# Patient Record
Sex: MALE | Race: OTHER | Employment: UNEMPLOYED | URBAN - METROPOLITAN AREA
[De-identification: names, ages, dates, MRNs, and addresses within clinical notes are randomized per-mention and may not be internally consistent; named-entity substitution may affect disease eponyms.]

---

## 2022-10-05 ENCOUNTER — HOSPITAL ENCOUNTER (INPATIENT)
Age: 57
LOS: 20 days | Discharge: HOME HEALTH | DRG: 854 | End: 2022-10-25
Attending: EMERGENCY MEDICINE | Admitting: FAMILY MEDICINE

## 2022-10-05 ENCOUNTER — APPOINTMENT (OUTPATIENT)
Dept: CT IMAGING | Age: 57
DRG: 854 | End: 2022-10-05

## 2022-10-05 ENCOUNTER — HOSPITAL ENCOUNTER (EMERGENCY)
Dept: GENERAL RADIOLOGY | Age: 57
Discharge: HOME | DRG: 854 | End: 2022-10-08

## 2022-10-05 DIAGNOSIS — L02.219 CELLULITIS AND ABSCESS OF TRUNK: Primary | ICD-10-CM

## 2022-10-05 DIAGNOSIS — R73.9 HYPERGLYCEMIA: ICD-10-CM

## 2022-10-05 DIAGNOSIS — L03.319 CELLULITIS AND ABSCESS OF TRUNK: Primary | ICD-10-CM

## 2022-10-05 DIAGNOSIS — L02.215 ABSCESS, PERINEUM: ICD-10-CM

## 2022-10-05 DIAGNOSIS — N48.21 PENILE ABSCESS: ICD-10-CM

## 2022-10-05 PROBLEM — L02.91 ABSCESS: Status: ACTIVE | Noted: 2022-10-05

## 2022-10-05 LAB
ALBUMIN SERPL-MCNC: 1.8 G/DL (ref 3.5–5)
ALBUMIN/GLOB SERPL: 0.2 {RATIO} (ref 1.2–3.5)
ALP SERPL-CCNC: 164 U/L (ref 50–136)
ALT SERPL-CCNC: 66 U/L (ref 12–65)
ANION GAP SERPL CALC-SCNC: 7 MMOL/L (ref 4–13)
AST SERPL-CCNC: 36 U/L (ref 15–37)
BASOPHILS # BLD: 0.1 K/UL (ref 0–0.2)
BASOPHILS NFR BLD: 0 % (ref 0–2)
BILIRUB SERPL-MCNC: 0.4 MG/DL (ref 0.2–1.1)
BUN SERPL-MCNC: 31 MG/DL (ref 6–23)
CALCIUM SERPL-MCNC: 9.3 MG/DL (ref 8.3–10.4)
CHLORIDE SERPL-SCNC: 99 MMOL/L (ref 101–110)
CO2 SERPL-SCNC: 27 MMOL/L (ref 21–32)
CREAT SERPL-MCNC: 1.3 MG/DL (ref 0.8–1.5)
CRP SERPL-MCNC: 26.8 MG/DL (ref 0–0.9)
DIFFERENTIAL METHOD BLD: ABNORMAL
EOSINOPHIL # BLD: 0.1 K/UL (ref 0–0.8)
EOSINOPHIL NFR BLD: 0 % (ref 0.5–7.8)
ERYTHROCYTE [DISTWIDTH] IN BLOOD BY AUTOMATED COUNT: 14.1 % (ref 11.9–14.6)
GLOBULIN SER CALC-MCNC: 7.3 G/DL (ref 2.3–3.5)
GLUCOSE BLD STRIP.AUTO-MCNC: 327 MG/DL (ref 65–100)
GLUCOSE BLD STRIP.AUTO-MCNC: 363 MG/DL (ref 65–100)
GLUCOSE SERPL-MCNC: 287 MG/DL (ref 65–100)
HCT VFR BLD AUTO: 37.9 % (ref 41.1–50.3)
HGB BLD-MCNC: 12 G/DL (ref 13.6–17.2)
IMM GRANULOCYTES # BLD AUTO: 0.2 K/UL (ref 0–0.5)
IMM GRANULOCYTES NFR BLD AUTO: 1 % (ref 0–5)
LACTATE SERPL-SCNC: 1.8 MMOL/L (ref 0.4–2)
LACTATE SERPL-SCNC: 2 MMOL/L (ref 0.4–2)
LYMPHOCYTES # BLD: 3.1 K/UL (ref 0.5–4.6)
LYMPHOCYTES NFR BLD: 15 % (ref 13–44)
MCH RBC QN AUTO: 28.8 PG (ref 26.1–32.9)
MCHC RBC AUTO-ENTMCNC: 31.7 G/DL (ref 31.4–35)
MCV RBC AUTO: 91.1 FL (ref 79.6–97.8)
MONOCYTES # BLD: 1.3 K/UL (ref 0.1–1.3)
MONOCYTES NFR BLD: 6 % (ref 4–12)
NEUTS SEG # BLD: 16.6 K/UL (ref 1.7–8.2)
NEUTS SEG NFR BLD: 78 % (ref 43–78)
NRBC # BLD: 0 K/UL (ref 0–0.2)
PLATELET # BLD AUTO: 321 K/UL (ref 150–450)
PMV BLD AUTO: 12 FL (ref 9.4–12.3)
POTASSIUM SERPL-SCNC: 4.4 MMOL/L (ref 3.5–5.1)
PROCALCITONIN SERPL-MCNC: 0.95 NG/ML (ref 0–0.49)
PROT SERPL-MCNC: 9.1 G/DL (ref 6.3–8.2)
RBC # BLD AUTO: 4.16 M/UL (ref 4.23–5.6)
SERVICE CMNT-IMP: ABNORMAL
SERVICE CMNT-IMP: ABNORMAL
SODIUM SERPL-SCNC: 133 MMOL/L (ref 138–145)
WBC # BLD AUTO: 21.2 K/UL (ref 4.3–11.1)

## 2022-10-05 PROCEDURE — 87040 BLOOD CULTURE FOR BACTERIA: CPT

## 2022-10-05 PROCEDURE — 6370000000 HC RX 637 (ALT 250 FOR IP): Performed by: EMERGENCY MEDICINE

## 2022-10-05 PROCEDURE — 82962 GLUCOSE BLOOD TEST: CPT

## 2022-10-05 PROCEDURE — 96365 THER/PROPH/DIAG IV INF INIT: CPT

## 2022-10-05 PROCEDURE — 96375 TX/PRO/DX INJ NEW DRUG ADDON: CPT

## 2022-10-05 PROCEDURE — 93005 ELECTROCARDIOGRAM TRACING: CPT | Performed by: EMERGENCY MEDICINE

## 2022-10-05 PROCEDURE — 87205 SMEAR GRAM STAIN: CPT

## 2022-10-05 PROCEDURE — 84145 PROCALCITONIN (PCT): CPT

## 2022-10-05 PROCEDURE — 85025 COMPLETE CBC W/AUTO DIFF WBC: CPT

## 2022-10-05 PROCEDURE — 1100000000 HC RM PRIVATE

## 2022-10-05 PROCEDURE — 6360000002 HC RX W HCPCS: Performed by: EMERGENCY MEDICINE

## 2022-10-05 PROCEDURE — 2580000003 HC RX 258: Performed by: EMERGENCY MEDICINE

## 2022-10-05 PROCEDURE — 87186 SC STD MICRODIL/AGAR DIL: CPT

## 2022-10-05 PROCEDURE — 80053 COMPREHEN METABOLIC PANEL: CPT

## 2022-10-05 PROCEDURE — 87077 CULTURE AEROBIC IDENTIFY: CPT

## 2022-10-05 PROCEDURE — 6360000004 HC RX CONTRAST MEDICATION: Performed by: EMERGENCY MEDICINE

## 2022-10-05 PROCEDURE — 71045 X-RAY EXAM CHEST 1 VIEW: CPT

## 2022-10-05 PROCEDURE — 74177 CT ABD & PELVIS W/CONTRAST: CPT

## 2022-10-05 PROCEDURE — 86140 C-REACTIVE PROTEIN: CPT

## 2022-10-05 PROCEDURE — 99285 EMERGENCY DEPT VISIT HI MDM: CPT

## 2022-10-05 PROCEDURE — 83605 ASSAY OF LACTIC ACID: CPT

## 2022-10-05 RX ORDER — HYDROMORPHONE HYDROCHLORIDE 1 MG/ML
1 INJECTION, SOLUTION INTRAMUSCULAR; INTRAVENOUS; SUBCUTANEOUS
Status: COMPLETED | OUTPATIENT
Start: 2022-10-05 | End: 2022-10-05

## 2022-10-05 RX ORDER — 0.9 % SODIUM CHLORIDE 0.9 %
100 INTRAVENOUS SOLUTION INTRAVENOUS
Status: COMPLETED | OUTPATIENT
Start: 2022-10-05 | End: 2022-10-06

## 2022-10-05 RX ORDER — SODIUM CHLORIDE, SODIUM LACTATE, POTASSIUM CHLORIDE, AND CALCIUM CHLORIDE .6; .31; .03; .02 G/100ML; G/100ML; G/100ML; G/100ML
1000 INJECTION, SOLUTION INTRAVENOUS ONCE
Status: COMPLETED | OUTPATIENT
Start: 2022-10-05 | End: 2022-10-06

## 2022-10-05 RX ORDER — GLIPIZIDE 10 MG/1
TABLET, FILM COATED, EXTENDED RELEASE ORAL
Status: ON HOLD | COMMUNITY
Start: 2022-09-14 | End: 2022-10-25 | Stop reason: HOSPADM

## 2022-10-05 RX ORDER — ONDANSETRON 2 MG/ML
4 INJECTION INTRAMUSCULAR; INTRAVENOUS
Status: COMPLETED | OUTPATIENT
Start: 2022-10-05 | End: 2022-10-05

## 2022-10-05 RX ORDER — LISINOPRIL 5 MG/1
TABLET ORAL
Status: ON HOLD | COMMUNITY
End: 2022-10-25 | Stop reason: HOSPADM

## 2022-10-05 RX ORDER — SODIUM CHLORIDE 0.9 % (FLUSH) 0.9 %
10 SYRINGE (ML) INJECTION
Status: COMPLETED | OUTPATIENT
Start: 2022-10-05 | End: 2022-10-05

## 2022-10-05 RX ADMIN — HYDROMORPHONE HYDROCHLORIDE 1 MG: 1 INJECTION, SOLUTION INTRAMUSCULAR; INTRAVENOUS; SUBCUTANEOUS at 20:45

## 2022-10-05 RX ADMIN — SODIUM CHLORIDE 100 ML: 9 INJECTION, SOLUTION INTRAVENOUS at 21:03

## 2022-10-05 RX ADMIN — PIPERACILLIN AND TAZOBACTAM 4500 MG: 4; .5 INJECTION, POWDER, FOR SOLUTION INTRAVENOUS at 20:46

## 2022-10-05 RX ADMIN — SODIUM CHLORIDE, POTASSIUM CHLORIDE, SODIUM LACTATE AND CALCIUM CHLORIDE 1000 ML: 600; 310; 30; 20 INJECTION, SOLUTION INTRAVENOUS at 21:35

## 2022-10-05 RX ADMIN — INSULIN HUMAN 10 UNITS: 100 INJECTION, SOLUTION PARENTERAL at 21:36

## 2022-10-05 RX ADMIN — SODIUM CHLORIDE, PRESERVATIVE FREE 10 ML: 5 INJECTION INTRAVENOUS at 21:03

## 2022-10-05 RX ADMIN — IOPAMIDOL 100 ML: 755 INJECTION, SOLUTION INTRAVENOUS at 21:03

## 2022-10-05 RX ADMIN — ONDANSETRON 4 MG: 2 INJECTION INTRAMUSCULAR; INTRAVENOUS at 20:45

## 2022-10-05 ASSESSMENT — ENCOUNTER SYMPTOMS
SHORTNESS OF BREATH: 0
COUGH: 0
BACK PAIN: 1
ABDOMINAL PAIN: 0
VOMITING: 0

## 2022-10-05 ASSESSMENT — PAIN - FUNCTIONAL ASSESSMENT: PAIN_FUNCTIONAL_ASSESSMENT: 0-10

## 2022-10-05 ASSESSMENT — PAIN DESCRIPTION - LOCATION: LOCATION: BACK

## 2022-10-05 ASSESSMENT — PAIN SCALES - GENERAL: PAINLEVEL_OUTOF10: 8

## 2022-10-05 NOTE — ED TRIAGE NOTES
Per patient hyperglycemia x4 months states recently 404. Abscess noted to mid thoracic area red and darkened area noted. Patient states of n/v/d x7 days. Denies gu complications. Denies fever/chills.

## 2022-10-06 ENCOUNTER — APPOINTMENT (OUTPATIENT)
Dept: ULTRASOUND IMAGING | Age: 57
DRG: 854 | End: 2022-10-06

## 2022-10-06 ENCOUNTER — APPOINTMENT (OUTPATIENT)
Dept: NON INVASIVE DIAGNOSTICS | Age: 57
DRG: 854 | End: 2022-10-06

## 2022-10-06 PROBLEM — E11.9 DM2 (DIABETES MELLITUS, TYPE 2) (HCC): Status: ACTIVE | Noted: 2022-10-06

## 2022-10-06 PROBLEM — I10 PRIMARY HYPERTENSION: Status: ACTIVE | Noted: 2022-10-06

## 2022-10-06 PROBLEM — E11.51 PERIPHERAL ANGIOPATHY DUE TO TYPE 2 DIABETES MELLITUS (HCC): Chronic | Status: ACTIVE | Noted: 2022-10-06

## 2022-10-06 PROBLEM — E11.51 PERIPHERAL ANGIOPATHY DUE TO TYPE 2 DIABETES MELLITUS (HCC): Status: ACTIVE | Noted: 2022-10-06

## 2022-10-06 LAB
ANION GAP SERPL CALC-SCNC: 7 MMOL/L (ref 4–13)
BASOPHILS # BLD: 0.1 K/UL (ref 0–0.2)
BASOPHILS NFR BLD: 0 % (ref 0–2)
BUN SERPL-MCNC: 32 MG/DL (ref 6–23)
CALCIUM SERPL-MCNC: 8.3 MG/DL (ref 8.3–10.4)
CHLORIDE SERPL-SCNC: 102 MMOL/L (ref 101–110)
CO2 SERPL-SCNC: 25 MMOL/L (ref 21–32)
CREAT SERPL-MCNC: 1.4 MG/DL (ref 0.8–1.5)
DIFFERENTIAL METHOD BLD: ABNORMAL
ECHO AO ASC DIAM: 3.4 CM
ECHO AO ASCENDING AORTA INDEX: 1.21 CM/M2
ECHO AO ROOT DIAM: 3.1 CM
ECHO AO ROOT INDEX: 1.1 CM/M2
ECHO AV AREA PEAK VELOCITY: 3.2 CM2
ECHO AV AREA VTI: 3.3 CM2
ECHO AV AREA/BSA PEAK VELOCITY: 1.1 CM2/M2
ECHO AV AREA/BSA VTI: 1.2 CM2/M2
ECHO AV MEAN GRADIENT: 4 MMHG
ECHO AV MEAN VELOCITY: 1 M/S
ECHO AV PEAK GRADIENT: 8 MMHG
ECHO AV PEAK VELOCITY: 1.4 M/S
ECHO AV VELOCITY RATIO: 0.86
ECHO AV VTI: 23.4 CM
ECHO BSA: 2.94 M2
ECHO EST RA PRESSURE: 3 MMHG
ECHO IVC PROX: 1.7 CM
ECHO LA AREA 2C: 22 CM2
ECHO LA AREA 4C: 19.7 CM2
ECHO LA DIAMETER INDEX: 1.39 CM/M2
ECHO LA DIAMETER: 3.9 CM
ECHO LA MAJOR AXIS: 5.3 CM
ECHO LA MINOR AXIS: 5.8 CM
ECHO LA TO AORTIC ROOT RATIO: 1.26
ECHO LA VOL BP: 66 ML (ref 18–58)
ECHO LA VOL/BSA BIPLANE: 23 ML/M2 (ref 16–34)
ECHO LV E' LATERAL VELOCITY: 12 CM/S
ECHO LV E' SEPTAL VELOCITY: 10 CM/S
ECHO LV EDV A2C: 95 ML
ECHO LV EDV A4C: 137 ML
ECHO LV EDV INDEX A4C: 49 ML/M2
ECHO LV EDV NDEX A2C: 34 ML/M2
ECHO LV EJECTION FRACTION A2C: 34 %
ECHO LV EJECTION FRACTION A4C: 47 %
ECHO LV EJECTION FRACTION BIPLANE: 42 % (ref 55–100)
ECHO LV ESV A2C: 63 ML
ECHO LV ESV A4C: 72 ML
ECHO LV ESV INDEX A2C: 22 ML/M2
ECHO LV ESV INDEX A4C: 26 ML/M2
ECHO LV FRACTIONAL SHORTENING: 19 % (ref 28–44)
ECHO LV INTERNAL DIMENSION DIASTOLE INDEX: 1.53 CM/M2
ECHO LV INTERNAL DIMENSION DIASTOLIC: 4.3 CM (ref 4.2–5.9)
ECHO LV INTERNAL DIMENSION SYSTOLIC INDEX: 1.25 CM/M2
ECHO LV INTERNAL DIMENSION SYSTOLIC: 3.5 CM
ECHO LV IVSD: 1.4 CM (ref 0.6–1)
ECHO LV MASS 2D: 219.8 G (ref 88–224)
ECHO LV MASS INDEX 2D: 78.2 G/M2 (ref 49–115)
ECHO LV POSTERIOR WALL DIASTOLIC: 1.3 CM (ref 0.6–1)
ECHO LV RELATIVE WALL THICKNESS RATIO: 0.6
ECHO LVOT AREA: 3.8 CM2
ECHO LVOT AV VTI INDEX: 0.86
ECHO LVOT DIAM: 2.2 CM
ECHO LVOT MEAN GRADIENT: 3 MMHG
ECHO LVOT PEAK GRADIENT: 6 MMHG
ECHO LVOT PEAK VELOCITY: 1.2 M/S
ECHO LVOT STROKE VOLUME INDEX: 27.3 ML/M2
ECHO LVOT SV: 76.7 ML
ECHO LVOT VTI: 20.2 CM
ECHO MV A VELOCITY: 0.62 M/S
ECHO MV E DECELERATION TIME (DT): 224 MS
ECHO MV E VELOCITY: 0.67 M/S
ECHO MV E/A RATIO: 1.08
ECHO MV E/E' LATERAL: 5.58
ECHO MV E/E' RATIO (AVERAGED): 6.14
ECHO MV E/E' SEPTAL: 6.7
ECHO PV ACCELERATION TIME (AT): 116 MS
ECHO PV MAX VELOCITY: 1.3 M/S
ECHO PV PEAK GRADIENT: 7 MMHG
ECHO RV BASAL DIMENSION: 3.8 CM
ECHO RV INTERNAL DIMENSION: 3.2 CM
ECHO RV TAPSE: 2.1 CM (ref 1.7–?)
EKG ATRIAL RATE: 122 BPM
EKG DIAGNOSIS: NORMAL
EKG P AXIS: 44 DEGREES
EKG P-R INTERVAL: 140 MS
EKG Q-T INTERVAL: 308 MS
EKG QRS DURATION: 98 MS
EKG QTC CALCULATION (BAZETT): 438 MS
EKG R AXIS: -56 DEGREES
EKG T AXIS: 77 DEGREES
EKG VENTRICULAR RATE: 122 BPM
EOSINOPHIL # BLD: 0.1 K/UL (ref 0–0.8)
EOSINOPHIL NFR BLD: 1 % (ref 0.5–7.8)
ERYTHROCYTE [DISTWIDTH] IN BLOOD BY AUTOMATED COUNT: 14 % (ref 11.9–14.6)
EST. AVERAGE GLUCOSE BLD GHB EST-MCNC: ABNORMAL MG/DL
GLUCOSE BLD STRIP.AUTO-MCNC: 171 MG/DL (ref 65–100)
GLUCOSE BLD STRIP.AUTO-MCNC: 237 MG/DL (ref 65–100)
GLUCOSE BLD STRIP.AUTO-MCNC: 344 MG/DL (ref 65–100)
GLUCOSE BLD STRIP.AUTO-MCNC: 354 MG/DL (ref 65–100)
GLUCOSE BLD STRIP.AUTO-MCNC: 372 MG/DL (ref 65–100)
GLUCOSE SERPL-MCNC: 348 MG/DL (ref 65–100)
HBA1C MFR BLD: >14 % (ref 4.8–5.6)
HCT VFR BLD AUTO: 31.9 % (ref 41.1–50.3)
HGB BLD-MCNC: 10.2 G/DL (ref 13.6–17.2)
IMM GRANULOCYTES # BLD AUTO: 0.2 K/UL (ref 0–0.5)
IMM GRANULOCYTES NFR BLD AUTO: 1 % (ref 0–5)
LV EF: 63 %
LVEF MODALITY: ABNORMAL
LYMPHOCYTES # BLD: 2.3 K/UL (ref 0.5–4.6)
LYMPHOCYTES NFR BLD: 12 % (ref 13–44)
MCH RBC QN AUTO: 29.1 PG (ref 26.1–32.9)
MCHC RBC AUTO-ENTMCNC: 32 G/DL (ref 31.4–35)
MCV RBC AUTO: 90.9 FL (ref 79.6–97.8)
MONOCYTES # BLD: 1.3 K/UL (ref 0.1–1.3)
MONOCYTES NFR BLD: 7 % (ref 4–12)
NEUTS SEG # BLD: 15.6 K/UL (ref 1.7–8.2)
NEUTS SEG NFR BLD: 79 % (ref 43–78)
NRBC # BLD: 0 K/UL (ref 0–0.2)
PLATELET # BLD AUTO: 246 K/UL (ref 150–450)
PMV BLD AUTO: 12 FL (ref 9.4–12.3)
POTASSIUM SERPL-SCNC: 4.5 MMOL/L (ref 3.5–5.1)
RBC # BLD AUTO: 3.51 M/UL (ref 4.23–5.6)
SERVICE CMNT-IMP: ABNORMAL
SODIUM SERPL-SCNC: 134 MMOL/L (ref 138–145)
WBC # BLD AUTO: 19.7 K/UL (ref 4.3–11.1)

## 2022-10-06 PROCEDURE — 2580000003 HC RX 258: Performed by: FAMILY MEDICINE

## 2022-10-06 PROCEDURE — 6370000000 HC RX 637 (ALT 250 FOR IP): Performed by: FAMILY MEDICINE

## 2022-10-06 PROCEDURE — 86593 SYPHILIS TEST NON-TREP QUANT: CPT

## 2022-10-06 PROCEDURE — 2580000003 HC RX 258: Performed by: STUDENT IN AN ORGANIZED HEALTH CARE EDUCATION/TRAINING PROGRAM

## 2022-10-06 PROCEDURE — 87102 FUNGUS ISOLATION CULTURE: CPT

## 2022-10-06 PROCEDURE — 6370000000 HC RX 637 (ALT 250 FOR IP): Performed by: STUDENT IN AN ORGANIZED HEALTH CARE EDUCATION/TRAINING PROGRAM

## 2022-10-06 PROCEDURE — 6360000002 HC RX W HCPCS: Performed by: FAMILY MEDICINE

## 2022-10-06 PROCEDURE — 86592 SYPHILIS TEST NON-TREP QUAL: CPT

## 2022-10-06 PROCEDURE — 76700 US EXAM ABDOM COMPLETE: CPT

## 2022-10-06 PROCEDURE — 51798 US URINE CAPACITY MEASURE: CPT

## 2022-10-06 PROCEDURE — 1100000000 HC RM PRIVATE

## 2022-10-06 PROCEDURE — 6360000004 HC RX CONTRAST MEDICATION: Performed by: STUDENT IN AN ORGANIZED HEALTH CARE EDUCATION/TRAINING PROGRAM

## 2022-10-06 PROCEDURE — 82962 GLUCOSE BLOOD TEST: CPT

## 2022-10-06 PROCEDURE — 36415 COLL VENOUS BLD VENIPUNCTURE: CPT

## 2022-10-06 PROCEDURE — 85025 COMPLETE CBC W/AUTO DIFF WBC: CPT

## 2022-10-06 PROCEDURE — 86635 COCCIDIOIDES ANTIBODY: CPT

## 2022-10-06 PROCEDURE — 83036 HEMOGLOBIN GLYCOSYLATED A1C: CPT

## 2022-10-06 PROCEDURE — C8929 TTE W OR WO FOL WCON,DOPPLER: HCPCS

## 2022-10-06 PROCEDURE — 93306 TTE W/DOPPLER COMPLETE: CPT | Performed by: INTERNAL MEDICINE

## 2022-10-06 PROCEDURE — 6360000002 HC RX W HCPCS: Performed by: EMERGENCY MEDICINE

## 2022-10-06 PROCEDURE — 80048 BASIC METABOLIC PNL TOTAL CA: CPT

## 2022-10-06 PROCEDURE — 99222 1ST HOSP IP/OBS MODERATE 55: CPT | Performed by: UROLOGY

## 2022-10-06 RX ORDER — ONDANSETRON 2 MG/ML
4 INJECTION INTRAMUSCULAR; INTRAVENOUS EVERY 6 HOURS PRN
Status: DISCONTINUED | OUTPATIENT
Start: 2022-10-06 | End: 2022-10-25 | Stop reason: HOSPADM

## 2022-10-06 RX ORDER — HYDROCODONE BITARTRATE AND ACETAMINOPHEN 5; 325 MG/1; MG/1
1 TABLET ORAL EVERY 4 HOURS PRN
Status: DISCONTINUED | OUTPATIENT
Start: 2022-10-06 | End: 2022-10-08

## 2022-10-06 RX ORDER — MORPHINE SULFATE 2 MG/ML
2 INJECTION, SOLUTION INTRAMUSCULAR; INTRAVENOUS EVERY 4 HOURS PRN
Status: DISCONTINUED | OUTPATIENT
Start: 2022-10-06 | End: 2022-10-08

## 2022-10-06 RX ORDER — ACETAMINOPHEN 650 MG/1
650 SUPPOSITORY RECTAL EVERY 6 HOURS PRN
Status: DISCONTINUED | OUTPATIENT
Start: 2022-10-06 | End: 2022-10-25 | Stop reason: HOSPADM

## 2022-10-06 RX ORDER — ENOXAPARIN SODIUM 100 MG/ML
40 INJECTION SUBCUTANEOUS 2 TIMES DAILY
Status: DISCONTINUED | OUTPATIENT
Start: 2022-10-06 | End: 2022-10-18

## 2022-10-06 RX ORDER — SODIUM CHLORIDE 0.9 % (FLUSH) 0.9 %
5-40 SYRINGE (ML) INJECTION EVERY 12 HOURS SCHEDULED
Status: DISCONTINUED | OUTPATIENT
Start: 2022-10-06 | End: 2022-10-25 | Stop reason: HOSPADM

## 2022-10-06 RX ORDER — ACETAMINOPHEN 325 MG/1
650 TABLET ORAL EVERY 6 HOURS PRN
Status: DISCONTINUED | OUTPATIENT
Start: 2022-10-06 | End: 2022-10-25 | Stop reason: HOSPADM

## 2022-10-06 RX ORDER — INSULIN LISPRO 100 [IU]/ML
0-8 INJECTION, SOLUTION INTRAVENOUS; SUBCUTANEOUS
Status: DISCONTINUED | OUTPATIENT
Start: 2022-10-06 | End: 2022-10-25 | Stop reason: HOSPADM

## 2022-10-06 RX ORDER — INSULIN GLARGINE 100 [IU]/ML
25 INJECTION, SOLUTION SUBCUTANEOUS DAILY
Status: DISCONTINUED | OUTPATIENT
Start: 2022-10-06 | End: 2022-10-08

## 2022-10-06 RX ORDER — INSULIN GLARGINE 100 [IU]/ML
10 INJECTION, SOLUTION SUBCUTANEOUS NIGHTLY
Status: DISCONTINUED | OUTPATIENT
Start: 2022-10-06 | End: 2022-10-06

## 2022-10-06 RX ORDER — INSULIN LISPRO 100 [IU]/ML
3 INJECTION, SOLUTION INTRAVENOUS; SUBCUTANEOUS
Status: DISCONTINUED | OUTPATIENT
Start: 2022-10-06 | End: 2022-10-06

## 2022-10-06 RX ORDER — INSULIN LISPRO 100 [IU]/ML
0-4 INJECTION, SOLUTION INTRAVENOUS; SUBCUTANEOUS NIGHTLY
Status: DISCONTINUED | OUTPATIENT
Start: 2022-10-06 | End: 2022-10-25 | Stop reason: HOSPADM

## 2022-10-06 RX ORDER — SODIUM CHLORIDE 0.9 % (FLUSH) 0.9 %
5-40 SYRINGE (ML) INJECTION PRN
Status: DISCONTINUED | OUTPATIENT
Start: 2022-10-06 | End: 2022-10-25 | Stop reason: HOSPADM

## 2022-10-06 RX ORDER — ONDANSETRON 4 MG/1
4 TABLET, ORALLY DISINTEGRATING ORAL EVERY 8 HOURS PRN
Status: DISCONTINUED | OUTPATIENT
Start: 2022-10-06 | End: 2022-10-25 | Stop reason: HOSPADM

## 2022-10-06 RX ORDER — LISINOPRIL 5 MG/1
5 TABLET ORAL DAILY
Status: DISCONTINUED | OUTPATIENT
Start: 2022-10-06 | End: 2022-10-09

## 2022-10-06 RX ORDER — SODIUM CHLORIDE 9 MG/ML
INJECTION, SOLUTION INTRAVENOUS CONTINUOUS
Status: ACTIVE | OUTPATIENT
Start: 2022-10-06 | End: 2022-10-06

## 2022-10-06 RX ORDER — POLYETHYLENE GLYCOL 3350 17 G/17G
17 POWDER, FOR SOLUTION ORAL DAILY PRN
Status: DISCONTINUED | OUTPATIENT
Start: 2022-10-06 | End: 2022-10-25 | Stop reason: HOSPADM

## 2022-10-06 RX ORDER — SODIUM CHLORIDE 9 MG/ML
INJECTION, SOLUTION INTRAVENOUS PRN
Status: DISCONTINUED | OUTPATIENT
Start: 2022-10-06 | End: 2022-10-25 | Stop reason: HOSPADM

## 2022-10-06 RX ADMIN — Medication 2500 MG: at 00:44

## 2022-10-06 RX ADMIN — MORPHINE SULFATE 2 MG: 2 INJECTION, SOLUTION INTRAMUSCULAR; INTRAVENOUS at 05:33

## 2022-10-06 RX ADMIN — MORPHINE SULFATE 2 MG: 2 INJECTION, SOLUTION INTRAMUSCULAR; INTRAVENOUS at 09:50

## 2022-10-06 RX ADMIN — MORPHINE SULFATE 2 MG: 2 INJECTION, SOLUTION INTRAMUSCULAR; INTRAVENOUS at 01:34

## 2022-10-06 RX ADMIN — PIPERACILLIN AND TAZOBACTAM 3375 MG: 3; .375 INJECTION, POWDER, FOR SOLUTION INTRAVENOUS at 22:08

## 2022-10-06 RX ADMIN — INSULIN LISPRO 2 UNITS: 100 INJECTION, SOLUTION INTRAVENOUS; SUBCUTANEOUS at 16:17

## 2022-10-06 RX ADMIN — HYDROCODONE BITARTRATE AND ACETAMINOPHEN 1 TABLET: 5; 325 TABLET ORAL at 23:39

## 2022-10-06 RX ADMIN — SODIUM CHLORIDE, PRESERVATIVE FREE 8 ML: 5 INJECTION INTRAVENOUS at 08:22

## 2022-10-06 RX ADMIN — INSULIN LISPRO 8 UNITS: 100 INJECTION, SOLUTION INTRAVENOUS; SUBCUTANEOUS at 11:38

## 2022-10-06 RX ADMIN — SODIUM CHLORIDE, PRESERVATIVE FREE 10 ML: 5 INJECTION INTRAVENOUS at 22:08

## 2022-10-06 RX ADMIN — SODIUM CHLORIDE: 9 INJECTION, SOLUTION INTRAVENOUS at 02:33

## 2022-10-06 RX ADMIN — PIPERACILLIN AND TAZOBACTAM 3375 MG: 3; .375 INJECTION, POWDER, FOR SOLUTION INTRAVENOUS at 13:39

## 2022-10-06 RX ADMIN — PERFLUTREN 0.15 ML: 6.52 INJECTION, SUSPENSION INTRAVENOUS at 14:41

## 2022-10-06 RX ADMIN — HYDROCODONE BITARTRATE AND ACETAMINOPHEN 1 TABLET: 5; 325 TABLET ORAL at 03:50

## 2022-10-06 RX ADMIN — ENOXAPARIN SODIUM 40 MG: 100 INJECTION SUBCUTANEOUS at 22:08

## 2022-10-06 RX ADMIN — ACETAMINOPHEN 650 MG: 325 TABLET ORAL at 02:33

## 2022-10-06 RX ADMIN — PIPERACILLIN AND TAZOBACTAM 3375 MG: 3; .375 INJECTION, POWDER, FOR SOLUTION INTRAVENOUS at 06:34

## 2022-10-06 RX ADMIN — INSULIN LISPRO 8 UNITS: 100 INJECTION, SOLUTION INTRAVENOUS; SUBCUTANEOUS at 08:18

## 2022-10-06 RX ADMIN — MORPHINE SULFATE 2 MG: 2 INJECTION, SOLUTION INTRAMUSCULAR; INTRAVENOUS at 22:07

## 2022-10-06 RX ADMIN — INSULIN LISPRO 3 UNITS: 100 INJECTION, SOLUTION INTRAVENOUS; SUBCUTANEOUS at 08:18

## 2022-10-06 RX ADMIN — INSULIN GLARGINE 25 UNITS: 100 INJECTION, SOLUTION SUBCUTANEOUS at 09:51

## 2022-10-06 RX ADMIN — HYDROCODONE BITARTRATE AND ACETAMINOPHEN 1 TABLET: 5; 325 TABLET ORAL at 13:38

## 2022-10-06 RX ADMIN — INSULIN LISPRO 4 UNITS: 100 INJECTION, SOLUTION INTRAVENOUS; SUBCUTANEOUS at 02:48

## 2022-10-06 RX ADMIN — MORPHINE SULFATE 2 MG: 2 INJECTION, SOLUTION INTRAMUSCULAR; INTRAVENOUS at 16:17

## 2022-10-06 RX ADMIN — SODIUM CHLORIDE: 9 INJECTION, SOLUTION INTRAVENOUS at 08:34

## 2022-10-06 RX ADMIN — HYDROCODONE BITARTRATE AND ACETAMINOPHEN 1 TABLET: 5; 325 TABLET ORAL at 08:22

## 2022-10-06 ASSESSMENT — PAIN SCALES - GENERAL
PAINLEVEL_OUTOF10: 10
PAINLEVEL_OUTOF10: 7
PAINLEVEL_OUTOF10: 6
PAINLEVEL_OUTOF10: 10
PAINLEVEL_OUTOF10: 6
PAINLEVEL_OUTOF10: 7
PAINLEVEL_OUTOF10: 10
PAINLEVEL_OUTOF10: 6
PAINLEVEL_OUTOF10: 7
PAINLEVEL_OUTOF10: 6

## 2022-10-06 ASSESSMENT — PAIN DESCRIPTION - PAIN TYPE: TYPE: SURGICAL PAIN

## 2022-10-06 ASSESSMENT — PAIN DESCRIPTION - ORIENTATION
ORIENTATION: MID
ORIENTATION: MID
ORIENTATION: LEFT

## 2022-10-06 ASSESSMENT — PAIN DESCRIPTION - LOCATION
LOCATION: BACK
LOCATION: SCROTUM
LOCATION: BACK
LOCATION: BACK
LOCATION: PENIS
LOCATION: SCROTUM
LOCATION: SCROTUM
LOCATION: PENIS

## 2022-10-06 ASSESSMENT — PAIN DESCRIPTION - DESCRIPTORS
DESCRIPTORS: ACHING;SORE
DESCRIPTORS: ACHING;SORE
DESCRIPTORS: THROBBING
DESCRIPTORS: SORE

## 2022-10-06 NOTE — CONSULTS
CONSULT                      Date: 10/6/2022        Patient Name: Nadia Catherine     YOB: 1965      Age:  60 y.o. History of Present Illness     60 y.o.   male with medical history of DM2, HTN who presented to ED with nausea, vomiting, and concerns about back abscess. Per report, patient has been having intermittent drainage from a painful wound on his back for ~2 months. Additionally, patient with areas of groin cellulitis and induration as well. I&D performed in ER for back abscess. WBC is elevated at 21.2. Blood glucoses are also elevated over 300. Patient reports high blood sugars at home recently as well. Given areas of abscess and cellulitis, CT was performed, which does not show air or gangrenous changes, but does show developing L mons (as well as known back abscess). Patient related that it had been > 24 hours since he voided. Past Medical History     Past Medical History:   Diagnosis Date    Diabetes mellitus ([facility] [address]     Hypertension         Past Surgical History     History reviewed. No pertinent surgical history. Medications Prior to Admission     Prior to Admission medications    Medication Sig Start Date End Date Taking? Authorizing Provider   glipiZIDE (GLUCOTROL XL) 10 MG extended release tablet TAKE 1 TABLET BY MOUTH TWICE DAILY WITH FOOD 9/14/22   Historical Provider, MD   lisinopril (PRINIVIL;ZESTRIL) 5 MG tablet 1 tablet Orally Once a day for 30 day(s)    Historical Provider, MD   metFORMIN (GLUCOPHAGE) 1000 MG tablet 1 tablet with a meal Orally Twice a day for 30 days    Historical Provider, MD        Allergies     Patient has no known allergies.     Social History     Social History       Tobacco History       Smoking Status  Former Smoking Tobacco Type  Cigarettes      Smokeless Tobacco Use  Never              Alcohol History       Alcohol Use Status  Not Asked              Drug Use       Drug Use Status  Not Asked              Sexual Activity Sexually Active  Not Asked                    Family History     History reviewed. No pertinent family history. Review of Systems     Pertinent information in HPI    Physical Exam     /75   Pulse 91   Temp 99 °F (37.2 °C)   Resp 16   Ht 6' 2\" (1.88 m)   Wt (!) 365 lb (165.6 kg)   SpO2 95%   BMI 46.86 kg/m²     Physical Exam:  General:          Well nourished. No overt distress  Head:               Normocephalic, atraumatic  HENT:             Nares appear normal, no drainage. CV:                  RRR.    Lungs:             CTAB. Abdomen: Bowel sounds present. Soft, nontender, nondistended. Extremities:     Warm and dry. No cyanosis or clubbing. Skin:                L mons erythematous, firm, warm;  Penis edematous with small fluctuant area of dorsal midshaft and pinhole draining area; no scrotal swelling or redness  Neuro:             Cranial nerves II-XII grossly intact. Sensation intact  Psych:             Normal mood and affect.   Alert and oriented x3        Labs      Recent Results (from the past 24 hour(s))   EKG 12 Lead    Collection Time: 10/05/22  4:52 PM   Result Value Ref Range    Ventricular Rate 122 BPM    Atrial Rate 122 BPM    P-R Interval 140 ms    QRS Duration 98 ms    Q-T Interval 308 ms    QTc Calculation (Bazett) 438 ms    P Axis 44 degrees    R Axis -56 degrees    T Axis 77 degrees    Diagnosis Sinus tachycardia    POCT Glucose    Collection Time: 10/05/22  4:57 PM   Result Value Ref Range    POC Glucose 363 (H) 65 - 100 mg/dL    Performed by: CarlosAprGino    Lactic Acid    Collection Time: 10/05/22  4:58 PM   Result Value Ref Range    Lactic Acid, Plasma 2.0 0.4 - 2.0 MMOL/L   CBC with Auto Differential    Collection Time: 10/05/22  4:58 PM   Result Value Ref Range    WBC 21.2 (H) 4.3 - 11.1 K/uL    RBC 4.16 (L) 4.23 - 5.6 M/uL    Hemoglobin 12.0 (L) 13.6 - 17.2 g/dL    Hematocrit 37.9 (L) 41.1 - 50.3 %    MCV 91.1 79.6 - 97.8 FL    MCH 28.8 26.1 - 32.9 PG    MCHC 31.7 31.4 - 35.0 g/dL    RDW 14.1 11.9 - 14.6 %    Platelets 640 165 - 644 K/uL    MPV 12.0 9.4 - 12.3 FL    nRBC 0.00 0.0 - 0.2 K/uL    Differential Type AUTOMATED      Seg Neutrophils 78 43 - 78 %    Lymphocytes 15 13 - 44 %    Monocytes 6 4.0 - 12.0 %    Eosinophils % 0 (L) 0.5 - 7.8 %    Basophils 0 0.0 - 2.0 %    Immature Granulocytes 1 0.0 - 5.0 %    Segs Absolute 16.6 (H) 1.7 - 8.2 K/UL    Absolute Lymph # 3.1 0.5 - 4.6 K/UL    Absolute Mono # 1.3 0.1 - 1.3 K/UL    Absolute Eos # 0.1 0.0 - 0.8 K/UL    Basophils Absolute 0.1 0.0 - 0.2 K/UL    Absolute Immature Granulocyte 0.2 0.0 - 0.5 K/UL   CMP    Collection Time: 10/05/22  4:58 PM   Result Value Ref Range    Sodium 133 (L) 138 - 145 mmol/L    Potassium 4.4 3.5 - 5.1 mmol/L    Chloride 99 (L) 101 - 110 mmol/L    CO2 27 21 - 32 mmol/L    Anion Gap 7 4 - 13 mmol/L    Glucose 287 (H) 65 - 100 mg/dL    BUN 31 (H) 6 - 23 MG/DL    Creatinine 1.30 0.8 - 1.5 MG/DL    Est, Glom Filt Rate >60 >60 ml/min/1.73m2    Calcium 9.3 8.3 - 10.4 MG/DL    Total Bilirubin 0.4 0.2 - 1.1 MG/DL    ALT 66 (H) 12 - 65 U/L    AST 36 15 - 37 U/L    Alk Phosphatase 164 (H) 50 - 136 U/L    Total Protein 9.1 (H) 6.3 - 8.2 g/dL    Albumin 1.8 (L) 3.5 - 5.0 g/dL    Globulin 7.3 (H) 2.3 - 3.5 g/dL    Albumin/Globulin Ratio 0.2 (L) 1.2 - 3.5     Procalcitonin    Collection Time: 10/05/22  4:58 PM   Result Value Ref Range    Procalcitonin 0.95 (H) 0.00 - 0.49 ng/mL   C-Reactive Protein    Collection Time: 10/05/22  4:58 PM   Result Value Ref Range    CRP 26.8 (H) 0.0 - 0.9 mg/dL   Lactic Acid    Collection Time: 10/05/22  8:23 PM   Result Value Ref Range    Lactic Acid, Plasma 1.8 0.4 - 2.0 MMOL/L   POCT Glucose    Collection Time: 10/05/22 10:17 PM   Result Value Ref Range    POC Glucose 327 (H) 65 - 100 mg/dL    Performed by: Vicente    POCT Glucose    Collection Time: 10/06/22  2:45 AM   Result Value Ref Range    POC Glucose 344 (H) 65 - 100 mg/dL    Performed by: Tere    POCT Glucose    Collection Time: 10/06/22  7:13 AM   Result Value Ref Range    POC Glucose 372 (H) 65 - 100 mg/dL    Performed by: Clarke         Imaging/Diagnostics Last 24 Hours     CT ABDOMEN PELVIS W IV CONTRAST Additional Contrast? None    Result Date: 10/5/2022  EXAM: CT of the abdomen and pelvis with contrast. Indication: Hyperglycemia. Comparison: None. Multiple axial images were obtained through the abdomen and pelvis after intravenous injection of 100mL of Isovue 370. Radiation dose reduction techniques were used for this study: All CT scans performed at this facility use one or all of the following: Automated exposure control, adjustment of the mA and/or kVp according to patient's size, iterative reconstruction. FINDINGS: LOWER THORAX: Lung bases are clear. No pericardial or pleural effusion. LIVER: Normal size and contour. No focal liver lesions. The portal vein, splenic vein, and superior mesenteric vein are patent. BILIARY SYSTEM: Cholelithiasis without evidence of acute cholecystitis. PANCREAS: No pancreatic mass. No pancreatic duct dilation. SPLEEN: No splenomegaly or aggressive splenic lesion. ADRENALS: No adrenal nodule or adrenal hypertrophy. URINARY SYSTEM: No suspicious renal lesion. No renal or ureteral calculus. No hydronephrosis. The bladder is unremarkable. FLUID:  No intraperitoneal free fluid. REPRODUCTIVE: Prominent left inguinal lymph nodes with fatty soft tissue fat stranding and what appears to be phlegmonous change along the leftward aspect of the pubic region/superior aspect of the left scrotum soft tissues with suspicion for developing pubic/scrotal abscess measuring 22 x 38 mm. BOWEL: Stomach, small bowel, and large bowel are normal in course and caliber. No evidence of obstruction. Appendix is normal. VASCULAR/NODES: No aortic or iliac artery aneurysm. No lymphadenopathy.  BONES/SUBCUTANEOUS TISSUE: Left back/flank irregular peripheral enhancing fluid collection with numerous locules of air adjacent reactive fat stranding most indicative of abscess measuring approximately 12 x 10 x 4.9 cm. This abscess abuts the leftward dorsal paraspinous musculature however does not appear to involve the musculature. No evidence of acute osseous abnormality. 1. Left soft tissue\flank abscess measuring approximately 12 x 10 x 4.9 cm abutting the dorsal left paraspinous musculature without muscular involvement evident. 2. Abundant inflammatory change of the left inguinal region/left pubic region with some phlegmonous change evident and likely an early developing abscess of the left scrotum/left pubic region measuring 22 x 38 mm. Recommend clinical correlation with physical examination and close attention to this area. 3. Cholelithiasis without evidence of acute cholecystitis. XR CHEST PORTABLE    Result Date: 10/5/2022  EXAM: Single view chest radiograph. INDICATION: Hyperglycemia for 4 months. COMPARISON: None. FINDINGS: No focal lung consolidation. No pneumothorax. No pleural effusion. The heart is normal in size. No evidence of acute osseous abnormality. 1. No acute cardiopulmonary abnormality. Assessment      Principal Problem:    Abscess  Active Problems:    Primary hypertension    DM2 (diabetes mellitus, type 2) ([facility])  Resolved Problems:    * No resolved hospital problems. *  Small penile abscess  Urinary retention    Plan       Using sterile technique and feel to locate glans/urethral meatus within edematous foreskin, urethral catheter placed with > 1 L UOP. Urine will be sent for culture. Penile wound does not currently need I &D. Hopefully the area will continue to spontaneously drain and improve with IV antibiotics. Will monitor. NO scrotal sign of infection present. I called Dr. Jaiden Sandoval, General Surgery, in regards to the back abscess and L mons infection and she will see patient later today.           Electronically signed by Danielito Brizuela MD on 10/6/22 at 7:47 AM EDT

## 2022-10-06 NOTE — PROGRESS NOTES
TRANSFER - IN REPORT:    Verbal report received from Diana Tate RN on Jeanne Cevallos being received from ED for routine progression of care. Report consisted of patients Situation, Background, Assessment and Recommendations(SBAR). Information from the following report(s) SBAR, Kardex, ED Summary, Procedure Summary, MAR, and Recent Results was reviewed. Opportunity for questions and clarification was provided. Assessment completed upon patients arrival to unit and care assumed. Patient received to room OF17. Patient connected to monitor and assessment completed. Plan of care reviewed. Patient oriented to room and call light. Patient aware to use call light to communicate any chest pain or needs. Admission skin assessment completed with second RN and reveals the following: Open wound to left mid-back/flank, covered loosely w/packing and ABD pad. Scrotal redness and swelling. Small scattered scabs to BLE. Sacrum/coccyx and bilateral heels dry and intact w/no redness noted.

## 2022-10-06 NOTE — CONSULTS
Infectious Disease Consult      Today's Date: 10/6/2022   Admit Date: 10/5/2022    Impression:   Patient is a 60year old with DM2 with HbA1C of >14, admitted with a multiple abscesses, dominant on the left Paraspinous area    #Left Paraspinous abscess  #Pubic abscess  - S/p bedside I&D by urology - gram stain with GPCs  - Likely bacterial but given prolonged duration of drainage - will also consider mycobacterial organisms, fungal and atypical bacterial etiology. - Appropriately on vancomycin and PTZ  - Patient does need I&D for source control  - Major contributing factor is his diabetes   -  Plan:   Continue vancomycin dosed by pharmacy and pip/tazo 3.375grams q8rs - prolonged infusions  Engage surgery for I&D, send OR sample for AFB, fungal and bacterial cultures  Will check Cocci ab  Agree with HIV ab  Check RPR and GC/chlamydia  Aim for optimal glycemic control       Anti-infectives:   PTZ - 10/6- present  Vanc 10/5 - present    Subjective:   Date of Consultation:  October 6, 2022  Date of Admission: 10/5/2022   Referring Provider: Dr Allean Mcburney    Patient is a 60 y.o. male with PMH of poorly controlled DM, noted be originally from [facility] in ED note but on questioning patient, he has lived in the [address] [place] [address] since he was 1years old. He has lived in [place] and [place]. He moved to the [address] [place] [address] when he was 2 from [facility]. He is visiting [place], history of DM 2 hba1c of >14, reports that he is on metformin, admitted on 10/5/22 , with primary complaint of persistent draining back wound for about 2 months. S/p bedside I&D of groin abscess by urology on 10/06/22. A CT done showed a [id number]  abscess abutting the left paraspinous musculatures, abundant inflammatory changes in left pubic region and a left 3.8cm pubic abscess. Patient denies any trauma the area. He also has drainage from his left groin and shaft of his penis. He denies any respiratory or GI symptoms.        Patient Active Problem List   Diagnosis    Abscess Peripheral angiopathy due to type 2 diabetes mellitus ([facility]    Primary hypertension    DM2 (diabetes mellitus, type 2) ([facility])     Past Medical History:   Diagnosis Date    Diabetes mellitus ([facility]     Hypertension       History reviewed. No pertinent family history. Social History     Tobacco Use    Smoking status: Former     Types: Cigarettes    Smokeless tobacco: Never   Substance Use Topics    Alcohol use: Not on file     History reviewed. No pertinent surgical history. Prior to Admission medications    Medication Sig Start Date End Date Taking? Authorizing Provider   glipiZIDE (GLUCOTROL XL) 10 MG extended release tablet TAKE 1 TABLET BY MOUTH TWICE DAILY WITH FOOD 22   Historical Provider, MD   lisinopril (PRINIVIL;ZESTRIL) 5 MG tablet 1 tablet Orally Once a day for 30 day(s)    Historical Provider, MD   metFORMIN (GLUCOPHAGE) 1000 MG tablet 1 tablet with a meal Orally Twice a day for 30 days    Historical Provider, MD     No Known Allergies     Review of Systems:  All systems reviewed and negative except as otherwise stated in the HPI    Objective:   Blood pressure (!) 154/82, pulse 93, temperature 99.1 °F (37.3 °C), resp. rate 18, height 6' 2\" (1.88 m), weight (!) 365 lb (165.6 kg), SpO2 95 %. Visit Vitals  BP (!) 154/82   Pulse 93   Temp 99.1 °F (37.3 °C)   Resp 18   Ht 6' 2\" (1.88 m)   Wt (!) 365 lb (165.6 kg)   SpO2 95%   BMI 46.86 kg/m²     Temp (24hrs), Av.3 °F (37.4 °C), Min:98.2 °F (36.8 °C), Max:100.8 °F (38.2 °C)       Exam:    General:  Alert, cooperative, well noursished, well developed, appears stated age   Eyes:  Sclera anicteric. Pupils equally round and reactive to light. Mouth/Throat: Mucous membranes normal, oral pharynx clear   Neck: Supple   Lungs:   Clear to auscultation bilaterally, good effort   CV:  Regular rate and rhythm,no murmur, click, rub or gallop   Abdomen:   Soft, non-tender.  bowel sounds normal. non-distended   Extremities: No cyanosis or edema   Skin: Large draining abscess of patients left back, drainage and redness on scrotum, shaft of penis.  Erythema in groin and perineum   Lymph nodes: Cervical and supraclavicular normal   Musculoskeletal: No swelling or deformity   Lines/Devices:  Intact, no erythema, drainage or tenderness   Psych: Alert and oriented, normal mood affect given the setting       CBC:  Recent Labs     10/05/22  1658 10/06/22  0840   WBC 21.2* 19.7*   HGB 12.0* 10.2*   HCT 37.9* 31.9*    246       BMP:  Recent Labs     10/05/22  1658 10/06/22  0840   BUN 31* 32*   * 134*   K 4.4 4.5   CL 99* 102   CO2 27 25       LFTS:  Recent Labs     10/05/22  1658   ALT 66*       Data Review:   Recent Results (from the past 24 hour(s))   EKG 12 Lead    Collection Time: 10/05/22  4:52 PM   Result Value Ref Range    Ventricular Rate 122 BPM    Atrial Rate 122 BPM    P-R Interval 140 ms    QRS Duration 98 ms    Q-T Interval 308 ms    QTc Calculation (Bazett) 438 ms    P Axis 44 degrees    R Axis -56 degrees    T Axis 77 degrees    Diagnosis Sinus tachycardia    POCT Glucose    Collection Time: 10/05/22  4:57 PM   Result Value Ref Range    POC Glucose 363 (H) 65 - 100 mg/dL    Performed by: CarlosAprGino    Lactic Acid    Collection Time: 10/05/22  4:58 PM   Result Value Ref Range    Lactic Acid, Plasma 2.0 0.4 - 2.0 MMOL/L   CBC with Auto Differential    Collection Time: 10/05/22  4:58 PM   Result Value Ref Range    WBC 21.2 (H) 4.3 - 11.1 K/uL    RBC 4.16 (L) 4.23 - 5.6 M/uL    Hemoglobin 12.0 (L) 13.6 - 17.2 g/dL    Hematocrit 37.9 (L) 41.1 - 50.3 %    MCV 91.1 79.6 - 97.8 FL    MCH 28.8 26.1 - 32.9 PG    MCHC 31.7 31.4 - 35.0 g/dL    RDW 14.1 11.9 - 14.6 %    Platelets 549 087 - 878 K/uL    MPV 12.0 9.4 - 12.3 FL    nRBC 0.00 0.0 - 0.2 K/uL    Differential Type AUTOMATED      Seg Neutrophils 78 43 - 78 %    Lymphocytes 15 13 - 44 %    Monocytes 6 4.0 - 12.0 %    Eosinophils % 0 (L) 0.5 - 7.8 %    Basophils 0 0.0 - 2.0 %    Immature Granulocytes 1 0.0 - 5.0 %    Segs Absolute 16.6 (H) 1.7 - 8.2 K/UL    Absolute Lymph # 3.1 0.5 - 4.6 K/UL    Absolute Mono # 1.3 0.1 - 1.3 K/UL    Absolute Eos # 0.1 0.0 - 0.8 K/UL    Basophils Absolute 0.1 0.0 - 0.2 K/UL    Absolute Immature Granulocyte 0.2 0.0 - 0.5 K/UL   CMP    Collection Time: 10/05/22  4:58 PM   Result Value Ref Range    Sodium 133 (L) 138 - 145 mmol/L    Potassium 4.4 3.5 - 5.1 mmol/L    Chloride 99 (L) 101 - 110 mmol/L    CO2 27 21 - 32 mmol/L    Anion Gap 7 4 - 13 mmol/L    Glucose 287 (H) 65 - 100 mg/dL    BUN 31 (H) 6 - 23 MG/DL    Creatinine 1.30 0.8 - 1.5 MG/DL    Est, Glom Filt Rate >60 >60 ml/min/1.73m2    Calcium 9.3 8.3 - 10.4 MG/DL    Total Bilirubin 0.4 0.2 - 1.1 MG/DL    ALT 66 (H) 12 - 65 U/L    AST 36 15 - 37 U/L    Alk Phosphatase 164 (H) 50 - 136 U/L    Total Protein 9.1 (H) 6.3 - 8.2 g/dL    Albumin 1.8 (L) 3.5 - 5.0 g/dL    Globulin 7.3 (H) 2.3 - 3.5 g/dL    Albumin/Globulin Ratio 0.2 (L) 1.2 - 3.5     Procalcitonin    Collection Time: 10/05/22  4:58 PM   Result Value Ref Range    Procalcitonin 0.95 (H) 0.00 - 0.49 ng/mL   C-Reactive Protein    Collection Time: 10/05/22  4:58 PM   Result Value Ref Range    CRP 26.8 (H) 0.0 - 0.9 mg/dL   Lactic Acid    Collection Time: 10/05/22  8:23 PM   Result Value Ref Range    Lactic Acid, Plasma 1.8 0.4 - 2.0 MMOL/L   Culture, Wound Aerobic Only    Collection Time: 10/05/22  8:23 PM    Specimen: Abscess    SACRAL WOUND   Result Value Ref Range    Special Requests NO SPECIAL REQUESTS      Gram stain PENDING     Culture        No growth after short period of incubation. Further results to follow after overnight incubation.    POCT Glucose    Collection Time: 10/05/22 10:17 PM   Result Value Ref Range    POC Glucose 327 (H) 65 - 100 mg/dL    Performed by: Vicente    POCT Glucose    Collection Time: 10/06/22  2:45 AM   Result Value Ref Range    POC Glucose 344 (H) 65 - 100 mg/dL    Performed by: Tere    POCT Glucose    Collection Time: 10/06/22  7:13 AM   Result Value Ref Range    POC Glucose 372 (H) 65 - 100 mg/dL    Performed by: Clarke    Basic Metabolic Panel w/ Reflex to MG    Collection Time: 10/06/22  8:40 AM   Result Value Ref Range    Sodium 134 (L) 138 - 145 mmol/L    Potassium 4.5 3.5 - 5.1 mmol/L    Chloride 102 101 - 110 mmol/L    CO2 25 21 - 32 mmol/L    Anion Gap 7 4 - 13 mmol/L    Glucose 348 (H) 65 - 100 mg/dL    BUN 32 (H) 6 - 23 MG/DL    Creatinine 1.40 0.8 - 1.5 MG/DL    Est, Glom Filt Rate 58 (L) >60 ml/min/1.73m2    Calcium 8.3 8.3 - 10.4 MG/DL   CBC with Auto Differential    Collection Time: 10/06/22  8:40 AM   Result Value Ref Range    WBC 19.7 (H) 4.3 - 11.1 K/uL    RBC 3.51 (L) 4.23 - 5.6 M/uL    Hemoglobin 10.2 (L) 13.6 - 17.2 g/dL    Hematocrit 31.9 (L) 41.1 - 50.3 %    MCV 90.9 79.6 - 97.8 FL    MCH 29.1 26.1 - 32.9 PG    MCHC 32.0 31.4 - 35.0 g/dL    RDW 14.0 11.9 - 14.6 %    Platelets 350 945 - 904 K/uL    MPV 12.0 9.4 - 12.3 FL    nRBC 0.00 0.0 - 0.2 K/uL    Differential Type AUTOMATED      Seg Neutrophils 79 (H) 43 - 78 %    Lymphocytes 12 (L) 13 - 44 %    Monocytes 7 4.0 - 12.0 %    Eosinophils % 1 0.5 - 7.8 %    Basophils 0 0.0 - 2.0 %    Immature Granulocytes 1 0.0 - 5.0 %    Segs Absolute 15.6 (H) 1.7 - 8.2 K/UL    Absolute Lymph # 2.3 0.5 - 4.6 K/UL    Absolute Mono # 1.3 0.1 - 1.3 K/UL    Absolute Eos # 0.1 0.0 - 0.8 K/UL    Basophils Absolute 0.1 0.0 - 0.2 K/UL    Absolute Immature Granulocyte 0.2 0.0 - 0.5 K/UL   POCT Glucose    Collection Time: 10/06/22 11:03 AM   Result Value Ref Range    POC Glucose 354 (H) 65 - 100 mg/dL    Performed by: Clarke         Microbiology:  Reviewed    Studies:  Reviewed    Signed By: Valeriy Alvarado MD     October 6, 2022

## 2022-10-06 NOTE — PROGRESS NOTES
Physician Progress Note      PATIENT:               Yola Boles  CSN #:                  [id number]  :                       1965  ADMIT DATE:       10/5/2022 7:22 PM  [address] [place] DATE:  RESPONDING  PROVIDER #:        Geo Fam MD          QUERY TEXT:    Pt admitted with Abscesses/Cellulitis. Pt noted to meet sepsis criteria. If   possible, please document in the progress notes and discharge summary if you   are evaluating and /or treating any of the following: The medical record reflects the following:  Risk Factors: 60 y.o. male with medical history of DM2, HTN who presented to   ED with nausea, vomiting, and concerns about abscess. Per report, patient has   been having intermittent drainage from a painful wound on his back for   2 months. Additionally, patient with areas of groin cellulitis and induration   as well. Clinical Indicators: WBC 21.2, 19.7, Tachycardia, CRP 26.8, Procal 0.95, LA   1.8, 2.0  Treatment: Serial labs, monitor VS, Zosyn, blood and urine cultures, IVF's    Thank you,  Brynn Escobedo RN, BSN, CDI  [email or web address]  . Options provided:  -- Sepsis, present on admission  -- Cellulitis without Sepsis  -- Other - I will add my own diagnosis  -- Disagree - Not applicable / Not valid  -- Disagree - Clinically unable to determine / Unknown  -- Refer to Clinical Documentation Reviewer    PROVIDER RESPONSE TEXT:    This patient has sepsis which was present on admission. Query created by: Janet Nichole on 10/6/2022 12:11 PM      QUERY TEXT:    Patient admitted with BMI 46.86. If possible, please document in progress   notes and discharge summary if you are evaluating and /or treating any of the   following: The medical record reflects the following:  Risk Factors: 60 y.o. male with medical history of DM2, HTN who presented to   ED with nausea, vomiting, and concerns about abscess.   Per report, patient has   been having intermittent drainage from a painful wound on his back for   2 months. Additionally, patient with areas of groin cellulitis and induration   as well. Clinical Indicators: BMI 46.86  Treatment: Carb control diet      ? Specificity of obesity and morbid obesity should be reported based on   physician documentation, as there are several published classifications and   definitions?  MS-DRG Training Guide. CDC:   https://tate-dior.info/. WHO:   http://edgardo.biz/. NIH:   LargeFood.be    Thank you,  Robert Boo RN, BSN, [email or web address] [email or web address]  . Options provided:  -- Obesity  -- Morbid obesity  -- Severe obesity  -- Overweight  -- BMI not clinically significant  -- Other - I will add my own diagnosis  -- Disagree - Not applicable / Not valid  -- Disagree - Clinically unable to determine / Unknown  -- Refer to Clinical Documentation Reviewer    PROVIDER RESPONSE TEXT:    This patient has severe obesity.     Query created by: Sarah Salazar on 10/6/2022 12:15 PM      Electronically signed by:  Sugey Patricia MD 10/6/2022 1:04 PM

## 2022-10-06 NOTE — H&P
Hospitalist History and Physical   Admit Date:  10/5/2022  7:22 PM   Name:  Domenica Sicard   Age:  60 y.o. Sex:  male  :  1965   MRN:  [id number]     Presenting Complaint: N/V, abscess, high BG  Reason(s) for Admission: Abscess [L02.91]     History of Present Illness:   Domenica Sicard is a 60 y.o. male with medical history of DM2, HTN who presented to ED with nausea, vomiting, and concerns about abscess. Per report, patient has been having intermittent drainage from a painful wound on his back for ~2 months. Additionally, patient with areas of groin cellulitis and induration as well. I&D performed in ER for back abscess. WBC is elevated at 21.2. Blood glucoses are also elevated over 300. Patient reports high blood sugars at home recently as well. Patient not in DKA. Given areas of abscess and cellulitis, CT was performed, which does not show air or gangrenous changes, but does show developing scrotal abscess (as well as known back abscess). Dr. Cavazos of Urology contacted. Hospitalist to admit. Review of Systems:  10 systems reviewed and negative except as noted in HPI. Assessment & Plan:   Abscesses/Cellulitis  - Back abscess has been I&D'd by ER  - Areas of developing groin/scrotal abscess as seen on CT  - Given  involvement, Urology consulted, Dr. Cavazos agrees to see, appreciate their evaluation  - IV vanc and zosyn    DM2  - Elevated Bgs  - Only on oral meds, holding while hospitalized  - Will check hgbA1C  - SSI    HTN  - Continue home lisinopril     Disposition: inpatient    Past medical history reviewed. Past surgical history reviewed. No Known Allergies   Social History     Tobacco Use    Smoking status: Not on file    Smokeless tobacco: Not on file   Substance Use Topics    Alcohol use: Not on file         Family history reviewed and noncontributory to patient's acute condition; no relevant family history unless otherwise noted above.     There is no immunization history on file for this patient. PTA Medications:  No current outpatient medications    Objective:   Patient Vitals for the past 24 hrs:   Temp Pulse Resp BP SpO2   10/05/22 2223 -- -- -- 123/75 96 %   10/05/22 2035 -- -- -- 121/63 --   10/05/22 1923 -- -- -- 133/80 98 %   10/05/22 1907 99 °F (37.2 °C) (!) 120 20 (!) 138/90 98 %   10/05/22 1654 98.2 °F (36.8 °C) (!) 123 20 (!) 146/95 99 %          Estimated body mass index is 46.86 kg/m² as calculated from the following:    Height as of this encounter: 6' 2\" (1.88 m). Weight as of this encounter: 365 lb (165.6 kg). Intake/Output Summary (Last 24 hours) at 10/5/2022 2335  Last data filed at 10/5/2022 2116  Gross per 24 hour   Intake 100 ml   Output --   Net 100 ml         Physical Exam:  General:    Well nourished. No overt distress  Head:  Normocephalic, atraumatic  Eyes:  Sclerae appear normal.  Pupils equally round. HENT:  Nares appear normal, no drainage. Moist mucous membranes  Neck:  No restricted ROM. Trachea midline  CV:   RRR. S1/S2 auscultated  Lungs:   CTAB. No wheezing, rhonchi, or rales. Appears even, unlabored  Abdomen: Bowel sounds present. Soft, nontender, nondistended. Extremities: Warm and dry. No cyanosis or clubbing. Skin:     Back with area of abscess s/p I&D, dressed. Swelling of groin, scrotum, penis with associated erythema. Neuro:  Cranial nerves II-XII grossly intact. Sensation intact  Psych:  Normal mood and affect.   Alert and oriented x3    Data Ordered and Personally Reviewed:    Last 24hr Labs:  Recent Results (from the past 24 hour(s))   EKG 12 Lead    Collection Time: 10/05/22  4:52 PM   Result Value Ref Range    Ventricular Rate 122 BPM    Atrial Rate 122 BPM    P-R Interval 140 ms    QRS Duration 98 ms    Q-T Interval 308 ms    QTc Calculation (Bazett) 438 ms    P Axis 44 degrees    R Axis -56 degrees    T Axis 77 degrees    Diagnosis Sinus tachycardia    POCT Glucose    Collection Time: 10/05/22  4:57 PM   Result Value Ref Range    POC Glucose 363 (H) 65 - 100 mg/dL    Performed by: Varsha    Lactic Acid    Collection Time: 10/05/22  4:58 PM   Result Value Ref Range    Lactic Acid, Plasma 2.0 0.4 - 2.0 MMOL/L   CBC with Auto Differential    Collection Time: 10/05/22  4:58 PM   Result Value Ref Range    WBC 21.2 (H) 4.3 - 11.1 K/uL    RBC 4.16 (L) 4.23 - 5.6 M/uL    Hemoglobin 12.0 (L) 13.6 - 17.2 g/dL    Hematocrit 37.9 (L) 41.1 - 50.3 %    MCV 91.1 79.6 - 97.8 FL    MCH 28.8 26.1 - 32.9 PG    MCHC 31.7 31.4 - 35.0 g/dL    RDW 14.1 11.9 - 14.6 %    Platelets 414 263 - 472 K/uL    MPV 12.0 9.4 - 12.3 FL    nRBC 0.00 0.0 - 0.2 K/uL    Differential Type AUTOMATED      Seg Neutrophils 78 43 - 78 %    Lymphocytes 15 13 - 44 %    Monocytes 6 4.0 - 12.0 %    Eosinophils % 0 (L) 0.5 - 7.8 %    Basophils 0 0.0 - 2.0 %    Immature Granulocytes 1 0.0 - 5.0 %    Segs Absolute 16.6 (H) 1.7 - 8.2 K/UL    Absolute Lymph # 3.1 0.5 - 4.6 K/UL    Absolute Mono # 1.3 0.1 - 1.3 K/UL    Absolute Eos # 0.1 0.0 - 0.8 K/UL    Basophils Absolute 0.1 0.0 - 0.2 K/UL    Absolute Immature Granulocyte 0.2 0.0 - 0.5 K/UL   CMP    Collection Time: 10/05/22  4:58 PM   Result Value Ref Range    Sodium 133 (L) 138 - 145 mmol/L    Potassium 4.4 3.5 - 5.1 mmol/L    Chloride 99 (L) 101 - 110 mmol/L    CO2 27 21 - 32 mmol/L    Anion Gap 7 4 - 13 mmol/L    Glucose 287 (H) 65 - 100 mg/dL    BUN 31 (H) 6 - 23 MG/DL    Creatinine 1.30 0.8 - 1.5 MG/DL    Est, Glom Filt Rate >60 >60 ml/min/1.73m2    Calcium 9.3 8.3 - 10.4 MG/DL    Total Bilirubin 0.4 0.2 - 1.1 MG/DL    ALT 66 (H) 12 - 65 U/L    AST 36 15 - 37 U/L    Alk Phosphatase 164 (H) 50 - 136 U/L    Total Protein 9.1 (H) 6.3 - 8.2 g/dL    Albumin 1.8 (L) 3.5 - 5.0 g/dL    Globulin 7.3 (H) 2.3 - 3.5 g/dL    Albumin/Globulin Ratio 0.2 (L) 1.2 - 3.5     Procalcitonin    Collection Time: 10/05/22  4:58 PM   Result Value Ref Range    Procalcitonin 0.95 (H) 0.00 - 0.49 ng/mL   C-Reactive Protein    Collection Time: 10/05/22  4:58 PM   Result Value Ref Range    CRP 26.8 (H) 0.0 - 0.9 mg/dL   Lactic Acid    Collection Time: 10/05/22  8:23 PM   Result Value Ref Range    Lactic Acid, Plasma 1.8 0.4 - 2.0 MMOL/L   POCT Glucose    Collection Time: 10/05/22 10:17 PM   Result Value Ref Range    POC Glucose 327 (H) 65 - 100 mg/dL    Performed by: Vicente          Signed:  Jesika Dos Santos MD

## 2022-10-06 NOTE — PROGRESS NOTES
Hospitalist Progress Note   Admit Date:  10/5/2022  7:22 PM   Name:  Ryan Hagan   Age:  60 y.o. Sex:  male  :  1965   MRN:  [id number]   Room:  [place] [address]    Presenting Complaint: Abscess     Reason(s) for Admission: Cellulitis and abscess of trunk [L03.319, L02.219]  Abscess [L02.91]  Hyperglycemia [R73.9]  Abscess, perineum [L02.215]     Hospital Course:   Ryan Hagan is a 60 y.o. male with medical history of DM2, HTN who presented to ED with nausea, vomiting, and concerns about abscess. Per report, patient has been having intermittent drainage from a painful wound on his back for ~2 months. Additionally, patient with areas of groin cellulitis and induration as well. I&D performed in ER for back abscess. WBC is elevated at 21.2. Blood glucoses are also elevated over 300. Patient reports high blood sugars at home recently as well. Patient not in DKA. Given areas of abscess and cellulitis, CT was performed, which does not show air or gangrenous changes, but does show developing scrotal abscess (as well as known back abscess). Dr. Loli Leija of Urology contacted. Subjective & 24hr Events (10/06/22): Patient is seen and examined at the bedside. He spiked a fever of 100.8 F yesterday. Patient is complaining of severe back pain. He reported nausea but no vomiting. Family was at the bedside. Patient denies chest pain, shortness of breath, diarrhea, palpitations, dizziness.       Assessment & Plan:   Abscesses/Cellulitis  -Left flank abscess and s/p I&D'd by ED  Febrile 100.8F and  leukocytosis improving  S/p IV fluids  Follow-up with wound cultures and blood cultures  - Continue IV vanc and zosyn  F/u HIV  F/U echo  IR was consulted   ID was consulted    Urinary retention  S/p Robles  Urine output of more than 1 L  Follow-up with urine cultures    Small penile abscess  No scrotal signs of infection  No need of I&D and hopefully will  improve with IV antibiotics  Urology was consulted    Cholelithiasis  Follow-up with ultrasound     Uncontrolled DM2  Blood glucose ranging around 344  Holding oral hypoglycemics  Follow-up with hgbA1C  Started on Lantus 25 units daily  - SSI  Diabetes instructor was consulted     HTN  -Stopped lisinopril as patient was hypotensive    Anemia   Most likely due to infection  Hb is 10 and stable. Disposition: Anticipating hospital stay for 2 to 3 days. Diet:  ADULT DIET; Regular; 4 carb choices (60 gm/meal)  DVT PPx: Lovenox  Code status: Full Code    Hospital Problems:  Principal Problem:    Abscess  Active Problems:    Primary hypertension    DM2 (diabetes mellitus, type 2) ([facility] [address]  Resolved Problems:    * No resolved hospital problems. *      Objective:   Patient Vitals for the past 24 hrs:   Temp Pulse Resp BP SpO2   10/06/22 1102 99.1 °F (37.3 °C) 93 18 (!) 154/82 95 %   10/06/22 0713 99 °F (37.2 °C) 91 16 122/75 95 %   10/06/22 0413 99.7 °F (37.6 °C) 96 16 109/63 95 %   10/06/22 0350 -- -- 18 -- --   10/06/22 0204 -- -- 18 -- --   10/06/22 0122 (!) 100.8 °F (38.2 °C) (!) 108 20 117/77 96 %   10/06/22 0040 -- -- -- 119/69 --   10/06/22 0010 -- -- -- 110/70 --   10/06/22 0000 -- -- -- 125/73 --   10/05/22 2340 -- -- -- 108/69 90 %   10/05/22 2330 -- -- -- 103/70 96 %   10/05/22 2310 -- -- -- 113/82 --   10/05/22 2300 -- -- -- 108/63 100 %   10/05/22 2223 -- -- -- 123/75 96 %   10/05/22 2035 -- -- -- 121/63 --   10/05/22 1923 -- -- -- 133/80 98 %   10/05/22 1907 99 °F (37.2 °C) (!) 120 20 (!) 138/90 98 %   10/05/22 1654 98.2 °F (36.8 °C) (!) 123 20 (!) 146/95 99 %       Oxygen Therapy  SpO2: 95 %  O2 Device: None (Room air)    Estimated body mass index is 46.86 kg/m² as calculated from the following:    Height as of this encounter: 6' 2\" (1.88 m). Weight as of this encounter: 365 lb (165.6 kg).     Intake/Output Summary (Last 24 hours) at 10/6/2022 1450  Last data filed at 10/6/2022 0443  Gross per 24 hour   Intake 545 ml   Output -- Net 545 ml         Physical Exam:     Blood pressure (!) 154/82, pulse 93, temperature 99.1 °F (37.3 °C), resp. rate 18, height 6' 2\" (1.88 m), weight (!) 365 lb (165.6 kg), SpO2 95 %. General:    Well nourished, morbidly obese   Head:  Normocephalic, atraumatic  Eyes:  Sclerae appear normal.  Pupils equally round. ENT:  Nares appear normal, no drainage. Moist oral mucosa  Neck:  No restricted ROM. Trachea midline   CV:   RRR. No m/r/g. No jugular venous distension. Lungs:   CTAB. No wheezing, rhonchi, or rales. Symmetric expansion. Abdomen: Bowel sounds present. Soft, nontender, nondistended. Extremities: No cyanosis or clubbing. No edema  Skin:     No rashes and normal coloration. Warm and dry. Genitourinary Scrotal ulcer is present and small penile abscess is present, Robles is present   Dressing is present on the back  Neuro:  CN II-XII grossly intact. Sensation intact. A&Ox3  Psych:  Normal mood and affect.       I have personally reviewed labs and tests showing:  Recent Labs:  Recent Results (from the past 48 hour(s))   EKG 12 Lead    Collection Time: 10/05/22  4:52 PM   Result Value Ref Range    Ventricular Rate 122 BPM    Atrial Rate 122 BPM    P-R Interval 140 ms    QRS Duration 98 ms    Q-T Interval 308 ms    QTc Calculation (Bazett) 438 ms    P Axis 44 degrees    R Axis -56 degrees    T Axis 77 degrees    Diagnosis Sinus tachycardia    POCT Glucose    Collection Time: 10/05/22  4:57 PM   Result Value Ref Range    POC Glucose 363 (H) 65 - 100 mg/dL    Performed by: Varsha    Culture, Blood 1    Collection Time: 10/05/22  4:58 PM    Specimen: Blood   Result Value Ref Range    Special Requests NO SPECIAL REQUESTS  LEFT  Antecubital        Culture NO GROWTH AFTER 17 HOURS     Lactic Acid    Collection Time: 10/05/22  4:58 PM   Result Value Ref Range    Lactic Acid, Plasma 2.0 0.4 - 2.0 MMOL/L   CBC with Auto Differential    Collection Time: 10/05/22  4:58 PM   Result Value Ref Range WBC 21.2 (H) 4.3 - 11.1 K/uL    RBC 4.16 (L) 4.23 - 5.6 M/uL    Hemoglobin 12.0 (L) 13.6 - 17.2 g/dL    Hematocrit 37.9 (L) 41.1 - 50.3 %    MCV 91.1 79.6 - 97.8 FL    MCH 28.8 26.1 - 32.9 PG    MCHC 31.7 31.4 - 35.0 g/dL    RDW 14.1 11.9 - 14.6 %    Platelets 635 895 - 275 K/uL    MPV 12.0 9.4 - 12.3 FL    nRBC 0.00 0.0 - 0.2 K/uL    Differential Type AUTOMATED      Seg Neutrophils 78 43 - 78 %    Lymphocytes 15 13 - 44 %    Monocytes 6 4.0 - 12.0 %    Eosinophils % 0 (L) 0.5 - 7.8 %    Basophils 0 0.0 - 2.0 %    Immature Granulocytes 1 0.0 - 5.0 %    Segs Absolute 16.6 (H) 1.7 - 8.2 K/UL    Absolute Lymph # 3.1 0.5 - 4.6 K/UL    Absolute Mono # 1.3 0.1 - 1.3 K/UL    Absolute Eos # 0.1 0.0 - 0.8 K/UL    Basophils Absolute 0.1 0.0 - 0.2 K/UL    Absolute Immature Granulocyte 0.2 0.0 - 0.5 K/UL   CMP    Collection Time: 10/05/22  4:58 PM   Result Value Ref Range    Sodium 133 (L) 138 - 145 mmol/L    Potassium 4.4 3.5 - 5.1 mmol/L    Chloride 99 (L) 101 - 110 mmol/L    CO2 27 21 - 32 mmol/L    Anion Gap 7 4 - 13 mmol/L    Glucose 287 (H) 65 - 100 mg/dL    BUN 31 (H) 6 - 23 MG/DL    Creatinine 1.30 0.8 - 1.5 MG/DL    Est, Glom Filt Rate >60 >60 ml/min/1.73m2    Calcium 9.3 8.3 - 10.4 MG/DL    Total Bilirubin 0.4 0.2 - 1.1 MG/DL    ALT 66 (H) 12 - 65 U/L    AST 36 15 - 37 U/L    Alk Phosphatase 164 (H) 50 - 136 U/L    Total Protein 9.1 (H) 6.3 - 8.2 g/dL    Albumin 1.8 (L) 3.5 - 5.0 g/dL    Globulin 7.3 (H) 2.3 - 3.5 g/dL    Albumin/Globulin Ratio 0.2 (L) 1.2 - 3.5     Procalcitonin    Collection Time: 10/05/22  4:58 PM   Result Value Ref Range    Procalcitonin 0.95 (H) 0.00 - 0.49 ng/mL   C-Reactive Protein    Collection Time: 10/05/22  4:58 PM   Result Value Ref Range    CRP 26.8 (H) 0.0 - 0.9 mg/dL   Culture, Blood 1    Collection Time: 10/05/22  8:23 PM    Specimen: Blood   Result Value Ref Range    Special Requests LEFT  HAND        Culture NO GROWTH AFTER 14 HOURS     Lactic Acid    Collection Time: 10/05/22 8:23 PM   Result Value Ref Range    Lactic Acid, Plasma 1.8 0.4 - 2.0 MMOL/L   Culture, Wound Aerobic Only    Collection Time: 10/05/22  8:23 PM    Specimen: Abscess    SACRAL WOUND   Result Value Ref Range    Special Requests NO SPECIAL REQUESTS      Gram stain 10 TO 30 WBCS SEEN PER OIF     Gram stain FEW GRAM POSITIVE COCCI      Culture        No growth after short period of incubation. Further results to follow after overnight incubation.    POCT Glucose    Collection Time: 10/05/22 10:17 PM   Result Value Ref Range    POC Glucose 327 (H) 65 - 100 mg/dL    Performed by: Vicente    POCT Glucose    Collection Time: 10/06/22  2:45 AM   Result Value Ref Range    POC Glucose 344 (H) 65 - 100 mg/dL    Performed by: Tere    POCT Glucose    Collection Time: 10/06/22  7:13 AM   Result Value Ref Range    POC Glucose 372 (H) 65 - 100 mg/dL    Performed by: Clarke    Transthoracic echocardiogram (TTE) complete with contrast, bubble, strain, and 3D PRN    Collection Time: 10/06/22  7:48 AM   Result Value Ref Range    LV EDV A2C 95 mL    LV EDV A4C 137 mL    LV ESV A2C 63 mL    LV ESV A4C 72 mL    IVSd 1.4 (A) 0.6 - 1.0 cm    LVIDd 4.3 4.2 - 5.9 cm    LVIDs 3.5 cm    LVOT Diameter 2.2 cm    LVOT Mean Gradient 3 mmHg    LVOT VTI 20.2 cm    LVOT Peak Velocity 1.2 m/s    LVOT Peak Gradient 6 mmHg    LVPWd 1.3 (A) 0.6 - 1.0 cm    LV E' Lateral Velocity 12 cm/s    LV E' Septal Velocity 10 cm/s    LV Ejection Fraction A2C 34 %    LV Ejection Fraction A4C 47 %    EF BP 42 (A) 55 - 100 %    LVOT Area 3.8 cm2    LVOT SV 77.0 ml    LA Minor Axis 5.8 cm    LA Major Axis 5.3 cm    LA Area 2C 22.0 cm2    LA Area 4C 19.7 cm2    LA Volume BP 66 (A) 18 - 58 mL    LA Diameter 3.9 cm    AV Mean Velocity 1.0 m/s    AV Mean Gradient 4 mmHg    AV VTI 23.4 cm    AV Peak Velocity 1.4 m/s    AV Peak Gradient 8 mmHg    AV Area by VTI 3.3 cm2    AV Area by Peak Velocity 3.2 cm2    Aortic Root 3.1 cm    Ascending Aorta 3.4 cm    IVC Proxmal 1.7 cm    MV E Wave Deceleration Time 224.0 ms    MV A Velocity 0.62 m/s    MV E Velocity 0.67 m/s    PV .0 ms    PV Max Velocity 1.3 m/s    PV Peak Gradient 7 mmHg    RVIDd 3.2 cm    RV Basal Dimension 3.8 cm    TAPSE 2.1 1.7 cm   Basic Metabolic Panel w/ Reflex to MG    Collection Time: 10/06/22  8:40 AM   Result Value Ref Range    Sodium 134 (L) 138 - 145 mmol/L    Potassium 4.5 3.5 - 5.1 mmol/L    Chloride 102 101 - 110 mmol/L    CO2 25 21 - 32 mmol/L    Anion Gap 7 4 - 13 mmol/L    Glucose 348 (H) 65 - 100 mg/dL    BUN 32 (H) 6 - 23 MG/DL    Creatinine 1.40 0.8 - 1.5 MG/DL    Est, Glom Filt Rate 58 (L) >60 ml/min/1.73m2    Calcium 8.3 8.3 - 10.4 MG/DL   CBC with Auto Differential    Collection Time: 10/06/22  8:40 AM   Result Value Ref Range    WBC 19.7 (H) 4.3 - 11.1 K/uL    RBC 3.51 (L) 4.23 - 5.6 M/uL    Hemoglobin 10.2 (L) 13.6 - 17.2 g/dL    Hematocrit 31.9 (L) 41.1 - 50.3 %    MCV 90.9 79.6 - 97.8 FL    MCH 29.1 26.1 - 32.9 PG    MCHC 32.0 31.4 - 35.0 g/dL    RDW 14.0 11.9 - 14.6 %    Platelets 054 113 - 697 K/uL    MPV 12.0 9.4 - 12.3 FL    nRBC 0.00 0.0 - 0.2 K/uL    Differential Type AUTOMATED      Seg Neutrophils 79 (H) 43 - 78 %    Lymphocytes 12 (L) 13 - 44 %    Monocytes 7 4.0 - 12.0 %    Eosinophils % 1 0.5 - 7.8 %    Basophils 0 0.0 - 2.0 %    Immature Granulocytes 1 0.0 - 5.0 %    Segs Absolute 15.6 (H) 1.7 - 8.2 K/UL    Absolute Lymph # 2.3 0.5 - 4.6 K/UL    Absolute Mono # 1.3 0.1 - 1.3 K/UL    Absolute Eos # 0.1 0.0 - 0.8 K/UL    Basophils Absolute 0.1 0.0 - 0.2 K/UL    Absolute Immature Granulocyte 0.2 0.0 - 0.5 K/UL   Hemoglobin A1C    Collection Time: 10/06/22  8:40 AM   Result Value Ref Range    Hemoglobin A1C >14.0 (H) 4.8 - 5.6 %    eAG Cannot be calculated mg/dL   POCT Glucose    Collection Time: 10/06/22 11:03 AM   Result Value Ref Range    POC Glucose 354 (H) 65 - 100 mg/dL    Performed by: Clarke        I have personally reviewed imaging studies showing: Other Studies:  CT ABDOMEN PELVIS W IV CONTRAST Additional Contrast? None   Final Result   1. Left soft tissue\flank abscess measuring approximately 12 x 10 x 4.9 cm   abutting the dorsal left paraspinous musculature without muscular involvement   evident. 2. Abundant inflammatory change of the left inguinal region/left pubic region   with some phlegmonous change evident and likely an early developing abscess of   the left scrotum/left pubic region measuring 22 x 38 mm. Recommend clinical   correlation with physical examination and close attention to this area. 3. Cholelithiasis without evidence of acute cholecystitis. XR CHEST PORTABLE   Final Result   1. No acute cardiopulmonary abnormality. US ABDOMEN LIMITED Specify organ?  GALLBLADDER    (Results Pending)       Current Meds:  Current Facility-Administered Medications   Medication Dose Route Frequency    [Held by provider] lisinopril (PRINIVIL;ZESTRIL) tablet 5 mg  5 mg Oral Daily    insulin lispro (HUMALOG) injection vial 0-8 Units  0-8 Units SubCUTAneous TID WC    insulin lispro (HUMALOG) injection vial 0-4 Units  0-4 Units SubCUTAneous Nightly    sodium chloride flush 0.9 % injection 5-40 mL  5-40 mL IntraVENous 2 times per day    sodium chloride flush 0.9 % injection 5-40 mL  5-40 mL IntraVENous PRN    0.9 % sodium chloride infusion   IntraVENous PRN    enoxaparin (LOVENOX) injection 40 mg  40 mg SubCUTAneous BID    ondansetron (ZOFRAN-ODT) disintegrating tablet 4 mg  4 mg Oral Q8H PRN    Or    ondansetron (ZOFRAN) injection 4 mg  4 mg IntraVENous Q6H PRN    polyethylene glycol (GLYCOLAX) packet 17 g  17 g Oral Daily PRN    acetaminophen (TYLENOL) tablet 650 mg  650 mg Oral Q6H PRN    Or    acetaminophen (TYLENOL) suppository 650 mg  650 mg Rectal Q6H PRN    0.9 % sodium chloride infusion   IntraVENous Continuous    HYDROcodone-acetaminophen (NORCO) 5-325 MG per tablet 1 tablet  1 tablet Oral Q4H PRN    morphine injection 2 mg  2 mg IntraVENous Q4H PRN    piperacillin-tazobactam (ZOSYN) 3,375 mg in sodium chloride 0.9 % 50 mL IVPB (mini-bag)  3,375 mg IntraVENous Q8H    insulin glargine (LANTUS) injection vial 25 Units  25 Units SubCUTAneous Daily       Signed:  Maribel Goss MD    Part of this note may have been written by using a voice dictation software. The note has been proof read but may still contain some grammatical/other typographical errors.

## 2022-10-06 NOTE — ED PROVIDER NOTES
Emergency Department Provider Note                   PCP:                None Provider               Age: 60 y.o. Sex: male     No diagnosis found. DISPOSITION          MDM  Number of Diagnoses or Management Options  Diagnosis management comments: Diabetic with skin abscesses and cellulitis. Concern for sepsis. Also groin cellulitis. Assess for Lake's. CT scan. IV antibiotics and cultures. Amount and/or Complexity of Data Reviewed  Clinical lab tests: ordered and reviewed  Tests in the radiology section of CPT®: ordered and reviewed  Tests in the medicine section of CPT®: ordered and reviewed  Decide to obtain previous medical records or to obtain history from someone other than the patient: yes  Independent visualization of images, tracings, or specimens: yes (My interpretation EKG shows sinus tachycardia at 122. Left axis deviation. Partial right bundle branch block. No ST-T changes. No ectopy.   Normal QT interval.)    Risk of Complications, Morbidity, and/or Mortality  Presenting problems: moderate  Diagnostic procedures: minimal  Management options: low               Orders Placed This Encounter   Procedures    Culture, Blood 1    Culture, Blood 1    Culture, Wound Aerobic Only    XR CHEST PORTABLE    CT ABDOMEN PELVIS W IV CONTRAST Additional Contrast? None    Lactic Acid    Lactic Acid    CBC with Auto Differential    CMP    Procalcitonin    Cardiac Monitor - ED Only    Straight Cath (Select if patient is unable to provide a sample)    POCT Glucose    POCT Glucose    EKG 12 Lead    Saline lock IV        Medications   lactated ringers bolus (has no administration in time range)   insulin regular (HUMULIN R;NOVOLIN R) injection 10 Units (has no administration in time range)   piperacillin-tazobactam (ZOSYN) 4,500 mg in sodium chloride 0.9 % 100 mL IVPB (mini-bag) (has no administration in time range)   HYDROmorphone HCl PF (DILAUDID) injection 1 mg (has no administration in time range)   ondansetron (ZOFRAN) injection 4 mg (has no administration in time range)   sodium chloride flush 0.9 % injection 10 mL (has no administration in time range)   0.9 % sodium chloride bolus (has no administration in time range)   iopamidol (ISOVUE-370) 76 % injection 100 mL (has no administration in time range)       New Prescriptions    No medications on file        Jeanne Cevallos is a 60 y.o. male who presents to the Emergency Department with chief complaint of    Chief Complaint   Patient presents with    Abscess      57-year-old male was visiting from [facility].  Has a history of independent dependent diabetes. Here with significant other. No significant surgery in the past other than appendix. Patient states he has had a draining wound on his back for 2 months. Has not seen anyone about it. It has drained intermittently. It seems to be more swollen as of late. Patient presented today because of increasing pain in his back, elevated blood sugar and more drainage. Has had fever and chills. No vomiting diarrhea. No cough runny nose congestion. No urinary symptoms. The history is provided by the patient. Review of Systems   Constitutional:  Positive for chills, fatigue and fever. Respiratory:  Negative for cough and shortness of breath. Cardiovascular:  Negative for chest pain, palpitations and leg swelling. Gastrointestinal:  Negative for abdominal pain and vomiting. Genitourinary:  Positive for genital sores and scrotal swelling. Negative for difficulty urinating. Musculoskeletal:  Positive for back pain. All other systems reviewed and are negative. No past medical history on file. No past surgical history on file. No family history on file. Social History     Socioeconomic History    Marital status: Single         Patient has no known allergies. Previous Medications    No medications on file        Vitals signs and nursing note reviewed.    Patient Vitals for the past 4 hrs:   Temp Pulse Resp BP SpO2   10/05/22 1907 99 °F (37.2 °C) (!) 120 20 (!) 138/90 98 %   10/05/22 1654 98.2 °F (36.8 °C) (!) 123 20 (!) 146/95 99 %          Physical Exam  Vitals and nursing note reviewed. Constitutional:       Appearance: He is not ill-appearing. HENT:      Head: Normocephalic and atraumatic. Right Ear: External ear normal.      Left Ear: External ear normal.      Mouth/Throat:      Mouth: Mucous membranes are moist.      Pharynx: Oropharynx is clear. Eyes:      General: No scleral icterus. Extraocular Movements: Extraocular movements intact. Pupils: Pupils are equal, round, and reactive to light. Cardiovascular:      Rate and Rhythm: Regular rhythm. Tachycardia present. Pulmonary:      Effort: Pulmonary effort is normal.      Breath sounds: Normal breath sounds. Abdominal:      Palpations: Abdomen is soft. Tenderness: There is no abdominal tenderness. Musculoskeletal:         General: No swelling or tenderness. Cervical back: Normal range of motion and neck supple. Right lower leg: No edema. Left lower leg: No edema. Skin:     General: Skin is warm and dry. Neurological:      General: No focal deficit present. Mental Status: He is alert. Incision/Drainage    Date/Time: 10/5/2022 10:53 PM  Performed by: Sherman Barney MD  Authorized by:  Sherman Barney MD     Consent:     Consent obtained:  Verbal  Universal protocol:     Patient identity confirmed:  Verbally with patient  Location:     Type:  Abscess    Location:  Trunk    Trunk location:  Back  Pre-procedure details:     Skin preparation:  Antiseptic wash  Anesthesia:     Anesthesia method:  Local infiltration    Local anesthetic:  Lidocaine 1% WITH epi  Procedure details:     Incision types:  Single straight    Incision depth:  Subcutaneous    Wound management:  Probed and deloculated    Drainage:  Purulent    Drainage amount:  Copious    Wound treatment: Wound left open    Packing materials:  1/2 in iodoform gauze  Post-procedure details:     Procedure completion:  Tolerated  Comments:      2 abscesses on back drained. Total of about 120 mL of pus evacuated. Both abscesses to the right of midline were packed with iodoform gauze. Results for orders placed or performed during the hospital encounter of 10/05/22   XR CHEST PORTABLE    Narrative    EXAM: Single view chest radiograph. INDICATION: Hyperglycemia for 4 months. COMPARISON: None. FINDINGS:  No focal lung consolidation. No pneumothorax. No pleural effusion. The heart is  normal in size. No evidence of acute osseous abnormality. Impression    1. No acute cardiopulmonary abnormality.      Lactic Acid   Result Value Ref Range    Lactic Acid, Plasma 2.0 0.4 - 2.0 MMOL/L   CBC with Auto Differential   Result Value Ref Range    WBC 21.2 (H) 4.3 - 11.1 K/uL    RBC 4.16 (L) 4.23 - 5.6 M/uL    Hemoglobin 12.0 (L) 13.6 - 17.2 g/dL    Hematocrit 37.9 (L) 41.1 - 50.3 %    MCV 91.1 79.6 - 97.8 FL    MCH 28.8 26.1 - 32.9 PG    MCHC 31.7 31.4 - 35.0 g/dL    RDW 14.1 11.9 - 14.6 %    Platelets 728 975 - 960 K/uL    MPV 12.0 9.4 - 12.3 FL    nRBC 0.00 0.0 - 0.2 K/uL    Differential Type AUTOMATED      Seg Neutrophils 78 43 - 78 %    Lymphocytes 15 13 - 44 %    Monocytes 6 4.0 - 12.0 %    Eosinophils % 0 (L) 0.5 - 7.8 %    Basophils 0 0.0 - 2.0 %    Immature Granulocytes 1 0.0 - 5.0 %    Segs Absolute 16.6 (H) 1.7 - 8.2 K/UL    Absolute Lymph # 3.1 0.5 - 4.6 K/UL    Absolute Mono # 1.3 0.1 - 1.3 K/UL    Absolute Eos # 0.1 0.0 - 0.8 K/UL    Basophils Absolute 0.1 0.0 - 0.2 K/UL    Absolute Immature Granulocyte 0.2 0.0 - 0.5 K/UL   CMP   Result Value Ref Range    Sodium 133 (L) 138 - 145 mmol/L    Potassium 4.4 3.5 - 5.1 mmol/L    Chloride 99 (L) 101 - 110 mmol/L    CO2 27 21 - 32 mmol/L    Anion Gap 7 4 - 13 mmol/L    Glucose 287 (H) 65 - 100 mg/dL    BUN 31 (H) 6 - 23 MG/DL    Creatinine 1.30 0.8 - 1.5 MG/DL Est, Glom Filt Rate >60 >60 ml/min/1.73m2    Calcium 9.3 8.3 - 10.4 MG/DL    Total Bilirubin 0.4 0.2 - 1.1 MG/DL    ALT 66 (H) 12 - 65 U/L    AST 36 15 - 37 U/L    Alk Phosphatase 164 (H) 50 - 136 U/L    Total Protein 9.1 (H) 6.3 - 8.2 g/dL    Albumin 1.8 (L) 3.5 - 5.0 g/dL    Globulin 7.3 (H) 2.3 - 3.5 g/dL    Albumin/Globulin Ratio 0.2 (L) 1.2 - 3.5     Procalcitonin   Result Value Ref Range    Procalcitonin 0.95 (H) 0.00 - 0.49 ng/mL   POCT Glucose   Result Value Ref Range    POC Glucose 363 (H) 65 - 100 mg/dL    Performed by: [employer]    EKG 12 Lead   Result Value Ref Range    Ventricular Rate 122 BPM    Atrial Rate 122 BPM    P-R Interval 140 ms    QRS Duration 98 ms    Q-T Interval 308 ms    QTc Calculation (Bazett) 438 ms    P Axis 44 degrees    R Axis -56 degrees    T Axis 77 degrees    Diagnosis Sinus tachycardia         XR CHEST PORTABLE   Final Result   1. No acute cardiopulmonary abnormality.          CT ABDOMEN PELVIS W IV CONTRAST Additional Contrast? None    (Results Pending)             Results Include:    Recent Results (from the past 24 hour(s))   EKG 12 Lead    Collection Time: 10/05/22  4:52 PM   Result Value Ref Range    Ventricular Rate 122 BPM    Atrial Rate 122 BPM    P-R Interval 140 ms    QRS Duration 98 ms    Q-T Interval 308 ms    QTc Calculation (Bazett) 438 ms    P Axis 44 degrees    R Axis -56 degrees    T Axis 77 degrees    Diagnosis Sinus tachycardia    POCT Glucose    Collection Time: 10/05/22  4:57 PM   Result Value Ref Range    POC Glucose 363 (H) 65 - 100 mg/dL    Performed by: [facility]    Lactic Acid    Collection Time: 10/05/22  4:58 PM   Result Value Ref Range    Lactic Acid, Plasma 2.0 0.4 - 2.0 MMOL/L   CBC with Auto Differential    Collection Time: 10/05/22  4:58 PM   Result Value Ref Range    WBC 21.2 (H) 4.3 - 11.1 K/uL    RBC 4.16 (L) 4.23 - 5.6 M/uL    Hemoglobin 12.0 (L) 13.6 - 17.2 g/dL    Hematocrit 37.9 (L) 41.1 - 50.3 %    MCV 91.1 79.6 - 97.8 FL    MCH 28.8 26.1 - 32.9 PG    MCHC 31.7 31.4 - 35.0 g/dL    RDW 14.1 11.9 - 14.6 %    Platelets 765 894 - 980 K/uL    MPV 12.0 9.4 - 12.3 FL    nRBC 0.00 0.0 - 0.2 K/uL    Differential Type AUTOMATED      Seg Neutrophils 78 43 - 78 %    Lymphocytes 15 13 - 44 %    Monocytes 6 4.0 - 12.0 %    Eosinophils % 0 (L) 0.5 - 7.8 %    Basophils 0 0.0 - 2.0 %    Immature Granulocytes 1 0.0 - 5.0 %    Segs Absolute 16.6 (H) 1.7 - 8.2 K/UL    Absolute Lymph # 3.1 0.5 - 4.6 K/UL    Absolute Mono # 1.3 0.1 - 1.3 K/UL    Absolute Eos # 0.1 0.0 - 0.8 K/UL    Basophils Absolute 0.1 0.0 - 0.2 K/UL    Absolute Immature Granulocyte 0.2 0.0 - 0.5 K/UL   CMP    Collection Time: 10/05/22  4:58 PM   Result Value Ref Range    Sodium 133 (L) 138 - 145 mmol/L    Potassium 4.4 3.5 - 5.1 mmol/L    Chloride 99 (L) 101 - 110 mmol/L    CO2 27 21 - 32 mmol/L    Anion Gap 7 4 - 13 mmol/L    Glucose 287 (H) 65 - 100 mg/dL    BUN 31 (H) 6 - 23 MG/DL    Creatinine 1.30 0.8 - 1.5 MG/DL    Est, Glom Filt Rate >60 >60 ml/min/1.73m2    Calcium 9.3 8.3 - 10.4 MG/DL    Total Bilirubin 0.4 0.2 - 1.1 MG/DL    ALT 66 (H) 12 - 65 U/L    AST 36 15 - 37 U/L    Alk Phosphatase 164 (H) 50 - 136 U/L    Total Protein 9.1 (H) 6.3 - 8.2 g/dL    Albumin 1.8 (L) 3.5 - 5.0 g/dL    Globulin 7.3 (H) 2.3 - 3.5 g/dL    Albumin/Globulin Ratio 0.2 (L) 1.2 - 3.5     Procalcitonin    Collection Time: 10/05/22  4:58 PM   Result Value Ref Range    Procalcitonin 0.95 (H) 0.00 - 0.49 ng/mL   C-Reactive Protein    Collection Time: 10/05/22  4:58 PM   Result Value Ref Range    CRP 26.8 (H) 0.0 - 0.9 mg/dL   Lactic Acid    Collection Time: 10/05/22  8:23 PM   Result Value Ref Range    Lactic Acid, Plasma 1.8 0.4 - 2.0 MMOL/L   POCT Glucose    Collection Time: 10/05/22 10:17 PM   Result Value Ref Range    POC Glucose 327 (H) 65 - 100 mg/dL    Performed by: Vicente      CT ABDOMEN PELVIS W IV CONTRAST Additional Contrast? None    Result Date: 10/5/2022  EXAM: CT of the abdomen and pelvis with contrast. Indication: Hyperglycemia. Comparison: None. Multiple axial images were obtained through the abdomen and pelvis after intravenous injection of 100mL of Isovue 370. Radiation dose reduction techniques were used for this study: All CT scans performed at this facility use one or all of the following: Automated exposure control, adjustment of the mA and/or kVp according to patient's size, iterative reconstruction. FINDINGS: LOWER THORAX: Lung bases are clear. No pericardial or pleural effusion. LIVER: Normal size and contour. No focal liver lesions. The portal vein, splenic vein, and superior mesenteric vein are patent. BILIARY SYSTEM: Cholelithiasis without evidence of acute cholecystitis. PANCREAS: No pancreatic mass. No pancreatic duct dilation. SPLEEN: No splenomegaly or aggressive splenic lesion. ADRENALS: No adrenal nodule or adrenal hypertrophy. URINARY SYSTEM: No suspicious renal lesion. No renal or ureteral calculus. No hydronephrosis. The bladder is unremarkable. FLUID:  No intraperitoneal free fluid. REPRODUCTIVE: Prominent left inguinal lymph nodes with fatty soft tissue fat stranding and what appears to be phlegmonous change along the leftward aspect of the pubic region/superior aspect of the left scrotum soft tissues with suspicion for developing pubic/scrotal abscess measuring 22 x 38 mm. BOWEL: Stomach, small bowel, and large bowel are normal in course and caliber. No evidence of obstruction. Appendix is normal. VASCULAR/NODES: No aortic or iliac artery aneurysm. No lymphadenopathy. BONES/SUBCUTANEOUS TISSUE: Left back/flank irregular peripheral enhancing fluid collection with numerous locules of air adjacent reactive fat stranding most indicative of abscess measuring approximately 12 x 10 x 4.9 cm. This abscess abuts the leftward dorsal paraspinous musculature however does not appear to involve the musculature. No evidence of acute osseous abnormality.      1. Left soft tissue\flank abscess measuring approximately 12 x 10 x 4.9 cm abutting the dorsal left paraspinous musculature without muscular involvement evident. 2. Abundant inflammatory change of the left inguinal region/left pubic region with some phlegmonous change evident and likely an early developing abscess of the left scrotum/left pubic region measuring 22 x 38 mm. Recommend clinical correlation with physical examination and close attention to this area. 3. Cholelithiasis without evidence of acute cholecystitis. XR CHEST PORTABLE    Result Date: 10/5/2022  EXAM: Single view chest radiograph. INDICATION: Hyperglycemia for 4 months. COMPARISON: None. FINDINGS: No focal lung consolidation. No pneumothorax. No pleural effusion. The heart is normal in size. No evidence of acute osseous abnormality. 1. No acute cardiopulmonary abnormality. Patient's been cultured up. Antibiotics given. Insulin for hyperglycemia. Discussed with hospitalist regarding admission. Abscesses were drained. No crepitus in  region to back. CT scan was a result of my exploration of the abscesses. No pain out of proportion. Doubt necrotizing fasciitis. Did speak with urology who will evaluate the patient. Voice dictation software was used during the making of this note. This software is not perfect and grammatical and other typographical errors may be present. This note has not been completely proofread for errors.      Marquis Basilia MD  10/05/22 2041       Marquis Basilia MD  10/05/22 9371       Marquis Basilia MD  10/05/22 4661

## 2022-10-06 NOTE — ED NOTES
TRANSFER - OUT REPORT:    Verbal report given to KEVIN Swain on Antolin Renae  being transferred to [address] [place] [address] for routine progression of patient care       Report consisted of patient's Situation, Background, Assessment and   Recommendations(SBAR). Information from the following report(s) Nurse Handoff Report was reviewed with the receiving nurse. Lines:   Peripheral IV 10/05/22 Left (Active)       Peripheral IV 10/05/22 Right Hand (Active)   Site Assessment Clean, dry & intact 10/05/22 2219   Line Status Blood return noted 10/05/22 2219        Opportunity for questions and clarification was provided.       Patient transported with:  Registered Nurse      Skye Oquendo RN  10/06/22 6135

## 2022-10-06 NOTE — CONSULTS
Department of Interventional Radiology  [phone]        Consult Note     Patient: Anthony Mathews MRN: [id number]  SSN: [id number]    YOB: 1965  Age: 60 y.o. Sex: male      Referring Physician: Dr. Ria Lopez Date: 10/6/2022     Subjective:     Chief Complaint: Back abscess and scrotal cellulits    History of Present Illness: Anthony Mathews is a 60 y.o. male who is seen in consultation for potential need for percutaneous IR placed drains. Pt has a pertinent PMH for: DM2 and HTN who presented to the ER yesterday with nausea, vomiting, and concerns about abscess. Per report, patient has been having intermittent drainage from a painful wound on his back for a little over one month. Additionally, patient with areas of groin cellulitis and induration as well which he states have been present around 2 weeks    I&D performed in ER for back abscess. WBC is elevated at 21.2. Blood glucoses are also elevated over 300. Patient reports high blood sugars at home recently as well. Patient not in DKA. Given areas of abscess and cellulitis, CT was performed, which does not show air or gangrenous changes, but does show groin/perineal phlegmon as well as packing in the known back abscess/cellulitis. Dr. Earnest Rangel has already evaluated the groin/perineal region. Dr. Kristy Ng from surgery is also being consulted. Past Medical History:   Diagnosis Date    Diabetes mellitus ([address]     Hypertension      History reviewed. No pertinent surgical history. History reviewed. No pertinent family history.   Social History     Tobacco Use    Smoking status: Former     Types: Cigarettes    Smokeless tobacco: Never   Substance Use Topics    Alcohol use: Not on file      No Known Allergies  Current Facility-Administered Medications   Medication Dose Route Frequency Provider Last Rate Last Admin    [Held by provider] lisinopril (PRINIVIL;ZESTRIL) tablet 5 mg  5 mg Oral Daily Jose Alberto Schmitz MD        insulin lispro (HUMALOG) injection vial 0-8 Units  0-8 Units SubCUTAneous TID WC Deb Nails MD   2 Units at 10/06/22 1617    insulin lispro (HUMALOG) injection vial 0-4 Units  0-4 Units SubCUTAneous Nightly Deb Nails MD   4 Units at 10/06/22 0248    sodium chloride flush 0.9 % injection 5-40 mL  5-40 mL IntraVENous 2 times per day Deb Nails MD   8 mL at 10/06/22 7086    sodium chloride flush 0.9 % injection 5-40 mL  5-40 mL IntraVENous PRN Deb Nails MD        0.9 % sodium chloride infusion   IntraVENous PRN Deb Nails MD        enoxaparin (LOVENOX) injection 40 mg  40 mg SubCUTAneous BID Deb Nails MD        ondansetron (ZOFRAN-ODT) disintegrating tablet 4 mg  4 mg Oral Q8H PRN Deb Nails MD        Or    [facility]ron [facility]) injection 4 mg  4 mg IntraVENous Q6H PRN Deb Nails MD        polyethylene glycol (GLYCOLAX) packet 17 g  17 g Oral Daily PRN Deb Nails MD        acetaminophen (TYLENOL) tablet 650 mg  650 mg Oral Q6H PRN Deb Nails MD   650 mg at 10/06/22 7477    Or    acetaminophen (TYLENOL) suppository 650 mg  650 mg Rectal Q6H PRN Deb Nails MD        0.9 % sodium chloride infusion   IntraVENous Continuous Cathryn Aguilera  mL/hr at 10/06/22 0834 New Bag at 10/06/22 0834    HYDROcodone-acetaminophen (NORCO) 5-325 MG per tablet 1 tablet  1 tablet Oral Q4H PRN Deb Nails MD   1 tablet at 10/06/22 1338    morphine injection 2 mg  2 mg IntraVENous Q4H PRN Deb Nails MD   2 mg at 10/06/22 1617    piperacillin-tazobactam (ZOSYN) 3,375 mg in sodium chloride 0.9 % 50 mL IVPB (mini-bag)  3,375 mg IntraVENous Norrine MD Benita 12.5 mL/hr at 10/06/22 1339 3,375 mg at 10/06/22 1339    insulin glargine (LANTUS) injection vial 25 Units  25 Units SubCUTAneous Daily Corlis Bars, MD   25 Units at 10/06/22 0974      Medications Prior to Admission: glipiZIDE (GLUCOTROL XL) 10 MG extended release tablet, TAKE 1 TABLET BY MOUTH TWICE DAILY WITH FOOD  lisinopril (PRINIVIL;ZESTRIL) 5 MG tablet, 1 tablet Orally Once a day for 30 day(s)  metFORMIN (GLUCOPHAGE) 1000 MG tablet, 1 tablet with a meal Orally Twice a day for 30 days    No Known Allergies    Review of Systems:  Pertinent items are noted in HPI. Objective:     Physical Exam:  Vitals:    10/06/22 0413 10/06/22 0713 10/06/22 1102 10/06/22 1504   BP: 109/63 122/75 (!) 154/82 129/84   Pulse: 96 91 93 94   Resp: 16 16 18 15   Temp: 99.7 °F (37.6 °C) 99 °F (37.2 °C) 99.1 °F (37.3 °C) 99.1 °F (37.3 °C)   TempSrc: Oral      SpO2: 95% 95% 95% 95%   Weight:       Height:         Pain Assessment   10     HEART: regular rate and rhythm, S1, S2 normal, no murmur, click, rub or gallop  LUNG: clear to auscultation bilaterally  ABDOMEN: soft, non-tender; bowel sounds normal; no masses,  no organomegaly  BACK: Patient has a very large area of cellulitis over his mid to left back with a small I&D present with some purulent drainage on the bandage. : Patient has cellulitis and swelling to the suprapubic region that extends to the shaft of the penis and scrotum. Moderate swelling noted. No draining abscess noted.   No crepitus or obvious Lake's at this time    Lab/Data Review:    CBC:   Lab Results   Component Value Date/Time    WBC 19.7 10/06/2022 08:40 AM    RBC 3.51 10/06/2022 08:40 AM    HGB 10.2 10/06/2022 08:40 AM    HCT 31.9 10/06/2022 08:40 AM    MCV 90.9 10/06/2022 08:40 AM    RDW 14.0 10/06/2022 08:40 AM     10/06/2022 08:40 AM     CMP:    Lab Results   Component Value Date/Time     10/06/2022 08:40 AM    K 4.5 10/06/2022 08:40 AM     10/06/2022 08:40 AM    CO2 25 10/06/2022 08:40 AM    BUN 32 10/06/2022 08:40 AM    PROT 9.1 10/05/2022 04:58 PM     PT/INR:  No results found for: PTINR  PTT:  No results found for: APTT      Assessment/Plan:     61-year-old diabetic male with a large back abscess/cellulitis as well as cellulitis/phlegmon to the mons pubis/groin/scrotum. No discrete, undrained abscess. No role for percutaneous drain. Discussed with, and images reviewed with, Dr. Clyde Sorto. Agree w recommendation for General Surgery consult RE:  back cellulitis/abscess.

## 2022-10-06 NOTE — PROGRESS NOTES
Patient speaks Polish as their preferred language for their healthcare communication. If there are technical problems using the [employer] mobil unit, please contact Language Services for interpretation at:    Grey Bosworth, Senior -Navigator ([phone])  General phone: [phone] ( [phone])  Email: [email or web address]    Please always document the use of interpreting services (name and/or 's ID number) in your clinical notes. Our interpreters are available for team members working with limited  English proficient (LEP) patients remotely, in person (if needed for special cases), as phone or video interpreters on the [employer] units.         Thank you,        Grey Bosworth CHITM  Senior /Navigator

## 2022-10-06 NOTE — PROGRESS NOTES
Patient states he hasn't voided in over 24 hrs. Bladder scanner = > 730ml. Unable to straight cath due to penile and scrotal edema and scrotal abscesses. Notified Dr. Anya Zaman and was instructed to call urology. Notified Dr. Sonam Gómez via phone. Dr. Sonam Gómez to see patient momentarily.

## 2022-10-06 NOTE — PROGRESS NOTES
[facility] Pharmacokinetic Monitoring Service - Vancomycin     Domenico Khan is a 60 y.o. male starting on vancomycin therapy for Wound Infection. Pharmacy consulted by Dr. Slim Carbajal for monitoring and adjustment. Target Concentration: Goal trough of 10-15 mg/L and AUC/MADINA <500 mg*hr/L    Additional Antimicrobials: Zosyn    Pertinent Laboratory Values: Wt Readings from Last 1 Encounters:   10/05/22 (!) 365 lb (165.6 kg)     Temp Readings from Last 1 Encounters:   10/06/22 (!) 100.8 °F (38.2 °C) (Oral)     Recent Labs     10/05/22  1658 10/05/22  2023   BUN 31*  --    CREATININE 1.30  --    WBC 21.2*  --    PROCAL 0.95*  --    LACACIDPL 2.0 1.8     Estimated Creatinine Clearance: 101 mL/min (based on SCr of 1.3 mg/dL). No results found for: Anali Vance    MRSA Nasal Swab: N/A. Non-respiratory infection. .    Plan:  Dosing recommendations based on Bayesian software  Start vancomycin 2500mg IV x 1 in ER followed by Vancomycin 1250mg Iv   Anticipated AUC of 443 and trough concentration of 10.9 at steady state  Renal labs as indicated   Vancomycin concentrations will be ordered as clinically appropriate   Pharmacy will continue to monitor patient and adjust therapy as indicated    Thank you for the consult,  Kd Verde, [facility]

## 2022-10-07 ENCOUNTER — ANESTHESIA (OUTPATIENT)
Dept: SURGERY | Age: 57
DRG: 854 | End: 2022-10-07

## 2022-10-07 ENCOUNTER — ANESTHESIA EVENT (OUTPATIENT)
Dept: SURGERY | Age: 57
DRG: 854 | End: 2022-10-07

## 2022-10-07 PROBLEM — L02.219 CELLULITIS AND ABSCESS OF TRUNK: Status: ACTIVE | Noted: 2022-10-07

## 2022-10-07 PROBLEM — L03.319 CELLULITIS AND ABSCESS OF TRUNK: Status: ACTIVE | Noted: 2022-10-07

## 2022-10-07 LAB
ANION GAP SERPL CALC-SCNC: 6 MMOL/L (ref 4–13)
BASOPHILS # BLD: 0.1 K/UL (ref 0–0.2)
BASOPHILS NFR BLD: 0 % (ref 0–2)
BUN SERPL-MCNC: 23 MG/DL (ref 6–23)
CALCIUM SERPL-MCNC: 8.4 MG/DL (ref 8.3–10.4)
CHLORIDE SERPL-SCNC: 104 MMOL/L (ref 101–110)
CO2 SERPL-SCNC: 24 MMOL/L (ref 21–32)
CREAT SERPL-MCNC: 1.4 MG/DL (ref 0.8–1.5)
DIFFERENTIAL METHOD BLD: ABNORMAL
EOSINOPHIL # BLD: 0.3 K/UL (ref 0–0.8)
EOSINOPHIL NFR BLD: 2 % (ref 0.5–7.8)
ERYTHROCYTE [DISTWIDTH] IN BLOOD BY AUTOMATED COUNT: 14.1 % (ref 11.9–14.6)
GLUCOSE BLD STRIP.AUTO-MCNC: 169 MG/DL (ref 65–100)
GLUCOSE BLD STRIP.AUTO-MCNC: 192 MG/DL (ref 65–100)
GLUCOSE BLD STRIP.AUTO-MCNC: 193 MG/DL (ref 65–100)
GLUCOSE BLD STRIP.AUTO-MCNC: 200 MG/DL (ref 65–100)
GLUCOSE BLD STRIP.AUTO-MCNC: 212 MG/DL (ref 65–100)
GLUCOSE SERPL-MCNC: 170 MG/DL (ref 65–100)
HCT VFR BLD AUTO: 31.9 % (ref 41.1–50.3)
HGB BLD-MCNC: 10 G/DL (ref 13.6–17.2)
IMM GRANULOCYTES # BLD AUTO: 0.2 K/UL (ref 0–0.5)
IMM GRANULOCYTES NFR BLD AUTO: 1 % (ref 0–5)
LYMPHOCYTES # BLD: 2.3 K/UL (ref 0.5–4.6)
LYMPHOCYTES NFR BLD: 12 % (ref 13–44)
MCH RBC QN AUTO: 28.8 PG (ref 26.1–32.9)
MCHC RBC AUTO-ENTMCNC: 31.3 G/DL (ref 31.4–35)
MCV RBC AUTO: 91.9 FL (ref 79.6–97.8)
MONOCYTES # BLD: 1.2 K/UL (ref 0.1–1.3)
MONOCYTES NFR BLD: 6 % (ref 4–12)
NEUTS SEG # BLD: 15.2 K/UL (ref 1.7–8.2)
NEUTS SEG NFR BLD: 79 % (ref 43–78)
NRBC # BLD: 0 K/UL (ref 0–0.2)
PHOSPHATE SERPL-MCNC: 2.9 MG/DL (ref 2.5–4.5)
PLATELET # BLD AUTO: 279 K/UL (ref 150–450)
PMV BLD AUTO: 11.8 FL (ref 9.4–12.3)
POTASSIUM SERPL-SCNC: 4.4 MMOL/L (ref 3.5–5.1)
RBC # BLD AUTO: 3.47 M/UL (ref 4.23–5.6)
RPR SER QL: NONREACTIVE
SERVICE CMNT-IMP: ABNORMAL
SODIUM SERPL-SCNC: 134 MMOL/L (ref 138–145)
WBC # BLD AUTO: 19.3 K/UL (ref 4.3–11.1)

## 2022-10-07 PROCEDURE — 36415 COLL VENOUS BLD VENIPUNCTURE: CPT

## 2022-10-07 PROCEDURE — 2580000003 HC RX 258: Performed by: NURSE ANESTHETIST, CERTIFIED REGISTERED

## 2022-10-07 PROCEDURE — 6360000002 HC RX W HCPCS: Performed by: FAMILY MEDICINE

## 2022-10-07 PROCEDURE — 99232 SBSQ HOSP IP/OBS MODERATE 35: CPT | Performed by: UROLOGY

## 2022-10-07 PROCEDURE — 84100 ASSAY OF PHOSPHORUS: CPT

## 2022-10-07 PROCEDURE — 3700000000 HC ANESTHESIA ATTENDED CARE: Performed by: STUDENT IN AN ORGANIZED HEALTH CARE EDUCATION/TRAINING PROGRAM

## 2022-10-07 PROCEDURE — 6360000002 HC RX W HCPCS: Performed by: ANESTHESIOLOGY

## 2022-10-07 PROCEDURE — 85025 COMPLETE CBC W/AUTO DIFF WBC: CPT

## 2022-10-07 PROCEDURE — 7100000001 HC PACU RECOVERY - ADDTL 15 MIN: Performed by: STUDENT IN AN ORGANIZED HEALTH CARE EDUCATION/TRAINING PROGRAM

## 2022-10-07 PROCEDURE — 87205 SMEAR GRAM STAIN: CPT

## 2022-10-07 PROCEDURE — 6360000002 HC RX W HCPCS: Performed by: NURSE ANESTHETIST, CERTIFIED REGISTERED

## 2022-10-07 PROCEDURE — 87075 CULTR BACTERIA EXCEPT BLOOD: CPT

## 2022-10-07 PROCEDURE — 0VB50ZZ EXCISION OF SCROTUM, OPEN APPROACH: ICD-10-PCS | Performed by: UROLOGY

## 2022-10-07 PROCEDURE — 6370000000 HC RX 637 (ALT 250 FOR IP): Performed by: STUDENT IN AN ORGANIZED HEALTH CARE EDUCATION/TRAINING PROGRAM

## 2022-10-07 PROCEDURE — 7100000000 HC PACU RECOVERY - FIRST 15 MIN: Performed by: STUDENT IN AN ORGANIZED HEALTH CARE EDUCATION/TRAINING PROGRAM

## 2022-10-07 PROCEDURE — 0JBB0ZZ EXCISION OF PERINEUM SUBCUTANEOUS TISSUE AND FASCIA, OPEN APPROACH: ICD-10-PCS | Performed by: STUDENT IN AN ORGANIZED HEALTH CARE EDUCATION/TRAINING PROGRAM

## 2022-10-07 PROCEDURE — 1100000000 HC RM PRIVATE

## 2022-10-07 PROCEDURE — 2500000003 HC RX 250 WO HCPCS: Performed by: NURSE ANESTHETIST, CERTIFIED REGISTERED

## 2022-10-07 PROCEDURE — 80048 BASIC METABOLIC PNL TOTAL CA: CPT

## 2022-10-07 PROCEDURE — 0JBC0ZZ EXCISION OF PELVIC REGION SUBCUTANEOUS TISSUE AND FASCIA, OPEN APPROACH: ICD-10-PCS | Performed by: STUDENT IN AN ORGANIZED HEALTH CARE EDUCATION/TRAINING PROGRAM

## 2022-10-07 PROCEDURE — 10061 I&D ABSCESS COMP/MULTIPLE: CPT | Performed by: STUDENT IN AN ORGANIZED HEALTH CARE EDUCATION/TRAINING PROGRAM

## 2022-10-07 PROCEDURE — 3600000014 HC SURGERY LEVEL 4 ADDTL 15MIN: Performed by: STUDENT IN AN ORGANIZED HEALTH CARE EDUCATION/TRAINING PROGRAM

## 2022-10-07 PROCEDURE — 0VBS0ZZ EXCISION OF PENIS, OPEN APPROACH: ICD-10-PCS | Performed by: UROLOGY

## 2022-10-07 PROCEDURE — 87536 HIV-1 QUANT&REVRSE TRNSCRPJ: CPT

## 2022-10-07 PROCEDURE — 2580000003 HC RX 258: Performed by: FAMILY MEDICINE

## 2022-10-07 PROCEDURE — 87206 SMEAR FLUORESCENT/ACID STAI: CPT

## 2022-10-07 PROCEDURE — 82962 GLUCOSE BLOOD TEST: CPT

## 2022-10-07 PROCEDURE — 3700000001 HC ADD 15 MINUTES (ANESTHESIA): Performed by: STUDENT IN AN ORGANIZED HEALTH CARE EDUCATION/TRAINING PROGRAM

## 2022-10-07 PROCEDURE — 0JB70ZZ EXCISION OF BACK SUBCUTANEOUS TISSUE AND FASCIA, OPEN APPROACH: ICD-10-PCS | Performed by: STUDENT IN AN ORGANIZED HEALTH CARE EDUCATION/TRAINING PROGRAM

## 2022-10-07 PROCEDURE — 87077 CULTURE AEROBIC IDENTIFY: CPT

## 2022-10-07 PROCEDURE — 2709999900 HC NON-CHARGEABLE SUPPLY: Performed by: STUDENT IN AN ORGANIZED HEALTH CARE EDUCATION/TRAINING PROGRAM

## 2022-10-07 PROCEDURE — 6370000000 HC RX 637 (ALT 250 FOR IP): Performed by: FAMILY MEDICINE

## 2022-10-07 PROCEDURE — 87186 SC STD MICRODIL/AGAR DIL: CPT

## 2022-10-07 PROCEDURE — 99254 IP/OBS CNSLTJ NEW/EST MOD 60: CPT | Performed by: STUDENT IN AN ORGANIZED HEALTH CARE EDUCATION/TRAINING PROGRAM

## 2022-10-07 PROCEDURE — 3600000004 HC SURGERY LEVEL 4 BASE: Performed by: STUDENT IN AN ORGANIZED HEALTH CARE EDUCATION/TRAINING PROGRAM

## 2022-10-07 RX ORDER — HYDROMORPHONE HCL 110MG/55ML
0.5 PATIENT CONTROLLED ANALGESIA SYRINGE INTRAVENOUS AS NEEDED
Status: COMPLETED | OUTPATIENT
Start: 2022-10-07 | End: 2022-10-07

## 2022-10-07 RX ORDER — PROPOFOL 10 MG/ML
INJECTION, EMULSION INTRAVENOUS PRN
Status: DISCONTINUED | OUTPATIENT
Start: 2022-10-07 | End: 2022-10-07 | Stop reason: SDUPTHER

## 2022-10-07 RX ORDER — SUCCINYLCHOLINE CHLORIDE 20 MG/ML
INJECTION INTRAMUSCULAR; INTRAVENOUS PRN
Status: DISCONTINUED | OUTPATIENT
Start: 2022-10-07 | End: 2022-10-07 | Stop reason: SDUPTHER

## 2022-10-07 RX ORDER — SODIUM CHLORIDE, SODIUM LACTATE, POTASSIUM CHLORIDE, CALCIUM CHLORIDE 600; 310; 30; 20 MG/100ML; MG/100ML; MG/100ML; MG/100ML
INJECTION, SOLUTION INTRAVENOUS CONTINUOUS PRN
Status: DISCONTINUED | OUTPATIENT
Start: 2022-10-07 | End: 2022-10-07 | Stop reason: SDUPTHER

## 2022-10-07 RX ORDER — FENTANYL CITRATE 50 UG/ML
INJECTION, SOLUTION INTRAMUSCULAR; INTRAVENOUS PRN
Status: DISCONTINUED | OUTPATIENT
Start: 2022-10-07 | End: 2022-10-07 | Stop reason: SDUPTHER

## 2022-10-07 RX ORDER — LIDOCAINE HYDROCHLORIDE 20 MG/ML
INJECTION, SOLUTION EPIDURAL; INFILTRATION; INTRACAUDAL; PERINEURAL PRN
Status: DISCONTINUED | OUTPATIENT
Start: 2022-10-07 | End: 2022-10-07 | Stop reason: SDUPTHER

## 2022-10-07 RX ADMIN — MORPHINE SULFATE 2 MG: 2 INJECTION, SOLUTION INTRAMUSCULAR; INTRAVENOUS at 23:07

## 2022-10-07 RX ADMIN — MORPHINE SULFATE 2 MG: 2 INJECTION, SOLUTION INTRAMUSCULAR; INTRAVENOUS at 02:09

## 2022-10-07 RX ADMIN — PIPERACILLIN AND TAZOBACTAM 3375 MG: 3; .375 INJECTION, POWDER, FOR SOLUTION INTRAVENOUS at 14:02

## 2022-10-07 RX ADMIN — ONDANSETRON 4 MG: 2 INJECTION INTRAMUSCULAR; INTRAVENOUS at 16:24

## 2022-10-07 RX ADMIN — FENTANYL CITRATE 50 MCG: 50 INJECTION, SOLUTION INTRAMUSCULAR; INTRAVENOUS at 16:26

## 2022-10-07 RX ADMIN — MORPHINE SULFATE 2 MG: 2 INJECTION, SOLUTION INTRAMUSCULAR; INTRAVENOUS at 14:01

## 2022-10-07 RX ADMIN — FENTANYL CITRATE 50 MCG: 50 INJECTION, SOLUTION INTRAMUSCULAR; INTRAVENOUS at 17:08

## 2022-10-07 RX ADMIN — ENOXAPARIN SODIUM 40 MG: 100 INJECTION SUBCUTANEOUS at 09:01

## 2022-10-07 RX ADMIN — PIPERACILLIN AND TAZOBACTAM 3375 MG: 3; .375 INJECTION, POWDER, FOR SOLUTION INTRAVENOUS at 23:10

## 2022-10-07 RX ADMIN — SODIUM CHLORIDE, PRESERVATIVE FREE 10 ML: 5 INJECTION INTRAVENOUS at 23:29

## 2022-10-07 RX ADMIN — PHENYLEPHRINE HYDROCHLORIDE 2 ML: 10 INJECTION INTRAVENOUS at 16:23

## 2022-10-07 RX ADMIN — FENTANYL CITRATE 50 MCG: 50 INJECTION, SOLUTION INTRAMUSCULAR; INTRAVENOUS at 16:00

## 2022-10-07 RX ADMIN — SODIUM CHLORIDE, PRESERVATIVE FREE 10 ML: 5 INJECTION INTRAVENOUS at 09:02

## 2022-10-07 RX ADMIN — ENOXAPARIN SODIUM 40 MG: 100 INJECTION SUBCUTANEOUS at 23:09

## 2022-10-07 RX ADMIN — FENTANYL CITRATE 50 MCG: 50 INJECTION, SOLUTION INTRAMUSCULAR; INTRAVENOUS at 17:03

## 2022-10-07 RX ADMIN — PHENYLEPHRINE HYDROCHLORIDE 1 ML: 10 INJECTION INTRAVENOUS at 16:08

## 2022-10-07 RX ADMIN — PHENYLEPHRINE HYDROCHLORIDE 2 ML: 10 INJECTION INTRAVENOUS at 16:09

## 2022-10-07 RX ADMIN — SODIUM CHLORIDE, SODIUM LACTATE, POTASSIUM CHLORIDE, AND CALCIUM CHLORIDE: 600; 310; 30; 20 INJECTION, SOLUTION INTRAVENOUS at 15:34

## 2022-10-07 RX ADMIN — HYDROMORPHONE HYDROCHLORIDE 0.5 MG: 2 INJECTION, SOLUTION INTRAMUSCULAR; INTRAVENOUS; SUBCUTANEOUS at 17:50

## 2022-10-07 RX ADMIN — INSULIN LISPRO 2 UNITS: 100 INJECTION, SOLUTION INTRAVENOUS; SUBCUTANEOUS at 11:31

## 2022-10-07 RX ADMIN — HYDROMORPHONE HYDROCHLORIDE 0.5 MG: 2 INJECTION, SOLUTION INTRAMUSCULAR; INTRAVENOUS; SUBCUTANEOUS at 18:06

## 2022-10-07 RX ADMIN — HYDROMORPHONE HYDROCHLORIDE 0.5 MG: 2 INJECTION, SOLUTION INTRAMUSCULAR; INTRAVENOUS; SUBCUTANEOUS at 17:55

## 2022-10-07 RX ADMIN — HYDROMORPHONE HYDROCHLORIDE 0.5 MG: 2 INJECTION, SOLUTION INTRAMUSCULAR; INTRAVENOUS; SUBCUTANEOUS at 18:00

## 2022-10-07 RX ADMIN — HYDROCODONE BITARTRATE AND ACETAMINOPHEN 1 TABLET: 5; 325 TABLET ORAL at 09:01

## 2022-10-07 RX ADMIN — PIPERACILLIN AND TAZOBACTAM 3375 MG: 3; .375 INJECTION, POWDER, FOR SOLUTION INTRAVENOUS at 15:49

## 2022-10-07 RX ADMIN — PROPOFOL 160 MG: 10 INJECTION, EMULSION INTRAVENOUS at 15:44

## 2022-10-07 RX ADMIN — LIDOCAINE HYDROCHLORIDE 100 MG: 20 INJECTION, SOLUTION EPIDURAL; INFILTRATION; INTRACAUDAL; PERINEURAL at 15:44

## 2022-10-07 RX ADMIN — Medication 160 MG: at 15:44

## 2022-10-07 RX ADMIN — ACETAMINOPHEN 650 MG: 325 TABLET ORAL at 00:42

## 2022-10-07 RX ADMIN — PIPERACILLIN AND TAZOBACTAM 3375 MG: 3; .375 INJECTION, POWDER, FOR SOLUTION INTRAVENOUS at 05:58

## 2022-10-07 ASSESSMENT — PAIN SCALES - GENERAL
PAINLEVEL_OUTOF10: 5
PAINLEVEL_OUTOF10: 0
PAINLEVEL_OUTOF10: 0
PAINLEVEL_OUTOF10: 8
PAINLEVEL_OUTOF10: 3
PAINLEVEL_OUTOF10: 10
PAINLEVEL_OUTOF10: 7
PAINLEVEL_OUTOF10: 8
PAINLEVEL_OUTOF10: 7
PAINLEVEL_OUTOF10: 3
PAINLEVEL_OUTOF10: 10
PAINLEVEL_OUTOF10: 8
PAINLEVEL_OUTOF10: 6

## 2022-10-07 ASSESSMENT — PAIN DESCRIPTION - LOCATION
LOCATION: BACK
LOCATION: PERINEUM
LOCATION: ABDOMEN
LOCATION: PERINEUM
LOCATION: BACK;PENIS

## 2022-10-07 ASSESSMENT — PAIN DESCRIPTION - DESCRIPTORS
DESCRIPTORS: BURNING;JABBING
DESCRIPTORS: SHOOTING
DESCRIPTORS: THROBBING;SHOOTING

## 2022-10-07 ASSESSMENT — PAIN DESCRIPTION - ORIENTATION
ORIENTATION: POSTERIOR
ORIENTATION: MID
ORIENTATION: POSTERIOR
ORIENTATION: ANTERIOR

## 2022-10-07 ASSESSMENT — PAIN - FUNCTIONAL ASSESSMENT: PAIN_FUNCTIONAL_ASSESSMENT: 0-10

## 2022-10-07 NOTE — PROGRESS NOTES
Admit Date: 10/5/2022      Subjective:     Nicole Stanford is resting. Afebrile currently, T max 102.2 overnight. Objective:     Patient Vitals for the past 8 hrs:   BP Temp Temp src Pulse Resp SpO2   10/07/22 0554 114/74 99.1 °F (37.3 °C) Oral 97 18 93 %   10/07/22 0033 133/72 (!) 102.2 °F (39 °C) Oral (!) 102 -- 95 %     No intake/output data recorded. 10/05 1901 - 10/07 0700  In: 545 [P.O.:120;  I.V.:325]  Out: 1000 [Urine:1000]    Physical Exam:  GENERAL ASSESSMENT: alert, oriented to person, place and time  Chest: easy work of breathing  CVS exam: normal rate, regular rhythm, normal S1, S2  ABDOMEN: soft, non tender  Neurological exam reveals alert, oriented, normal speech    MALE GENITAL EXAM:  Mons erythematous, firm, warm;  Penis with edematous area with small fluctuant area of dorsal midshaft and pinhole area which is not draining with palpation, seems more erythematous today; very tender, no necrosis or crepitus, no scrotal swelling/erythema       Data Review   Recent Results (from the past 24 hour(s))   Basic Metabolic Panel w/ Reflex to MG    Collection Time: 10/06/22  8:40 AM   Result Value Ref Range    Sodium 134 (L) 138 - 145 mmol/L    Potassium 4.5 3.5 - 5.1 mmol/L    Chloride 102 101 - 110 mmol/L    CO2 25 21 - 32 mmol/L    Anion Gap 7 4 - 13 mmol/L    Glucose 348 (H) 65 - 100 mg/dL    BUN 32 (H) 6 - 23 MG/DL    Creatinine 1.40 0.8 - 1.5 MG/DL    Est, Glom Filt Rate 58 (L) >60 ml/min/1.73m2    Calcium 8.3 8.3 - 10.4 MG/DL   CBC with Auto Differential    Collection Time: 10/06/22  8:40 AM   Result Value Ref Range    WBC 19.7 (H) 4.3 - 11.1 K/uL    RBC 3.51 (L) 4.23 - 5.6 M/uL    Hemoglobin 10.2 (L) 13.6 - 17.2 g/dL    Hematocrit 31.9 (L) 41.1 - 50.3 %    MCV 90.9 79.6 - 97.8 FL    MCH 29.1 26.1 - 32.9 PG    MCHC 32.0 31.4 - 35.0 g/dL    RDW 14.0 11.9 - 14.6 %    Platelets 912 277 - 855 K/uL    MPV 12.0 9.4 - 12.3 FL    nRBC 0.00 0.0 - 0.2 K/uL    Differential Type AUTOMATED      Seg Neutrophils 79 (H) 43 - 78 %    Lymphocytes 12 (L) 13 - 44 %    Monocytes 7 4.0 - 12.0 %    Eosinophils % 1 0.5 - 7.8 %    Basophils 0 0.0 - 2.0 %    Immature Granulocytes 1 0.0 - 5.0 %    Segs Absolute 15.6 (H) 1.7 - 8.2 K/UL    Absolute Lymph # 2.3 0.5 - 4.6 K/UL    Absolute Mono # 1.3 0.1 - 1.3 K/UL    Absolute Eos # 0.1 0.0 - 0.8 K/UL    Basophils Absolute 0.1 0.0 - 0.2 K/UL    Absolute Immature Granulocyte 0.2 0.0 - 0.5 K/UL   Hemoglobin A1C    Collection Time: 10/06/22  8:40 AM   Result Value Ref Range    Hemoglobin A1C >14.0 (H) 4.8 - 5.6 %    eAG Cannot be calculated mg/dL   POCT Glucose    Collection Time: 10/06/22 11:03 AM   Result Value Ref Range    POC Glucose 354 (H) 65 - 100 mg/dL    Performed by: Clarke    Transthoracic echocardiogram (TTE) complete with contrast, bubble, strain, and 3D PRN    Collection Time: 10/06/22  2:43 PM   Result Value Ref Range    LV EDV A2C 95 mL    LV EDV A4C 137 mL    LV ESV A2C 63 mL    LV ESV A4C 72 mL    IVSd 1.4 (A) 0.6 - 1.0 cm    LVIDd 4.3 4.2 - 5.9 cm    LVIDs 3.5 cm    LVOT Diameter 2.2 cm    LVOT Mean Gradient 3 mmHg    LVOT VTI 20.2 cm    LVOT Peak Velocity 1.2 m/s    LVOT Peak Gradient 6 mmHg    LVPWd 1.3 (A) 0.6 - 1.0 cm    LV E' Lateral Velocity 12 cm/s    LV E' Septal Velocity 10 cm/s    LV Ejection Fraction A2C 34 %    LV Ejection Fraction A4C 47 %    EF BP 42 (A) 55 - 100 %    LVOT Area 3.8 cm2    LVOT SV 76.7 ml    LA Minor Axis 5.8 cm    LA Major Axis 5.3 cm    LA Area 2C 22.0 cm2    LA Area 4C 19.7 cm2    LA Volume BP 66 (A) 18 - 58 mL    LA Diameter 3.9 cm    AV Mean Velocity 1.0 m/s    AV Mean Gradient 4 mmHg    AV VTI 23.4 cm    AV Peak Velocity 1.4 m/s    AV Peak Gradient 8 mmHg    AV Area by VTI 3.3 cm2    AV Area by Peak Velocity 3.2 cm2    Aortic Root 3.1 cm    Ascending Aorta 3.4 cm    IVC Proxmal 1.7 cm    MV E Wave Deceleration Time 224.0 ms    MV A Velocity 0.62 m/s    MV E Velocity 0.67 m/s    PV .0 ms    PV Max Velocity 1.3 m/s    PV Peak Gradient 7 mmHg    RVIDd 3.2 cm    RV Basal Dimension 3.8 cm    TAPSE 2.1 1.7 cm    Body Surface Area 2.94 m2    Fractional Shortening 2D 19 28 - 44 %    LV ESV Index A4C 26 mL/m2    LV EDV Index A4C 49 mL/m2    LV ESV Index A2C 22 mL/m2    LV EDV Index A2C 34 mL/m2    LVIDd Index 1.53 cm/m2    LVIDs Index 1.25 cm/m2    LV RWT Ratio 0.60     LV Mass 2D 219.8 88 - 224 g    LV Mass 2D Index 78.2 49 - 115 g/m2    MV E/A 1.08     E/E' Ratio (Averaged) 6.14     E/E' Lateral 5.58     E/E' Septal 6.70     LA Volume Index BP 23 16 - 34 ml/m2    LVOT Stroke Volume Index 27.3 mL/m2    LA Size Index 1.39 cm/m2    LA/AO Root Ratio 1.26     Ao Root Index 1.10 cm/m2    Ascending Aorta Index 1.21 cm/m2    AV Velocity Ratio 0.86     LVOT:AV VTI Index 0.86     BABATUNDE/BSA VTI 1.2 cm2/m2    BABATUNDE/BSA Peak Velocity 1.1 cm2/m2    Est. RA Pressure 3 mmHg   POCT Glucose    Collection Time: 10/06/22  4:06 PM   Result Value Ref Range    POC Glucose 237 (H) 65 - 100 mg/dL    Performed by: Clarke    POCT Glucose    Collection Time: 10/06/22  9:00 PM   Result Value Ref Range    POC Glucose 171 (H) 65 - 100 mg/dL    Performed by: Heidi    CBC with Auto Differential    Collection Time: 10/07/22  6:31 AM   Result Value Ref Range    WBC 19.3 (H) 4.3 - 11.1 K/uL    RBC 3.47 (L) 4.23 - 5.6 M/uL    Hemoglobin 10.0 (L) 13.6 - 17.2 g/dL    Hematocrit 31.9 (L) 41.1 - 50.3 %    MCV 91.9 79.6 - 97.8 FL    MCH 28.8 26.1 - 32.9 PG    MCHC 31.3 (L) 31.4 - 35.0 g/dL    RDW 14.1 11.9 - 14.6 %    Platelets 046 908 - 380 K/uL    MPV 11.8 9.4 - 12.3 FL    nRBC 0.00 0.0 - 0.2 K/uL    Differential Type AUTOMATED      Seg Neutrophils 79 (H) 43 - 78 %    Lymphocytes 12 (L) 13 - 44 %    Monocytes 6 4.0 - 12.0 %    Eosinophils % 2 0.5 - 7.8 %    Basophils 0 0.0 - 2.0 %    Immature Granulocytes 1 0.0 - 5.0 %    Segs Absolute 15.2 (H) 1.7 - 8.2 K/UL    Absolute Lymph # 2.3 0.5 - 4.6 K/UL    Absolute Mono # 1.2 0.1 - 1.3 K/UL    Absolute Eos # 0.3 0.0 - 0.8 K/UL    Basophils Absolute 0.1 0.0 - 0.2 K/UL    Absolute Immature Granulocyte 0.2 0.0 - 0.5 K/UL       Assessment:     Principal Problem:    Abscess  Active Problems:    Primary hypertension    DM2 (diabetes mellitus, type 2) (HCC)  Resolved Problems:    * No resolved hospital problems. *    61 yo male with hx of DM2, HTN who presented to ED with nausea, vomiting, and concerns about back abscess. Per report, patient has been having intermittent drainage from a painful wound on his back for ~2 months. Additionally, patient with areas of groin cellulitis and induration as well. I&D performed in ER for back abscess. WBC is elevated at 21.2. Blood glucoses are also elevated over 300. Patient reports high blood sugars at home recently as well. Given areas of abscess and cellulitis, CT was performed, which does not show air or gangrenous changes, but does show developing L mons (as well as known back abscess). Pt with penile wound as well. Had temp of 102 last night. On IV zosyn. WBC slightly improved. Plan:     Awaiting general surgery evaluation. We will keep close watch on penile wound. For now continue broad spectrum IV abx.       Signed By: Chava Aguilera, APRN - CNP     October 7, 2022      [facility]

## 2022-10-07 NOTE — CARE COORDINATION
10/07/22 1425   Service Assessment   Patient Orientation Alert and Oriented   Cognition Alert   History Provided By Patient   Primary Caregiver Spouse   PCP Verified by CM Yes  (in [id number] [place] [address])   Last Visit to PCP Within last 6 months   Prior Functional Level Independent in ADLs/IADLs   Current Functional Level Independent in ADLs/IADLs   Can patient return to prior living arrangement Yes   Ability to make needs known: Good   Family able to assist with home care needs: Yes   Social/Functional History   Lives With Significant other   Type of [address] chair   Home Equipment Cane   ADL Assistance Independent   Homemaking Assistance Independent   Homemaking Responsibilities Yes   Active  Yes   Mode of Transportation Car   Occupation Full time employment   Discharge [address] Medications No   Type of [address] None   Patient expects to be discharged to: [address] Discharge   Transition of Care Consult (CM Consult) Discharge Planning   Confirm Follow Up Transport Self   Condition of Participation: Discharge [address] for Transition of Care is related to the following treatment goals: Home   The Patient and/or Patient Representative was provided with a Choice of Provider? Patient   The Patient and/Or Patient Representative agree with the Discharge Plan? Yes   Freedom of Choice list was provided with basic dialogue that supports the patient's individualized plan of care/goals, treatment preferences, and shares the quality data associated with the providers? Yes         Pt awaiting bed assignment for admission. CM met with pt to discuss CM needs & DCP. Pt is A&Ox4. Pt is indep at home with all ADLS. Pt lives in [address] [place] [address] with his wife, who he refers to as his ex-wife, they are . Patient travels between [address] [place] [address] and [place] to see his girlfriend. Pt has no DME needs.  Pt has no difficulty with obtaining medications or transport. Patient denies hx of HH/rehab. DCP home with spouse. No further needs noted. CM to continue to monitor.

## 2022-10-07 NOTE — WOUND CARE
Noted Consult for groin, back and penis areas, noted urology and GS teams consulted for same. Noted plans for GS to debride groins and back today. Patient off unit currently for surgery, unable to visualize wounds at this time. Available to assist with wound care needs after surgery if needed. Will plan to check in with surgery teams Monday for needs.

## 2022-10-07 NOTE — ANESTHESIA PRE PROCEDURE
Department of Anesthesiology  Preprocedure Note       Name:  Medina Che   Age:  60 y.o.  :  1965                                          MRN:  [id number]         Date:  10/7/2022      Surgeon: Dwayne Davison):  Juan Matos DO    Procedure: Procedure(s):  BACK ABSCESS I & D    Medications prior to admission:   Prior to Admission medications    Medication Sig Start Date End Date Taking?  Authorizing Provider   glipiZIDE (GLUCOTROL XL) 10 MG extended release tablet TAKE 1 TABLET BY MOUTH TWICE DAILY WITH FOOD 22   Historical Provider, MD   lisinopril (PRINIVIL;ZESTRIL) 5 MG tablet 1 tablet Orally Once a day for 30 day(s)    Historical Provider, MD   metFORMIN (GLUCOPHAGE) 1000 MG tablet 1 tablet with a meal Orally Twice a day for 30 days    Historical Provider, MD       Current medications:    Current Facility-Administered Medications   Medication Dose Route Frequency Provider Last Rate Last Admin    [Held by provider] lisinopril (PRINIVIL;ZESTRIL) tablet 5 mg  5 mg Oral Daily Jaiden Martinez MD        insulin lispro (HUMALOG) injection vial 0-8 Units  0-8 Units SubCUTAneous TID WC Jaiden Martinez MD   2 Units at 10/07/22 1131    insulin lispro (HUMALOG) injection vial 0-4 Units  0-4 Units SubCUTAneous Nightly Jaiden Martinez MD   4 Units at 10/06/22 0248    sodium chloride flush 0.9 % injection 5-40 mL  5-40 mL IntraVENous 2 times per day Jaiden Martinez MD   10 mL at 10/07/22 0902    sodium chloride flush 0.9 % injection 5-40 mL  5-40 mL IntraVENous PRN Jaiden Martinez MD        0.9 % sodium chloride infusion   IntraVENous PRN Jaiden Martinez MD        enoxaparin (LOVENOX) injection 40 mg  40 mg SubCUTAneous BID Jaiden Martinez MD   40 mg at 10/07/22 0901    ondansetron (ZOFRAN-ODT) disintegrating tablet 4 mg  4 mg Oral Q8H PRN Jaiden Martinez MD        Or    [facility]etron [facility]) injection 4 mg  4 mg IntraVENous Q6H PRN Jaiden Martinez MD        polyethylene glycol (GLYCOLAX) packet 17 g  17 g Oral Daily PRN Feliciano Lawrence MD        acetaminophen (TYLENOL) tablet 650 mg  650 mg Oral Q6H PRN Feliciano Lawrence MD   650 mg at 10/07/22 0042    Or    acetaminophen (TYLENOL) suppository 650 mg  650 mg Rectal Q6H PRN Feliciano Lawrence MD        HYDROcodone-acetaminophen [facility]) 5-325 MG per tablet 1 tablet  1 tablet Oral Q4H PRN Feliciano Lawrence MD   1 tablet at 10/07/22 0901    morphine injection 2 mg  2 mg IntraVENous Q4H PRN Feliciano Lawrence MD   2 mg at 10/07/22 1401    piperacillin-tazobactam (ZOSYN) 3,375 mg in sodium chloride 0.9 % 50 mL IVPB (mini-bag)  3,375 mg IntraVENous Carol Diehl MD 12.5 mL/hr at 10/07/22 1402 3,375 mg at 10/07/22 1402    insulin glargine (LANTUS) injection vial 25 Units  25 Units SubCUTAneous Daily Rock Alexey MD   25 Units at 10/06/22 2770       Allergies:  No Known Allergies    Problem List:    Patient Active Problem List   Diagnosis Code    Abscess L02.91    Peripheral angiopathy due to type 2 diabetes mellitus ([facility] E11.51    Primary hypertension I10    DM2 (diabetes mellitus, type 2) ([facility] E11.9    Cellulitis and abscess of trunk L03.319, L02.219       Past Medical History:        Diagnosis Date    Diabetes mellitus ([facility]     Hypertension        Past Surgical History:  History reviewed. No pertinent surgical history.     Social History:    Social History     Tobacco Use    Smoking status: Former     Types: Cigarettes    Smokeless tobacco: Never   Substance Use Topics    Alcohol use: Not on file                                Counseling given: Not Answered      Vital Signs (Current):   Vitals:    10/07/22 0554 10/07/22 0715 10/07/22 1112 10/07/22 1401   BP: 114/74 113/75 124/77    Pulse: 97 97 99    Resp: 18 16 16 20   Temp: 99.1 °F (37.3 °C) 99.8 °F (37.7 °C) 99 °F (37.2 °C)    TempSrc: Oral Oral Oral    SpO2: 93% 93% 96%    Weight:       Height:                                                  BP Readings from Last 3 Encounters:   10/07/22 124/77       NPO Status:                                                                                 BMI:   Wt Readings from Last 3 Encounters:   10/05/22 (!) 365 lb (165.6 kg)     Body mass index is 46.86 kg/m².     CBC:   Lab Results   Component Value Date/Time    WBC 19.3 10/07/2022 06:31 AM    RBC 3.47 10/07/2022 06:31 AM    HGB 10.0 10/07/2022 06:31 AM    HCT 31.9 10/07/2022 06:31 AM    MCV 91.9 10/07/2022 06:31 AM    RDW 14.1 10/07/2022 06:31 AM     10/07/2022 06:31 AM       CMP:   Lab Results   Component Value Date/Time     10/07/2022 06:31 AM    K 4.4 10/07/2022 06:31 AM     10/07/2022 06:31 AM    CO2 24 10/07/2022 06:31 AM    BUN 23 10/07/2022 06:31 AM    CREATININE 1.40 10/07/2022 06:31 AM    LABGLOM 58 10/07/2022 06:31 AM    GLUCOSE 170 10/07/2022 06:31 AM    PROT 9.1 10/05/2022 04:58 PM    CALCIUM 8.4 10/07/2022 06:31 AM    BILITOT 0.4 10/05/2022 04:58 PM    ALKPHOS 164 10/05/2022 04:58 PM    AST 36 10/05/2022 04:58 PM    ALT 66 10/05/2022 04:58 PM       POC Tests:   Recent Labs     10/07/22  1107   POCGLU 212*       Coags: No results found for: PROTIME, INR, APTT    HCG (If Applicable): No results found for: PREGTESTUR, PREGSERUM, HCG, HCGQUANT     ABGs: No results found for: PHART, PO2ART, XTX6GBJ, WXO0URC, BEART, A4KIGEXE     Type & Screen (If Applicable):  No results found for: LABABO, LABRH    Drug/Infectious Status (If Applicable):  No results found for: HIV, HEPCAB    COVID-19 Screening (If Applicable): No results found for: COVID19        Anesthesia Evaluation  Patient summary reviewed and Nursing notes reviewed  Airway: Mallampati: II  TM distance: >3 FB   Neck ROM: full     Dental:    (+) poor dentition      Pulmonary:Negative Pulmonary ROS and normal exam                               Cardiovascular:  Exercise tolerance: good (>4 METS),   (+) hypertension:,         Rhythm: regular  Rate: normal                    Neuro/Psych:   Negative Neuro/Psych ROS              GI/Hepatic/Renal: Neg GI/Hepatic/Renal ROS            Endo/Other: Negative Endo/Other ROS   (+) Diabetes, . Pt had no PAT visit       Abdominal:             Vascular: negative vascular ROS. Other Findings:           Anesthesia Plan      general     ASA 2       Induction: intravenous. MIPS: Postoperative opioids intended and Prophylactic antiemetics administered. Anesthetic plan and risks discussed with patient.       Plan discussed with surgical team.                    Tameka Oneal MD   10/7/2022

## 2022-10-07 NOTE — PROGRESS NOTES
During language assessment, patient verbalized his ability to speak [place].        Bailee [address]  Senior /[employer]ultural [employer]  [phone] (phone)

## 2022-10-07 NOTE — PROGRESS NOTES
TRANSFER - OUT REPORT:    Verbal report given to [address] on Kela Libman  being transferred to [facility] for routine progression of patient care       Report consisted of patient's Situation, Background, Assessment and   Recommendations(SBAR). Information from the following report(s) Index, Intake/Output, MAR, Recent Results, and Med Rec Status was reviewed with the receiving nurse. Lines:   Peripheral IV 10/05/22 Left (Active)   Site Assessment Clean, dry & intact 10/07/22 1713   Line Status Capped;Flushed 10/07/22 1135   Line Care Connections checked and tightened;Cap changed 10/07/22 1135   Phlebitis Assessment No symptoms 10/07/22 1713   Infiltration Assessment 0 10/07/22 1713   Alcohol Cap Used Yes 10/07/22 1135   Dressing Status Clean, dry & intact 10/07/22 1713   Dressing Type Transparent 10/07/22 1713       Peripheral IV 10/05/22 Right Hand (Active)   Site Assessment Clean, dry & intact 10/07/22 1713   Line Status Normal saline locked; Flushed; Infusing 10/07/22 1135   Line Care Connections checked and tightened 10/07/22 1135   Phlebitis Assessment No symptoms 10/07/22 1713   Infiltration Assessment 0 10/07/22 1713   Alcohol Cap Used Yes 10/07/22 1135   Dressing Status Clean, dry & intact 10/07/22 1713   Dressing Type Transparent 10/07/22 1713        Opportunity for questions and clarification was provided.

## 2022-10-07 NOTE — OP NOTE
Incision and drainage of back abscess and left groin abscess Operative Note      Name:  Leon Cho Date/Time of Admission: 10/5/2022  7:22 PM  CSN: [id number]  Attending Provider: Leonardo Cuevas MD  MRN:  [id number]  : 1965 60 y.o. Pre-operative Diagnosis: back and left groin Abscess    Post-operative Diagnosis: Same    Procedure: Incision and drainage of back 6x5x5 and left groin 9x5x5 abscess    Surgeon: Jayesh Ortiz DO    Anesthesia: General anesthesia    Specimen: mass     INDICATION: The patient is a 60y.o.-year-old male who complains of abscess to the back and left groin extending onto his penis. The patient elected to undergo incision and drainage of the area. DESCRIPTION OF PROCEDURE: The patient was correctly identified in preoperative holding area. Consent was confirmed and placed on the chart. Preoperative antibiotics were administered per SCIP protocol. The patient was then taken back to the operative suite and placed in the supine position. General anesthesia was performed per anesthesia. The patient was placed laterally and then The patient's was then prepped and draped in the usual sterile fashion. A timeout was performed and all members of the team that were present were in agreement. The procedure began by making an incision over the apex of the abscess. purulent material was expressed. The patient did have dead skin and subcuticular tissue that was excised. Cultures were obtained. The locations were broken up using blunt dissection. Further inspection of the cavity noted no other acute abnormalities. Hemostasis was achieved with electric cautery. The wound was irrigated with normal saline. I packed this wound with nunet for hemostasis and dakins soaked kerlex. The patient was the placed supine and an incision was made over his left groin. This area was opened extensively as the abscess went laterally, medially and into his scrotum and penis.  I cleaned up all of the dead tissue. The penial abscess was opened by urology, please see their dictation for this portion of the case. Hemostasis was noted. The wound was packed with 2 pieces of dakins soaked kerlex. ABD was placed on the incision. At this time, the procedure was complete. At the conclusion of the case all sponge, needles and instrument counts were correct. The attending was present and scrubbed for the entirety of the case and critical portions. The patient tolerated well and was taken to the [address] [place] [address]. Unit.        Aide Arroyo [facility] Denny Cox DO

## 2022-10-07 NOTE — PERIOP NOTE
TRANSFER - IN REPORT:    Verbal report received from [id number] [facility] on Chip Churn  being received from [facility] for ordered procedure      Report consisted of patient's Situation, Background, Assessment and   Recommendations(SBAR). Information from the following report(s) Pre Procedure Checklist was reviewed with the receiving nurse. Opportunity for questions and clarification was provided. Assessment completed upon patient's arrival to unit and care assumed.

## 2022-10-07 NOTE — PROGRESS NOTES
TRANSFER - IN REPORT:    Verbal report received from [facility] on Nicole Stanford  being received from PACU for routine progression of patient care      Report consisted of patient's Situation, Background, Assessment and   Recommendations(SBAR). Information from the following report(s) Index, Intake/Output, MAR, Med Rec Status, and Cardiac Rhythm ST  was reviewed with the receiving nurse. Opportunity for questions and clarification was provided. Assessment completed upon patient's arrival to unit and care assumed.

## 2022-10-07 NOTE — ANESTHESIA POSTPROCEDURE EVALUATION
Department of Anesthesiology  Postprocedure Note    Patient: Chen Garcias  MRN: [id number]  YOB: 1965  Date of evaluation: 10/7/2022      Procedure Summary     Date: 10/07/22 Room / Location: [facility] MAIN OR 08 / [facility] MAIN OR    Anesthesia Start: 1534 Anesthesia Stop: 1717    Procedures:       BACK ABSCESS I & D (Back)      INGUINAL AND PENILE DEBRIDEMENT (Penis) Diagnosis:       Abscess of back      (Abscess of back [L02.212])    Providers: Chris Sherman DO Responsible Provider: Juan Lindsay MD    Anesthesia Type: general ASA Status: 2          Anesthesia Type: No value filed.     Keisha Phase I: Keisha Score: 10    Keisha Phase II:        Anesthesia Post Evaluation    Patient location during evaluation: PACU  Patient participation: complete - patient participated  Level of consciousness: awake and awake and alert  Airway patency: patent  Nausea & Vomiting: no nausea  Complications: no  Cardiovascular status: hemodynamically stable  Respiratory status: acceptable  Hydration status: euvolemic  Multimodal analgesia pain management approach

## 2022-10-07 NOTE — PLAN OF CARE
Problem: Discharge Planning  Goal: Discharge to home or other facility with appropriate resources  Outcome: Progressing  Flowsheets (Taken 10/7/2022 0800)  Discharge to home or other facility with appropriate resources:   Identify barriers to discharge with patient and caregiver   Arrange for needed discharge resources and transportation as appropriate   Identify discharge learning needs (meds, wound care, etc)     Problem: Chronic Conditions and Co-morbidities  Goal: Patient's chronic conditions and co-morbidity symptoms are monitored and maintained or improved  Outcome: Progressing  Flowsheets (Taken 10/7/2022 0800)  Care Plan - Patient's Chronic Conditions and Co-Morbidity Symptoms are Monitored and Maintained or Improved:   Monitor and assess patient's chronic conditions and comorbid symptoms for stability, deterioration, or improvement   Collaborate with multidisciplinary team to address chronic and comorbid conditions and prevent exacerbation or deterioration

## 2022-10-07 NOTE — DIABETES MGMT
Patient admitted with abscess. Blood glucose ranged 171-372 yesterday with patient receiving Lantus 25 units and Humalog 21 units. Blood glucose this morning was 169. Creatinine 1.40. GFR 58. Reviewed patient current regimen: Lantus 25 units daily and Humalog correctional insulin. Glycemic control improving on current regimen. Patient seen for assessment regarding diabetes management. Patient significant other at bedside. Patient has a past medical history of HTN and DM. Patient states they have been living with diabetes for 5 years and voices positive family history of diabetes. Patient states they do have a working glucometer with supplies at home. Per patient they typically check blood glucose levels 3-4 times a day. Per patient their blood glucose levels have been running 300-400 at home. Patient states they are currently taking Metformin 1000 mg BID at home for management of diabetes. Patient voices that they have not experienced hypoglycemia in the past. Educated regarding hypoglycemia signs, symptoms, and treatment. Patient has not attended formal diabetes education in the past. Patient reports no difficulty with affording their diabetic supplies. Patient states he travels between [place] and his home in [place] every other month. Patient states he has a PCP in [place]. Patient states he plans to return to [place] after discharge. Patient given educational material, \"Diabetes Self-Management: A Patient Teaching Guide\", which was reviewed with patient. Explained basic physiology of diabetes, as well as causes, signs and symptoms, and treatments for hypoglycemia and hyperglycemia. Described the effects of poor glycemic control and the development of long-term complications such as renal, eye, nerve, and cardiovascular disease. Patient denies smoking. Patient reports numbness and tingling in feet. Described proper diabetic foot care and the importance of checking feet daily.  Per patient they typically drink water, juice, and coffee or tea with artificial sweetener. . Reviewed alternative beverages to help improve glycemic control. Per patient they typically eat regularly, but does state he sometimes skips meals because he does not feel like,eating. Educated re: effects of carbohydrates on blood glucose, the \"plate method\" of healthy meal planning, basics of healthy meal plan, Consistent Carbohydrate Diet, discussed the basics of carb counting and how to read a nutrition label. Educated patient regarding the benefits of physical activity (as cleared by provider) on glycemic control. Also explained the relationship between hyperglycemia and infection and delayed healing. Discussed target goals for blood glucose and A1C. Educated patient regarding diabetic medications including mechanism of action, timing, and possible side effects. Patient verbalizes understanding of teaching. Explained the importance of blood glucose monitoring to patient and how this will provide their primary care provider with more information to help them safely titrate their regimen. Recommended frequency of AC&HS and to record in log book to take to primary care provider appointment. Educated patient that all glucometers are similar but differ in small ways. Patient states he has meter and supplies at home. Patient instructed on the preparation and injection of insulin dose via vial and syringe method. Patient educated regarding insulin administration sites. Patient also educated regarding the importance of site rotation, proper storage of insulin, and proper sharps disposal. Patient was also instructed in proper use of insulin pens. Patient was able to return demonstrate proper use of insulin pen using injection model and saline demo pen. Patient politely declines return demonstration. Patient states significant other uses insulin and available to help with insulin at discharge.   Significant other at bedside verifies able to help with insulin at discharge. Patient prefers pens. Patient will need prescription for insulin pens and pen needles at discharge. Educated patient regarding current basal/bolus regimen of Lantus 25 units daily and Humalog correctional insulin including type of insulin, timing with meals, onset, duration of effect, and peak of insulin dose. Patient verbalizes understanding. Patient educated on the different types of insulins including Regular, NPH, and mixed insulins. Discussed increased risk of hypoglycemia when using NPH or mixed insulins versus Lantus, the importance of eating regularly when taking those types of insulin, and reviewed s/s treatments of hypoglycemia again. Patient verbalized understanding. Patient has been given educational handouts regarding insulin affordability programs, [employer] insulins, and over-the-counter glucometers and diabetes supplies as more affordable options which were reviewed with patient. Patient educated on the different types of insulins including but not limited to Lantus, Basaglar, Humalog, Regular, NPH, and mixed insulins. Discussed increased risk of hypoglycemia when using NPH or mixed insulins versus Lantus, the importance of eating regularly when taking those types of insulin, and reviewed s/s treatments of hypoglycemia again. Patient qualified for $35 per coupon savings cards for Basaglar pens, Humalog pens, or Humulin 70/30 insulin pens as well as other insulins (see coupon card for details). Coupon card provided to patient and patient was provided with Website and phone number to contact insulin affordability programs to obtain more coupon cards if needed. Patient verbalized understanding of importance of follow up with [address] [place] [address] or PCP for long-term affordable diabetes regimen. Patient states they can afford $35 if needed at discharge. Encouraged compliance with discharge regimen.  Encouraged patient to continue to work on lifestyle modifications and to follow up with PCP for further titration of regimen. Patient PCP located in [place]. Patient verbalized understanding and voices no further questions regarding diabetes management at this time.

## 2022-10-07 NOTE — PROGRESS NOTES
TRANSFER - OUT REPORT:    Verbal report given to Virginie Crowe on [facility]  being transferred to Pre-Op for ordered procedure       Report consisted of patient's Situation, Background, Assessment and   Recommendations(SBAR). Information from the following report(s) Index, MAR, Recent Results, and Med Rec Status was reviewed with the receiving nurse. Lines:   Peripheral IV 10/05/22 Left (Active)   Site Assessment Clean, dry & intact 10/07/22 1135   Line Status Capped;Flushed 10/07/22 1135   Line Care Connections checked and tightened;Cap changed 10/07/22 1135   Phlebitis Assessment No symptoms 10/07/22 1135   Infiltration Assessment 0 10/07/22 1135   Alcohol Cap Used Yes 10/07/22 1135   Dressing Status Clean, dry & intact 10/07/22 1135   Dressing Type Transparent 10/07/22 1135       Peripheral IV 10/05/22 Right Hand (Active)   Site Assessment Clean, dry & intact 10/07/22 1135   Line Status Normal saline locked; Flushed; Infusing 10/07/22 1135   Line Care Connections checked and tightened 10/07/22 1135   Phlebitis Assessment No symptoms 10/07/22 1135   Infiltration Assessment 0 10/07/22 1135   Alcohol Cap Used Yes 10/07/22 1135   Dressing Status Clean, dry & intact 10/07/22 1135   Dressing Type Transparent 10/07/22 1135        Opportunity for questions and clarification was provided.       Patient transported with:

## 2022-10-07 NOTE — PROGRESS NOTES
Hospitalist Progress Note   Admit Date:  10/5/2022  7:22 PM   Name:  Tena Connell   Age:  60 y.o. Sex:  male  :  1965   MRN:  [id number]   Room:  [place] [address]    Presenting Complaint: Abscess     Reason(s) for Admission: Cellulitis and abscess of trunk [L03.319, L02.219]  Abscess [L02.91]  Hyperglycemia [R73.9]  Abscess, perineum [L02.215]     Hospital Course:   Tena Connell is a 60 y.o. male with medical history of DM2, HTN who presented to ED with nausea, vomiting, and concerns about abscess. Per report, patient has been having intermittent drainage from a painful wound on his back for ~2 months. Additionally, patient with areas of groin cellulitis and induration as well. I&D performed in ER for back abscess. WBC is elevated at 21.2. Blood glucoses are also elevated over 300. Patient reports high blood sugars at home recently as well. Patient not in DKA. Given areas of abscess and cellulitis, CT was performed, which does not show air or gangrenous changes, but does show developing scrotal abscess (as well as known back abscess). Dr. Saman Mckeon of Urology contacted. He was treated with IV antibiotics of Zosyn and vancomycin. ID was consulted. Vancomycin was discontinued. Adhikari Potters He was given some IV fluids. HIV negative and echo with normal ejection fraction. Wound cultures growing GPC. Blood cultures negative. IR was consulted and no role for percutaneous drain. Surgery was consulted. Subjective & 24hr Events (10/07/22): Patient is seen and examined at the bedside. He spiked a fever of 102 F yesterday. Patient is complaining of severe back pain. Family was at the bedside. Patient denies chest pain, shortness of breath, diarrhea, palpitations, dizziness. Assessment & Plan:   Abscesses/Cellulitis  -Left flank abscess and s/p I&D'd by ED  Febrile 102F and  leukocytosis of 19k.   S/p IV fluids  HIV negative   Echo with normal EF  Wound cultures growing GPC    Blood cultures negative  F/u Cocci ab, RPR and GC/chlamydia  Plan for I and D of back abscess and groin abscess  - Continue IV vanc and zosyn  IR was consulted and no role for percutaneous drain. ID was consulted  Surgery consulted    Urinary retention  S/p Robles  Follow-up with urine cultures    Small penile abscess  No scrotal signs of infection  Continue IV antibiotics  Urology was following, appreciate recommendations    Cholelithiasis  CT and ultrasound showed Cholelithiasis  Follow up with surgery as outpatient     Uncontrolled DM2  Blood glucose is better controlled. Holding oral hypoglycemics   hgbA1C >14  Continue  Lantus 25 units daily  - SSI  Diabetes instructor was consulted     HTN  -Stopped lisinopril as patient is normotensive     Anemia   Hb is 10 and stable. F/u anemia panel      Disposition: Anticipating hospital stay for 2 to 3 days. Diet:  Diet NPO  DVT PPx: Lovenox  Code status: Full Code    Hospital Problems:  Principal Problem:    Abscess  Active Problems:    Primary hypertension    DM2 (diabetes mellitus, type 2) (HCC)    Cellulitis and abscess of trunk  Resolved Problems:    * No resolved hospital problems. *      Objective:   Patient Vitals for the past 24 hrs:   Temp Pulse Resp BP SpO2   10/07/22 1112 99 °F (37.2 °C) 99 16 124/77 96 %   10/07/22 0715 99.8 °F (37.7 °C) 97 16 113/75 93 %   10/07/22 0554 99.1 °F (37.3 °C) 97 18 114/74 93 %   10/07/22 0033 (!) 102.2 °F (39 °C) (!) 102 -- 133/72 95 %   10/06/22 2034 99.7 °F (37.6 °C) 99 18 126/72 94 %   10/06/22 1504 99.1 °F (37.3 °C) 94 15 129/84 95 %       Oxygen Therapy  SpO2: 96 %  O2 Device: None (Room air)    Estimated body mass index is 46.86 kg/m² as calculated from the following:    Height as of this encounter: 6' 2\" (1.88 m). Weight as of this encounter: 365 lb (165.6 kg).     Intake/Output Summary (Last 24 hours) at 10/7/2022 1239  Last data filed at 10/7/2022 1050  Gross per 24 hour   Intake 139.17 ml   Output 1920 ml   Net -1780.83 ml         Physical Exam:     Blood pressure 124/77, pulse 99, temperature 99 °F (37.2 °C), temperature source Oral, resp. rate 16, height 6' 2\" (1.88 m), weight (!) 365 lb (165.6 kg), SpO2 96 %. General:    Well nourished, morbidly obese   Head:  Normocephalic, atraumatic  Eyes:  Sclerae appear normal.  Pupils equally round. ENT:  Nares appear normal, no drainage. Moist oral mucosa  Neck:  No restricted ROM. Trachea midline   CV:   RRR. No m/r/g. No jugular venous distension. Lungs:   CTAB. No wheezing, rhonchi, or rales. Symmetric expansion. Abdomen: Bowel sounds present. Soft, nontender, nondistended. Extremities: No cyanosis or clubbing. No edema  Skin:     No rashes and normal coloration. Warm and dry. Genitourinary Scrotal swelling  is present and small penile ulcer is present, Robles is present   Dressing is present on the back  Neuro:  CN II-XII grossly intact. Sensation intact. A&Ox3  Psych:  Normal mood and affect.       I have personally reviewed labs and tests showing:  Recent Labs:  Recent Results (from the past 48 hour(s))   EKG 12 Lead    Collection Time: 10/05/22  4:52 PM   Result Value Ref Range    Ventricular Rate 122 BPM    Atrial Rate 122 BPM    P-R Interval 140 ms    QRS Duration 98 ms    Q-T Interval 308 ms    QTc Calculation (Bazett) 438 ms    P Axis 44 degrees    R Axis -56 degrees    T Axis 77 degrees    Diagnosis Sinus tachycardia    POCT Glucose    Collection Time: 10/05/22  4:57 PM   Result Value Ref Range    POC Glucose 363 (H) 65 - 100 mg/dL    Performed by: Varsha    Culture, Blood 1    Collection Time: 10/05/22  4:58 PM    Specimen: Blood   Result Value Ref Range    Special Requests NO SPECIAL REQUESTS  LEFT  Antecubital        Culture NO GROWTH 2 DAYS     Lactic Acid    Collection Time: 10/05/22  4:58 PM   Result Value Ref Range    Lactic Acid, Plasma 2.0 0.4 - 2.0 MMOL/L   CBC with Auto Differential    Collection Time: 10/05/22  4:58 PM   Result Value Ref Range    WBC 21.2 (H) 4.3 - 11.1 K/uL    RBC 4.16 (L) 4.23 - 5.6 M/uL    Hemoglobin 12.0 (L) 13.6 - 17.2 g/dL    Hematocrit 37.9 (L) 41.1 - 50.3 %    MCV 91.1 79.6 - 97.8 FL    MCH 28.8 26.1 - 32.9 PG    MCHC 31.7 31.4 - 35.0 g/dL    RDW 14.1 11.9 - 14.6 %    Platelets 478 945 - 950 K/uL    MPV 12.0 9.4 - 12.3 FL    nRBC 0.00 0.0 - 0.2 K/uL    Differential Type AUTOMATED      Seg Neutrophils 78 43 - 78 %    Lymphocytes 15 13 - 44 %    Monocytes 6 4.0 - 12.0 %    Eosinophils % 0 (L) 0.5 - 7.8 %    Basophils 0 0.0 - 2.0 %    Immature Granulocytes 1 0.0 - 5.0 %    Segs Absolute 16.6 (H) 1.7 - 8.2 K/UL    Absolute Lymph # 3.1 0.5 - 4.6 K/UL    Absolute Mono # 1.3 0.1 - 1.3 K/UL    Absolute Eos # 0.1 0.0 - 0.8 K/UL    Basophils Absolute 0.1 0.0 - 0.2 K/UL    Absolute Immature Granulocyte 0.2 0.0 - 0.5 K/UL   CMP    Collection Time: 10/05/22  4:58 PM   Result Value Ref Range    Sodium 133 (L) 138 - 145 mmol/L    Potassium 4.4 3.5 - 5.1 mmol/L    Chloride 99 (L) 101 - 110 mmol/L    CO2 27 21 - 32 mmol/L    Anion Gap 7 4 - 13 mmol/L    Glucose 287 (H) 65 - 100 mg/dL    BUN 31 (H) 6 - 23 MG/DL    Creatinine 1.30 0.8 - 1.5 MG/DL    Est, Glom Filt Rate >60 >60 ml/min/1.73m2    Calcium 9.3 8.3 - 10.4 MG/DL    Total Bilirubin 0.4 0.2 - 1.1 MG/DL    ALT 66 (H) 12 - 65 U/L    AST 36 15 - 37 U/L    Alk Phosphatase 164 (H) 50 - 136 U/L    Total Protein 9.1 (H) 6.3 - 8.2 g/dL    Albumin 1.8 (L) 3.5 - 5.0 g/dL    Globulin 7.3 (H) 2.3 - 3.5 g/dL    Albumin/Globulin Ratio 0.2 (L) 1.2 - 3.5     Procalcitonin    Collection Time: 10/05/22  4:58 PM   Result Value Ref Range    Procalcitonin 0.95 (H) 0.00 - 0.49 ng/mL   C-Reactive Protein    Collection Time: 10/05/22  4:58 PM   Result Value Ref Range    CRP 26.8 (H) 0.0 - 0.9 mg/dL   Culture, Blood 1    Collection Time: 10/05/22  8:23 PM    Specimen: Blood   Result Value Ref Range    Special Requests LEFT  HAND        Culture NO GROWTH 2 DAYS     Lactic Acid    Collection Time: 10/05/22  8:23 PM   Result Value Ref Range    Lactic Acid, Plasma 1.8 0.4 - 2.0 MMOL/L   Culture, Wound Aerobic Only    Collection Time: 10/05/22  8:23 PM    Specimen: Abscess    SACRAL WOUND   Result Value Ref Range    Special Requests NO SPECIAL REQUESTS      Gram stain 10 TO 30 WBCS SEEN PER OIF     Gram stain FEW GRAM POSITIVE COCCI      Culture (A)       MODERATE STAPHYLOCOCCUS AUREUS SENSITIVITY TO FOLLOW   POCT Glucose    Collection Time: 10/05/22 10:17 PM   Result Value Ref Range    POC Glucose 327 (H) 65 - 100 mg/dL    Performed by: Vicente    POCT Glucose    Collection Time: 10/06/22  2:45 AM   Result Value Ref Range    POC Glucose 344 (H) 65 - 100 mg/dL    Performed by: Tere    POCT Glucose    Collection Time: 10/06/22  7:13 AM   Result Value Ref Range    POC Glucose 372 (H) 65 - 100 mg/dL    Performed by: Clarke    Basic Metabolic Panel w/ Reflex to MG    Collection Time: 10/06/22  8:40 AM   Result Value Ref Range    Sodium 134 (L) 138 - 145 mmol/L    Potassium 4.5 3.5 - 5.1 mmol/L    Chloride 102 101 - 110 mmol/L    CO2 25 21 - 32 mmol/L    Anion Gap 7 4 - 13 mmol/L    Glucose 348 (H) 65 - 100 mg/dL    BUN 32 (H) 6 - 23 MG/DL    Creatinine 1.40 0.8 - 1.5 MG/DL    Est, Glom Filt Rate 58 (L) >60 ml/min/1.73m2    Calcium 8.3 8.3 - 10.4 MG/DL   CBC with Auto Differential    Collection Time: 10/06/22  8:40 AM   Result Value Ref Range    WBC 19.7 (H) 4.3 - 11.1 K/uL    RBC 3.51 (L) 4.23 - 5.6 M/uL    Hemoglobin 10.2 (L) 13.6 - 17.2 g/dL    Hematocrit 31.9 (L) 41.1 - 50.3 %    MCV 90.9 79.6 - 97.8 FL    MCH 29.1 26.1 - 32.9 PG    MCHC 32.0 31.4 - 35.0 g/dL    RDW 14.0 11.9 - 14.6 %    Platelets 920 166 - 630 K/uL    MPV 12.0 9.4 - 12.3 FL    nRBC 0.00 0.0 - 0.2 K/uL    Differential Type AUTOMATED      Seg Neutrophils 79 (H) 43 - 78 %    Lymphocytes 12 (L) 13 - 44 %    Monocytes 7 4.0 - 12.0 %    Eosinophils % 1 0.5 - 7.8 %    Basophils 0 0.0 - 2.0 %    Immature Granulocytes 1 0.0 - 5.0 %    Segs Absolute 15.6 (H) 1.7 - 8.2 K/UL    Absolute Lymph # 2.3 0.5 - 4.6 K/UL    Absolute Mono # 1.3 0.1 - 1.3 K/UL    Absolute Eos # 0.1 0.0 - 0.8 K/UL    Basophils Absolute 0.1 0.0 - 0.2 K/UL    Absolute Immature Granulocyte 0.2 0.0 - 0.5 K/UL   Hemoglobin A1C    Collection Time: 10/06/22  8:40 AM   Result Value Ref Range    Hemoglobin A1C >14.0 (H) 4.8 - 5.6 %    eAG Cannot be calculated mg/dL   POCT Glucose    Collection Time: 10/06/22 11:03 AM   Result Value Ref Range    POC Glucose 354 (H) 65 - 100 mg/dL    Performed by: Clarke    Transthoracic echocardiogram (TTE) complete with contrast, bubble, strain, and 3D PRN    Collection Time: 10/06/22  2:43 PM   Result Value Ref Range    LV EDV A2C 95 mL    LV EDV A4C 137 mL    LV ESV A2C 63 mL    LV ESV A4C 72 mL    IVSd 1.4 (A) 0.6 - 1.0 cm    LVIDd 4.3 4.2 - 5.9 cm    LVIDs 3.5 cm    LVOT Diameter 2.2 cm    LVOT Mean Gradient 3 mmHg    LVOT VTI 20.2 cm    LVOT Peak Velocity 1.2 m/s    LVOT Peak Gradient 6 mmHg    LVPWd 1.3 (A) 0.6 - 1.0 cm    LV E' Lateral Velocity 12 cm/s    LV E' Septal Velocity 10 cm/s    LV Ejection Fraction A2C 34 %    LV Ejection Fraction A4C 47 %    EF BP 42 (A) 55 - 100 %    LVOT Area 3.8 cm2    LVOT SV 76.7 ml    LA Minor Axis 5.8 cm    LA Major Axis 5.3 cm    LA Area 2C 22.0 cm2    LA Area 4C 19.7 cm2    LA Volume BP 66 (A) 18 - 58 mL    LA Diameter 3.9 cm    AV Mean Velocity 1.0 m/s    AV Mean Gradient 4 mmHg    AV VTI 23.4 cm    AV Peak Velocity 1.4 m/s    AV Peak Gradient 8 mmHg    AV Area by VTI 3.3 cm2    AV Area by Peak Velocity 3.2 cm2    Aortic Root 3.1 cm    Ascending Aorta 3.4 cm    IVC Proxmal 1.7 cm    MV E Wave Deceleration Time 224.0 ms    MV A Velocity 0.62 m/s    MV E Velocity 0.67 m/s    PV .0 ms    PV Max Velocity 1.3 m/s    PV Peak Gradient 7 mmHg    RVIDd 3.2 cm    RV Basal Dimension 3.8 cm    TAPSE 2.1 1.7 cm    Body Surface Area 2.94 m2    Fractional Shortening 2D 19 28 - 44 %    LV ESV Index A4C 26 mL/m2    LV EDV Index A4C 49 mL/m2    LV ESV Index A2C 22 mL/m2    LV EDV Index A2C 34 mL/m2    LVIDd Index 1.53 cm/m2    LVIDs Index 1.25 cm/m2    LV RWT Ratio 0.60     LV Mass 2D 219.8 88 - 224 g    LV Mass 2D Index 78.2 49 - 115 g/m2    MV E/A 1.08     E/E' Ratio (Averaged) 6.14     E/E' Lateral 5.58     E/E' Septal 6.70     LA Volume Index BP 23 16 - 34 ml/m2    LVOT Stroke Volume Index 27.3 mL/m2    LA Size Index 1.39 cm/m2    LA/AO Root Ratio 1.26     Ao Root Index 1.10 cm/m2    Ascending Aorta Index 1.21 cm/m2    AV Velocity Ratio 0.86     LVOT:AV VTI Index 0.86     BABATUNDE/BSA VTI 1.2 cm2/m2    BABATUNDE/BSA Peak Velocity 1.1 cm2/m2    Est. RA Pressure 3 mmHg   POCT Glucose    Collection Time: 10/06/22  4:06 PM   Result Value Ref Range    POC Glucose 237 (H) 65 - 100 mg/dL    Performed by: Clarke    POCT Glucose    Collection Time: 10/06/22  9:00 PM   Result Value Ref Range    POC Glucose 171 (H) 65 - 100 mg/dL    Performed by: Heidi    Basic Metabolic Panel w/ Reflex to MG    Collection Time: 10/07/22  6:31 AM   Result Value Ref Range    Sodium 134 (L) 138 - 145 mmol/L    Potassium 4.4 3.5 - 5.1 mmol/L    Chloride 104 101 - 110 mmol/L    CO2 24 21 - 32 mmol/L    Anion Gap 6 4 - 13 mmol/L    Glucose 170 (H) 65 - 100 mg/dL    BUN 23 6 - 23 MG/DL    Creatinine 1.40 0.8 - 1.5 MG/DL    Est, Glom Filt Rate 58 (L) >60 ml/min/1.73m2    Calcium 8.4 8.3 - 10.4 MG/DL   CBC with Auto Differential    Collection Time: 10/07/22  6:31 AM   Result Value Ref Range    WBC 19.3 (H) 4.3 - 11.1 K/uL    RBC 3.47 (L) 4.23 - 5.6 M/uL    Hemoglobin 10.0 (L) 13.6 - 17.2 g/dL    Hematocrit 31.9 (L) 41.1 - 50.3 %    MCV 91.9 79.6 - 97.8 FL    MCH 28.8 26.1 - 32.9 PG    MCHC 31.3 (L) 31.4 - 35.0 g/dL    RDW 14.1 11.9 - 14.6 %    Platelets 483 495 - 943 K/uL    MPV 11.8 9.4 - 12.3 FL    nRBC 0.00 0.0 - 0.2 K/uL    Differential Type AUTOMATED      Seg Neutrophils 79 (H) 43 - 78 %    Lymphocytes 12 (L) 13 - 44 % Monocytes 6 4.0 - 12.0 %    Eosinophils % 2 0.5 - 7.8 %    Basophils 0 0.0 - 2.0 %    Immature Granulocytes 1 0.0 - 5.0 %    Segs Absolute 15.2 (H) 1.7 - 8.2 K/UL    Absolute Lymph # 2.3 0.5 - 4.6 K/UL    Absolute Mono # 1.2 0.1 - 1.3 K/UL    Absolute Eos # 0.3 0.0 - 0.8 K/UL    Basophils Absolute 0.1 0.0 - 0.2 K/UL    Absolute Immature Granulocyte 0.2 0.0 - 0.5 K/UL   Phosphorus    Collection Time: 10/07/22  6:31 AM   Result Value Ref Range    Phosphorus 2.9 2.5 - 4.5 MG/DL   POCT Glucose    Collection Time: 10/07/22  8:26 AM   Result Value Ref Range    POC Glucose 169 (H) 65 - 100 mg/dL    Performed by: Sharri    POCT Glucose    Collection Time: 10/07/22 11:07 AM   Result Value Ref Range    POC Glucose 212 (H) 65 - 100 mg/dL    Performed by: Sharri        I have personally reviewed imaging studies showing: Other Studies:  US ABDOMEN COMPLETE   Final Result   Cholelithiasis without biliary tree obstruction. Echogenic liver,   nonspecific. No hydronephrosis. CT ABDOMEN PELVIS W IV CONTRAST Additional Contrast? None   Final Result   1. Left soft tissue\flank abscess measuring approximately 12 x 10 x 4.9 cm   abutting the dorsal left paraspinous musculature without muscular involvement   evident. 2. Abundant inflammatory change of the left inguinal region/left pubic region   with some phlegmonous change evident and likely an early developing abscess of   the left scrotum/left pubic region measuring 22 x 38 mm. Recommend clinical   correlation with physical examination and close attention to this area. 3. Cholelithiasis without evidence of acute cholecystitis. XR CHEST PORTABLE   Final Result   1. No acute cardiopulmonary abnormality.              Current Meds:  Current Facility-Administered Medications   Medication Dose Route Frequency    [Held by provider] lisinopril (PRINIVIL;ZESTRIL) tablet 5 mg  5 mg Oral Daily    insulin lispro (HUMALOG) injection vial 0-8 Units 0-8 Units SubCUTAneous TID WC    insulin lispro (HUMALOG) injection vial 0-4 Units  0-4 Units SubCUTAneous Nightly    sodium chloride flush 0.9 % injection 5-40 mL  5-40 mL IntraVENous 2 times per day    sodium chloride flush 0.9 % injection 5-40 mL  5-40 mL IntraVENous PRN    0.9 % sodium chloride infusion   IntraVENous PRN    enoxaparin (LOVENOX) injection 40 mg  40 mg SubCUTAneous BID    ondansetron (ZOFRAN-ODT) disintegrating tablet 4 mg  4 mg Oral Q8H PRN    Or    ondansetron (ZOFRAN) injection 4 mg  4 mg IntraVENous Q6H PRN    polyethylene glycol (GLYCOLAX) packet 17 g  17 g Oral Daily PRN    acetaminophen (TYLENOL) tablet 650 mg  650 mg Oral Q6H PRN    Or    acetaminophen (TYLENOL) suppository 650 mg  650 mg Rectal Q6H PRN    HYDROcodone-acetaminophen (NORCO) 5-325 MG per tablet 1 tablet  1 tablet Oral Q4H PRN    morphine injection 2 mg  2 mg IntraVENous Q4H PRN    piperacillin-tazobactam (ZOSYN) 3,375 mg in sodium chloride 0.9 % 50 mL IVPB (mini-bag)  3,375 mg IntraVENous Q8H    insulin glargine (LANTUS) injection vial 25 Units  25 Units SubCUTAneous Daily       Signed:  Scout Barton MD    Part of this note may have been written by using a voice dictation software. The note has been proof read but may still contain some grammatical/other typographical errors.

## 2022-10-07 NOTE — PERIOP NOTE
TRANSFER - OUT REPORT:    Verbal report given to Harinder Thompson RN on Deb Rosenbaum  being transferred to [place] [address] for routine progression of patient care       Report consisted of patient's Situation, Background, Assessment and   Recommendations(SBAR). Information from the following report(s) Nurse Handoff Report, Surgery Report, MAR, and Cardiac Rhythm sinus tach  was reviewed with the receiving nurse. Lines:   Peripheral IV 10/05/22 Left (Active)   Site Assessment Clean, dry & intact 10/07/22 1713   Line Status Capped;Flushed 10/07/22 1135   Line Care Connections checked and tightened;Cap changed 10/07/22 1135   Phlebitis Assessment No symptoms 10/07/22 1713   Infiltration Assessment 0 10/07/22 1713   Alcohol Cap Used Yes 10/07/22 1135   Dressing Status Clean, dry & intact 10/07/22 1713   Dressing Type Transparent 10/07/22 1713       Peripheral IV 10/05/22 Right Hand (Active)   Site Assessment Clean, dry & intact 10/07/22 1713   Line Status Normal saline locked; Flushed; Infusing 10/07/22 1135   Line Care Connections checked and tightened 10/07/22 1135   Phlebitis Assessment No symptoms 10/07/22 1713   Infiltration Assessment 0 10/07/22 1713   Alcohol Cap Used Yes 10/07/22 1135   Dressing Status Clean, dry & intact 10/07/22 1713   Dressing Type Transparent 10/07/22 1713        Opportunity for questions and clarification was provided.

## 2022-10-07 NOTE — CONSULTS
GENERAL SURGERY CONSULT    Name:  Tena Connell  Date/Time of Admission: 10/5/2022  7:22 PM  CSN: [id number]  Attending Provider: Gordy Mulligan MD  Room/Bed:  [place] [address]  : 1965  60 y.o. SUBJECTIVE:    Reason for Consultation:  back abscess and left groin abscess    HPI:  Tena Connell  is a 60 y.o. male with a history of DM, HTN who presented to with abscess. Pt states it has been going on for a month but he didn't want to come into the hospital. Blood sugars have been over 300. Pt also complains of new area of abscess to left groin. Urology placed a jaimes catheter. Primary Care Physician:  None Provider     Allergies:  No Known Allergies     Outpatient Meds:  Medications Prior to Admission: glipiZIDE (GLUCOTROL XL) 10 MG extended release tablet, TAKE 1 TABLET BY MOUTH TWICE DAILY WITH FOOD  lisinopril (PRINIVIL;ZESTRIL) 5 MG tablet, 1 tablet Orally Once a day for 30 day(s)  metFORMIN (GLUCOPHAGE) 1000 MG tablet, 1 tablet with a meal Orally Twice a day for 30 days    Past Medical History:  Past Medical History:   Diagnosis Date    Diabetes mellitus ([address]     Hypertension         Past Surgical History:  History reviewed. No pertinent surgical history.      Social History:  Social History     Socioeconomic History    Marital status: Single     Spouse name: Not on file    Number of children: Not on file    Years of education: Not on file    Highest education level: Not on file   Occupational History    Not on file   Tobacco Use    Smoking status: Former     Types: Cigarettes    Smokeless tobacco: Never   Substance and Sexual Activity    Alcohol use: Not on file    Drug use: Not on file    Sexual activity: Not on file   Other Topics Concern    Not on file   Social History Narrative    Not on file     Social Determinants of Health     Financial Resource Strain: Not on file   Food Insecurity: Not on file   Transportation Needs: Not on file   Physical Activity: Not on file   Stress: Not on file Social Connections: Not on file   Intimate Partner Violence: Not on file   Housing Stability: Not on file       Family History:  History reviewed. No pertinent family history. Review of Systems: 10 point review of systems reviewed and negative unless noted above in the HPI      OBJECTIVE:    Vital Signs:  Vitals:    10/07/22 0715   BP: 113/75   Pulse: 97   Resp: 16   Temp: 99.8 °F (37.7 °C)   SpO2: 93%        Physical Exam:  General Appearance AOx3, in no acute distress  Eyes Conjunctivae/corneas clear. PERRL, EOM's intact. No scleral icterus  Ears, Nose, Throat ENT exam normal, no neck nodes or sinus tenderness  Neck supple, no significant adenopathy. No notable JVD  Respiratory No chest wall deformities or tenderness, respiratory effort normal, no use of accessory muscles. Cardiovascular RRR. No chest wall tenderness. Gastrointestinal Abdomen soft, nontender, nondistended. BS x4. No rebound, guarding, or rigidity present. No palpable masses. No CVA tenderness  Lymphatics No palpable lymphadenopathy, no hepatosplenomegaly  Musculoskeletal No joint tenderness, deformity or swelling. Full ROM UE/LE. Distal pulses intact UE/LE. No edema, cyanosis, or venous stasis changes.   Skin Back abscess with necrotic skin and purulent drainage  Left groin with significant induration   Neurological alert, oriented, normal speech, no focal findings or movement disorder noted, CN II-XII intact  Psychiatric Alert and oriented, appropriate affect    Labs:   Recent Results (from the past 24 hour(s))   POCT Glucose    Collection Time: 10/06/22 11:03 AM   Result Value Ref Range    POC Glucose 354 (H) 65 - 100 mg/dL    Performed by: Clarke    Transthoracic echocardiogram (TTE) complete with contrast, bubble, strain, and 3D PRN    Collection Time: 10/06/22  2:43 PM   Result Value Ref Range    LV EDV A2C 95 mL    LV EDV A4C 137 mL    LV ESV A2C 63 mL    LV ESV A4C 72 mL    IVSd 1.4 (A) 0.6 - 1.0 cm    LVIDd 4.3 4.2 - 5.9 cm    LVIDs 3.5 cm    LVOT Diameter 2.2 cm    LVOT Mean Gradient 3 mmHg    LVOT VTI 20.2 cm    LVOT Peak Velocity 1.2 m/s    LVOT Peak Gradient 6 mmHg    LVPWd 1.3 (A) 0.6 - 1.0 cm    LV E' Lateral Velocity 12 cm/s    LV E' Septal Velocity 10 cm/s    LV Ejection Fraction A2C 34 %    LV Ejection Fraction A4C 47 %    EF BP 42 (A) 55 - 100 %    LVOT Area 3.8 cm2    LVOT SV 76.7 ml    LA Minor Axis 5.8 cm    LA Major Axis 5.3 cm    LA Area 2C 22.0 cm2    LA Area 4C 19.7 cm2    LA Volume BP 66 (A) 18 - 58 mL    LA Diameter 3.9 cm    AV Mean Velocity 1.0 m/s    AV Mean Gradient 4 mmHg    AV VTI 23.4 cm    AV Peak Velocity 1.4 m/s    AV Peak Gradient 8 mmHg    AV Area by VTI 3.3 cm2    AV Area by Peak Velocity 3.2 cm2    Aortic Root 3.1 cm    Ascending Aorta 3.4 cm    IVC Proxmal 1.7 cm    MV E Wave Deceleration Time 224.0 ms    MV A Velocity 0.62 m/s    MV E Velocity 0.67 m/s    PV .0 ms    PV Max Velocity 1.3 m/s    PV Peak Gradient 7 mmHg    RVIDd 3.2 cm    RV Basal Dimension 3.8 cm    TAPSE 2.1 1.7 cm    Body Surface Area 2.94 m2    Fractional Shortening 2D 19 28 - 44 %    LV ESV Index A4C 26 mL/m2    LV EDV Index A4C 49 mL/m2    LV ESV Index A2C 22 mL/m2    LV EDV Index A2C 34 mL/m2    LVIDd Index 1.53 cm/m2    LVIDs Index 1.25 cm/m2    LV RWT Ratio 0.60     LV Mass 2D 219.8 88 - 224 g    LV Mass 2D Index 78.2 49 - 115 g/m2    MV E/A 1.08     E/E' Ratio (Averaged) 6.14     E/E' Lateral 5.58     E/E' Septal 6.70     LA Volume Index BP 23 16 - 34 ml/m2    LVOT Stroke Volume Index 27.3 mL/m2    LA Size Index 1.39 cm/m2    LA/AO Root Ratio 1.26     Ao Root Index 1.10 cm/m2    Ascending Aorta Index 1.21 cm/m2    AV Velocity Ratio 0.86     LVOT:AV VTI Index 0.86     BABATUNDE/BSA VTI 1.2 cm2/m2    BABATUNDE/BSA Peak Velocity 1.1 cm2/m2    Est. RA Pressure 3 mmHg   POCT Glucose    Collection Time: 10/06/22  4:06 PM   Result Value Ref Range    POC Glucose 237 (H) 65 - 100 mg/dL    Performed by: Clarke    POCT Glucose Collection Time: 10/06/22  9:00 PM   Result Value Ref Range    POC Glucose 171 (H) 65 - 100 mg/dL    Performed by: [employer]    Basic Metabolic Panel w/ Reflex to MG    Collection Time: 10/07/22  6:31 AM   Result Value Ref Range    Sodium 134 (L) 138 - 145 mmol/L    Potassium 4.4 3.5 - 5.1 mmol/L    Chloride 104 101 - 110 mmol/L    CO2 24 21 - 32 mmol/L    Anion Gap 6 4 - 13 mmol/L    Glucose 170 (H) 65 - 100 mg/dL    BUN 23 6 - 23 MG/DL    Creatinine 1.40 0.8 - 1.5 MG/DL    Est, Glom Filt Rate 58 (L) >60 ml/min/1.73m2    Calcium 8.4 8.3 - 10.4 MG/DL   CBC with Auto Differential    Collection Time: 10/07/22  6:31 AM   Result Value Ref Range    WBC 19.3 (H) 4.3 - 11.1 K/uL    RBC 3.47 (L) 4.23 - 5.6 M/uL    Hemoglobin 10.0 (L) 13.6 - 17.2 g/dL    Hematocrit 31.9 (L) 41.1 - 50.3 %    MCV 91.9 79.6 - 97.8 FL    MCH 28.8 26.1 - 32.9 PG    MCHC 31.3 (L) 31.4 - 35.0 g/dL    RDW 14.1 11.9 - 14.6 %    Platelets 868 636 - 983 K/uL    MPV 11.8 9.4 - 12.3 FL    nRBC 0.00 0.0 - 0.2 K/uL    Differential Type AUTOMATED      Seg Neutrophils 79 (H) 43 - 78 %    Lymphocytes 12 (L) 13 - 44 %    Monocytes 6 4.0 - 12.0 %    Eosinophils % 2 0.5 - 7.8 %    Basophils 0 0.0 - 2.0 %    Immature Granulocytes 1 0.0 - 5.0 %    Segs Absolute 15.2 (H) 1.7 - 8.2 K/UL    Absolute Lymph # 2.3 0.5 - 4.6 K/UL    Absolute Mono # 1.2 0.1 - 1.3 K/UL    Absolute Eos # 0.3 0.0 - 0.8 K/UL    Basophils Absolute 0.1 0.0 - 0.2 K/UL    Absolute Immature Granulocyte 0.2 0.0 - 0.5 K/UL   Phosphorus    Collection Time: 10/07/22  6:31 AM   Result Value Ref Range    Phosphorus 2.9 2.5 - 4.5 MG/DL   POCT Glucose    Collection Time: 10/07/22  8:26 AM   Result Value Ref Range    POC Glucose 169 (H) 65 - 100 mg/dL    Performed by: Sharri        Imaging:   CT Result (most recent):  CT ABDOMEN PELVIS W IV CONTRAST 10/05/2022    Narrative  EXAM: CT of the abdomen and pelvis with contrast.  Indication: Hyperglycemia. Comparison: None.     Multiple axial images were obtained through the abdomen and pelvis after  intravenous injection of 100mL of Isovue 370. Radiation dose reduction  techniques were used for this study: All CT scans performed at this facility  use one or all of the following: Automated exposure control, adjustment of the  mA and/or kVp according to patient's size, iterative reconstruction. FINDINGS:  LOWER THORAX: Lung bases are clear. No pericardial or pleural effusion. LIVER: Normal size and contour. No focal liver lesions. The portal vein, splenic  vein, and superior mesenteric vein are patent. BILIARY SYSTEM: Cholelithiasis without evidence of acute cholecystitis. PANCREAS: No pancreatic mass. No pancreatic duct dilation. SPLEEN: No splenomegaly or aggressive splenic lesion. ADRENALS: No adrenal nodule or adrenal hypertrophy. URINARY SYSTEM: No suspicious renal lesion. No renal or ureteral calculus. No  hydronephrosis. The bladder is unremarkable. FLUID:  No intraperitoneal free fluid. REPRODUCTIVE: Prominent left inguinal lymph nodes with fatty soft tissue fat  stranding and what appears to be phlegmonous change along the leftward aspect of  the pubic region/superior aspect of the left scrotum soft tissues with suspicion  for developing pubic/scrotal abscess measuring 22 x 38 mm. BOWEL: Stomach, small bowel, and large bowel are normal in course and caliber. No evidence of obstruction. Appendix is normal.    VASCULAR/NODES: No aortic or iliac artery aneurysm. No lymphadenopathy. BONES/SUBCUTANEOUS TISSUE: Left back/flank irregular peripheral enhancing fluid  collection with numerous locules of air adjacent reactive fat stranding most  indicative of abscess measuring approximately 12 x 10 x 4.9 cm. This abscess  abuts the leftward dorsal paraspinous musculature however does not appear to  involve the musculature. No evidence of acute osseous abnormality. Impression  1.  Left soft tissue\flank abscess measuring approximately 12 x 10 x 4.9 cm  abutting the dorsal left paraspinous musculature without muscular involvement  evident. 2. Abundant inflammatory change of the left inguinal region/left pubic region  with some phlegmonous change evident and likely an early developing abscess of  the left scrotum/left pubic region measuring 22 x 38 mm. Recommend clinical  correlation with physical examination and close attention to this area. 3. Cholelithiasis without evidence of acute cholecystitis. ASSESSMENT:    Principal Problem:    Abscess  Active Problems:    Primary hypertension    DM2 (diabetes mellitus, type 2) ([facility])  Resolved Problems:    * No resolved hospital problems. *       PLAN:    I&D of back abscess and groin abscess  Will discuss with urology to see if they want to come in to look at penile area while he is under  Consent  NPO  Cont IV abx   Will take care of his cholelithiasis as outpatient once he is over this acute infection    Thank you for allowing us to participate in the care of your patient!     Electronically signed by:  Bro Sparrow DO  10/7/2022

## 2022-10-07 NOTE — PROGRESS NOTES
Patient speaks Occitan as their preferred language for their healthcare communication. If there are technical problems using the [employer] mobil unit, please contact Language Services for interpretation at:    Senior Mina -Navigator ([phone])  General phone: [phone] ( [phone])  Email: [email or web address]    Please always document the use of interpreting services (name and/or 's ID number) in your clinical notes. Our interpreters are available for team members working with limited  English proficient (LEP) patients remotely, in person (if needed for special cases), as phone or video interpreters on the [employer] Mobil units.         Thank you,        Dm CASTANEDA  Senior /Navigator

## 2022-10-08 PROBLEM — R33.8 ACUTE URINARY RETENTION: Status: ACTIVE | Noted: 2022-10-08

## 2022-10-08 PROBLEM — A41.9 SEPSIS (HCC): Status: ACTIVE | Noted: 2022-10-08

## 2022-10-08 PROBLEM — N48.21 PENILE ABSCESS: Status: ACTIVE | Noted: 2022-10-08

## 2022-10-08 PROBLEM — L02.214 ABSCESS OF GROIN, LEFT: Status: ACTIVE | Noted: 2022-10-08

## 2022-10-08 LAB
ANION GAP SERPL CALC-SCNC: 4 MMOL/L (ref 4–13)
BACTERIA SPEC CULT: ABNORMAL
BACTERIA SPEC CULT: ABNORMAL
BASOPHILS # BLD: 0.1 K/UL (ref 0–0.2)
BASOPHILS NFR BLD: 0 % (ref 0–2)
BUN SERPL-MCNC: 19 MG/DL (ref 6–23)
CALCIUM SERPL-MCNC: 8 MG/DL (ref 8.3–10.4)
CHLORIDE SERPL-SCNC: 105 MMOL/L (ref 101–110)
CO2 SERPL-SCNC: 24 MMOL/L (ref 21–32)
CREAT SERPL-MCNC: 1.3 MG/DL (ref 0.8–1.5)
DIFFERENTIAL METHOD BLD: ABNORMAL
EOSINOPHIL # BLD: 0.1 K/UL (ref 0–0.8)
EOSINOPHIL NFR BLD: 1 % (ref 0.5–7.8)
ERYTHROCYTE [DISTWIDTH] IN BLOOD BY AUTOMATED COUNT: 14.3 % (ref 11.9–14.6)
FERRITIN SERPL-MCNC: 1428 NG/ML (ref 8–388)
FOLATE SERPL-MCNC: 31.6 NG/ML (ref 3.1–17.5)
GLUCOSE BLD STRIP.AUTO-MCNC: 234 MG/DL (ref 65–100)
GLUCOSE BLD STRIP.AUTO-MCNC: 244 MG/DL (ref 65–100)
GLUCOSE BLD STRIP.AUTO-MCNC: 292 MG/DL (ref 65–100)
GLUCOSE BLD STRIP.AUTO-MCNC: 305 MG/DL (ref 65–100)
GLUCOSE SERPL-MCNC: 245 MG/DL (ref 65–100)
GRAM STN SPEC: ABNORMAL
GRAM STN SPEC: ABNORMAL
HCT VFR BLD AUTO: 30.7 % (ref 41.1–50.3)
HGB BLD-MCNC: 9.6 G/DL (ref 13.6–17.2)
HGB RETIC QN AUTO: 33 PG (ref 29–35)
HIV1 RNA # SERPL NAA+PROBE: <20 COPIES/ML
HIV1 RNA SERPL NAA+PROBE-LOG#: NORMAL LOG10COPY/ML
IMM GRANULOCYTES # BLD AUTO: 0.1 K/UL (ref 0–0.5)
IMM GRANULOCYTES NFR BLD AUTO: 1 % (ref 0–5)
IMM RETICS NFR: 26.2 % (ref 2.3–13.4)
IRON SERPL-MCNC: 26 UG/DL (ref 35–150)
LYMPHOCYTES # BLD: 2.3 K/UL (ref 0.5–4.6)
LYMPHOCYTES NFR BLD: 13 % (ref 13–44)
MCH RBC QN AUTO: 28.7 PG (ref 26.1–32.9)
MCHC RBC AUTO-ENTMCNC: 31.3 G/DL (ref 31.4–35)
MCV RBC AUTO: 91.9 FL (ref 79.6–97.8)
MONOCYTES # BLD: 0.9 K/UL (ref 0.1–1.3)
MONOCYTES NFR BLD: 5 % (ref 4–12)
NEUTS SEG # BLD: 13.8 K/UL (ref 1.7–8.2)
NEUTS SEG NFR BLD: 80 % (ref 43–78)
NRBC # BLD: 0 K/UL (ref 0–0.2)
PHOSPHATE SERPL-MCNC: 3.1 MG/DL (ref 2.5–4.5)
PLATELET # BLD AUTO: 327 K/UL (ref 150–450)
PMV BLD AUTO: 11.6 FL (ref 9.4–12.3)
POTASSIUM SERPL-SCNC: 5.2 MMOL/L (ref 3.5–5.1)
RBC # BLD AUTO: 3.34 M/UL (ref 4.23–5.6)
RETICS # AUTO: 0.06 M/UL (ref 0.03–0.1)
RETICS/RBC NFR AUTO: 1.8 % (ref 0.3–2)
SERVICE CMNT-IMP: ABNORMAL
SODIUM SERPL-SCNC: 133 MMOL/L (ref 136–145)
TRANSFERRIN SERPL-MCNC: 85 MG/DL (ref 202–364)
VIT B12 SERPL-MCNC: 1752 PG/ML (ref 193–986)
WBC # BLD AUTO: 17.3 K/UL (ref 4.3–11.1)

## 2022-10-08 PROCEDURE — 84466 ASSAY OF TRANSFERRIN: CPT

## 2022-10-08 PROCEDURE — 82728 ASSAY OF FERRITIN: CPT

## 2022-10-08 PROCEDURE — 6370000000 HC RX 637 (ALT 250 FOR IP): Performed by: INTERNAL MEDICINE

## 2022-10-08 PROCEDURE — 85025 COMPLETE CBC W/AUTO DIFF WBC: CPT

## 2022-10-08 PROCEDURE — 2580000003 HC RX 258: Performed by: FAMILY MEDICINE

## 2022-10-08 PROCEDURE — 97162 PT EVAL MOD COMPLEX 30 MIN: CPT

## 2022-10-08 PROCEDURE — 1100000000 HC RM PRIVATE

## 2022-10-08 PROCEDURE — 6360000002 HC RX W HCPCS: Performed by: FAMILY MEDICINE

## 2022-10-08 PROCEDURE — 82607 VITAMIN B-12: CPT

## 2022-10-08 PROCEDURE — 6360000002 HC RX W HCPCS: Performed by: INTERNAL MEDICINE

## 2022-10-08 PROCEDURE — 84100 ASSAY OF PHOSPHORUS: CPT

## 2022-10-08 PROCEDURE — 80048 BASIC METABOLIC PNL TOTAL CA: CPT

## 2022-10-08 PROCEDURE — 85046 RETICYTE/HGB CONCENTRATE: CPT

## 2022-10-08 PROCEDURE — 6370000000 HC RX 637 (ALT 250 FOR IP): Performed by: STUDENT IN AN ORGANIZED HEALTH CARE EDUCATION/TRAINING PROGRAM

## 2022-10-08 PROCEDURE — 82746 ASSAY OF FOLIC ACID SERUM: CPT

## 2022-10-08 PROCEDURE — 6370000000 HC RX 637 (ALT 250 FOR IP): Performed by: FAMILY MEDICINE

## 2022-10-08 PROCEDURE — 2580000003 HC RX 258: Performed by: INTERNAL MEDICINE

## 2022-10-08 PROCEDURE — 36415 COLL VENOUS BLD VENIPUNCTURE: CPT

## 2022-10-08 PROCEDURE — 97530 THERAPEUTIC ACTIVITIES: CPT

## 2022-10-08 PROCEDURE — 82962 GLUCOSE BLOOD TEST: CPT

## 2022-10-08 PROCEDURE — 83540 ASSAY OF IRON: CPT

## 2022-10-08 PROCEDURE — 83010 ASSAY OF HAPTOGLOBIN QUANT: CPT

## 2022-10-08 PROCEDURE — 99024 POSTOP FOLLOW-UP VISIT: CPT | Performed by: STUDENT IN AN ORGANIZED HEALTH CARE EDUCATION/TRAINING PROGRAM

## 2022-10-08 RX ORDER — INSULIN LISPRO 100 [IU]/ML
4 INJECTION, SOLUTION INTRAVENOUS; SUBCUTANEOUS
Status: DISCONTINUED | OUTPATIENT
Start: 2022-10-08 | End: 2022-10-23

## 2022-10-08 RX ORDER — METHOCARBAMOL 750 MG/1
750 TABLET, FILM COATED ORAL 4 TIMES DAILY
Status: DISCONTINUED | OUTPATIENT
Start: 2022-10-08 | End: 2022-10-25 | Stop reason: HOSPADM

## 2022-10-08 RX ORDER — DEXTROSE MONOHYDRATE 100 MG/ML
INJECTION, SOLUTION INTRAVENOUS CONTINUOUS PRN
Status: DISCONTINUED | OUTPATIENT
Start: 2022-10-08 | End: 2022-10-25 | Stop reason: HOSPADM

## 2022-10-08 RX ORDER — DOCUSATE SODIUM 100 MG/1
100 CAPSULE, LIQUID FILLED ORAL DAILY
Status: DISCONTINUED | OUTPATIENT
Start: 2022-10-08 | End: 2022-10-10

## 2022-10-08 RX ORDER — SULFAMETHOXAZOLE AND TRIMETHOPRIM 800; 160 MG/1; MG/1
1 TABLET ORAL EVERY 12 HOURS SCHEDULED
Status: DISCONTINUED | OUTPATIENT
Start: 2022-10-08 | End: 2022-10-08

## 2022-10-08 RX ORDER — GABAPENTIN 100 MG/1
100 CAPSULE ORAL 3 TIMES DAILY
Status: DISCONTINUED | OUTPATIENT
Start: 2022-10-08 | End: 2022-10-16

## 2022-10-08 RX ORDER — HYDROMORPHONE HYDROCHLORIDE 1 MG/ML
0.5 INJECTION, SOLUTION INTRAMUSCULAR; INTRAVENOUS; SUBCUTANEOUS
Status: DISCONTINUED | OUTPATIENT
Start: 2022-10-08 | End: 2022-10-24

## 2022-10-08 RX ORDER — HYDROCODONE BITARTRATE AND ACETAMINOPHEN 5; 325 MG/1; MG/1
2 TABLET ORAL EVERY 4 HOURS PRN
Status: DISCONTINUED | OUTPATIENT
Start: 2022-10-08 | End: 2022-10-09

## 2022-10-08 RX ORDER — INSULIN GLARGINE 100 [IU]/ML
30 INJECTION, SOLUTION SUBCUTANEOUS DAILY
Status: DISCONTINUED | OUTPATIENT
Start: 2022-10-08 | End: 2022-10-10

## 2022-10-08 RX ADMIN — HYDROMORPHONE HYDROCHLORIDE 0.5 MG: 1 INJECTION, SOLUTION INTRAMUSCULAR; INTRAVENOUS; SUBCUTANEOUS at 19:57

## 2022-10-08 RX ADMIN — HYDROMORPHONE HYDROCHLORIDE 0.5 MG: 1 INJECTION, SOLUTION INTRAMUSCULAR; INTRAVENOUS; SUBCUTANEOUS at 11:37

## 2022-10-08 RX ADMIN — POLYETHYLENE GLYCOL 3350 17 G: 17 POWDER, FOR SOLUTION ORAL at 01:34

## 2022-10-08 RX ADMIN — HYDROCODONE BITARTRATE AND ACETAMINOPHEN 1 TABLET: 5; 325 TABLET ORAL at 06:40

## 2022-10-08 RX ADMIN — INSULIN LISPRO 4 UNITS: 100 INJECTION, SOLUTION INTRAVENOUS; SUBCUTANEOUS at 12:58

## 2022-10-08 RX ADMIN — GABAPENTIN 100 MG: 100 CAPSULE ORAL at 21:11

## 2022-10-08 RX ADMIN — PIPERACILLIN AND TAZOBACTAM 3375 MG: 3; .375 INJECTION, POWDER, FOR SOLUTION INTRAVENOUS at 15:32

## 2022-10-08 RX ADMIN — INSULIN LISPRO 4 UNITS: 100 INJECTION, SOLUTION INTRAVENOUS; SUBCUTANEOUS at 17:20

## 2022-10-08 RX ADMIN — ENOXAPARIN SODIUM 40 MG: 100 INJECTION SUBCUTANEOUS at 08:52

## 2022-10-08 RX ADMIN — METHOCARBAMOL TABLETS 750 MG: 750 TABLET, COATED ORAL at 13:02

## 2022-10-08 RX ADMIN — DOCUSATE SODIUM 100 MG: 100 CAPSULE, LIQUID FILLED ORAL at 08:52

## 2022-10-08 RX ADMIN — INSULIN LISPRO 6 UNITS: 100 INJECTION, SOLUTION INTRAVENOUS; SUBCUTANEOUS at 17:21

## 2022-10-08 RX ADMIN — GABAPENTIN 100 MG: 100 CAPSULE ORAL at 08:52

## 2022-10-08 RX ADMIN — ENOXAPARIN SODIUM 40 MG: 100 INJECTION SUBCUTANEOUS at 21:11

## 2022-10-08 RX ADMIN — METHOCARBAMOL TABLETS 750 MG: 750 TABLET, COATED ORAL at 17:20

## 2022-10-08 RX ADMIN — MORPHINE SULFATE 2 MG: 2 INJECTION, SOLUTION INTRAMUSCULAR; INTRAVENOUS at 04:14

## 2022-10-08 RX ADMIN — SODIUM CHLORIDE, PRESERVATIVE FREE 10 ML: 5 INJECTION INTRAVENOUS at 19:57

## 2022-10-08 RX ADMIN — Medication 2500 MG: at 11:22

## 2022-10-08 RX ADMIN — INSULIN LISPRO 2 UNITS: 100 INJECTION, SOLUTION INTRAVENOUS; SUBCUTANEOUS at 08:53

## 2022-10-08 RX ADMIN — PIPERACILLIN AND TAZOBACTAM 3375 MG: 3; .375 INJECTION, POWDER, FOR SOLUTION INTRAVENOUS at 22:17

## 2022-10-08 RX ADMIN — GABAPENTIN 100 MG: 100 CAPSULE ORAL at 13:02

## 2022-10-08 RX ADMIN — INSULIN GLARGINE 30 UNITS: 100 INJECTION, SOLUTION SUBCUTANEOUS at 08:53

## 2022-10-08 RX ADMIN — METHOCARBAMOL TABLETS 750 MG: 750 TABLET, COATED ORAL at 08:52

## 2022-10-08 RX ADMIN — SODIUM CHLORIDE, PRESERVATIVE FREE 10 ML: 5 INJECTION INTRAVENOUS at 08:52

## 2022-10-08 RX ADMIN — PIPERACILLIN AND TAZOBACTAM 3375 MG: 3; .375 INJECTION, POWDER, FOR SOLUTION INTRAVENOUS at 06:06

## 2022-10-08 RX ADMIN — METHOCARBAMOL TABLETS 750 MG: 750 TABLET, COATED ORAL at 21:11

## 2022-10-08 ASSESSMENT — PAIN DESCRIPTION - PAIN TYPE
TYPE: ACUTE PAIN
TYPE: ACUTE PAIN

## 2022-10-08 ASSESSMENT — PAIN SCALES - GENERAL
PAINLEVEL_OUTOF10: 0
PAINLEVEL_OUTOF10: 9
PAINLEVEL_OUTOF10: 10
PAINLEVEL_OUTOF10: 0

## 2022-10-08 ASSESSMENT — PAIN DESCRIPTION - LOCATION
LOCATION: BACK;PENIS
LOCATION: BACK;PENIS
LOCATION: GROIN

## 2022-10-08 ASSESSMENT — PAIN DESCRIPTION - DESCRIPTORS
DESCRIPTORS: ACHING;SORE
DESCRIPTORS: ACHING
DESCRIPTORS: ACHING
DESCRIPTORS: SORE;STABBING

## 2022-10-08 ASSESSMENT — PAIN DESCRIPTION - ONSET: ONSET: ON-GOING

## 2022-10-08 ASSESSMENT — PAIN - FUNCTIONAL ASSESSMENT
PAIN_FUNCTIONAL_ASSESSMENT: ACTIVITIES ARE NOT PREVENTED
PAIN_FUNCTIONAL_ASSESSMENT: PREVENTS OR INTERFERES SOME ACTIVE ACTIVITIES AND ADLS

## 2022-10-08 ASSESSMENT — PAIN DESCRIPTION - ORIENTATION
ORIENTATION: POSTERIOR
ORIENTATION: LEFT
ORIENTATION: LEFT

## 2022-10-08 ASSESSMENT — PAIN DESCRIPTION - FREQUENCY: FREQUENCY: CONTINUOUS

## 2022-10-08 NOTE — PROGRESS NOTES
VANCO DAILY FOLLOW UP NOTE  [facility] Pharmacokinetic Monitoring Service - Vancomycin    Consulting Provider: Dr. Tori Ansari   Indication: SSTI  Target Concentration: Goal trough of 10-15 mg/L and AUC/MADINA <500 mg*hr/L  Day of Therapy: 1  Additional Antimicrobials: none    Pertinent Laboratory Values: Wt Readings from Last 1 Encounters:   10/07/22 (!) 365 lb (165.6 kg)     Temp Readings from Last 1 Encounters:   10/08/22 99.4 °F (37.4 °C) (Oral)     Recent Labs     10/05/22  1658 10/05/22  2023 10/06/22  0840 10/07/22  0631 10/08/22  0638   BUN 31*  --  32* 23 19   CREATININE 1.30  --  1.40 1.40 1.30   WBC 21.2*  --  19.7* 19.3* 17.3*   PROCAL 0.95*  --   --   --   --    LACACIDPL 2.0 1.8  --   --   --      Estimated Creatinine Clearance: 101 mL/min (based on SCr of 1.3 mg/dL). No results found for: Kerry Brooks    MRSA Nasal Swab: N/A. Non-respiratory infection. Assessment:  Date/Time Dose Concentration AUC         Note: Serum concentrations collected for AUC dosing may appear elevated if collected in close proximity to the dose administered, this is not necessarily an indication of toxicity    Plan:  Dosing recommendations based on [address]  Patient previously loaded with vancomycin on 10/6. Will reload with 2500 mg x 1 dose.  Then give 1250 mg q18h  Anticipated AUC of 443 and trough concentration of 11.0 at steady state  Renal labs as indicated   Vancomycin concentrations will be ordered as clinically appropriate   Pharmacy will continue to monitor patient and adjust therapy as indicated    Thank you for the consult,  CAMILO East DERRELL [facility]

## 2022-10-08 NOTE — PROGRESS NOTES
END OF SHIFT NOTE:    INTAKE/OUTPUT  10/07 0701 - 10/08 0700  In: 1039.2 [P.O.:200; I.V.:800]  Out: 2135 [Urine:2095]  Voiding: No  Catheter: Yes  Drain:   Urinary Catheter 10/06/22 Robles (Active)   Catheter Indications Assist in healing of open sacral or perineal wounds (Stage III, IV, or unstageable documented by a provider or enterostomal therapy) and/or full thickness perineal and lower extremity burns in incontinent patients 10/08/22 1330   Site Assessment No urethral drainage 10/08/22 1330   Urine Color Jinny 10/08/22 1330   Urine Appearance Clear 10/08/22 1330   Urine Odor Other (Comment) 10/07/22 1135   Collection Container Standard 10/08/22 1330   Securement Method Securing device (Describe) 10/08/22 1330   Catheter Care Completed Yes 10/08/22 1330   Catheter Best Practices  Drainage tube clipped to bed;Catheter secured to thigh; Tamper seal intact; Bag below bladder;Bag not on floor; Lack of dependent loop in tubing;Drainage bag less than half full 10/08/22 1330   Status Draining;Patent 10/08/22 1330   Output (mL) 300 mL 10/08/22 0724               Flatus: Patient does have flatus present. Stool: 0 occurrences. Characteristics:           Stool Assessment  Last BM (including prior to admit): 10/05/22    Emesis: 0 occurrences. Characteristics:        VITAL SIGNS  Patient Vitals for the past 12 hrs:   Temp Pulse Resp BP SpO2   10/08/22 1543 98.9 °F (37.2 °C) (!) 103 18 130/86 94 %   10/08/22 1207 -- -- 17 -- --   10/08/22 1135 98.5 °F (36.9 °C) (!) 110 18 127/76 95 %   10/08/22 0724 99.4 °F (37.4 °C) (!) 104 20 131/82 94 %   10/08/22 0710 -- -- 18 -- --       Pain Assessment  Pain Level: 0 (10/08/22 1207)  Pain Location: Back, Penis  Patient's Stated Pain Goal: 0 - No pain    Ambulating  Yes    Shift report given to oncoming nurse at the bedside.     Jaime Lr RN

## 2022-10-08 NOTE — PROGRESS NOTES
Reviewed notes for new spiritual concerns. [place]an    LIVES IN [place]    EX - WIFE - YISEL    FULL CODE            Will follow as needed.

## 2022-10-08 NOTE — OP NOTE
[facility]  OPERATIVE REPORT    Name:  Shaan Lee  MR#:  [id number]  :  1965  ACCOUNT #:  [de-identified]  DATE OF SERVICE:  10/07/2022    PREOPERATIVE DIAGNOSIS:  Penile abscess. POSTOPERATIVE DIAGNOSIS:  Penile abscess. PROCEDURE PERFORMED:  Debridement of penile abscess. SURGEON:  Bree Baez MD    ANESTHESIA:  General.    COMPLICATIONS:  None. SPECIMENS REMOVED:  None. ESTIMATED BLOOD LOSS:  Minimal.    NARRATIVE:  The patient is a 56-year-old male who came in with a several-week history of a draining abscess on his back. This had been incised and drained by Dr. Yfn Cordoba during this anesthetic. After it was packed he was placed in the supine position and she had debrided the left inguinal area which also contained an abscess. I then entered the room and examined the patient's genitalia. The scrotum was uninvolved. There was a clear tract extending over the dorsal surface of the shaft of the penis and I was able to express pus from this. I opened this up with a 15-blade from just proximal to the coronal sulcus back toward the base of the penis. There was quite a bit of necrotic tissue that had to be debrided. At that point, it became clear that there was a track going from this area up to the inguinal incision that had been made previously. I connected these two and opened it quite widely. Once again, there was some pus that was liberated. This also tracked down inferiorly down into the crease between the left hemiscrotum and thigh. There was a small punctum down in that area that is probably connected to the rest of the incision. We dissected down that way, but did not divide the skin. This was opened up well with some necrotic tissue having been removed down towards the inferior aspect of the groin and the superior aspect of the scrotum. A Robles catheter was left in place. The entire wound was packed wet-to-dry.   The patient was then awakened, extubated, and taken from the OR without any further incident or complaint.         Gilson Guo MD      [id number]  D:  10/07/2022 21:22  T:  10/08/2022 1:44  JOB #:  [id number]

## 2022-10-08 NOTE — PROGRESS NOTES
GENERAL SURGERY PROGRESS NOTE    Name:  Eula Uriarte Date/Time of Admission: 10/5/2022  7:22 PM  CSN: [id number]  Attending Provider: Altaf Christianson MD  Room/Bed:   : 1965 60 y.o.      10/8/2022   POD#: 1 I&D back abscess and left groin/scrotal and penial abscess    SUBJECTIVE:    Patient seen and examined. Pt doing well, pain moderately well controlled. Tolerating diet    Current Meds:  Scheduled Meds:   gabapentin  100 mg Oral TID    methocarbamol  750 mg Oral 4x Daily    docusate sodium  100 mg Oral Daily    [Held by provider] lisinopril  5 mg Oral Daily    insulin lispro  0-8 Units SubCUTAneous TID WC    insulin lispro  0-4 Units SubCUTAneous Nightly    sodium chloride flush  5-40 mL IntraVENous 2 times per day    enoxaparin  40 mg SubCUTAneous BID    piperacillin-tazobactam  3,375 mg IntraVENous Q8H    insulin glargine  25 Units SubCUTAneous Daily     Continuous Infusions:   sodium chloride       PRN Meds:.sodium chloride flush, sodium chloride, ondansetron **OR** ondansetron, polyethylene glycol, acetaminophen **OR** acetaminophen, HYDROcodone-acetaminophen, morphine     OBJECTIVE:    Vital Signs:  Vitals:    10/08/22 0724   BP: 131/82   Pulse: (!) 104   Resp: 20   Temp: 99.4 °F (37.4 °C)   SpO2: 94%        I/O:    I/O last 3 completed shifts: In: 1039.2 [P.O.:200;  I.V.:800; IV Piggyback:39.2]  Out: 2135 [Urine:2095; Blood:40]   I/O this shift:  In: -   Out: 300 [Urine:300]      PHYSICAL EXAMINATION  General  Alert, cooperative, NAD  Lungs Unlabored breathing  Heart RRR  Abdominal Soft, nontender, nondistended, no involuntary guarding or rebound appreciated   Extremities No edema or gross deformities noted appreciated   Incisions C/d, induration improving    Labs:   Lab Results   Component Value Date    WBC 17.3 (H) 10/08/2022    HGB 9.6 (L) 10/08/2022    HCT 30.7 (L) 10/08/2022    MCV 91.9 10/08/2022     10/08/2022        Lab Results   Component Value Date/Time     10/08/2022 06:38 AM    K PENDING 10/08/2022 06:38 AM     10/08/2022 06:38 AM    CO2 24 10/08/2022 06:38 AM    BUN 19 10/08/2022 06:38 AM    CREATININE 1.30 10/08/2022 06:38 AM    GLUCOSE 245 10/08/2022 06:38 AM    CALCIUM 8.0 10/08/2022 06:38 AM         Imaging Studies: none    ASSESSMENT:    Principal Problem:    Abscess  Active Problems:    Primary hypertension    DM2 (diabetes mellitus, type 2) (HCC)    Cellulitis and abscess of trunk  Resolved Problems:    * No resolved hospital problems.  *    -AFVSS      PLAN:    Continue IV abx  Continue daily dakins WTD packing changes per nursing, will plan for wound vac on Monday  Pain control  Bowel regimen  Maintain jaimes    Electronically signed by:  Leatha Lawton DO  10/8/2022

## 2022-10-08 NOTE — PROGRESS NOTES
Hospitalist Progress Note   Admit Date:  10/5/2022  7:22 PM   Name:  Juice Jha   Age:  60 y.o. Sex:  male  :  1965   MRN:  [id number]   Room:      Reason(s) for Admission: Cellulitis and abscess of trunk [L03.319, L02.219]  Abscess [L02.91]  Hyperglycemia [R73.9]  Abscess, perineum [facility] Course & Interval History:   Mr. Robin Jackson is a nice 61 y/o male with a h/o DM2 and HTN who was admitted to our service on 10/5 with an abscess on his upper back for about 2 months. CT was done showing a paraspinal abscess w/o muscular involvement along with L inguinal inflammatory changes with possible early abscess of scrotum. Urology and General Surgery were consulted. He was started on antibiotics. Hyperglycemia, A1C >14, started on basal + bolus insulin. ID consulted, now on Zosyn. HIV neg, other ID orders pending. On 10/7 he underwent I&D of back abscess and L groin abscess. He later underwent debridement of penile abscess with Urology. Wound cultures from 10/5 I&D with staph aureus. Subjective/24hr Events (10/08/22): S/p I&D of back and groin abscess and debridement of penile abscess yesterday. Main complaint is pain in the groin, morphine minimally helpful. AM glucose 244. No chest pain or SOB. Assessment & Plan:   # Sepsis 2/2 abscess of upper back, L groin and penis   - Leukocytosis + tachycardia + abscesses on CT. Resolved. - S/p I&D of back and groin abscess, penile debridement on 10/7. 10/5 wound culture with MRSA, stop Zosyn and start IV vanc, blood cxs neg. Wound dressing changes. Surgery, ID and Urology appreciated, wound vac Monday. Pain control. # Acute urinary retention   - likely 2/2 penile abscess and edema. Has Robles. # Uncontrolled DM2   - Increase basal insulin, add prandial, con't SSI    # HTN   - Normotensive, off ACEi now    Discharge Planning: Home when able, next week. Diet:  ADULT DIET;  Regular; 3 carb choices (45 gm/meal)  DVT PPx: Lovenox  Code status: Full Code    Hospital Problems             Last Modified POA    * (Principal) Sepsis ([facility] [address] 10/8/2022 Yes    Primary hypertension 10/6/2022 Yes    DM2 (diabetes mellitus, type 2) ([facility] [address] 10/6/2022 Yes    Cellulitis and abscess of trunk 10/7/2022 Yes    Abscess of groin, left 10/8/2022 Yes    Penile abscess 10/8/2022 Yes    Acute urinary retention 10/8/2022 Yes        Objective:   Patient Vitals for the past 24 hrs:   Temp Pulse Resp BP SpO2   10/08/22 0724 99.4 °F (37.4 °C) (!) 104 20 131/82 94 %   10/08/22 0315 100.3 °F (37.9 °C) 100 21 133/76 94 %   10/07/22 2304 100 °F (37.8 °C) (!) 113 19 137/75 90 %   10/07/22 2027 99.1 °F (37.3 °C) (!) 101 22 129/87 93 %   10/07/22 1830 98.9 °F (37.2 °C) (!) 101 22 131/82 92 %   10/07/22 1815 -- 100 16 113/68 96 %   10/07/22 1810 -- 99 18 114/63 97 %   10/07/22 1805 -- (!) 102 17 110/60 95 %   10/07/22 1800 -- 99 17 118/61 97 %   10/07/22 1755 -- (!) 101 18 137/65 93 %   10/07/22 1750 -- (!) 104 18 126/63 96 %   10/07/22 1745 97.6 °F (36.4 °C) (!) 104 17 125/64 97 %   10/07/22 1740 -- (!) 105 16 126/71 96 %   10/07/22 1735 -- (!) 103 17 (!) 113/57 95 %   10/07/22 1730 -- (!) 103 17 127/60 97 %   10/07/22 1725 -- (!) 101 16 116/61 (!) 88 %   10/07/22 1720 -- 100 16 126/63 93 %   10/07/22 1713 97.2 °F (36.2 °C) (!) 101 16 128/68 100 %   10/07/22 1450 98.2 °F (36.8 °C) 100 16 130/64 96 %   10/07/22 1401 -- -- 20 -- --   10/07/22 1112 99 °F (37.2 °C) 99 16 124/77 96 %       Estimated body mass index is 46.86 kg/m² as calculated from the following:    Height as of this encounter: 6' 2\" (1.88 m). Weight as of this encounter: 365 lb (165.6 kg). Intake/Output Summary (Last 24 hours) at 10/8/2022 0850  Last data filed at 10/8/2022 0724  Gross per 24 hour   Intake 939.17 ml   Output 1635 ml   Net -695.83 ml         Physical Exam:   Blood pressure 131/82, pulse (!) 104, temperature 99.4 °F (37.4 °C), temperature source Oral, resp.  rate 20, height 6' 2\" (1.88 m), weight (!) 365 lb (165.6 kg), SpO2 94 %. General:    Well nourished. No overt distress. Obese. Head:  Normocephalic, atraumatic. Eyes:  Sclerae appear normal. Pupils equally round. ENT:  Nares appear normal, no drainage. Moist oral mucosa  Neck:  No restricted ROM. Trachea midline. CV:   RRR. No m/r/g. No jugular venous distension. Lungs:   CTAB. No wheezing, rhonchi, or rales. Respirations even, unlabored. Abdomen: Bowel sounds present. Soft, nontender, nondistended. Extremities: No cyanosis or clubbing. No edema. :  Robles catheter present, scrotal edema noted, dressings present to penis and groin. Skin:     No rashes and normal coloration. Warm and dry. Dressing to mid back. Neuro:  CN II-XII grossly intact. Sensation intact. A&Ox3  Psych:  Normal mood and affect.       I have reviewed ordered lab tests and independently visualized imaging below:    Recent Labs:  Recent Results (from the past 48 hour(s))   POCT Glucose    Collection Time: 10/06/22 11:03 AM   Result Value Ref Range    POC Glucose 354 (H) 65 - 100 mg/dL    Performed by: Clarke    Transthoracic echocardiogram (TTE) complete with contrast, bubble, strain, and 3D PRN    Collection Time: 10/06/22  2:43 PM   Result Value Ref Range    LV EDV A2C 95 mL    LV EDV A4C 137 mL    LV ESV A2C 63 mL    LV ESV A4C 72 mL    IVSd 1.4 (A) 0.6 - 1.0 cm    LVIDd 4.3 4.2 - 5.9 cm    LVIDs 3.5 cm    LVOT Diameter 2.2 cm    LVOT Mean Gradient 3 mmHg    LVOT VTI 20.2 cm    LVOT Peak Velocity 1.2 m/s    LVOT Peak Gradient 6 mmHg    LVPWd 1.3 (A) 0.6 - 1.0 cm    LV E' Lateral Velocity 12 cm/s    LV E' Septal Velocity 10 cm/s    LV Ejection Fraction A2C 34 %    LV Ejection Fraction A4C 47 %    EF BP 42 (A) 55 - 100 %    LVOT Area 3.8 cm2    LVOT SV 76.7 ml    LA Minor Axis 5.8 cm    LA Major Axis 5.3 cm    LA Area 2C 22.0 cm2    LA Area 4C 19.7 cm2    LA Volume BP 66 (A) 18 - 58 mL    LA Diameter 3.9 cm    AV Mean Velocity 1.0 m/s    AV Mean Gradient 4 mmHg    AV VTI 23.4 cm    AV Peak Velocity 1.4 m/s    AV Peak Gradient 8 mmHg    AV Area by VTI 3.3 cm2    AV Area by Peak Velocity 3.2 cm2    Aortic Root 3.1 cm    Ascending Aorta 3.4 cm    IVC Proxmal 1.7 cm    MV E Wave Deceleration Time 224.0 ms    MV A Velocity 0.62 m/s    MV E Velocity 0.67 m/s    PV .0 ms    PV Max Velocity 1.3 m/s    PV Peak Gradient 7 mmHg    RVIDd 3.2 cm    RV Basal Dimension 3.8 cm    TAPSE 2.1 1.7 cm    Body Surface Area 2.94 m2    Fractional Shortening 2D 19 28 - 44 %    LV ESV Index A4C 26 mL/m2    LV EDV Index A4C 49 mL/m2    LV ESV Index A2C 22 mL/m2    LV EDV Index A2C 34 mL/m2    LVIDd Index 1.53 cm/m2    LVIDs Index 1.25 cm/m2    LV RWT Ratio 0.60     LV Mass 2D 219.8 88 - 224 g    LV Mass 2D Index 78.2 49 - 115 g/m2    MV E/A 1.08     E/E' Ratio (Averaged) 6.14     E/E' Lateral 5.58     E/E' Septal 6.70     LA Volume Index BP 23 16 - 34 ml/m2    LVOT Stroke Volume Index 27.3 mL/m2    LA Size Index 1.39 cm/m2    LA/AO Root Ratio 1.26     Ao Root Index 1.10 cm/m2    Ascending Aorta Index 1.21 cm/m2    AV Velocity Ratio 0.86     LVOT:AV VTI Index 0.86     BABATUNDE/BSA VTI 1.2 cm2/m2    BABATUNDE/BSA Peak Velocity 1.1 cm2/m2    Est. RA Pressure 3 mmHg   POCT Glucose    Collection Time: 10/06/22  4:06 PM   Result Value Ref Range    POC Glucose 237 (H) 65 - 100 mg/dL    Performed by: Clarke    POCT Glucose    Collection Time: 10/06/22  9:00 PM   Result Value Ref Range    POC Glucose 171 (H) 65 - 100 mg/dL    Performed by: Heidi    RPR    Collection Time: 10/06/22 11:06 PM   Result Value Ref Range    RPR NONREACTIVE NR     Basic Metabolic Panel w/ Reflex to MG    Collection Time: 10/07/22  6:31 AM   Result Value Ref Range    Sodium 134 (L) 138 - 145 mmol/L    Potassium 4.4 3.5 - 5.1 mmol/L    Chloride 104 101 - 110 mmol/L    CO2 24 21 - 32 mmol/L    Anion Gap 6 4 - 13 mmol/L    Glucose 170 (H) 65 - 100 mg/dL    BUN 23 6 - 23 MG/DL    Creatinine 1.40 0.8 - 1.5 MG/DL    Est, Glom Filt Rate 58 (L) >60 ml/min/1.73m2    Calcium 8.4 8.3 - 10.4 MG/DL   CBC with Auto Differential    Collection Time: 10/07/22  6:31 AM   Result Value Ref Range    WBC 19.3 (H) 4.3 - 11.1 K/uL    RBC 3.47 (L) 4.23 - 5.6 M/uL    Hemoglobin 10.0 (L) 13.6 - 17.2 g/dL    Hematocrit 31.9 (L) 41.1 - 50.3 %    MCV 91.9 79.6 - 97.8 FL    MCH 28.8 26.1 - 32.9 PG    MCHC 31.3 (L) 31.4 - 35.0 g/dL    RDW 14.1 11.9 - 14.6 %    Platelets 754 974 - 338 K/uL    MPV 11.8 9.4 - 12.3 FL    nRBC 0.00 0.0 - 0.2 K/uL    Differential Type AUTOMATED      Seg Neutrophils 79 (H) 43 - 78 %    Lymphocytes 12 (L) 13 - 44 %    Monocytes 6 4.0 - 12.0 %    Eosinophils % 2 0.5 - 7.8 %    Basophils 0 0.0 - 2.0 %    Immature Granulocytes 1 0.0 - 5.0 %    Segs Absolute 15.2 (H) 1.7 - 8.2 K/UL    Absolute Lymph # 2.3 0.5 - 4.6 K/UL    Absolute Mono # 1.2 0.1 - 1.3 K/UL    Absolute Eos # 0.3 0.0 - 0.8 K/UL    Basophils Absolute 0.1 0.0 - 0.2 K/UL    Absolute Immature Granulocyte 0.2 0.0 - 0.5 K/UL   Phosphorus    Collection Time: 10/07/22  6:31 AM   Result Value Ref Range    Phosphorus 2.9 2.5 - 4.5 MG/DL   POCT Glucose    Collection Time: 10/07/22  8:26 AM   Result Value Ref Range    POC Glucose 169 (H) 65 - 100 mg/dL    Performed by: [employer] [employer]    POCT Glucose    Collection Time: 10/07/22 11:07 AM   Result Value Ref Range    POC Glucose 212 (H) 65 - 100 mg/dL    Performed by: [employer]    POCT Glucose    Collection Time: 10/07/22  3:09 PM   Result Value Ref Range    POC Glucose 193 (H) 65 - 100 mg/dL    Performed by: Pawel    POCT Glucose    Collection Time: 10/07/22  5:12 PM   Result Value Ref Range    POC Glucose 192 (H) 65 - 100 mg/dL    Performed by: Mónica Nichols    POCT Glucose    Collection Time: 10/07/22  8:58 PM   Result Value Ref Range    POC Glucose 200 (H) 65 - 100 mg/dL    Performed by: Laney    Basic Metabolic Panel w/ Reflex to MG    Collection Time: 10/08/22  6:38 AM   Result Value Ref Range    Sodium 133 (L) 136 - 145 mmol/L    Potassium 5.2 (H) 3.5 - 5.1 mmol/L    Chloride 105 101 - 110 mmol/L    CO2 24 21 - 32 mmol/L    Anion Gap 4 4 - 13 mmol/L    Glucose 245 (H) 65 - 100 mg/dL    BUN 19 6 - 23 MG/DL    Creatinine 1.30 0.8 - 1.5 MG/DL    Est, Glom Filt Rate >60 >60 ml/min/1.73m2    Calcium 8.0 (L) 8.3 - 10.4 MG/DL   CBC with Auto Differential    Collection Time: 10/08/22  6:38 AM   Result Value Ref Range    WBC 17.3 (H) 4.3 - 11.1 K/uL    RBC 3.34 (L) 4.23 - 5.6 M/uL    Hemoglobin 9.6 (L) 13.6 - 17.2 g/dL    Hematocrit 30.7 (L) 41.1 - 50.3 %    MCV 91.9 79.6 - 97.8 FL    MCH 28.7 26.1 - 32.9 PG    MCHC 31.3 (L) 31.4 - 35.0 g/dL    RDW 14.3 11.9 - 14.6 %    Platelets 400 377 - 327 K/uL    MPV 11.6 9.4 - 12.3 FL    nRBC 0.00 0.0 - 0.2 K/uL    Differential Type AUTOMATED      Seg Neutrophils 80 (H) 43 - 78 %    Lymphocytes 13 13 - 44 %    Monocytes 5 4.0 - 12.0 %    Eosinophils % 1 0.5 - 7.8 %    Basophils 0 0.0 - 2.0 %    Immature Granulocytes 1 0.0 - 5.0 %    Segs Absolute 13.8 (H) 1.7 - 8.2 K/UL    Absolute Lymph # 2.3 0.5 - 4.6 K/UL    Absolute Mono # 0.9 0.1 - 1.3 K/UL    Absolute Eos # 0.1 0.0 - 0.8 K/UL    Basophils Absolute 0.1 0.0 - 0.2 K/UL    Absolute Immature Granulocyte 0.1 0.0 - 0.5 K/UL   Phosphorus    Collection Time: 10/08/22  6:38 AM   Result Value Ref Range    Phosphorus 3.1 2.5 - 4.5 MG/DL   Iron    Collection Time: 10/08/22  6:38 AM   Result Value Ref Range    Iron 26 (L) 35 - 150 ug/dL   Ferritin    Collection Time: 10/08/22  6:38 AM   Result Value Ref Range    Ferritin 1,428 (H) 8 - 388 NG/ML   Transferrin    Collection Time: 10/08/22  6:38 AM   Result Value Ref Range    Transferrin 85 (L) 202 - 364 mg/dL   Reticulocytes    Collection Time: 10/08/22  6:38 AM   Result Value Ref Range    Reticulocyte Count,Automated 1.8 0.3 - 2.0 %    Absolute Retic # 0.0605 0.026 - 0.095 M/ul    Immature Retic Fraction 26.2 (H) 2.3 - 13.4 %    Retic Hemoglobin conc. 33 29 - 35 pg Vitamin B12    Collection Time: 10/08/22  6:38 AM   Result Value Ref Range    Vitamin B-12 1752 (H) 193 - 986 pg/mL   Folate    Collection Time: 10/08/22  6:38 AM   Result Value Ref Range    Folate 31.6 (H) 3.1 - 17.5 ng/mL   POCT Glucose    Collection Time: 10/08/22  8:03 AM   Result Value Ref Range    POC Glucose 244 (H) 65 - 100 mg/dL    Performed by: Simeon Dalton          Other Studies:  US ABDOMEN COMPLETE    Result Date: 10/6/2022  ABDOMINAL ULTRASOUND. INDICATION: Cholelithiasis and urinary retention. . COMPARISON: Yesterday's CT scan. FINDINGS: Pancreas is grossly unremarkable although the tail is partially obscured by overlying bowel gas. Aorta is normal caliber, 2.5 cm. The IVC is patent. The spleen has a homogenous echotexture and measures 14 cm. Left kidney is unremarkable without hydronephrosis and measures 13.9 cm. Right kidney is unremarkable without hydronephrosis and measures 13.5 cm. Renal echogenicity is normal, the right kidney is hypoechoic to the liver. The intrahepatic biliary tree is not dilated. There is hepatopetal flow in the portal vein. No gross focal parenchymal abnormality identified within the liver. Liver echotexture: Uniform and increased. The gallbladder wall is not thickened. Multiple echogenic shadowing gallstones. The common bile duct is not dilated, 6 mm. Cholelithiasis without biliary tree obstruction. Echogenic liver, nonspecific. No hydronephrosis. Transthoracic echocardiogram (TTE) complete with contrast, bubble, strain, and 3D PRN    Result Date: 10/6/2022    Left Ventricle: Normal left ventricular systolic function with a visually estimated EF of 60 - 65%. Left ventricle size is normal. Normal wall thickness. Normal wall motion. Normal diastolic function. Technical qualifiers: Color flow Doppler was performed and pulse wave and/or continuous wave Doppler was performed. Contrast used: Definity.        Current Meds:  Current Facility-Administered Medications Medication Dose Route Frequency    gabapentin (NEURONTIN) capsule 100 mg  100 mg Oral TID    methocarbamol (ROBAXIN) tablet 750 mg  750 mg Oral 4x Daily    docusate sodium (COLACE) capsule 100 mg  100 mg Oral Daily    insulin glargine (LANTUS) injection vial 30 Units  30 Units SubCUTAneous Daily    HYDROcodone-acetaminophen (NORCO) 5-325 MG per tablet 2 tablet  2 tablet Oral Q4H PRN    HYDROmorphone HCl PF (DILAUDID) injection 0.5 mg  0.5 mg IntraVENous Q3H PRN    insulin lispro (HUMALOG) injection vial 4 Units  4 Units SubCUTAneous TID WC    [Held by provider] lisinopril (PRINIVIL;ZESTRIL) tablet 5 mg  5 mg Oral Daily    insulin lispro (HUMALOG) injection vial 0-8 Units  0-8 Units SubCUTAneous TID WC    insulin lispro (HUMALOG) injection vial 0-4 Units  0-4 Units SubCUTAneous Nightly    sodium chloride flush 0.9 % injection 5-40 mL  5-40 mL IntraVENous 2 times per day    sodium chloride flush 0.9 % injection 5-40 mL  5-40 mL IntraVENous PRN    0.9 % sodium chloride infusion   IntraVENous PRN    enoxaparin (LOVENOX) injection 40 mg  40 mg SubCUTAneous BID    ondansetron (ZOFRAN-ODT) disintegrating tablet 4 mg  4 mg Oral Q8H PRN    Or    ondansetron (ZOFRAN) injection 4 mg  4 mg IntraVENous Q6H PRN    polyethylene glycol (GLYCOLAX) packet 17 g  17 g Oral Daily PRN    acetaminophen (TYLENOL) tablet 650 mg  650 mg Oral Q6H PRN    Or    acetaminophen (TYLENOL) suppository 650 mg  650 mg Rectal Q6H PRN    piperacillin-tazobactam (ZOSYN) 3,375 mg in sodium chloride 0.9 % 50 mL IVPB (mini-bag)  3,375 mg IntraVENous Q8H       Signed:  Salvador Wang MD    Part of this note may have been written by using a voice dictation software. The note has been proof read but may still contain some grammatical/other typographical errors.

## 2022-10-08 NOTE — PROGRESS NOTES
Infectious Disease Consult      Today's Date: 10/8/2022   Admit Date: 10/5/2022    Impression:   Patient is a 60year old with DM2 with HbA1C of >14, admitted with a multiple abscesses, dominant on the left Paraspinous area    #Left Paraspinous abscess  #Pubic abscess  - S/p bedside I&D by urology 10/5/22- culture positive for staph aureus  - Likely bacterial but given prolonged duration of drainage - will also consider mycobacterial organisms, fungal and atypical bacterial etiology. - Appropriately on vancomycin and PTZ  - Patient is s/p I&D of the paraspinal abscess which tracked down into his penis 10/7/22- cultures pending  - Major contributing factor is his diabetes   - Routine screening for HIV (HIV RNA was ordered by primary)    Plan:   Continue vancomycin dosed by pharmacy and pip/tazo 3.375grams q8rs - prolonged infusions  Follow pending AFB (called lab to add AFB cx), fungal and bacterial cultures  Follow Cocci ab - more likely that this is al staph  Aim for optimal glycemic control       Anti-infectives:   PTZ - 10/6- present  Vanc 10/5 - present    Subjective:   Patient complains of pain today, post op. Pain is mostly in the groin  He is about to start physical therapy  Afebrile  No Known Allergies     Review of Systems:  All systems reviewed and negative except as otherwise stated in the HPI    Objective:   Blood pressure 133/76, pulse 100, temperature 100.3 °F (37.9 °C), temperature source Oral, resp. rate 21, height 6' 2\" (1.88 m), weight (!) 365 lb (165.6 kg), SpO2 94 %. Visit Vitals  /76   Pulse 100   Temp 100.3 °F (37.9 °C) (Oral)   Resp 21   Ht 6' 2\" (1.88 m)   Wt (!) 365 lb (165.6 kg)   SpO2 94%   BMI 46.86 kg/m²     Temp (24hrs), Av.9 °F (37.2 °C), Min:97.2 °F (36.2 °C), Max:100.3 °F (37.9 °C)       Exam:    General:  Alert, cooperative, well noursished, well developed, appears stated age   Eyes:  Sclera anicteric. Pupils equally round and reactive to light.    Mouth/Throat: Mucous membranes normal, oral pharynx clear   Neck: Supple   Lungs:   Clear to auscultation bilaterally, good effort   CV:  Regular rate and rhythm,no murmur, click, rub or gallop   Abdomen:   Soft, non-tender. bowel sounds normal. non-distended   Extremities: No cyanosis or edema   Skin: Back wound now dressed.  Bloody fluid without overt purulence, groin wound also dressed   Lymph nodes: Cervical and supraclavicular normal   Musculoskeletal: No swelling or deformity   Lines/Devices:  Intact, no erythema, drainage or tenderness   Psych: Alert and oriented, normal mood affect given the setting       CBC:  Recent Labs     10/05/22  1658 10/06/22  0840 10/07/22  0631   WBC 21.2* 19.7* 19.3*   HGB 12.0* 10.2* 10.0*   HCT 37.9* 31.9* 31.9*    246 279         BMP:  Recent Labs     10/05/22  1658 10/06/22  0840 10/07/22  0631   BUN 31* 32* 23   * 134* 134*   K 4.4 4.5 4.4   CL 99* 102 104   CO2 27 25 24         LFTS:  Recent Labs     10/05/22  1658   ALT 66*         Data Review:   Recent Results (from the past 24 hour(s))   POCT Glucose    Collection Time: 10/07/22  8:26 AM   Result Value Ref Range    POC Glucose 169 (H) 65 - 100 mg/dL    Performed by: Sharri    POCT Glucose    Collection Time: 10/07/22 11:07 AM   Result Value Ref Range    POC Glucose 212 (H) 65 - 100 mg/dL    Performed by: Sharri    POCT Glucose    Collection Time: 10/07/22  3:09 PM   Result Value Ref Range    POC Glucose 193 (H) 65 - 100 mg/dL    Performed by: Pawel    POCT Glucose    Collection Time: 10/07/22  5:12 PM   Result Value Ref Range    POC Glucose 192 (H) 65 - 100 mg/dL    Performed by: Ayesha Concepcion    POCT Glucose    Collection Time: 10/07/22  8:58 PM   Result Value Ref Range    POC Glucose 200 (H) 65 - 100 mg/dL    Performed by: Laney           Microbiology:  Reviewed    Studies:  Reviewed    Signed By: Martin Baltazar MD     October 8, 2022

## 2022-10-08 NOTE — PLAN OF CARE
Problem: Discharge Planning  Goal: Discharge to home or other facility with appropriate resources  Outcome: Progressing     Problem: Safety - Adult  Goal: Free from fall injury  Outcome: Progressing  Flowsheets (Taken 10/8/2022 2215)  Free From Fall Injury: Instruct family/caregiver on patient safety     Problem: Pain  Goal: Verbalizes/displays adequate comfort level or baseline comfort level  Outcome: Progressing     Problem: Chronic Conditions and Co-morbidities  Goal: Patient's chronic conditions and co-morbidity symptoms are monitored and maintained or improved  Outcome: Progressing

## 2022-10-08 NOTE — PROGRESS NOTES
ACUTE PHYSICAL THERAPY GOALS:   (Developed with and agreed upon by patient and/or caregiver. )  LTG:  (1.)Mr. Michell Calero will move from supine to sit and sit to supine , scoot up and down, and roll side to side in bed with STAND BY ASSIST within 7 treatment day(s). (2.)Mr. Michell Calero will transfer from bed to chair and chair to bed with CONTACT GUARD ASSIST using the least restrictive device within 7 treatment day(s). (3.)Mr. Michell Calero will ambulate with CONTACT GUARD ASSIST for 250+ feet with the least restrictive device within 7 treatment day(s). (4.)Mr. Michell Calero will perform 4 stairs with HR and CGA within 7 treatment days for ascending and descending stairs for home.   ________________________________________________________________________________________________       PHYSICAL THERAPY Initial Assessment and AM  (Link to Caseload Tracking: PT Visit Days : 1  Acknowledge Orders  Time In/Out  PT Charge Capture  Rehab Caseload Tracker    Eula Uriarte is a 60 y.o. male   PRIMARY DIAGNOSIS: Sepsis ([facility] [address]  Cellulitis and abscess of trunk [L03.319, L02.219]  Abscess [L02.91]  Hyperglycemia [R73.9]  Abscess, perineum [L02.215]  Procedure(s) (LRB):  BACK ABSCESS I & D (N/A)  INGUINAL AND PENILE DEBRIDEMENT (N/A)  1 Day Post-Op  Reason for Referral: Generalized Muscle Weakness (M62.81)  Difficulty in walking, Not elsewhere classified (R26.2)  Inpatient: Payor: /     ASSESSMENT:     REHAB RECOMMENDATIONS:   Recommendation to date pending progress:  Setting:  Home Health Therapy    Equipment:    Rolling Walker     ASSESSMENT:  Mr. Michell Calero presents with decreased bed mobility, transfers, ambulation, balance, activity tolerance, and overall general functional mobility s/p I & D for back abscess and L groin. Pt from [facility], has lived here since 1years old, speaks [place]. Pt lives with GF, MOD I with SPC at baseline. Pt has pain with mobility. Pt back and groin dressing saturated, RN notified.  Pt with MOD A to sit to EOB, once upright prop sitting EOB, slightly posterior lean in sitting to off set pressure from groin. Pt with MIN A, bed elevated for standing. Pt with several sit to stand attempts at bedside, able to stand; however, unsteady with initial standing attempts, returned to sitting quickly. Pt required use of RW for support, ambulated around bed to chair in room. Pt required cues for pacing, slightly impulsive with mobility. Pt overall functioning well below baseline level of mod I, PT to follow for acute care needs. [facility] [address] [address] AM-PAC 6 Clicks Basic Mobility Inpatient Short Form  AM-PAC Mobility Inpatient   How much difficulty turning over in bed?: A Lot  How much difficulty sitting down on / standing up from a chair with arms?: A Little  How much difficulty moving from lying on back to sitting on side of bed?: A Lot  How much help from another person moving to and from a bed to a chair?: A Little  How much help from another person needed to walk in hospital room?: A Little  How much help from another person for climbing 3-5 steps with a railing?: A Lot  AM-PAC Inpatient Mobility Raw Score : 15  AM-PAC Inpatient T-Scale Score : 39.45  Mobility Inpatient CMS 0-100% Score: 57.7  Mobility Inpatient CMS G-Code Modifier : CK    SUBJECTIVE:   Mr. Justen Jett states, \"I haven't been up yet\"     Social/Functional Lives With: Significant other  Type of Home: House  Home Access: Stairs to enter with rails  Entrance Stairs - Number of Steps: 4  Bathroom Equipment: Shower chair  Home Equipment: Verónica Sangeetha  ADL Assistance: Independent  Homemaking Assistance: Independent  Homemaking Responsibilities: Yes  Active : Yes  Mode of Transportation: Car  Occupation: Full time employment    OBJECTIVE:     PAIN: VITALS / O2: PRECAUTION / Josefine New / DRAINS:   Pre Treatment:   Pain Assessment: 0-10  Pain Level: 10  Pain Location: Groin  Pain Orientation: Left  Pain Descriptors: Aching; Sore      Post Treatment: 10/10 Vitals Oxygen  O2 Therapy: Room air   Robles Catheter and IV    RESTRICTIONS/PRECAUTIONS:  Restrictions/Precautions: Fall Risk                 GROSS EVALUATION: Intact Impaired (Comments):   AROM [x]     PROM [x]    Strength []  Grossly 3+/5   Balance [] Sitting - Static: Fair, +  Sitting - Dynamic: Fair  Standing - Static: Fair  Standing - Dynamic: Fair, -   Posture [] Rounded Shoulders   Sensation [x]     Coordination [x]      Tone [x]     Edema [] Scrotal    Activity Tolerance []  General fatigue, pain    []      COGNITION/  PERCEPTION: Intact Impaired (Comments):   Orientation [x]     Vision [x]     Hearing [x]     Cognition  [x]       MOBILITY: I Mod I S SBA CGA Min Mod Max Total  NT x2 Comments:   Bed Mobility    Rolling [] [] [] [] [] [] [] [] [] [x] []    Supine to Sit [] [] [] [] [] [] [x] [] [] [] [] Pivoting    Scooting [] [] [] [] [] [x] [] [] [] [] []    Sit to Supine [] [] [] [] [] [] [] [] [] [x] [] In chair   Transfers    Sit to Stand [] [] [] [] [] [x] [] [] [] [] [] Bed elevated   Bed to Chair [] [] [] [] [] [x] [] [] [] [] []    Stand to Sit [] [] [] [] [] [x] [] [] [] [] []     [] [] [] [] [] [] [] [] [] [] []    I=Independent, Mod I=Modified Independent, S=Supervision, SBA=Standby Assistance, CGA=Contact Guard Assistance,   Min=Minimal Assistance, Mod=Moderate Assistance, Max=Maximal Assistance, Total=Total Assistance, NT=Not Tested    GAIT: I Mod I S SBA CGA Min Mod Max Total  NT x2 Comments:   Level of Assistance [] [] [] [] [] [x] [] [] [] [] []    Distance 10  feet    DME Gait Belt and Rolling Walker    Gait Quality Decreased miki , Decreased step clearance, Decreased step length, Trunk sway increased, and Wide base of support    Weightbearing Status Restrictions/Precautions  Restrictions/Precautions: Fall Risk    Stairs      I=Independent, Mod I=Modified Independent, S=Supervision, SBA=Standby Assistance, CGA=Contact Guard Assistance,   Min=Minimal Assistance, Mod=Moderate Assistance, Max=Maximal Assistance, Total=Total Assistance, NT=Not Tested    PLAN:   FREQUENCY AND DURATION: 3 times/week for duration of hospital stay or until stated goals are met, whichever comes first.    THERAPY PROGNOSIS: Good    PROBLEM LIST:   (Skilled intervention is medically necessary to address:)  Decreased ADL/Functional Activities  Decreased Activity Tolerance  Decreased Balance  Decreased Gait Ability  Decreased Strength  Decreased Transfer Abilities INTERVENTIONS PLANNED:   (Benefits and precautions of physical therapy have been discussed with the patient.)  Therapeutic Activity  Therapeutic Exercise/HEP  Neuromuscular Re-education  Gait Training  Education       TREATMENT:   EVALUATION: MODERATE COMPLEXITY: (Untimed Charge)    TREATMENT:   Therapeutic Activity (38 Minutes): Therapeutic activity included Supine to Sit, Scooting, Transfer Training, Ambulation on level ground, Sitting balance , Standing balance, and walker safety to improve functional Activity tolerance, Balance, Coordination, Mobility, and Strength.     TREATMENT GRID:  N/A    AFTER TREATMENT PRECAUTIONS: Bed/Chair Locked, Call light within reach, Chair, Needs within reach, and RN notified    INTERDISCIPLINARY COLLABORATION:  RN/ PCT and PT/ PTA    EDUCATION: Education Given To: Patient  Education Provided: Plan of Care;Transfer Training  Education Method: Verbal  Barriers to Learning: None  Education Outcome: Continued education needed    TIME IN/OUT:  Time In: 0927  Time Out: [address]  Minutes: [facility],

## 2022-10-08 NOTE — PROGRESS NOTES
T-max 100.3 overnight. Patient is mildly tachycardic. He does not seem to be in severe distress but he says that his pain is an 8 out of 10. I did not breakdown his dressing but it would appear that there is been some slight improvement in his scrotal edema and edema of the foreskin. Continue current antibiotics and anticipate wound VAC on Monday.

## 2022-10-09 LAB
ANION GAP SERPL CALC-SCNC: 5 MMOL/L (ref 4–13)
BUN SERPL-MCNC: 21 MG/DL (ref 6–23)
CALCIUM SERPL-MCNC: 8.2 MG/DL (ref 8.3–10.4)
CHLORIDE SERPL-SCNC: 107 MMOL/L (ref 101–110)
CO2 SERPL-SCNC: 24 MMOL/L (ref 21–32)
CREAT SERPL-MCNC: 1.7 MG/DL (ref 0.8–1.5)
ERYTHROCYTE [DISTWIDTH] IN BLOOD BY AUTOMATED COUNT: 14.2 % (ref 11.9–14.6)
GLUCOSE BLD STRIP.AUTO-MCNC: 193 MG/DL (ref 65–100)
GLUCOSE BLD STRIP.AUTO-MCNC: 210 MG/DL (ref 65–100)
GLUCOSE BLD STRIP.AUTO-MCNC: 219 MG/DL (ref 65–100)
GLUCOSE BLD STRIP.AUTO-MCNC: 247 MG/DL (ref 65–100)
GLUCOSE SERPL-MCNC: 203 MG/DL (ref 65–100)
HCT VFR BLD AUTO: 29 % (ref 41.1–50.3)
HGB BLD-MCNC: 9.2 G/DL (ref 13.6–17.2)
MCH RBC QN AUTO: 28.8 PG (ref 26.1–32.9)
MCHC RBC AUTO-ENTMCNC: 31.7 G/DL (ref 31.4–35)
MCV RBC AUTO: 90.6 FL (ref 79.6–97.8)
NRBC # BLD: 0 K/UL (ref 0–0.2)
PHOSPHATE SERPL-MCNC: 2.9 MG/DL (ref 2.5–4.5)
PLATELET # BLD AUTO: 351 K/UL (ref 150–450)
PMV BLD AUTO: 10.7 FL (ref 9.4–12.3)
POTASSIUM SERPL-SCNC: 4.3 MMOL/L (ref 3.5–5.1)
RBC # BLD AUTO: 3.2 M/UL (ref 4.23–5.6)
SERVICE CMNT-IMP: ABNORMAL
SODIUM SERPL-SCNC: 136 MMOL/L (ref 136–145)
WBC # BLD AUTO: 14.4 K/UL (ref 4.3–11.1)

## 2022-10-09 PROCEDURE — 97530 THERAPEUTIC ACTIVITIES: CPT

## 2022-10-09 PROCEDURE — 97165 OT EVAL LOW COMPLEX 30 MIN: CPT

## 2022-10-09 PROCEDURE — 6370000000 HC RX 637 (ALT 250 FOR IP): Performed by: INTERNAL MEDICINE

## 2022-10-09 PROCEDURE — 6370000000 HC RX 637 (ALT 250 FOR IP): Performed by: STUDENT IN AN ORGANIZED HEALTH CARE EDUCATION/TRAINING PROGRAM

## 2022-10-09 PROCEDURE — 85027 COMPLETE CBC AUTOMATED: CPT

## 2022-10-09 PROCEDURE — 82962 GLUCOSE BLOOD TEST: CPT

## 2022-10-09 PROCEDURE — 2580000003 HC RX 258: Performed by: INTERNAL MEDICINE

## 2022-10-09 PROCEDURE — 6360000002 HC RX W HCPCS: Performed by: INTERNAL MEDICINE

## 2022-10-09 PROCEDURE — 99024 POSTOP FOLLOW-UP VISIT: CPT | Performed by: STUDENT IN AN ORGANIZED HEALTH CARE EDUCATION/TRAINING PROGRAM

## 2022-10-09 PROCEDURE — 36415 COLL VENOUS BLD VENIPUNCTURE: CPT

## 2022-10-09 PROCEDURE — 6360000002 HC RX W HCPCS: Performed by: FAMILY MEDICINE

## 2022-10-09 PROCEDURE — 6370000000 HC RX 637 (ALT 250 FOR IP): Performed by: FAMILY MEDICINE

## 2022-10-09 PROCEDURE — 80048 BASIC METABOLIC PNL TOTAL CA: CPT

## 2022-10-09 PROCEDURE — 2580000003 HC RX 258: Performed by: FAMILY MEDICINE

## 2022-10-09 PROCEDURE — 1100000000 HC RM PRIVATE

## 2022-10-09 PROCEDURE — 84100 ASSAY OF PHOSPHORUS: CPT

## 2022-10-09 RX ORDER — OXYCODONE HYDROCHLORIDE 5 MG/1
10 TABLET ORAL EVERY 4 HOURS PRN
Status: DISCONTINUED | OUTPATIENT
Start: 2022-10-09 | End: 2022-10-24

## 2022-10-09 RX ORDER — SODIUM CHLORIDE 9 MG/ML
INJECTION, SOLUTION INTRAVENOUS ONCE
Status: COMPLETED | OUTPATIENT
Start: 2022-10-09 | End: 2022-10-09

## 2022-10-09 RX ORDER — SODIUM HYPOCHLORITE 5 MG/ML
SOLUTION TOPICAL DAILY
Status: DISCONTINUED | OUTPATIENT
Start: 2022-10-09 | End: 2022-10-09

## 2022-10-09 RX ORDER — SODIUM HYPOCHLORITE 5 MG/ML
SOLUTION TOPICAL 2 TIMES DAILY
Status: DISCONTINUED | OUTPATIENT
Start: 2022-10-09 | End: 2022-10-09

## 2022-10-09 RX ORDER — SODIUM HYPOCHLORITE 1.25 MG/ML
SOLUTION TOPICAL 2 TIMES DAILY
Status: DISCONTINUED | OUTPATIENT
Start: 2022-10-09 | End: 2022-10-25 | Stop reason: HOSPADM

## 2022-10-09 RX ADMIN — HYDROMORPHONE HYDROCHLORIDE 0.5 MG: 1 INJECTION, SOLUTION INTRAMUSCULAR; INTRAVENOUS; SUBCUTANEOUS at 10:50

## 2022-10-09 RX ADMIN — VANCOMYCIN HYDROCHLORIDE 1250 MG: 10 INJECTION, POWDER, LYOPHILIZED, FOR SOLUTION INTRAVENOUS at 04:06

## 2022-10-09 RX ADMIN — VANCOMYCIN HYDROCHLORIDE 1250 MG: 10 INJECTION, POWDER, LYOPHILIZED, FOR SOLUTION INTRAVENOUS at 21:43

## 2022-10-09 RX ADMIN — INSULIN LISPRO 2 UNITS: 100 INJECTION, SOLUTION INTRAVENOUS; SUBCUTANEOUS at 17:37

## 2022-10-09 RX ADMIN — HYDROMORPHONE HYDROCHLORIDE 0.5 MG: 1 INJECTION, SOLUTION INTRAMUSCULAR; INTRAVENOUS; SUBCUTANEOUS at 23:25

## 2022-10-09 RX ADMIN — DOCUSATE SODIUM 100 MG: 100 CAPSULE, LIQUID FILLED ORAL at 08:21

## 2022-10-09 RX ADMIN — METHOCARBAMOL TABLETS 750 MG: 750 TABLET, COATED ORAL at 17:36

## 2022-10-09 RX ADMIN — GABAPENTIN 100 MG: 100 CAPSULE ORAL at 20:56

## 2022-10-09 RX ADMIN — ENOXAPARIN SODIUM 40 MG: 100 INJECTION SUBCUTANEOUS at 20:56

## 2022-10-09 RX ADMIN — PIPERACILLIN AND TAZOBACTAM 3375 MG: 3; .375 INJECTION, POWDER, FOR SOLUTION INTRAVENOUS at 05:45

## 2022-10-09 RX ADMIN — ENOXAPARIN SODIUM 40 MG: 100 INJECTION SUBCUTANEOUS at 08:21

## 2022-10-09 RX ADMIN — SODIUM CHLORIDE: 9 INJECTION, SOLUTION INTRAVENOUS at 10:50

## 2022-10-09 RX ADMIN — GABAPENTIN 100 MG: 100 CAPSULE ORAL at 14:58

## 2022-10-09 RX ADMIN — PIPERACILLIN AND TAZOBACTAM 3375 MG: 3; .375 INJECTION, POWDER, FOR SOLUTION INTRAVENOUS at 14:58

## 2022-10-09 RX ADMIN — SODIUM CHLORIDE, PRESERVATIVE FREE 10 ML: 5 INJECTION INTRAVENOUS at 08:22

## 2022-10-09 RX ADMIN — Medication: at 20:57

## 2022-10-09 RX ADMIN — METHOCARBAMOL TABLETS 750 MG: 750 TABLET, COATED ORAL at 08:21

## 2022-10-09 RX ADMIN — INSULIN LISPRO 4 UNITS: 100 INJECTION, SOLUTION INTRAVENOUS; SUBCUTANEOUS at 08:21

## 2022-10-09 RX ADMIN — PIPERACILLIN AND TAZOBACTAM 3375 MG: 3; .375 INJECTION, POWDER, FOR SOLUTION INTRAVENOUS at 21:45

## 2022-10-09 RX ADMIN — INSULIN LISPRO 4 UNITS: 100 INJECTION, SOLUTION INTRAVENOUS; SUBCUTANEOUS at 17:37

## 2022-10-09 RX ADMIN — INSULIN GLARGINE 30 UNITS: 100 INJECTION, SOLUTION SUBCUTANEOUS at 08:21

## 2022-10-09 RX ADMIN — INSULIN LISPRO 4 UNITS: 100 INJECTION, SOLUTION INTRAVENOUS; SUBCUTANEOUS at 12:27

## 2022-10-09 RX ADMIN — Medication: at 12:27

## 2022-10-09 RX ADMIN — GABAPENTIN 100 MG: 100 CAPSULE ORAL at 08:21

## 2022-10-09 RX ADMIN — OXYCODONE 10 MG: 5 TABLET ORAL at 14:58

## 2022-10-09 RX ADMIN — METHOCARBAMOL TABLETS 750 MG: 750 TABLET, COATED ORAL at 20:56

## 2022-10-09 RX ADMIN — METHOCARBAMOL TABLETS 750 MG: 750 TABLET, COATED ORAL at 12:26

## 2022-10-09 RX ADMIN — INSULIN LISPRO 2 UNITS: 100 INJECTION, SOLUTION INTRAVENOUS; SUBCUTANEOUS at 12:27

## 2022-10-09 RX ADMIN — ONDANSETRON 4 MG: 2 INJECTION INTRAMUSCULAR; INTRAVENOUS at 20:56

## 2022-10-09 RX ADMIN — HYDROMORPHONE HYDROCHLORIDE 0.5 MG: 1 INJECTION, SOLUTION INTRAMUSCULAR; INTRAVENOUS; SUBCUTANEOUS at 04:05

## 2022-10-09 ASSESSMENT — PAIN DESCRIPTION - DESCRIPTORS
DESCRIPTORS: ACHING
DESCRIPTORS: SHARP;SHOOTING;DISCOMFORT;ACHING
DESCRIPTORS: ACHING

## 2022-10-09 ASSESSMENT — PAIN DESCRIPTION - PAIN TYPE
TYPE: ACUTE PAIN
TYPE: ACUTE PAIN
TYPE: ACUTE PAIN;SURGICAL PAIN
TYPE: ACUTE PAIN;SURGICAL PAIN

## 2022-10-09 ASSESSMENT — PAIN SCALES - GENERAL
PAINLEVEL_OUTOF10: 0
PAINLEVEL_OUTOF10: 2
PAINLEVEL_OUTOF10: 3
PAINLEVEL_OUTOF10: 9
PAINLEVEL_OUTOF10: 8
PAINLEVEL_OUTOF10: 10
PAINLEVEL_OUTOF10: 9
PAINLEVEL_OUTOF10: 0
PAINLEVEL_OUTOF10: 0

## 2022-10-09 ASSESSMENT — PAIN DESCRIPTION - ONSET
ONSET: ON-GOING
ONSET: ON-GOING

## 2022-10-09 ASSESSMENT — PAIN DESCRIPTION - FREQUENCY
FREQUENCY: CONTINUOUS
FREQUENCY: CONTINUOUS

## 2022-10-09 ASSESSMENT — PAIN DESCRIPTION - ORIENTATION
ORIENTATION: LOWER

## 2022-10-09 ASSESSMENT — PAIN - FUNCTIONAL ASSESSMENT
PAIN_FUNCTIONAL_ASSESSMENT: PREVENTS OR INTERFERES SOME ACTIVE ACTIVITIES AND ADLS

## 2022-10-09 ASSESSMENT — PAIN DESCRIPTION - LOCATION
LOCATION: PENIS;SCROTUM
LOCATION: BACK;PENIS

## 2022-10-09 NOTE — PLAN OF CARE
Problem: Discharge Planning  Goal: Discharge to home or other facility with appropriate resources  Outcome: Progressing     Problem: Safety - Adult  Goal: Free from fall injury  Outcome: Progressing  Flowsheets (Taken 10/8/2022 1330 by Darline Palma RN)  Free From Fall Injury: Instruct family/caregiver on patient safety     Problem: Pain  Goal: Verbalizes/displays adequate comfort level or baseline comfort level  Outcome: Progressing     Problem: Chronic Conditions and Co-morbidities  Goal: Patient's chronic conditions and co-morbidity symptoms are monitored and maintained or improved  Outcome: Progressing

## 2022-10-09 NOTE — PROGRESS NOTES
GENERAL SURGERY PROGRESS NOTE    Name:  Domenico Khan Date/Time of Admission: 10/5/2022  7:22 PM  CSN: [id number]  Attending Provider: Parth Lara MD  Room/Bed:   : 1965 60 y.o.      10/9/2022   POD#: 2 I&D back abscess and left groin/scrotal and penial abscess    SUBJECTIVE:    Patient seen and examined. Pt doing well, pain moderately well controlled. Tolerating diet    Current Meds:  Scheduled Meds:   gabapentin  100 mg Oral TID    methocarbamol  750 mg Oral 4x Daily    docusate sodium  100 mg Oral Daily    insulin glargine  30 Units SubCUTAneous Daily    insulin lispro  4 Units SubCUTAneous TID WC    vancomycin  1,250 mg IntraVENous Q18H    piperacillin-tazobactam  3,375 mg IntraVENous Q8H    insulin lispro  0-8 Units SubCUTAneous TID WC    insulin lispro  0-4 Units SubCUTAneous Nightly    sodium chloride flush  5-40 mL IntraVENous 2 times per day    enoxaparin  40 mg SubCUTAneous BID     Continuous Infusions:   dextrose      sodium chloride       PRN Meds:. HYDROcodone-acetaminophen, HYDROmorphone, glucose, dextrose bolus **OR** dextrose bolus, glucagon (rDNA), dextrose, sodium chloride flush, sodium chloride, ondansetron **OR** ondansetron, polyethylene glycol, acetaminophen **OR** acetaminophen     OBJECTIVE:    Vital Signs:  Vitals:    10/09/22 0732   BP: (!) 145/83   Pulse: 97   Resp: 18   Temp: 99.8 °F (37.7 °C)   SpO2: 94%        I/O:    I/O last 3 completed shifts:   In: 790.6 [I.V.:790.6]  Out: 2700 [Urine:2700]   I/O this shift:  In: -   Out: 400 [Urine:400]      PHYSICAL EXAMINATION  General  Alert, cooperative, NAD  Lungs Unlabored breathing  Heart RRR  Abdominal Soft, nontender, nondistended, no involuntary guarding or rebound appreciated   Extremities No edema or gross deformities noted appreciated   Incisions C/d, induration improving    Labs:   Lab Results   Component Value Date    WBC 14.4 (H) 10/09/2022    HGB 9.2 (L) 10/09/2022    HCT 29.0 (L) 10/09/2022    MCV 90.6 10/09/2022     10/09/2022        Lab Results   Component Value Date/Time     10/09/2022 06:27 AM    K 4.3 10/09/2022 06:27 AM     10/09/2022 06:27 AM    CO2 24 10/09/2022 06:27 AM    BUN 21 10/09/2022 06:27 AM    CREATININE 1.70 10/09/2022 06:27 AM    GLUCOSE 203 10/09/2022 06:27 AM    CALCIUM 8.2 10/09/2022 06:27 AM         Imaging Studies: none    ASSESSMENT:    Principal Problem:    Sepsis ([address]  Active Problems:    Primary hypertension    DM2 (diabetes mellitus, type 2) (HCC)    Cellulitis and abscess of trunk    Abscess of groin, left    Penile abscess    Acute urinary retention  Resolved Problems:    * No resolved hospital problems.  *    -AFVSS      PLAN:    Continue IV abx  Continue daily dakins WTD packing changes per nursing, will plan for wound vac on Monday  Pain control  Bowel regimen  Maintain jaimes    Electronically signed by:  Surinder Montiel DO  10/9/2022

## 2022-10-09 NOTE — PROGRESS NOTES
Hospitalist Progress Note   Admit Date:  10/5/2022  7:22 PM   Name:  Deb Rosenbaum   Age:  60 y.o. Sex:  male  :  1965   MRN:  [id number]   Room:      Reason(s) for Admission: Cellulitis and abscess of trunk [L03.319, L02.219]  Abscess [L02.91]  Hyperglycemia [R73.9]  Abscess, perineum [facility] Course & Interval History:   Mr. Brittney Reich is a nice 61 y/o male with a h/o DM2 and HTN who was admitted to our service on 10/5 with an abscess on his upper back for about 2 months. CT was done showing a paraspinal abscess w/o muscular involvement along with L inguinal inflammatory changes with possible early abscess of scrotum. Urology and General Surgery were consulted. He was started on antibiotics. Hyperglycemia, A1C >14, started on basal + bolus insulin. ID consulted, now on Zosyn. HIV neg, other ID orders pending. On 10/7 he underwent I&D of back abscess and L groin abscess. He later underwent debridement of penile abscess with Urology. Wound cultures from 10/5 I&D with staph aureus. Planning wound vac 10/10. Subjective/24hr Events (10/09/22): Still c/o pain in groin, back abscess does not bother him much. Slept well, eating ok, Cr up a little today. Afebrile past 24 hours. Says the IV pain meds make him sleepy. No chest pain or SOB. Assessment & Plan:   # Sepsis 2/2 abscess of upper back, L groin and penis              - Leukocytosis + tachycardia + abscesses on CT. Resolved. - S/p I&D of back and groin abscess, penile debridement on 10/7. 10/5 wound culture with MRSA. Wound dressing changes, wound vac 10/10. Surgery, ID and Urology appreciated. Pain control. Glucose control for healing. # Acute urinary retention              - Likely 2/2 penile abscess and edema. Con't Robles. # Uncontrolled DM2              - Con't basal + prandial + SSI.   - Metformin and glipizide at home, will address when close to discharge but likely stop the [facility].       # HTN - Controlled off meds. Discharge Planning: Home when able. Diet:  ADULT DIET; Regular; 3 carb choices (45 gm/meal)  DVT PPx: Lovenox  Code status: Full Code    Hospital Problems             Last Modified POA    * (Principal) Sepsis ([facility] [address] 10/8/2022 Yes    Primary hypertension 10/6/2022 Yes    DM[facility]di[facility] mellitus[facility] type 2) ([facility] 10/6/2022 Yes    Cellulitis and abscess of trunk 10/7/2022 Yes    Abscess of groin, left 10/8/2022 Yes    Penile abscess 10/8/2022 Yes    Acute urinary retention 10/8/2022 Yes        Objective:   Patient Vitals for the past 24 hrs:   Temp Pulse Resp BP SpO2   10/09/22 0732 99.8 °F (37.7 °C) 97 18 (!) 145/83 94 %   10/09/22 0435 -- -- 19 -- --   10/09/22 0330 99.9 °F (37.7 °C) 92 19 118/74 94 %   10/08/22 2344 99.1 °F (37.3 °C) 95 19 125/76 94 %   10/08/22 2027 -- -- 19 -- --   10/08/22 1908 98.8 °F (37.1 °C) (!) 102 19 111/86 95 %   10/08/22 1543 98.9 °F (37.2 °C) (!) 103 18 130/86 94 %   10/08/22 1207 -- -- 17 -- --   10/08/22 1135 98.5 °F (36.9 °C) (!) 110 18 127/76 95 %       Estimated body mass index is 46.86 kg/m² as calculated from the following:    Height as of this encounter: 6' 2\" (1.88 m). Weight as of this encounter: 365 lb (165.6 kg). Intake/Output Summary (Last 24 hours) at 10/9/2022 0944  Last data filed at 10/9/2022 0732  Gross per 24 hour   Intake 790.6 ml   Output 1725 ml   Net -934.4 ml         Physical Exam:   Blood pressure (!) 145/83, pulse 97, temperature 99.8 °F (37.7 °C), temperature source Oral, resp. rate 18, height 6' 2\" (1.88 m), weight (!) 365 lb (165.6 kg), SpO2 94 %. General:    Well nourished. No overt distress. Obese. Appears older than stated age. Head:  Normocephalic, atraumatic  Eyes:  Sclerae appear normal. Pupils equally round. ENT:  Nares appear normal, no drainage. Moist oral mucosa  Neck:  No restricted ROM. Trachea midline. CV:   RRR. No m/r/g. No jugular venous distension. Lungs:   CTAB. No wheezing, rhonchi, or rales. Respirations even, unlabored. Abdomen: Bowel sounds present. Soft, nontender, nondistended. :  Robles with dark yellow urine. Gauze bandage to dorsum of penis, not removed. Bandage to L groin present. Extremities: No cyanosis or clubbing. No edema. Skin:     No rashes and normal coloration. Warm and dry. Gauze bandage to mid back. Neuro:  CN II-XII grossly intact. Sensation intact. A&Ox3  Psych:  Normal mood and affect.       I have reviewed ordered lab tests and independently visualized imaging below:    Recent Labs:  Recent Results (from the past 48 hour(s))   POCT Glucose    Collection Time: 10/07/22 11:07 AM   Result Value Ref Range    POC Glucose 212 (H) 65 - 100 mg/dL    Performed by: Sharri    POCT Glucose    Collection Time: 10/07/22  3:09 PM   Result Value Ref Range    POC Glucose 193 (H) 65 - 100 mg/dL    Performed by: Pawel    Culture, Tissue    Collection Time: 10/07/22  4:10 PM    Specimen: Tissue   Result Value Ref Range    Special Requests BACK  ABSCESS        Gram stain 0 TO 5 WBC'S SEEN PER OIF     Gram stain MODERATE GRAM POSITIVE COCCI      Culture (A)       MODERATE STAPHYLOCOCCUS AUREUS SENSITIVITY TO FOLLOW   Culture, Wound    Collection Time: 10/07/22  4:10 PM    Specimen: Back    ABSCESS   Result Value Ref Range    Special Requests NO SPECIAL REQUESTS      Gram stain 0 TO 5 WBC'S SEEN PER OIF     Gram stain FEW GRAM POSITIVE COCCI      Culture (A)       MODERATE STAPHYLOCOCCUS AUREUS SENSITIVITY TO FOLLOW   POCT Glucose    Collection Time: 10/07/22  5:12 PM   Result Value Ref Range    POC Glucose 192 (H) 65 - 100 mg/dL    Performed by: Lobo Marrero    POCT Glucose    Collection Time: 10/07/22  8:58 PM   Result Value Ref Range    POC Glucose 200 (H) 65 - 100 mg/dL    Performed by: Cyan    Basic Metabolic Panel w/ Reflex to MG    Collection Time: 10/08/22  6:38 AM   Result Value Ref Range    Sodium 133 (L) 136 - 145 mmol/L    Potassium 5.2 (H) 3.5 - 5.1 mmol/L    Chloride 105 101 - 110 mmol/L    CO2 24 21 - 32 mmol/L    Anion Gap 4 4 - 13 mmol/L    Glucose 245 (H) 65 - 100 mg/dL    BUN 19 6 - 23 MG/DL    Creatinine 1.30 0.8 - 1.5 MG/DL    Est, Glom Filt Rate >60 >60 ml/min/1.73m2    Calcium 8.0 (L) 8.3 - 10.4 MG/DL   CBC with Auto Differential    Collection Time: 10/08/22  6:38 AM   Result Value Ref Range    WBC 17.3 (H) 4.3 - 11.1 K/uL    RBC 3.34 (L) 4.23 - 5.6 M/uL    Hemoglobin 9.6 (L) 13.6 - 17.2 g/dL    Hematocrit 30.7 (L) 41.1 - 50.3 %    MCV 91.9 79.6 - 97.8 FL    MCH 28.7 26.1 - 32.9 PG    MCHC 31.3 (L) 31.4 - 35.0 g/dL    RDW 14.3 11.9 - 14.6 %    Platelets 426 172 - 608 K/uL    MPV 11.6 9.4 - 12.3 FL    nRBC 0.00 0.0 - 0.2 K/uL    Differential Type AUTOMATED      Seg Neutrophils 80 (H) 43 - 78 %    Lymphocytes 13 13 - 44 %    Monocytes 5 4.0 - 12.0 %    Eosinophils % 1 0.5 - 7.8 %    Basophils 0 0.0 - 2.0 %    Immature Granulocytes 1 0.0 - 5.0 %    Segs Absolute 13.8 (H) 1.7 - 8.2 K/UL    Absolute Lymph # 2.3 0.5 - 4.6 K/UL    Absolute Mono # 0.9 0.1 - 1.3 K/UL    Absolute Eos # 0.1 0.0 - 0.8 K/UL    Basophils Absolute 0.1 0.0 - 0.2 K/UL    Absolute Immature Granulocyte 0.1 0.0 - 0.5 K/UL   Phosphorus    Collection Time: 10/08/22  6:38 AM   Result Value Ref Range    Phosphorus 3.1 2.5 - 4.5 MG/DL   Iron    Collection Time: 10/08/22  6:38 AM   Result Value Ref Range    Iron 26 (L) 35 - 150 ug/dL   Ferritin    Collection Time: 10/08/22  6:38 AM   Result Value Ref Range    Ferritin 1,428 (H) 8 - 388 NG/ML   Transferrin    Collection Time: 10/08/22  6:38 AM   Result Value Ref Range    Transferrin 85 (L) 202 - 364 mg/dL   Reticulocytes    Collection Time: 10/08/22  6:38 AM   Result Value Ref Range    Reticulocyte Count,Automated 1.8 0.3 - 2.0 %    Absolute Retic # 0.0605 0.026 - 0.095 M/ul    Immature Retic Fraction 26.2 (H) 2.3 - 13.4 %    Retic Hemoglobin conc. 33 29 - 35 pg   Vitamin B12    Collection Time: 10/08/22  6:38 AM   Result Value Ref Range Vitamin B-12 1752 (H) 193 - 986 pg/mL   Folate    Collection Time: 10/08/22  6:38 AM   Result Value Ref Range    Folate 31.6 (H) 3.1 - 17.5 ng/mL   POCT Glucose    Collection Time: 10/08/22  8:03 AM   Result Value Ref Range    POC Glucose 244 (H) 65 - 100 mg/dL    Performed by: Rand Van Horne    POCT Glucose    Collection Time: 10/08/22 12:03 PM   Result Value Ref Range    POC Glucose 292 (H) 65 - 100 mg/dL    Performed by: Rand Van Horne    POCT Glucose    Collection Time: 10/08/22  4:40 PM   Result Value Ref Range    POC Glucose 305 (H) 65 - 100 mg/dL    Performed by: Rand Van Horne    POCT Glucose    Collection Time: 10/08/22  9:29 PM   Result Value Ref Range    POC Glucose 234 (H) 65 - 100 mg/dL    Performed by: Reddy Vaughn    Phosphorus    Collection Time: 10/09/22  6:27 AM   Result Value Ref Range    Phosphorus 2.9 2.5 - 4.5 MG/DL   Basic Metabolic Panel w/ Reflex to MG    Collection Time: 10/09/22  6:27 AM   Result Value Ref Range    Sodium 136 136 - 145 mmol/L    Potassium 4.3 3.5 - 5.1 mmol/L    Chloride 107 101 - 110 mmol/L    CO2 24 21 - 32 mmol/L    Anion Gap 5 4 - 13 mmol/L    Glucose 203 (H) 65 - 100 mg/dL    BUN 21 6 - 23 MG/DL    Creatinine 1.70 (H) 0.8 - 1.5 MG/DL    Est, Glom Filt Rate 46 (L) >60 ml/min/1.73m2    Calcium 8.2 (L) 8.3 - 10.4 MG/DL   CBC    Collection Time: 10/09/22  6:27 AM   Result Value Ref Range    WBC 14.4 (H) 4.3 - 11.1 K/uL    RBC 3.20 (L) 4.23 - 5.6 M/uL    Hemoglobin 9.2 (L) 13.6 - 17.2 g/dL    Hematocrit 29.0 (L) 41.1 - 50.3 %    MCV 90.6 79.6 - 97.8 FL    MCH 28.8 26.1 - 32.9 PG    MCHC 31.7 31.4 - 35.0 g/dL    RDW 14.2 11.9 - 14.6 %    Platelets 901 477 - 329 K/uL    MPV 10.7 9.4 - 12.3 FL    nRBC 0.00 0.0 - 0.2 K/uL   POCT Glucose    Collection Time: 10/09/22  7:32 AM   Result Value Ref Range    POC Glucose 193 (H) 65 - 100 mg/dL    Performed by: Mary Houser          Other Studies:  No results found.     Current Meds:  Current Facility-Administered Medications   Medication Dose Route Frequency    oxyCODONE (ROXICODONE) immediate release tablet 10 mg  10 mg Oral Q4H PRN    0.9 % sodium chloride infusion   IntraVENous Once    gabapentin (NEURONTIN) capsule 100 mg  100 mg Oral TID    methocarbamol (ROBAXIN) tablet 750 mg  750 mg Oral 4x Daily    docusate sodium (COLACE) capsule 100 mg  100 mg Oral Daily    insulin glargine (LANTUS) injection vial 30 Units  30 Units SubCUTAneous Daily    HYDROmorphone HCl PF (DILAUDID) injection 0.5 mg  0.5 mg IntraVENous Q3H PRN    insulin lispro (HUMALOG) injection vial 4 Units  4 Units SubCUTAneous TID WC    vancomycin (VANCOCIN) 1250 mg in sodium chloride 0.9% 250 mL IVPB  1,250 mg IntraVENous Q18H    piperacillin-tazobactam (ZOSYN) 3,375 mg in sodium chloride 0.9 % 50 mL IVPB (mini-bag)  3,375 mg IntraVENous Q8H    glucose chewable tablet 16 g  4 tablet Oral PRN    dextrose bolus 10% 125 mL  125 mL IntraVENous PRN    Or    dextrose bolus 10% 250 mL  250 mL IntraVENous PRN    glucagon (rDNA) injection 1 mg  1 mg SubCUTAneous PRN    dextrose 10 % infusion   IntraVENous Continuous PRN    insulin lispro (HUMALOG) injection vial 0-8 Units  0-8 Units SubCUTAneous TID WC    insulin lispro (HUMALOG) injection vial 0-4 Units  0-4 Units SubCUTAneous Nightly    sodium chloride flush 0.9 % injection 5-40 mL  5-40 mL IntraVENous 2 times per day    sodium chloride flush 0.9 % injection 5-40 mL  5-40 mL IntraVENous PRN    0.9 % sodium chloride infusion   IntraVENous PRN    enoxaparin (LOVENOX) injection 40 mg  40 mg SubCUTAneous BID    ondansetron (ZOFRAN-ODT) disintegrating tablet 4 mg  4 mg Oral Q8H PRN    Or    ondansetron (ZOFRAN) injection 4 mg  4 mg IntraVENous Q6H PRN    polyethylene glycol (GLYCOLAX) packet 17 g  17 g Oral Daily PRN    acetaminophen (TYLENOL) tablet 650 mg  650 mg Oral Q6H PRN    Or    acetaminophen (TYLENOL) suppository 650 mg  650 mg Rectal Q6H PRN       Signed:  Nora Levin MD    Part of this note may have been written by using a voice dictation software. The note has been proof read but may still contain some grammatical/other typographical errors.

## 2022-10-09 NOTE — PROGRESS NOTES
ACUTE OCCUPATIONAL THERAPY GOALS:   (Developed with and agreed upon by patient and/or caregiver.)  1. Nicole Stanford will be modified independent with functional mobility for ADL in room within 4 - 7 visits. 2. Nicole Stanford will be modified independent with total body bathing and dressing within 4 - 7 visits. 3. Nicole Stanford will state and demonstrate at least 5 energy conservation techniques during ADL/therapeutic activities within 4 - 7 visits. 4. Nicole Stanford will voice a plan for appropriate home modifications for home safety and fall prevention within 7 visits. 5. Nicole Stanford will participate at least 30 minutes of ADL with 3 or less rest breaks within 7 visits. 6. Nicole Stanford will complete a toilet transfer with modified independence within 7 visits. OCCUPATIONAL THERAPY Initial Assessment       OT Visit Days: 1  Acknowledge Orders  Time  OT Charge Capture  Rehab Caseload Tracker      Nicole Stanford is a 60 y.o. male   PRIMARY DIAGNOSIS: Sepsis ([facility] [address]  Cellulitis and abscess of trunk [L03.319, L02.219]  Abscess [L02.91]  Hyperglycemia [R73.9]  Abscess, perineum [L02.215]  Procedure(s) (LRB):  BACK ABSCESS I & D (N/A)  INGUINAL AND PENILE DEBRIDEMENT (N/A)  2 Days Post-Op  Reason for Referral: Generalized Muscle Weakness (M62.81)  Unspecified Lack of Coordination (R27.9)  Inpatient: Payor: /     ASSESSMENT:     REHAB RECOMMENDATIONS:   Recommendation to date pending progress:  Setting:  Short-term Rehab    Equipment:    To Be Determined     ASSESSMENT:  Mr. Olesya Martin is s/p the above procedure. He is typically independent with ADL and working. Today, he was agreeable to OT completing bed mobility with moderate overall assistance. Complains of scrotal/penile pain. He stood with minimal assistance and stood for about 5 minutes. He was assisted back to supine with minimal assistance.   Nicole Stanford presents with the following problems and would benefit from occupational therapy to maximize independence with self-care,instrumental activities of daily living, and functional transfers/mobility for activities of daily living/instrumental activities of daily living via the stated goals. [facility] Daily [facility] Short Form:    AM-PAC Daily Activity Inpatient   How much help for putting on and taking off regular lower body clothing?: Total  How much help for Bathing?: A Lot  How much help for Toileting?: A Lot  How much help for putting on and taking off regular upper body clothing?: A Little  How much help for taking care of personal grooming?: A Little  How much help for eating meals?: A Little  AM-PAC Inpatient Daily Activity Raw Score: 14  AM-PAC Inpatient ADL T-Scale Score : 33.39  ADL Inpatient CMS 0-100% Score: 59.67  ADL Inpatient CMS G-Code Modifier : CK           SUBJECTIVE:     Mr. April Clemente asks, \"what's the plan? \"     Social/Functional Lives With: Significant other  Type of Home: House  Home Access: Stairs to enter with rails  Entrance Stairs - Number of Steps: 4  Bathroom Equipment: Shower chair  Home Equipment: Radario  ADL Assistance: [address] [place] [address] Avenue: Independent  Homemaking Responsibilities: Yes  Active : Yes  Mode of Transportation: Car  Occupation: Full time employment    OBJECTIVE:     GUILLERMO / [place] Scrape / AIRWAY: Robles Catheter    RESTRICTIONS/PRECAUTIONS:  Restrictions/Precautions: Fall Risk    PAIN: Samantha Hard / O2:   Pre Treatment:   Pain Assessment: 0-10  Pain Level:  (less at rest)      Post Treatment: same       Vitals          Oxygen            GROSS EVALUATION: INTACT IMPAIRED   (See Comments)   UE AROM [x] []   UE PROM [x] []   Strength []  Grossly 4/5 B UE     Posture / Balance [] Sitting - Static: Fair, +  Sitting - Dynamic: Fair  Standing - Static: Fair (with support of rolling walker)  Standing - Dynamic: Fair, -   Sensation []  Not specifically assessed, but suspect diminished bilateral hands based on observation.    Coordination []  Reports decreased in bilateral hands (dropping things, etc.)     Tone [x]       Edema []    Activity Tolerance [] Patient limited by pain       COGNITION/  PERCEPTION: INTACT IMPAIRED   (See Comments)   Orientation [x]     Vision []     Hearing []     Cognition  [x]     Perception [x]       MOBILITY: I Mod I S SBA CGA Min Mod Max Total  NT x2 Comments:   Bed Mobility    Rolling [] [] [] [] [] [x] [] [] [] [] []    Supine to Sit [] [] [] [] [] [x] [x] [] [] [] []    Scooting [] [] [] [] [] [] [x] [] [] [] []    Sit to Supine [] [] [] [] [] [x] [] [] [] [] []    Transfers    Sit to Stand [] [] [] [] [] [x] [] [] [] [] []    Bed to Chair [] [] [] [] [] [] [] [] [] [x] []    Stand to Sit [] [] [] [] [x] [] [] [] [] [] []    Tub/Shower [] [] [] [] [] [] [] [] [] [x] []     Toilet [] [] [] [] [] [] [] [] [] [x] []      [] [] [] [] [] [] [] [] [] [] []    I=Independent, Mod I=Modified Independent, S=Supervision/Setup, SBA=Standby Assistance, CGA=Contact Guard Assistance, Min=Minimal Assistance, Mod=Moderate Assistance, Max=Maximal Assistance, Total=Total Assistance, NT=Not Tested    ACTIVITIES OF DAILY LIVING: I Mod I S SBA CGA Min Mod Max Total NT Comments   BASIC ADLs:              Upper Body Bathing  [] [] [] [] [] [] [] [] [] [x]    Lower Body Bathing [] [] [] [] [] [] [] [] [] [x]    Toileting [] [] [] [] [] [] [] [] [] [x]    Upper Body Dressing [] [] [] [] [] [] [] [] [] [x]    Lower Body Dressing [] [] [] [] [] [] [] [] [] [x]    Feeding [] [] [] [] [] [] [] [] [] [x]    Grooming [] [] [] [] [] [] [] [] [] [x]    Personal Device Care [] [] [] [] [] [] [] [] [] [x]    Functional Mobility [] [] [] [] [] [] [x] [] [] []    I=Independent, Mod I=Modified Independent, S=Supervision/Setup, SBA=Standby Assistance, CGA=Contact Guard Assistance, Min=Minimal Assistance, Mod=Moderate Assistance, Max=Maximal Assistance, Total=Total Assistance, NT=Not Tested    PLAN: FREQUENCY/DURATION   OT Plan of Care: 3 times/week for duration of hospital stay or until stated goals are met, whichever comes first.    PROBLEM LIST:   (Skilled intervention is medically necessary to address:)  Decreased ADL/Functional Activities  Decreased Activity Tolerance  Decreased AROM/PROM  Decreased Balance  Decreased Coordination  Decreased Strength  Decreased Transfer Abilities  Increased Pain   INTERVENTIONS PLANNED:  (Benefits and precautions of occupational therapy have been discussed with the patient.)  Self Care Training  Therapeutic Activity  Therapeutic Exercise/HEP  Neuromuscular Re-education  Manual Therapy  Education         TREATMENT:     EVALUATION: LOW COMPLEXITY: (Untimed Charge)    TREATMENT:   Therapeutic Activity (10 Minutes): Therapeutic activity included Supine to Sit, Sit to Supine, Scooting, Transfer Training, Sitting balance , and Standing balance to improve functional Activity tolerance, Balance, Coordination, Mobility, Strength, and ROM.     TREATMENT GRID:  N/A    AFTER TREATMENT PRECAUTIONS: Bed, Bed/Chair Locked, Call light within reach, RN notified, and Side rails x2    INTERDISCIPLINARY COLLABORATION:  RN/ PCT and OT/ STEEN    EDUCATION:  Education Given To: Patient  Education Provided: Role of Therapy;Plan of Care  Education Method: Demonstration  Barriers to Learning: None  Education Outcome: Verbalized understanding;Demonstrated understanding    TOTAL TREATMENT DURATION AND TIME:  Time In: 0915  Time Out: 0945  Minutes: [id number] [address]

## 2022-10-09 NOTE — PLAN OF CARE
Problem: Discharge Planning  Goal: Discharge to home or other facility with appropriate resources  10/9/2022 0904 by Racheal Avery RN  Outcome: Progressing  10/9/2022 0104 by Alyssia Taylor RN  Outcome: Progressing     Problem: Safety - Adult  Goal: Free from fall injury  10/9/2022 0904 by Racheal Avery RN  Outcome: Progressing  Flowsheets (Taken 10/9/2022 0715)  Free From Fall Injury: Instruct family/caregiver on patient safety  10/9/2022 0104 by Alyssia Taylor RN  Outcome: Progressing  Flowsheets (Taken 10/8/2022 1330 by Racheal Avery RN)  Free From Fall Injury: Instruct family/caregiver on patient safety     Problem: Pain  Goal: Verbalizes/displays adequate comfort level or baseline comfort level  10/9/2022 0904 by Racheal Avery RN  Outcome: Progressing  10/9/2022 0104 by Alyssia Taylor RN  Outcome: Progressing     Problem: Chronic Conditions and Co-morbidities  Goal: Patient's chronic conditions and co-morbidity symptoms are monitored and maintained or improved  10/9/2022 0904 by Rachela Avery RN  Outcome: Progressing  10/9/2022 0104 by Alyssia Taylor RN  Outcome: Progressing

## 2022-10-09 NOTE — PROGRESS NOTES
VANCO DAILY FOLLOW UP NOTE  2015 [facility] Pharmacokinetic Monitoring Service - Vancomycin    Consulting Provider: Dr. Noris Bentley   Indication: SSTI  Target Concentration: Goal trough of 10-15 mg/L and AUC/MADINA <500 mg*hr/L  Day of Therapy: 2  Additional Antimicrobials: none    Pertinent Laboratory Values: Wt Readings from Last 1 Encounters:   10/07/22 (!) 365 lb (165.6 kg)     Temp Readings from Last 1 Encounters:   10/09/22 99.8 °F (37.7 °C) (Oral)     Recent Labs     10/07/22  0631 10/08/22  0638 10/09/22  0627   BUN 23 19 21   CREATININE 1.40 1.30 1.70*   WBC 19.3* 17.3* 14.4*     Estimated Creatinine Clearance: 77 mL/min (A) (based on SCr of 1.7 mg/dL (H)). No results found for: Lord Hoff    MRSA Nasal Swab: N/A. Non-respiratory infection.       Assessment:  Date/Time Dose Concentration AUC    1250 mg q18h     Note: Serum concentrations collected for AUC dosing may appear elevated if collected in close proximity to the dose administered, this is not necessarily an indication of toxicity    Plan:  Continue 1250 mg q18h  Repeat vancomycin concentrations will be ordered as clinically appropriate   Pharmacy will continue to monitor patient and adjust therapy as indicated    Thank you for the consult,  Sudarshan Matias, [address], PharmD, BCOP  Clinical Pharmacist  Contact [employer]

## 2022-10-09 NOTE — PROGRESS NOTES
END OF SHIFT NOTE:    INTAKE/OUTPUT  10/08 0701 - 10/09 0700  In: 790.6 [I.V.:790.6]  Out: 925 [Urine:925]  Voiding: Yes  Catheter: Yes  Drain:   Urinary Catheter 10/06/22 Robles (Active)   Catheter Indications Assist in healing of open sacral or perineal wounds (Stage III, IV, or unstageable documented by a provider or enterostomal therapy) and/or full thickness perineal and lower extremity burns in incontinent patients 10/08/22 1920   Site Assessment No urethral drainage 10/08/22 1920   Urine Color Jinny 10/08/22 1920   Urine Appearance Clear 10/08/22 1920   Urine Odor Other (Comment) 10/07/22 1135   Collection Container Standard 10/08/22 1920   Securement Method Securing device (Describe) 10/08/22 1920   Catheter Care Completed Yes 10/08/22 1920   Catheter Best Practices  Drainage tube clipped to bed;Catheter secured to thigh; Tamper seal intact; Bag below bladder;Bag not on floor; Lack of dependent loop in tubing;Drainage bag less than half full 10/08/22 1920   Status Draining;Patent 10/08/22 1920   Output (mL) 625 mL 10/08/22 2344               Flatus: Patient does have flatus present. Stool:  0 occurrences. Characteristics:           Stool Assessment  Last BM (including prior to admit): 10/05/22    Emesis: 0 occurrences. Characteristics:        VITAL SIGNS  Patient Vitals for the past 12 hrs:   Temp Pulse Resp BP SpO2   10/09/22 0330 99.9 °F (37.7 °C) 92 19 118/74 94 %   10/08/22 2344 99.1 °F (37.3 °C) 95 19 125/76 94 %   10/08/22 2027 -- -- 19 -- --   10/08/22 1908 98.8 °F (37.1 °C) (!) 102 19 111/86 95 %       Pain Assessment  Pain Level: 10 (10/09/22 0405)  Pain Location: Penis, Scrotum  Patient's Stated Pain Goal: 0 - No pain    Ambulating  No    Shift report given to oncoming nurse at the bedside.     Tayler Redi, RN

## 2022-10-09 NOTE — PROGRESS NOTES
END OF SHIFT NOTE:    INTAKE/OUTPUT  10/08 0701 - 10/09 0700  In: 790.6 [I.V.:790.6]  Out: 1625 [Urine:1625]  Voiding: No  Catheter: Yes  Drain:   Urinary Catheter 10/06/22 Robles (Active)   Catheter Indications Assist in healing of open sacral or perineal wounds (Stage III, IV, or unstageable documented by a provider or enterostomal therapy) and/or full thickness perineal and lower extremity burns in incontinent patients 10/09/22 1330   Site Assessment No urethral drainage 10/09/22 1330   Urine Color Jinny 10/09/22 1330   Urine Appearance Clear 10/09/22 1330   Urine Odor Other (Comment) 10/07/22 1135   Collection Container Standard 10/09/22 1330   Securement Method Securing device (Describe) 10/09/22 1330   Catheter Care Completed Yes 10/09/22 1330   Catheter Best Practices  Drainage tube clipped to bed;Catheter secured to thigh; Tamper seal intact; Bag below bladder;Bag not on floor; Lack of dependent loop in tubing;Drainage bag less than half full 10/09/22 1330   Status Draining;Patent 10/09/22 1330   Output (mL) 725 mL 10/09/22 1740               Flatus: Patient does have flatus present. Stool: 0 occurrences. Characteristics:           Stool Assessment  Last BM (including prior to admit): 10/05/22    Emesis: 0 occurrences. Characteristics:        VITAL SIGNS  Patient Vitals for the past 12 hrs:   Temp Pulse Resp BP SpO2   10/09/22 1528 -- -- 17 -- --   10/09/22 1501 99.2 °F (37.3 °C) 97 18 122/86 96 %   10/09/22 1206 99 °F (37.2 °C) 98 18 122/78 96 %   10/09/22 1120 -- -- 17 -- --   10/09/22 0732 99.8 °F (37.7 °C) 97 18 (!) 145/83 94 %       Pain Assessment  Pain Level: 2 (10/09/22 1528)  Pain Location: Back, Penis  Patient's Stated Pain Goal: 0 - No pain    Ambulating  Yes    Shift report given to oncoming nurse at the bedside.     Loyda Hall RN

## 2022-10-10 ENCOUNTER — APPOINTMENT (OUTPATIENT)
Dept: CT IMAGING | Age: 57
DRG: 854 | End: 2022-10-10

## 2022-10-10 LAB
ANION GAP SERPL CALC-SCNC: 5 MMOL/L (ref 4–13)
BACTERIA SPEC CULT: ABNORMAL
BACTERIA SPEC CULT: NORMAL
BACTERIA SPEC CULT: NORMAL
BUN SERPL-MCNC: 20 MG/DL (ref 6–23)
CALCIUM SERPL-MCNC: 8.1 MG/DL (ref 8.3–10.4)
CHLORIDE SERPL-SCNC: 108 MMOL/L (ref 101–110)
CO2 SERPL-SCNC: 26 MMOL/L (ref 21–32)
CREAT SERPL-MCNC: 1.7 MG/DL (ref 0.8–1.5)
ERYTHROCYTE [DISTWIDTH] IN BLOOD BY AUTOMATED COUNT: 14.1 % (ref 11.9–14.6)
GLUCOSE BLD STRIP.AUTO-MCNC: 120 MG/DL (ref 65–100)
GLUCOSE BLD STRIP.AUTO-MCNC: 156 MG/DL (ref 65–100)
GLUCOSE BLD STRIP.AUTO-MCNC: 186 MG/DL (ref 65–100)
GLUCOSE BLD STRIP.AUTO-MCNC: 219 MG/DL (ref 65–100)
GLUCOSE SERPL-MCNC: 189 MG/DL (ref 65–100)
GRAM STN SPEC: ABNORMAL
HAPTOGLOB SERPL-MCNC: 576 MG/DL (ref 30–200)
HCT VFR BLD AUTO: 29.4 % (ref 41.1–50.3)
HGB BLD-MCNC: 9.2 G/DL (ref 13.6–17.2)
MCH RBC QN AUTO: 28.7 PG (ref 26.1–32.9)
MCHC RBC AUTO-ENTMCNC: 31.3 G/DL (ref 31.4–35)
MCV RBC AUTO: 91.6 FL (ref 79.6–97.8)
NRBC # BLD: 0 K/UL (ref 0–0.2)
PLATELET # BLD AUTO: 402 K/UL (ref 150–450)
PMV BLD AUTO: 10.4 FL (ref 9.4–12.3)
POTASSIUM SERPL-SCNC: 4.3 MMOL/L (ref 3.5–5.1)
RBC # BLD AUTO: 3.21 M/UL (ref 4.23–5.6)
SERVICE CMNT-IMP: ABNORMAL
SERVICE CMNT-IMP: NORMAL
SERVICE CMNT-IMP: NORMAL
SODIUM SERPL-SCNC: 139 MMOL/L (ref 138–145)
VANCOMYCIN SERPL-MCNC: 21.6 UG/ML
WBC # BLD AUTO: 12.4 K/UL (ref 4.3–11.1)

## 2022-10-10 PROCEDURE — 82962 GLUCOSE BLOOD TEST: CPT

## 2022-10-10 PROCEDURE — 99024 POSTOP FOLLOW-UP VISIT: CPT | Performed by: STUDENT IN AN ORGANIZED HEALTH CARE EDUCATION/TRAINING PROGRAM

## 2022-10-10 PROCEDURE — 6370000000 HC RX 637 (ALT 250 FOR IP)

## 2022-10-10 PROCEDURE — 6360000002 HC RX W HCPCS: Performed by: INTERNAL MEDICINE

## 2022-10-10 PROCEDURE — 97607 NEG PRS WND THR NDME<=50SQCM: CPT

## 2022-10-10 PROCEDURE — 2500000003 HC RX 250 WO HCPCS: Performed by: INTERNAL MEDICINE

## 2022-10-10 PROCEDURE — 2580000003 HC RX 258: Performed by: FAMILY MEDICINE

## 2022-10-10 PROCEDURE — 2580000003 HC RX 258: Performed by: INTERNAL MEDICINE

## 2022-10-10 PROCEDURE — 36415 COLL VENOUS BLD VENIPUNCTURE: CPT

## 2022-10-10 PROCEDURE — 80202 ASSAY OF VANCOMYCIN: CPT

## 2022-10-10 PROCEDURE — 80048 BASIC METABOLIC PNL TOTAL CA: CPT

## 2022-10-10 PROCEDURE — 6360000002 HC RX W HCPCS: Performed by: FAMILY MEDICINE

## 2022-10-10 PROCEDURE — 85027 COMPLETE CBC AUTOMATED: CPT

## 2022-10-10 PROCEDURE — 6370000000 HC RX 637 (ALT 250 FOR IP): Performed by: STUDENT IN AN ORGANIZED HEALTH CARE EDUCATION/TRAINING PROGRAM

## 2022-10-10 PROCEDURE — 70450 CT HEAD/BRAIN W/O DYE: CPT

## 2022-10-10 PROCEDURE — 6370000000 HC RX 637 (ALT 250 FOR IP): Performed by: INTERNAL MEDICINE

## 2022-10-10 PROCEDURE — 1100000000 HC RM PRIVATE

## 2022-10-10 PROCEDURE — 97605 NEG PRS WND THER DME<=50SQCM: CPT

## 2022-10-10 PROCEDURE — 6370000000 HC RX 637 (ALT 250 FOR IP): Performed by: FAMILY MEDICINE

## 2022-10-10 RX ORDER — HYDROMORPHONE HYDROCHLORIDE 1 MG/ML
1 INJECTION, SOLUTION INTRAMUSCULAR; INTRAVENOUS; SUBCUTANEOUS
Status: COMPLETED | OUTPATIENT
Start: 2022-10-10 | End: 2022-10-10

## 2022-10-10 RX ORDER — INSULIN GLARGINE 100 [IU]/ML
34 INJECTION, SOLUTION SUBCUTANEOUS DAILY
Status: DISCONTINUED | OUTPATIENT
Start: 2022-10-11 | End: 2022-10-13

## 2022-10-10 RX ORDER — DOCUSATE SODIUM 100 MG/1
100 CAPSULE, LIQUID FILLED ORAL 2 TIMES DAILY
Status: DISCONTINUED | OUTPATIENT
Start: 2022-10-10 | End: 2022-10-24

## 2022-10-10 RX ORDER — ATORVASTATIN CALCIUM 40 MG/1
40 TABLET, FILM COATED ORAL NIGHTLY
Status: DISCONTINUED | OUTPATIENT
Start: 2022-10-10 | End: 2022-10-24

## 2022-10-10 RX ADMIN — GABAPENTIN 100 MG: 100 CAPSULE ORAL at 13:22

## 2022-10-10 RX ADMIN — HYDROMORPHONE HYDROCHLORIDE 0.5 MG: 1 INJECTION, SOLUTION INTRAMUSCULAR; INTRAVENOUS; SUBCUTANEOUS at 03:15

## 2022-10-10 RX ADMIN — GABAPENTIN 100 MG: 100 CAPSULE ORAL at 21:08

## 2022-10-10 RX ADMIN — DOCUSATE SODIUM 100 MG: 100 CAPSULE, LIQUID FILLED ORAL at 09:10

## 2022-10-10 RX ADMIN — ENOXAPARIN SODIUM 40 MG: 100 INJECTION SUBCUTANEOUS at 21:09

## 2022-10-10 RX ADMIN — ATORVASTATIN CALCIUM 40 MG: 40 TABLET, FILM COATED ORAL at 21:08

## 2022-10-10 RX ADMIN — Medication 1500 MG: at 21:09

## 2022-10-10 RX ADMIN — ENOXAPARIN SODIUM 40 MG: 100 INJECTION SUBCUTANEOUS at 09:20

## 2022-10-10 RX ADMIN — METHOCARBAMOL TABLETS 750 MG: 750 TABLET, COATED ORAL at 09:10

## 2022-10-10 RX ADMIN — METHOCARBAMOL TABLETS 750 MG: 750 TABLET, COATED ORAL at 17:23

## 2022-10-10 RX ADMIN — GABAPENTIN 100 MG: 100 CAPSULE ORAL at 09:10

## 2022-10-10 RX ADMIN — SODIUM CHLORIDE, PRESERVATIVE FREE 10 ML: 5 INJECTION INTRAVENOUS at 21:10

## 2022-10-10 RX ADMIN — Medication: at 09:20

## 2022-10-10 RX ADMIN — INSULIN LISPRO 4 UNITS: 100 INJECTION, SOLUTION INTRAVENOUS; SUBCUTANEOUS at 09:00

## 2022-10-10 RX ADMIN — HYDROMORPHONE HYDROCHLORIDE 1 MG: 1 INJECTION, SOLUTION INTRAMUSCULAR; INTRAVENOUS; SUBCUTANEOUS at 13:37

## 2022-10-10 RX ADMIN — OXYCODONE 10 MG: 5 TABLET ORAL at 09:16

## 2022-10-10 RX ADMIN — DOCUSATE SODIUM 100 MG: 100 CAPSULE, LIQUID FILLED ORAL at 21:08

## 2022-10-10 RX ADMIN — INSULIN LISPRO 4 UNITS: 100 INJECTION, SOLUTION INTRAVENOUS; SUBCUTANEOUS at 13:19

## 2022-10-10 RX ADMIN — SODIUM CHLORIDE, PRESERVATIVE FREE 10 ML: 5 INJECTION INTRAVENOUS at 09:20

## 2022-10-10 RX ADMIN — INSULIN GLARGINE 30 UNITS: 100 INJECTION, SOLUTION SUBCUTANEOUS at 08:59

## 2022-10-10 RX ADMIN — METHOCARBAMOL TABLETS 750 MG: 750 TABLET, COATED ORAL at 21:08

## 2022-10-10 RX ADMIN — INSULIN LISPRO 2 UNITS: 100 INJECTION, SOLUTION INTRAVENOUS; SUBCUTANEOUS at 13:18

## 2022-10-10 RX ADMIN — HYDROMORPHONE HYDROCHLORIDE 0.5 MG: 1 INJECTION, SOLUTION INTRAMUSCULAR; INTRAVENOUS; SUBCUTANEOUS at 22:24

## 2022-10-10 RX ADMIN — METHOCARBAMOL TABLETS 750 MG: 750 TABLET, COATED ORAL at 13:21

## 2022-10-10 RX ADMIN — PIPERACILLIN AND TAZOBACTAM 3375 MG: 3; .375 INJECTION, POWDER, FOR SOLUTION INTRAVENOUS at 05:59

## 2022-10-10 RX ADMIN — INSULIN LISPRO 4 UNITS: 100 INJECTION, SOLUTION INTRAVENOUS; SUBCUTANEOUS at 17:08

## 2022-10-10 RX ADMIN — ANTI-FUNGAL POWDER MICONAZOLE NITRATE TALC FREE: 1.42 POWDER TOPICAL at 22:16

## 2022-10-10 ASSESSMENT — PAIN DESCRIPTION - DESCRIPTORS
DESCRIPTORS: ACHING;SORE;STABBING
DESCRIPTORS: ACHING
DESCRIPTORS: ACHING;SORE
DESCRIPTORS: CRAMPING;STABBING

## 2022-10-10 ASSESSMENT — PAIN DESCRIPTION - LOCATION
LOCATION: INCISION;GROIN
LOCATION: BACK
LOCATION: GROIN

## 2022-10-10 ASSESSMENT — PAIN DESCRIPTION - ORIENTATION
ORIENTATION: LOWER;POSTERIOR
ORIENTATION: LOWER
ORIENTATION: ANTERIOR

## 2022-10-10 ASSESSMENT — PAIN DESCRIPTION - PAIN TYPE
TYPE: SURGICAL PAIN
TYPE: ACUTE PAIN;SURGICAL PAIN

## 2022-10-10 ASSESSMENT — PAIN SCALES - GENERAL
PAINLEVEL_OUTOF10: 0
PAINLEVEL_OUTOF10: 5
PAINLEVEL_OUTOF10: 9
PAINLEVEL_OUTOF10: 8
PAINLEVEL_OUTOF10: 10

## 2022-10-10 NOTE — PROGRESS NOTES
Walked into pt's room to pt grabbing for objects that were not physically in front of him. I asked pt what he was doing/seeing. He said he was seeing \"animals and black spots\". Pt said that the doctors were aware that this was happening before he arrived to the hospital. This RN notified hospitalist. Orders received for stat CT of the head w/o contrast.        0455: Pt transported to CT.

## 2022-10-10 NOTE — PROGRESS NOTES
Hospitalist Progress Note   Admit Date:  10/5/2022  7:22 PM   Name:  Ryan Hagan   Age:  60 y.o. Sex:  male  :  1965   MRN:  [id number]   Room:      Reason(s) for Admission: Cellulitis and abscess of trunk [L03.319, L02.219]  Abscess [L02.91]  Hyperglycemia [R73.9]  Abscess, perineum [facility] Course & Interval History:   Mr. Compa Serrato is a nice 59 y/o male with a h/o DM2 and HTN who was admitted to our service on 10/5 with an abscess on his upper back for about 2 months. CT was done showing a paraspinal abscess w/o muscular involvement along with L inguinal inflammatory changes with possible early abscess of scrotum. Urology and General Surgery were consulted. He was started on antibiotics. Hyperglycemia, A1C >14, started on basal + bolus insulin. ID consulted, now on Zosyn. HIV neg, other ID orders pending. On 10/7 he underwent I&D of back abscess and L groin abscess. He later underwent debridement of penile abscess with Urology. Wound cultures from 10/5 I&D with staph aureus. Planning wound vac 10/10. Subjective/24hr Events (10/10/22): Had a lot of pain overnight. Head CT done early AM for c/o floaters or visual changes, unremarkable. He also notes he's had R arm/hand weakness, reduced  strength for about 2 weeks. No other focal deficits noted. Sugar is better. Still lots of pain around groin/surgical sites. No chest pain or SOB. Assessment & Plan:   # Sepsis 2/2 abscess of upper back, L groin and penis              - Leukocytosis + tachycardia + abscesses on CT. Resolved. - S/p I&D of back and groin abscess, penile debridement on 10/7. 10/5 wound culture with MRSA. Wound dressing changes, wound vac 10/10? Con't pain control, glucose control. Con't IV vancomycin. # Visual changes // RUE weakness   - Head CT unremarkable. Will check brain MRI in setting of uncontrolled DM.     # Acute urinary retention              - Likely 2/2 penile abscess and edema. Con't Robles. # Uncontrolled DM2              - Increase basal to 34U on 10/10, con't prandial + SSI.   - Check lipids tomorrow, add statin today 10/10              - Metformin and glipizide at home, would probably stop glipizide at discharge. # HTN              - Controlled off meds. Discharge Planning: Home when able. Diet:  ADULT DIET; Regular; 3 carb choices (45 gm/meal)  DVT PPx: Lovenox  Code status: Full Code    Hospital Problems             Last Modified POA    * (Principal) Sepsis ([facility] [address] 10/8/2022 Yes    Primary hypertension 10/6/2022 Yes    DM2 (diabetes mellitus, type 2) ([facility] [address] 10/6/2022 Yes    Cellulitis and abscess of trunk 10/7/2022 Yes    Abscess of groin, left 10/8/2022 Yes    Penile abscess 10/8/2022 Yes    Acute urinary retention 10/8/2022 Yes        Objective:   Patient Vitals for the past 24 hrs:   Temp Pulse Resp BP SpO2   10/10/22 0916 -- -- 18 -- --   10/10/22 0711 98.8 °F (37.1 °C) 90 19 (!) 140/79 95 %   10/10/22 0345 -- -- 19 -- --   10/10/22 0304 99.1 °F (37.3 °C) 90 19 134/81 92 %   10/09/22 2355 -- -- 19 -- --   10/09/22 2350 98.2 °F (36.8 °C) 99 19 126/79 92 %   10/09/22 1937 98.2 °F (36.8 °C) (!) 102 19 127/78 95 %   10/09/22 1528 -- -- 17 -- --   10/09/22 1501 99.2 °F (37.3 °C) 97 18 122/86 96 %   10/09/22 1206 99 °F (37.2 °C) 98 18 122/78 96 %   10/09/22 1120 -- -- 17 -- --       Estimated body mass index is 46.86 kg/m² as calculated from the following:    Height as of this encounter: 6' 2\" (1.88 m). Weight as of this encounter: 365 lb (165.6 kg). Intake/Output Summary (Last 24 hours) at 10/10/2022 0947  Last data filed at 10/10/2022 0711  Gross per 24 hour   Intake 1661.17 ml   Output 1625 ml   Net 36.17 ml         Physical Exam:   Blood pressure (!) 140/79, pulse 90, temperature 98.8 °F (37.1 °C), temperature source Oral, resp. rate 18, height 6' 2\" (1.88 m), weight (!) 365 lb (165.6 kg), SpO2 95 %. General:    Well nourished. No overt distress.  Obese, appears older than stated age. Head:  Normocephalic, atraumatic  Eyes:  Sclerae appear normal. Pupils equally round. ENT:  Nares appear normal, no drainage. Dry oral mucosa. Poor dentition. Neck:  No restricted ROM. Trachea midline. CV:   RRR. No m/r/g. No jugular venous distension. Lungs:   CTAB. No wheezing, rhonchi, or rales. Respirations even, unlabored. Abdomen: Bowel sounds present. Soft, nontender, nondistended. :  Groin/penile dressings in tact, not taken down. Extremities: No cyanosis or clubbing. No edema. Skin:     No rashes and normal coloration. Warm and dry. Neuro:  CN II-XII grossly intact. Subjective paresthesias R hand. A&Ox3. Mildly reduced  strength of the R hand. No pronator drift. No slurred speech or facial asymmetry. Psych:  Normal mood and affect.       I have reviewed ordered lab tests and independently visualized imaging below:    Recent Labs:  Recent Results (from the past 48 hour(s))   POCT Glucose    Collection Time: 10/08/22 12:03 PM   Result Value Ref Range    POC Glucose 292 (H) 65 - 100 mg/dL    Performed by: Janice Hilliard    POCT Glucose    Collection Time: 10/08/22  4:40 PM   Result Value Ref Range    POC Glucose 305 (H) 65 - 100 mg/dL    Performed by: Janice Hilliard    POCT Glucose    Collection Time: 10/08/22  9:29 PM   Result Value Ref Range    POC Glucose 234 (H) 65 - 100 mg/dL    Performed by: Miky Moody    Phosphorus    Collection Time: 10/09/22  6:27 AM   Result Value Ref Range    Phosphorus 2.9 2.5 - 4.5 MG/DL   Basic Metabolic Panel w/ Reflex to MG    Collection Time: 10/09/22  6:27 AM   Result Value Ref Range    Sodium 136 136 - 145 mmol/L    Potassium 4.3 3.5 - 5.1 mmol/L    Chloride 107 101 - 110 mmol/L    CO2 24 21 - 32 mmol/L    Anion Gap 5 4 - 13 mmol/L    Glucose 203 (H) 65 - 100 mg/dL    BUN 21 6 - 23 MG/DL    Creatinine 1.70 (H) 0.8 - 1.5 MG/DL    Est, Glom Filt Rate 46 (L) >60 ml/min/1.73m2    Calcium 8.2 (L) 8.3 - 10.4 MG/DL CBC    Collection Time: 10/09/22  6:27 AM   Result Value Ref Range    WBC 14.4 (H) 4.3 - 11.1 K/uL    RBC 3.20 (L) 4.23 - 5.6 M/uL    Hemoglobin 9.2 (L) 13.6 - 17.2 g/dL    Hematocrit 29.0 (L) 41.1 - 50.3 %    MCV 90.6 79.6 - 97.8 FL    MCH 28.8 26.1 - 32.9 PG    MCHC 31.7 31.4 - 35.0 g/dL    RDW 14.2 11.9 - 14.6 %    Platelets 512 577 - 103 K/uL    MPV 10.7 9.4 - 12.3 FL    nRBC 0.00 0.0 - 0.2 K/uL   POCT Glucose    Collection Time: 10/09/22  7:32 AM   Result Value Ref Range    POC Glucose 193 (H) 65 - 100 mg/dL    Performed by: Princess Badillo    POCT Glucose    Collection Time: 10/09/22 12:06 PM   Result Value Ref Range    POC Glucose 247 (H) 65 - 100 mg/dL    Performed by: Princess Badillo    POCT Glucose    Collection Time: 10/09/22  4:52 PM   Result Value Ref Range    POC Glucose 210 (H) 65 - 100 mg/dL    Performed by: Princess Badillo    POCT Glucose    Collection Time: 10/09/22  9:10 PM   Result Value Ref Range    POC Glucose 219 (H) 65 - 100 mg/dL    Performed by: Sole Burk    Vancomycin Level, Random    Collection Time: 10/10/22  7:06 AM   Result Value Ref Range    Vancomycin Rm 21.6 UG/ML   Basic Metabolic Panel w/ Reflex to MG    Collection Time: 10/10/22  7:06 AM   Result Value Ref Range    Sodium 139 138 - 145 mmol/L    Potassium 4.3 3.5 - 5.1 mmol/L    Chloride 108 101 - 110 mmol/L    CO2 26 21 - 32 mmol/L    Anion Gap 5 4 - 13 mmol/L    Glucose 189 (H) 65 - 100 mg/dL    BUN 20 6 - 23 MG/DL    Creatinine 1.70 (H) 0.8 - 1.5 MG/DL    Est, Glom Filt Rate 46 (L) >60 ml/min/1.73m2    Calcium 8.1 (L) 8.3 - 10.4 MG/DL   CBC    Collection Time: 10/10/22  7:06 AM   Result Value Ref Range    WBC 12.4 (H) 4.3 - 11.1 K/uL    RBC 3.21 (L) 4.23 - 5.6 M/uL    Hemoglobin 9.2 (L) 13.6 - 17.2 g/dL    Hematocrit 29.4 (L) 41.1 - 50.3 %    MCV 91.6 79.6 - 97.8 FL    MCH 28.7 26.1 - 32.9 PG    MCHC 31.3 (L) 31.4 - 35.0 g/dL    RDW 14.1 11.9 - 14.6 %    Platelets 857 361 - 030 K/uL    MPV 10.4 9.4 - 12.3 FL nRBC 0.00 0.0 - 0.2 K/uL   POCT Glucose    Collection Time: 10/10/22  7:12 AM   Result Value Ref Range    POC Glucose 186 (H) 65 - 100 mg/dL    Performed by: Princess Badillo          Other Studies:  CT HEAD WO CONTRAST    Result Date: 10/10/2022  EXAM: Noncontrast CT head. INDICATION: Visual disturbance. COMPARISON: None. TECHNIQUE: Noncontrast CT images of the head were obtained. Radiation dose reduction techniques were used for this study. Our CT scanners use one or all of the following:  Automated exposure control, adjustment of the mA or kV according to patient size, iterative reconstruction. FINDINGS: Brain volume is appropriate for age. No acute infarct, hemorrhage or mass is identified. There is no mass effect, midline shift or depressed fracture. The visualized paranasal sinuses and mastoid air cells are clear, except for mild right maxillary sinus mucosal thickening. No acute process.       Current Meds:  Current Facility-Administered Medications   Medication Dose Route Frequency    vancomycin (VANCOCIN) 1500 mg in sodium chloride 0.9% 500 mL IVPB  1,500 mg IntraVENous Q24H    oxyCODONE (ROXICODONE) immediate release tablet 10 mg  10 mg Oral Q4H PRN    sodium hypochlorite (DAKINS) 0.125 % external solution   Irrigation BID    gabapentin (NEURONTIN) capsule 100 mg  100 mg Oral TID    methocarbamol (ROBAXIN) tablet 750 mg  750 mg Oral 4x Daily    docusate sodium (COLACE) capsule 100 mg  100 mg Oral Daily    insulin glargine (LANTUS) injection vial 30 Units  30 Units SubCUTAneous Daily    HYDROmorphone HCl PF (DILAUDID) injection 0.5 mg  0.5 mg IntraVENous Q3H PRN    insulin lispro (HUMALOG) injection vial 4 Units  4 Units SubCUTAneous TID WC    glucose chewable tablet 16 g  4 tablet Oral PRN    dextrose bolus 10% 125 mL  125 mL IntraVENous PRN    Or    dextrose bolus 10% 250 mL  250 mL IntraVENous PRN    glucagon (rDNA) injection 1 mg  1 mg SubCUTAneous PRN    dextrose 10 % infusion   IntraVENous Continuous PRN    insulin lispro (HUMALOG) injection vial 0-8 Units  0-8 Units SubCUTAneous TID WC    insulin lispro (HUMALOG) injection vial 0-4 Units  0-4 Units SubCUTAneous Nightly    sodium chloride flush 0.9 % injection 5-40 mL  5-40 mL IntraVENous 2 times per day    sodium chloride flush 0.9 % injection 5-40 mL  5-40 mL IntraVENous PRN    0.9 % sodium chloride infusion   IntraVENous PRN    enoxaparin (LOVENOX) injection 40 mg  40 mg SubCUTAneous BID    ondansetron (ZOFRAN-ODT) disintegrating tablet 4 mg  4 mg Oral Q8H PRN    Or    ondansetron (ZOFRAN) injection 4 mg  4 mg IntraVENous Q6H PRN    polyethylene glycol (GLYCOLAX) packet 17 g  17 g Oral Daily PRN    acetaminophen (TYLENOL) tablet 650 mg  650 mg Oral Q6H PRN    Or    acetaminophen (TYLENOL) suppository 650 mg  650 mg Rectal Q6H PRN       Signed:  Ren Garsia MD    Part of this note may have been written by using a voice dictation software. The note has been proof read but may still contain some grammatical/other typographical errors.

## 2022-10-10 NOTE — PROGRESS NOTES
Admit Date: 10/5/2022    POD 3 Days Post-Op    Procedure:  Procedure(s):   Incision and drainage of back 6x5x5 and left groin 9x5x5 abscess: Ruffus Gilford,  10/7/22    Subjective:     Pt alert in bed eating breakfast. NAD noted. Respirations even and unlabored on room air. C/o moderately controlled pain=taking 0.5 mg prn iv dilaudid and PRN PO 10 mg oxycodone. Also taking scheduled neurontin and robaxin. Tolerating diet with no N or V    Objective:       Vitals:    10/10/22 0304 10/10/22 0345 10/10/22 0711 10/10/22 0916   BP: 134/81  (!) 140/79    Pulse: 90  90    Resp: 19 19 19 18   Temp: 99.1 °F (37.3 °C)  98.8 °F (37.1 °C)    TempSrc: Oral  Oral    SpO2: 92%  95%    Weight:       Height:           Temp (24hrs), Av.8 °F (37.1 °C), Min:98.2 °F (36.8 °C), Max:99.2 °F (37.3 °C)  . I&O reviewed as documented. Robles with 1725 clear yellow UOP past 24 h  +flatus 10/10/22  -bm 10/10/22=taking colace      Cr 1.7 10/10 from stable in 10.3-10.4 10/5-  Afebrile   VSS     Physical Exam  Constitutional:       General: He is not in acute distress. Appearance: Normal appearance. He is obese. HENT:      Mouth/Throat:      Mouth: Mucous membranes are moist.   Cardiovascular:      Rate and Rhythm: Normal rate and regular rhythm. Pulses: Normal pulses. Heart sounds: Normal heart sounds. No murmur heard. No friction rub. No gallop. Pulmonary:      Effort: Pulmonary effort is normal. No respiratory distress. Breath sounds: Normal breath sounds. Abdominal:      General: Bowel sounds are normal. There is no distension. Palpations: Abdomen is soft. Genitourinary:     Comments: Margaret    Musculoskeletal:         General: Swelling present. Normal range of motion. Cervical back: Normal range of motion. Comments: Trace nonpitting edema BLE    Skin:     General: Skin is warm and dry. Capillary Refill: Capillary refill takes 2 to 3 seconds.       Comments: Back andrea c/d/I with on freda incisional signs of infection. L groin/perineal drsg c/d/I with no freda incisional signs of infection   Neurological:      General: No focal deficit present. Mental Status: He is alert and oriented to person, place, and time.    Psychiatric:         Mood and Affect: Mood normal.         Behavior: Behavior normal.      Labs:   Recent Results (from the past 24 hour(s))   POCT Glucose    Collection Time: 10/09/22 12:06 PM   Result Value Ref Range    POC Glucose 247 (H) 65 - 100 mg/dL    Performed by: Loida Hollis    POCT Glucose    Collection Time: 10/09/22  4:52 PM   Result Value Ref Range    POC Glucose 210 (H) 65 - 100 mg/dL    Performed by: Loida Hollis    POCT Glucose    Collection Time: 10/09/22  9:10 PM   Result Value Ref Range    POC Glucose 219 (H) 65 - 100 mg/dL    Performed by: Zion Gómez    Vancomycin Level, Random    Collection Time: 10/10/22  7:06 AM   Result Value Ref Range    Vancomycin Rm 21.6 UG/ML   Basic Metabolic Panel w/ Reflex to MG    Collection Time: 10/10/22  7:06 AM   Result Value Ref Range    Sodium 139 138 - 145 mmol/L    Potassium 4.3 3.5 - 5.1 mmol/L    Chloride 108 101 - 110 mmol/L    CO2 26 21 - 32 mmol/L    Anion Gap 5 4 - 13 mmol/L    Glucose 189 (H) 65 - 100 mg/dL    BUN 20 6 - 23 MG/DL    Creatinine 1.70 (H) 0.8 - 1.5 MG/DL    Est, Glom Filt Rate 46 (L) >60 ml/min/1.73m2    Calcium 8.1 (L) 8.3 - 10.4 MG/DL   CBC    Collection Time: 10/10/22  7:06 AM   Result Value Ref Range    WBC 12.4 (H) 4.3 - 11.1 K/uL    RBC 3.21 (L) 4.23 - 5.6 M/uL    Hemoglobin 9.2 (L) 13.6 - 17.2 g/dL    Hematocrit 29.4 (L) 41.1 - 50.3 %    MCV 91.6 79.6 - 97.8 FL    MCH 28.7 26.1 - 32.9 PG    MCHC 31.3 (L) 31.4 - 35.0 g/dL    RDW 14.1 11.9 - 14.6 %    Platelets 533 855 - 444 K/uL    MPV 10.4 9.4 - 12.3 FL    nRBC 0.00 0.0 - 0.2 K/uL   POCT Glucose    Collection Time: 10/10/22  7:12 AM   Result Value Ref Range    POC Glucose 186 (H) 65 - 100 mg/dL    Performed by: Loida Hollis Assessment:     Principal Problem:    Sepsis ([facility] [address]  Active Problems:    Primary hypertension    DM2 (diabetes mellitus, type 2) (HCC)    Cellulitis and abscess of trunk    Abscess of groin, left    Penile abscess    Acute urinary retention  Resolved Problems:    * No resolved hospital problems.  *        Plan/Recommendations/Medical Decision Making:     Attending care per hospitalist    MRSA isolated in wound and tissue culture=on pharmacy dosing vancomycin wbx 12.4 10/10/22 from 14.4 10/9/22     Wound care plan for vacuum   Colace BID   MAURILIO Lott NP wrist watch/Jewelry/Money (specify)/Cell Phone/PDA (specify)/Clothing

## 2022-10-10 NOTE — PROGRESS NOTES
END OF SHIFT NOTE:    INTAKE/OUTPUT  10/09 0701 - 10/10 0700  In: 1661.2 [I.V.:1661.2]  Out: 0490 [Urine:1725]  Voiding: No  Catheter: Yes  Drain:   Negative Pressure Wound Therapy Groin Anterior (Active)   $ Standard NPWT <=50 sq cm PER TX $ Yes 10/10/22 1333   Wound Type Surgical 10/10/22 1333   Unit Type kci ulta 10/10/22 1333   Dressing Type Black Foam 10/10/22 1333   Number of pieces used 1 10/10/22 1333   Cycle Continuous 10/10/22 1333   Target Pressure (mmHg) 125 10/10/22 1333   Canister changed? Yes 10/10/22 1333   Dressing Status New dressing applied 10/10/22 1333   Dressing Changed Dressing reinforced 10/10/22 1333   Drainage Amount Small 10/10/22 1333   Drainage Description Serosanguinous;Purulent 10/10/22 1333   Dressing Change Due 10/12/22 10/10/22 1333   Wound Assessment Slough; Subcutaneous;Granulation tissue 10/10/22 1333   Nemo-wound Assessment Intact 10/10/22 1333   Shape irregular 10/10/22 1333   Odor Mild 10/10/22 1333       Negative Pressure Wound Therapy Back Left;Mid (Active)   $ Standard NPWT <=50 sq cm PER TX $ Yes 10/10/22 1333   Wound Type Surgical 10/10/22 1333   Unit Type kci ulta 10/10/22 1333   Dressing Type Black Foam 10/10/22 1333   Number of pieces used 1 10/10/22 1333   Number of pieces removed 1 10/10/22 1333   Target Pressure (mmHg) 125 10/10/22 1333   Canister changed? Yes 10/10/22 1333   Dressing Status New dressing applied 10/10/22 1333   Dressing Changed Changed/New 10/10/22 1333   Drainage Amount Moderate 10/10/22 1333   Drainage Description Serosanguinous 10/10/22 1333   Dressing Change Due 10/12/22 10/10/22 1333   Wound Assessment Granulation tissue; Subcutaneous; Exposed structure muscle 10/10/22 1333   Shape oval 10/10/22 1333   Odor None 10/10/22 1333       Urinary Catheter 10/06/22 Robles (Active)   $ Urethral catheter insertion Inserted for procedure 10/10/22 0800   Catheter Indications Assist in healing of open sacral or perineal wounds (Stage III, IV, or unstageable documented by a provider or enterostomal therapy) and/or full thickness perineal and lower extremity burns in incontinent patients 10/10/22 0800   Site Assessment No urethral drainage 10/09/22 1930   Urine Color Yellow 10/10/22 0800   Urine Appearance Sediment 10/10/22 0800   Urine Odor Other (Comment) 10/07/22 1135   Collection Container Standard 10/10/22 0800   Securement Method Securing device (Describe) 10/10/22 0800   Catheter Care Completed Yes 10/09/22 1930   Catheter Best Practices  Drainage tube clipped to bed;Catheter secured to thigh; Tamper seal intact; Bag below bladder;Bag not on floor; Lack of dependent loop in tubing;Drainage bag less than half full 10/09/22 1930   Status Draining 10/10/22 0800   Output (mL) 700 mL 10/10/22 1702               Flatus: Patient does have flatus present. Stool:  0 occurrences. Characteristics:           Stool Assessment  Last BM (including prior to admit): 10/05/22    Emesis:  occurrences. Characteristics:        VITAL SIGNS  Patient Vitals for the past 12 hrs:   Temp Pulse Resp BP SpO2   10/10/22 1533 99 °F (37.2 °C) 87 18 112/67 97 %   10/10/22 1337 -- -- 18 -- --   10/10/22 1119 98.4 °F (36.9 °C) 92 19 137/79 91 %   10/10/22 0916 -- -- 18 -- --       Pain Assessment  Pain Level: 5 (10/10/22 1723)  Pain Location: Back  Patient's Stated Pain Goal: 0 - No pain    Ambulating  No. PT and OT following. Shift report given to oncoming nurse at the bedside. Zhanna Guardado

## 2022-10-10 NOTE — DIABETES MGMT
Patient admitted with sepsis. Blood glucose ranged 193-247 yesterday with patient receiving Lantus 30 units and Humalog 14 units. Blood glucose this morning was 186. Creatinine 1.70. GFR 46. Reviewed patient current regimen: Lantus 30 units HS, Humalog 4 units prandial, and Humalog correctional insulin. Patient would likely benefit from an increase in basal insulin as fasting glucose is not at goal.  Provider updated via [employer] regarding recommendation and glycemic control.

## 2022-10-10 NOTE — PROGRESS NOTES
Physical Therapy Note:    Attempted to see patient this AM for physical therapy treatment  session. Patient refused this am, stating wants to wait for wound vac placement. Pt reports currently in no pain, does not wish to mobilize at this time despite encouragement and education on general mobility. Will follow and re-attempt as schedule permits/patient available.  Thank you,    Eh Cohen, PT     Rehab Caseload Tracker

## 2022-10-10 NOTE — PROGRESS NOTES
Patient speaks Albanian as their preferred language for their healthcare communication. If there are technical problems using the [employer] mobil unit, please contact Language Services for interpretation at:    Senior Kierna -Navigator ([phone])  General phone: [phone] ( [phone])  Email: [email or web address]    Please always document the use of interpreting services (name and/or 's ID number) in your clinical notes. Our interpreters are available for team members working with limited  English proficient (LEP) patients remotely, in person (if needed for special cases), as phone or video interpreters on the [employer] Mobil units.         Thank you,        Hannah CASTANEDA  Senior /Navigator

## 2022-10-10 NOTE — PROGRESS NOTES
END OF SHIFT NOTE:    INTAKE/OUTPUT  10/09 0701 - 10/10 0700  In: 1661.2 [I.V.:1661.2]  Out: 5051 [AVAAC:0628]  Voiding: Yes  Catheter: Yes  Drain:   Urinary Catheter 10/06/22 Robles (Active)   Catheter Indications Assist in healing of open sacral or perineal wounds (Stage III, IV, or unstageable documented by a provider or enterostomal therapy) and/or full thickness perineal and lower extremity burns in incontinent patients 10/09/22 1930   Site Assessment No urethral drainage 10/09/22 1930   Urine Color Jinny 10/09/22 1930   Urine Appearance Clear 10/09/22 1930   Urine Odor Other (Comment) 10/07/22 1135   Collection Container Standard 10/09/22 1930   Securement Method Securing device (Describe) 10/09/22 1930   Catheter Care Completed Yes 10/09/22 1930   Catheter Best Practices  Drainage tube clipped to bed;Catheter secured to thigh; Tamper seal intact; Bag below bladder;Bag not on floor; Lack of dependent loop in tubing;Drainage bag less than half full 10/09/22 1930   Status Draining;Patent 10/09/22 1930   Output (mL) 600 mL 10/10/22 0418               Flatus: Patient does have flatus present. Stool: 0 occurrences. Characteristics:           Stool Assessment  Last BM (including prior to admit): 10/05/22    Emesis: 0  occurrences. Characteristics:        VITAL SIGNS  Patient Vitals for the past 12 hrs:   Temp Pulse Resp BP SpO2   10/10/22 0345 -- -- 19 -- --   10/10/22 0304 99.1 °F (37.3 °C) 90 19 134/81 92 %   10/09/22 2355 -- -- 19 -- --   10/09/22 2350 98.2 °F (36.8 °C) 99 19 126/79 92 %   10/09/22 1937 98.2 °F (36.8 °C) (!) 102 19 127/78 95 %       Pain Assessment  Pain Level: 0 (10/10/22 0345)  Pain Location: Back, Scrotum, Penis  Patient's Stated Pain Goal: 0 - No pain    Ambulating  Yes    Shift report given to oncoming nurse at the bedside.     Sandro Alva RN

## 2022-10-10 NOTE — WOUND CARE
Left groin/ penis wound is [id number] with tunnel in groin 2cm, pink granular with subcutaneous tissue and scattered slough in base, the wound extends to the penis and wound vac seal will be difficult to maintain, paste strip and paste used to achieve seal on wound vac area today. Rash and intertrigo with musty odor to scrotum/ perineum and posterior folds, will add antifungal powder. Gauze and Nunet removed from Left back/ flank area wound with [id number], muscle is exposed area is pink/ granular, no bleeding, wound vac applied Y-connected to 1 machine with groin wound. If seal is unable to be maintained will discontinue wound vac from one or both wounds and use wet to dry. Will monitor.

## 2022-10-10 NOTE — PLAN OF CARE
Problem: Discharge Planning  Goal: Discharge to home or other facility with appropriate resources  Outcome: Progressing     Problem: Safety - Adult  Goal: Free from fall injury  Outcome: Progressing  Flowsheets (Taken 10/9/2022 1330 by Nguyen Lindsay RN)  Free From Fall Injury: Instruct family/caregiver on patient safety     Problem: Pain  Goal: Verbalizes/displays adequate comfort level or baseline comfort level  Outcome: Progressing     Problem: Chronic Conditions and Co-morbidities  Goal: Patient's chronic conditions and co-morbidity symptoms are monitored and maintained or improved  Outcome: Progressing

## 2022-10-10 NOTE — CARE COORDINATION
Chart reviewed by [facility] and discussed in IDR. Patient POD 3, s/p I& D of back and left groin. Patient tolerating diet. Robles. Wound cultures with MRSA. Wound consult for vac. PT/OT recommending home health when stable. CM following.

## 2022-10-10 NOTE — PROGRESS NOTES
VANCO DAILY FOLLOW UP NOTE  [address] [facility] Pharmacokinetic Monitoring Service - Vancomycin    Consulting Provider: Dr. Thom George   Indication: SSTI  Target Concentration: Goal trough of 10-15 mg/L and AUC/MADINA <500 mg*hr/L  Day of Therapy: 3  Additional Antimicrobials: none    Pertinent Laboratory Values: Wt Readings from Last 1 Encounters:   10/07/22 (!) 365 lb (165.6 kg)     Temp Readings from Last 1 Encounters:   10/10/22 98.8 °F (37.1 °C) (Oral)     Recent Labs     10/08/22  0638 10/09/22  0627 10/10/22  0706   BUN 19 21 20   CREATININE 1.30 1.70* 1.70*   WBC 17.3* 14.4* 12.4*     Estimated Creatinine Clearance: 77 mL/min (A) (based on SCr of 1.7 mg/dL (H)). Lab Results   Component Value Date/Time    VANCORANDOM 21.6 10/10/2022 07:06 AM     MRSA Nasal Swab: N/A. Non-respiratory infection.     Assessment:  Date/Time Dose Concentration AUC   10/10 0706 1250 mg q18h 21.6 513   Note: Serum concentrations collected for AUC dosing may appear elevated if collected in close proximity to the dose administered, this is not necessarily an indication of toxicity    Plan:  Current dosing regimen is upper end of therapeutic  Decrease dose to 1500 mg q24h for predicted AUC/Tr of 463/10.7  Repeat vancomycin concentrations will be ordered as clinically appropriate   Pharmacy will continue to monitor patient and adjust therapy as indicated    Thank you for the consult,  CAMILO Sanchez [facility]

## 2022-10-11 ENCOUNTER — APPOINTMENT (OUTPATIENT)
Dept: MRI IMAGING | Age: 57
DRG: 854 | End: 2022-10-11

## 2022-10-11 LAB
ANION GAP SERPL CALC-SCNC: 4 MMOL/L (ref 2–11)
BUN SERPL-MCNC: 18 MG/DL (ref 6–23)
CALCIUM SERPL-MCNC: 8.4 MG/DL (ref 8.3–10.4)
CHLORIDE SERPL-SCNC: 109 MMOL/L (ref 101–110)
CHOLEST SERPL-MCNC: 138 MG/DL
CO2 SERPL-SCNC: 26 MMOL/L (ref 21–32)
CREAT SERPL-MCNC: 1.6 MG/DL (ref 0.8–1.5)
ERYTHROCYTE [DISTWIDTH] IN BLOOD BY AUTOMATED COUNT: 14.5 % (ref 11.9–14.6)
GLUCOSE BLD STRIP.AUTO-MCNC: 164 MG/DL (ref 65–100)
GLUCOSE BLD STRIP.AUTO-MCNC: 168 MG/DL (ref 65–100)
GLUCOSE BLD STRIP.AUTO-MCNC: 172 MG/DL (ref 65–100)
GLUCOSE BLD STRIP.AUTO-MCNC: 178 MG/DL (ref 65–100)
GLUCOSE SERPL-MCNC: 159 MG/DL (ref 65–100)
HCT VFR BLD AUTO: 30 % (ref 41.1–50.3)
HDLC SERPL-MCNC: 16 MG/DL (ref 40–60)
HDLC SERPL: 8.6 {RATIO}
HGB BLD-MCNC: 9.3 G/DL (ref 13.6–17.2)
LDLC SERPL CALC-MCNC: 88.8 MG/DL
MCH RBC QN AUTO: 28.7 PG (ref 26.1–32.9)
MCHC RBC AUTO-ENTMCNC: 31 G/DL (ref 31.4–35)
MCV RBC AUTO: 92.6 FL (ref 82–102)
NRBC # BLD: 0 K/UL (ref 0–0.2)
PLATELET # BLD AUTO: 457 K/UL (ref 150–450)
PMV BLD AUTO: 10.3 FL (ref 9.4–12.3)
POTASSIUM SERPL-SCNC: 4.3 MMOL/L (ref 3.5–5.1)
RBC # BLD AUTO: 3.24 M/UL (ref 4.23–5.6)
SERVICE CMNT-IMP: ABNORMAL
SODIUM SERPL-SCNC: 139 MMOL/L (ref 133–143)
TRIGL SERPL-MCNC: 166 MG/DL (ref 35–150)
VLDLC SERPL CALC-MCNC: 33.2 MG/DL (ref 6–23)
WBC # BLD AUTO: 14 K/UL (ref 4.3–11.1)

## 2022-10-11 PROCEDURE — 80061 LIPID PANEL: CPT

## 2022-10-11 PROCEDURE — 82962 GLUCOSE BLOOD TEST: CPT

## 2022-10-11 PROCEDURE — 6360000002 HC RX W HCPCS: Performed by: INTERNAL MEDICINE

## 2022-10-11 PROCEDURE — 85027 COMPLETE CBC AUTOMATED: CPT

## 2022-10-11 PROCEDURE — 97530 THERAPEUTIC ACTIVITIES: CPT

## 2022-10-11 PROCEDURE — 6370000000 HC RX 637 (ALT 250 FOR IP)

## 2022-10-11 PROCEDURE — 1100000000 HC RM PRIVATE

## 2022-10-11 PROCEDURE — 36415 COLL VENOUS BLD VENIPUNCTURE: CPT

## 2022-10-11 PROCEDURE — 70551 MRI BRAIN STEM W/O DYE: CPT

## 2022-10-11 PROCEDURE — 6360000002 HC RX W HCPCS: Performed by: FAMILY MEDICINE

## 2022-10-11 PROCEDURE — 2580000003 HC RX 258: Performed by: INTERNAL MEDICINE

## 2022-10-11 PROCEDURE — 2580000003 HC RX 258: Performed by: FAMILY MEDICINE

## 2022-10-11 PROCEDURE — 80048 BASIC METABOLIC PNL TOTAL CA: CPT

## 2022-10-11 PROCEDURE — 6370000000 HC RX 637 (ALT 250 FOR IP): Performed by: STUDENT IN AN ORGANIZED HEALTH CARE EDUCATION/TRAINING PROGRAM

## 2022-10-11 PROCEDURE — 99233 SBSQ HOSP IP/OBS HIGH 50: CPT | Performed by: STUDENT IN AN ORGANIZED HEALTH CARE EDUCATION/TRAINING PROGRAM

## 2022-10-11 PROCEDURE — 6370000000 HC RX 637 (ALT 250 FOR IP): Performed by: INTERNAL MEDICINE

## 2022-10-11 RX ORDER — ASPIRIN 81 MG/1
81 TABLET ORAL DAILY
Status: DISCONTINUED | OUTPATIENT
Start: 2022-10-11 | End: 2022-10-25 | Stop reason: HOSPADM

## 2022-10-11 RX ORDER — POLYETHYLENE GLYCOL 3350 17 G/17G
17 POWDER, FOR SOLUTION ORAL DAILY
Status: DISCONTINUED | OUTPATIENT
Start: 2022-10-11 | End: 2022-10-12

## 2022-10-11 RX ADMIN — HYDROMORPHONE HYDROCHLORIDE 0.5 MG: 1 INJECTION, SOLUTION INTRAMUSCULAR; INTRAVENOUS; SUBCUTANEOUS at 17:07

## 2022-10-11 RX ADMIN — ASPIRIN 81 MG: 81 TABLET ORAL at 11:55

## 2022-10-11 RX ADMIN — DOCUSATE SODIUM 100 MG: 100 CAPSULE, LIQUID FILLED ORAL at 08:27

## 2022-10-11 RX ADMIN — HYDROMORPHONE HYDROCHLORIDE 0.5 MG: 1 INJECTION, SOLUTION INTRAMUSCULAR; INTRAVENOUS; SUBCUTANEOUS at 11:54

## 2022-10-11 RX ADMIN — GABAPENTIN 100 MG: 100 CAPSULE ORAL at 22:44

## 2022-10-11 RX ADMIN — OXYCODONE 10 MG: 5 TABLET ORAL at 08:27

## 2022-10-11 RX ADMIN — METHOCARBAMOL TABLETS 750 MG: 750 TABLET, COATED ORAL at 17:06

## 2022-10-11 RX ADMIN — Medication 1500 MG: at 21:40

## 2022-10-11 RX ADMIN — ENOXAPARIN SODIUM 40 MG: 100 INJECTION SUBCUTANEOUS at 08:27

## 2022-10-11 RX ADMIN — SODIUM CHLORIDE, PRESERVATIVE FREE 10 ML: 5 INJECTION INTRAVENOUS at 21:41

## 2022-10-11 RX ADMIN — ANTI-FUNGAL POWDER MICONAZOLE NITRATE TALC FREE: 1.42 POWDER TOPICAL at 08:29

## 2022-10-11 RX ADMIN — OXYCODONE 10 MG: 5 TABLET ORAL at 01:36

## 2022-10-11 RX ADMIN — OXYCODONE 10 MG: 5 TABLET ORAL at 22:44

## 2022-10-11 RX ADMIN — INSULIN LISPRO 4 UNITS: 100 INJECTION, SOLUTION INTRAVENOUS; SUBCUTANEOUS at 17:07

## 2022-10-11 RX ADMIN — SODIUM CHLORIDE, PRESERVATIVE FREE 10 ML: 5 INJECTION INTRAVENOUS at 08:29

## 2022-10-11 RX ADMIN — DOCUSATE SODIUM 100 MG: 100 CAPSULE, LIQUID FILLED ORAL at 21:42

## 2022-10-11 RX ADMIN — METHOCARBAMOL TABLETS 750 MG: 750 TABLET, COATED ORAL at 21:42

## 2022-10-11 RX ADMIN — INSULIN GLARGINE 34 UNITS: 100 INJECTION, SOLUTION SUBCUTANEOUS at 08:28

## 2022-10-11 RX ADMIN — GABAPENTIN 100 MG: 100 CAPSULE ORAL at 08:27

## 2022-10-11 RX ADMIN — ENOXAPARIN SODIUM 40 MG: 100 INJECTION SUBCUTANEOUS at 21:41

## 2022-10-11 RX ADMIN — ATORVASTATIN CALCIUM 40 MG: 40 TABLET, FILM COATED ORAL at 21:42

## 2022-10-11 RX ADMIN — INSULIN LISPRO 4 UNITS: 100 INJECTION, SOLUTION INTRAVENOUS; SUBCUTANEOUS at 11:55

## 2022-10-11 RX ADMIN — METHOCARBAMOL TABLETS 750 MG: 750 TABLET, COATED ORAL at 08:27

## 2022-10-11 RX ADMIN — GABAPENTIN 100 MG: 100 CAPSULE ORAL at 13:41

## 2022-10-11 RX ADMIN — INSULIN LISPRO 4 UNITS: 100 INJECTION, SOLUTION INTRAVENOUS; SUBCUTANEOUS at 08:28

## 2022-10-11 RX ADMIN — METHOCARBAMOL TABLETS 750 MG: 750 TABLET, COATED ORAL at 13:41

## 2022-10-11 RX ADMIN — ANTI-FUNGAL POWDER MICONAZOLE NITRATE TALC FREE: 1.42 POWDER TOPICAL at 21:43

## 2022-10-11 ASSESSMENT — PAIN SCALES - GENERAL
PAINLEVEL_OUTOF10: 5
PAINLEVEL_OUTOF10: 8
PAINLEVEL_OUTOF10: 0
PAINLEVEL_OUTOF10: 3
PAINLEVEL_OUTOF10: 8
PAINLEVEL_OUTOF10: 3
PAINLEVEL_OUTOF10: 9
PAINLEVEL_OUTOF10: 7
PAINLEVEL_OUTOF10: 8
PAINLEVEL_OUTOF10: 0

## 2022-10-11 ASSESSMENT — PAIN DESCRIPTION - ONSET
ONSET: ON-GOING

## 2022-10-11 ASSESSMENT — PAIN DESCRIPTION - DESCRIPTORS
DESCRIPTORS: ACHING
DESCRIPTORS: ACHING
DESCRIPTORS: STABBING
DESCRIPTORS: ACHING
DESCRIPTORS: SHARP
DESCRIPTORS: ACHING

## 2022-10-11 ASSESSMENT — PAIN DESCRIPTION - FREQUENCY
FREQUENCY: CONTINUOUS

## 2022-10-11 ASSESSMENT — PAIN - FUNCTIONAL ASSESSMENT
PAIN_FUNCTIONAL_ASSESSMENT: PREVENTS OR INTERFERES SOME ACTIVE ACTIVITIES AND ADLS
PAIN_FUNCTIONAL_ASSESSMENT: ACTIVITIES ARE NOT PREVENTED
PAIN_FUNCTIONAL_ASSESSMENT: PREVENTS OR INTERFERES SOME ACTIVE ACTIVITIES AND ADLS
PAIN_FUNCTIONAL_ASSESSMENT: PREVENTS OR INTERFERES SOME ACTIVE ACTIVITIES AND ADLS

## 2022-10-11 ASSESSMENT — PAIN DESCRIPTION - PAIN TYPE
TYPE: SURGICAL PAIN

## 2022-10-11 ASSESSMENT — PAIN DESCRIPTION - ORIENTATION
ORIENTATION: ANTERIOR;LOWER
ORIENTATION: ANTERIOR;LOWER
ORIENTATION: ANTERIOR
ORIENTATION: ANTERIOR;LOWER

## 2022-10-11 ASSESSMENT — PAIN DESCRIPTION - LOCATION
LOCATION: PENIS
LOCATION: ABDOMEN;BACK
LOCATION: BACK;PENIS
LOCATION: ABDOMEN;BACK
LOCATION: BACK;PENIS
LOCATION: BACK;PENIS

## 2022-10-11 NOTE — PROGRESS NOTES
ACUTE OCCUPATIONAL THERAPY GOALS:   (Developed with and agreed upon by patient and/or caregiver.)  1. Domenica Sicard will be modified independent with functional mobility for ADL in room within 4 - 7 visits. 2. Domenica Sicard will be modified independent with total body bathing and dressing within 4 - 7 visits. 3. Domenica Sicard will state and demonstrate at least 5 energy conservation techniques during ADL/therapeutic activities within 4 - 7 visits. 4. Domenica Sicard will voice a plan for appropriate home modifications for home safety and fall prevention within 7 visits. 5. Domenica Sicard will participate at least 30 minutes of ADL with 3 or less rest breaks within 7 visits. 6. Domenica Sicard will complete a toilet transfer with modified independence within 7 visits. OCCUPATIONAL THERAPY: Daily Note AM   OT Visit Days: 2   Time  OT Charge Capture  Rehab Caseload Tracker  OT Orders    Domenica Sicard is a 60 y.o. male   PRIMARY DIAGNOSIS: Sepsis ([address]  Cellulitis and abscess of trunk [L03.319, L02.219]  Abscess [L02.91]  Hyperglycemia [R73.9]  Abscess, perineum [L02.215]  Procedure(s) (LRB):  BACK ABSCESS I & D (N/A)  INGUINAL AND PENILE DEBRIDEMENT (N/A)  4 Days Post-Op  Inpatient: Payor: /     ASSESSMENT:     REHAB RECOMMENDATIONS: CURRENT LEVEL OF FUNCTION:  (Most Recently Demonstrated)   Recommendation to date pending progress:  Setting:  Home Health Therapy    Equipment:    None Bathing:  Not Tested pt declined  Dressing:  Not Tested pt declined  Feeding/Grooming:  Not Tested pt declined  Toileting:  Not Tested  Functional Mobility:  Not Tested pt declined     ASSESSMENT:  Mr. Justen Jett presents with decreased independence with mobility and decreased activity tolerance d/t penile pain. Pt stated that he is in too much pain to sit up. He is able to complete bed mobility but is still painful for him to do so.  He declined self care activities but completed exercises in bed with good effort. Continue OT POC. SUBJECTIVE:     Mr. Olesya Martin states, \"I can't sit up. I'm in too much pain. \"     Social/Functional Lives With: Significant other  Type of Home: House  Home Access: Stairs to enter with rails  Entrance Stairs - Number of Steps: 4  Bathroom Equipment: Shower chair  Home Equipment: Breann Moralez  ADL Assistance: [address]: Independent  Homemaking Responsibilities: Yes  Active : Yes  Mode of Transportation: Car  Occupation: Full time employment    OBJECTIVE:     Kyle Allison / Roldan Fostera / AIRWAY: Wound Vac    RESTRICTIONS/PRECAUTIONS:  Restrictions/Precautions  Restrictions/Precautions: Fall Risk        PAIN: Luis Meredith / O2:   Pre Treatment: 10; had pain meds           Post Treatment: same Vitals          Oxygen        MOBILITY: I Mod I S SBA CGA Min Mod Max Total  NT x2 Comments:   Bed Mobility    Rolling [] [x] [] [] [] [] [] [] [] [] []    Supine to Sit [] [] [] [] [] [] [] [] [] [] []    Scooting [] [] [] [] [] [] [] [] [] [] []    Sit to Supine [] [] [] [] [] [] [] [] [] [] []    Transfers    Sit to Stand [] [] [] [] [] [] [] [] [] [] []    Bed to Chair [] [] [] [] [] [] [] [] [] [] []    Stand to Sit [] [] [] [] [] [] [] [] [] [] []    Tub/Shower [] [] [] [] [] [] [] [] [] [] []     Toilet [] [] [] [] [] [] [] [] [] [] []      [] [] [] [] [] [] [] [] [] [] []    I=Independent, Mod I=Modified Independent, S=Supervision/Setup, SBA=Standby Assistance, CGA=Contact Guard Assistance, Min=Minimal Assistance, Mod=Moderate Assistance, Max=Maximal Assistance, Total=Total Assistance, NT=Not Tested    ACTIVITIES OF DAILY LIVING: I Mod I S SBA CGA Min Mod Max Total NT Comments   BASIC ADLs:              Upper Body   Bathing [] [] [] [] [] [] [] [] [] []    Lower Body Bathing [] [] [] [] [] [] [] [] [] []    Toileting [] [] [] [] [] [] [] [] [] []    Upper Body Dressing [] [] [] [] [] [] [] [] [] []    Lower Body Dressing [] [] [] [] [] [] [] [] [] []    Feeding [] [] [] [] [] [] [] [] [] []    Grooming [] [] [] [] [] [] [] [] [] []    Personal Device Care [] [] [] [] [] [] [] [] [] []    Functional Mobility [] [] [] [] [] [] [] [] [] []    I=Independent, Mod I=Modified Independent, S=Supervision/Setup, SBA=Standby Assistance, CGA=Contact Guard Assistance, Min=Minimal Assistance, Mod=Moderate Assistance, Max=Maximal Assistance, Total=Total Assistance, NT=Not Tested    BALANCE: Good Fair+ Fair Fair- Poor NT Comments   Sitting Static [] [] [] [] [] []    Sitting Dynamic [] [] [] [] [] []              Standing Static [] [] [] [] [] []    Standing Dynamic [] [] [] [] [] []        PLAN:     FREQUENCY/DURATION   OT Plan of Care: 3 times/week for duration of hospital stay or until stated goals are met, whichever comes first.    TREATMENT:     TREATMENT:   Therapeutic Activity (26 Minutes): Therapeutic activity included Rolling to improve functional Activity tolerance, Mobility, Strength, ROM, and exercises.     TREATMENT GRID:  N/A    AFTER TREATMENT PRECAUTIONS: Bed, Bed/Chair Locked, Call light within reach, Needs within reach, RN notified, and Side rails x3    INTERDISCIPLINARY COLLABORATION:  RN/ PCT and OT/ STEEN    EDUCATION:       TOTAL TREATMENT DURATION AND TIME:  Time In: [facility] [address]  Time Out: [place]  Minutes: Robert [address], CHANDLER

## 2022-10-11 NOTE — PROGRESS NOTES
Infectious Disease Progress Note    Today's Date: 10/11/2022   Admit Date: 10/5/2022    Impression:   MRSA penile abscess / perineal soft tissue infection  S/p I&D,wound vac, 10/7/22; Cx: MRSA  MRSA soft tissue paraspinous abscess  Visual disturbance / R arm weakness  Urinary retention  Poorly controlled DM    Plan:   Continue IV vancomycin for now. Suspect vision issues are related to DM in some way. Await brain MRI. I anticipate transition to oral antibiotics to complete 2-3 weeks of treatment. Anti-infectives:   IV vancomycin    Subjective: Interval history: afebrile; ongoing pain; still seeing \"ants\"     No Known Allergies     Review of Systems:  A comprehensive review of systems is negative except as stated above. Objective:     Visit Vitals  /85   Pulse 81   Temp 99.3 °F (37.4 °C) (Oral)   Resp 18   Ht 6' 2\" (1.88 m)   Wt (!) 365 lb (165.6 kg)   SpO2 93%   BMI 46.86 kg/m²     Temp (24hrs), Av °F (37.2 °C), Min:98.4 °F (36.9 °C), Max:99.6 °F (37.6 °C)       Lines:  Peripheral IV:       Physical Exam:    General:  In no acute distress   Eyes:     Mouth/Throat:    Neck:    Lungs:   Breathing comfortably   CV:     Abdomen:   Non-distended   Extremities: Trace edema   Skin: Perineal wound vac in place   Lymph nodes:    Musculoskeletal: No swollen or inflamed joints   Lines/Devices:     Psych: Alert and oriented       Data Review:     CBC:  Recent Labs     10/09/22  0627 10/10/22  0706 10/11/22  0528   WBC 14.4* 12.4* 14.0*   HGB 9.2* 9.2* 9.3*   HCT 29.0* 29.4* 30.0*    402 457*       BMP:  Recent Labs     10/09/22  0627 10/10/22  0706 10/11/22  0528   BUN 21 20 18    139 139   K 4.3 4.3 4.3    108 109   CO2 24 26 26       LFTS:  No results for input(s): ALT, TP, ALB in the last 72 hours. Invalid input(s): TBILI, SGOT, AP    Microbiology:   Reviewed    Imaging:   10/10/22 CT head:     FINDINGS: Brain volume is appropriate for age.  No acute infarct, hemorrhage or   mass is identified. There is no mass effect, midline shift or depressed   fracture. The visualized paranasal sinuses and mastoid air cells are clear,   except for mild right maxillary sinus mucosal thickening.        Signed By: Rosa Chairez MD     October 11, 2022

## 2022-10-11 NOTE — CARE COORDINATION
RNCM met with patient in room 203 to discuss discharge needs; possibly sunita wound vac. Patient awake in bed, A/O. Patient confirms that he is a resident of [place] with a [place]'s, explaining that he \"travels all over\". States he is from [place], then went to [place], then from [place] to [place], [place], then [place] and [place]. Patient states he has been in [place] for 6yrs. Patient lives with female friend in . Romeo Otto asked patient if he established PCP or applied for SCDL since he has been here for 6yrs. Patient states, \"no, my doctors are in [place]; my wife is in [place], all my mail goes there. I go back and forth\". 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 Patient provided home address for female friend, last name Andres; patient cannot recall first name? Kelton Adamson? ? Patient provided [address] name and confirmed address for [employer]. Patient denies insurance coverage and PCP in [place], states he will go back to [place] to doctors there. Patient needs a wound vac, however sunita wound vac would need to be returned and patient confirms \"traveling back and forth\". RNCM will discuss with [facility] and supervisor. Addendum: patient LAQUITA for MRI. RNCM noted female friend in room. RNCM met with friend in room 203 to ask if she is a relative? Female stated name as Naida Mathew. States they [place] together but all his mail goes to [place]\". RNCM stated needing physical address here in the event patient needs home health, wound care. Kelton Adamson provide [address] [facility], states their home \"is behind the store\". Kelton Adamson did provide contact number: [phone].

## 2022-10-11 NOTE — PROGRESS NOTES
END OF SHIFT NOTE:    INTAKE/OUTPUT  10/10 0701 - 10/11 0700  In: -   Out: 3000 [Urine:3000]  Voiding: No  Catheter: Yes  Drain:   Negative Pressure Wound Therapy Groin Anterior (Active)   $ Standard NPWT <=50 sq cm PER TX $ Yes 10/10/22 1333   Wound Type Surgical 10/11/22 1420   Unit Type kci ulta 10/11/22 1420   Dressing Type Black Foam 10/11/22 1420   Number of pieces used 1 10/10/22 1333   Cycle Continuous 10/11/22 1420   Target Pressure (mmHg) 125 10/11/22 1420   Canister changed? Yes 10/10/22 1333   Dressing Status Clean, dry & intact 10/11/22 1420   Dressing Changed Dressing reinforced 10/10/22 1333   Drainage Amount Small 10/10/22 1951   Drainage Description Serosanguinous;Purulent 10/11/22 1420   Dressing Change Due 10/12/22 10/11/22 1420   Wound Assessment Slough; Subcutaneous;Granulation tissue 10/10/22 1333   Nemo-wound Assessment Intact 10/10/22 1333   Shape irregular 10/10/22 1333   Odor Mild 10/10/22 1333       Negative Pressure Wound Therapy Back Left;Mid (Active)   $ Standard NPWT <=50 sq cm PER TX $ Yes 10/10/22 1333   Wound Type Surgical 10/11/22 1420   Unit Type kci ulta 10/11/22 1420   Dressing Type Black Foam 10/11/22 1420   Number of pieces used 1 10/10/22 1333   Number of pieces removed 1 10/10/22 1333   Cycle Continuous 10/11/22 1420   Target Pressure (mmHg) 125 10/11/22 1420   Canister changed? Yes 10/10/22 1333   Dressing Status Clean, dry & intact 10/11/22 1420   Dressing Changed Changed/New 10/10/22 1333   Drainage Amount Moderate 10/10/22 1333   Drainage Description Serosanguinous 10/11/22 1420   Dressing Change Due 10/12/22 10/11/22 1420   Wound Assessment Granulation tissue; Subcutaneous; Exposed structure muscle 10/10/22 1333   Shape oval 10/10/22 1333   Odor None 10/10/22 1333       Urinary Catheter 10/06/22 Robles (Active)   $ Urethral catheter insertion Inserted for procedure 10/10/22 0800   Catheter Indications Assist in healing of open sacral or perineal wounds (Stage III, IV, or unstageable documented by a provider or enterostomal therapy) and/or full thickness perineal and lower extremity burns in incontinent patients 10/11/22 1420   Site Assessment Swelling 10/11/22 1420   Urine Color Jinny 10/11/22 1420   Urine Appearance Sediment 10/11/22 1420   Urine Odor Other (Comment) 10/07/22 1135   Collection Container Standard 10/11/22 1420   Securement Method Securing device (Describe) 10/11/22 1420   Catheter Care Completed Yes 10/11/22 1420   Catheter Best Practices  Drainage tube clipped to bed;Catheter secured to thigh; Tamper seal intact; Bag below bladder;Bag not on floor; Lack of dependent loop in tubing;Drainage bag less than half full 10/11/22 1420   Status Draining 10/11/22 1420   Output (mL) 300 mL 10/11/22 1129               Flatus: Patient does have flatus present. Stool: 0 occurrences. Characteristics:           Stool Assessment  Last BM (including prior to admit): 10/05/22    Emesis: 0 occurrences. Characteristics:        VITAL SIGNS  Patient Vitals for the past 12 hrs:   Temp Pulse Resp BP SpO2   10/11/22 1737 -- -- 17 -- --   10/11/22 1653 99.5 °F (37.5 °C) 95 18 136/85 93 %   10/11/22 1224 -- -- 19 -- --   10/11/22 1129 100 °F (37.8 °C) 88 18 118/79 96 %   10/11/22 0857 -- -- 17 -- --   10/11/22 0719 99.3 °F (37.4 °C) 81 18 133/85 93 %       Pain Assessment  Pain Level: 0 (10/11/22 1737)  Pain Location: Back, Penis  Patient's Stated Pain Goal: 0 - No pain    Ambulating  No    Shift report given to oncoming nurse at the bedside.     Michael Montanez RN

## 2022-10-11 NOTE — WOUND CARE
Wound seal remained over night, wound vac seal may be tenuous. Vac papers for home machine started given to , may be difficult as patient is uninsured.

## 2022-10-11 NOTE — PROGRESS NOTES
Hospitalist Progress Note   Admit Date:  10/5/2022  7:22 PM   Name:  Eula Uriarte   Age:  60 y.o. Sex:  male  :  1965   MRN:  [id number]   Room:      Reason(s) for Admission: Cellulitis and abscess of trunk [L03.319, L02.219]  Abscess [L02.91]  Hyperglycemia [R73.9]  Abscess, perineum [facility] Course & Interval History:   Mr. Michell Calero is a nice 59 y/o male with a h/o DM2 and HTN who was admitted to our service on 10/5 with an abscess on his upper back for about 2 months. CT was done showing a paraspinal abscess w/o muscular involvement along with L inguinal inflammatory changes with possible early abscess of scrotum. Urology and General Surgery were consulted. He was started on antibiotics. Hyperglycemia, A1C >14, started on basal + bolus insulin. ID consulted, now on Zosyn. HIV neg, other ID orders pending. On 10/7 he underwent I&D of back abscess and L groin abscess. He later underwent debridement of penile abscess with Urology. Wound cultures from 10/5 I&D with staph aureus. Wound vac placed 10/10. Subjective/24hr Events (10/11/22): Had a better night in terms of pain, R hand  strength is improved but vision still the same. No leg weakness. Still some intermittent pain in his groin. No SOB or N/V/D. Assessment & Plan:   # Sepsis 2/2 abscess of upper back, L groin and penis due to MRSA              - Leukocytosis + tachycardia + abscesses on CT. Resolved. - S/p I&D of back and groin abscess, penile debridement on 10/7. 10/5 wound culture with MRSA. Wound vac placed 10/10.   - Con't vancomycin. Appreciate ID input. Wound dressing changes, wound vac 10/10? Con't pain control, glucose control. Con't IV vancomycin. # Visual changes // RUE weakness   - RUE strength improved 10/11 AM              - Head CT unremarkable. LDL 88. Onset seems too rapid to be DM retinopathy but will await brain MRI.  Statin added for DM, ASA empirically in case of Poor dentition. Neck:  No restricted ROM. Trachea midline. CV:   RRR. No m/r/g. No jugular venous distension. Lungs:   CTAB. No wheezing, rhonchi, or rales. Respirations even, unlabored. Abdomen: Bowel sounds present. Soft, nontender, nondistended. :  Robles in place. Extremities: No cyanosis or clubbing. No edema. Skin:     No rashes and normal coloration. Warm and dry. Wound vac to mid-back. Neuro:  CN II-XII grossly intact. A&Ox3. Improved  strength R hand. Psych:  Normal mood and affect.       I have reviewed ordered lab tests and independently visualized imaging below:    Recent Labs:  Recent Results (from the past 48 hour(s))   POCT Glucose    Collection Time: 10/09/22 12:06 PM   Result Value Ref Range    POC Glucose 247 (H) 65 - 100 mg/dL    Performed by: Juan Jose Stallion    POCT Glucose    Collection Time: 10/09/22  4:52 PM   Result Value Ref Range    POC Glucose 210 (H) 65 - 100 mg/dL    Performed by: Juan Jose Stallion    POCT Glucose    Collection Time: 10/09/22  9:10 PM   Result Value Ref Range    POC Glucose 219 (H) 65 - 100 mg/dL    Performed by: Katelyn Webber    Vancomycin Level, Random    Collection Time: 10/10/22  7:06 AM   Result Value Ref Range    Vancomycin Rm 21.6 UG/ML   Basic Metabolic Panel w/ Reflex to MG    Collection Time: 10/10/22  7:06 AM   Result Value Ref Range    Sodium 139 138 - 145 mmol/L    Potassium 4.3 3.5 - 5.1 mmol/L    Chloride 108 101 - 110 mmol/L    CO2 26 21 - 32 mmol/L    Anion Gap 5 4 - 13 mmol/L    Glucose 189 (H) 65 - 100 mg/dL    BUN 20 6 - 23 MG/DL    Creatinine 1.70 (H) 0.8 - 1.5 MG/DL    Est, Glom Filt Rate 46 (L) >60 ml/min/1.73m2    Calcium 8.1 (L) 8.3 - 10.4 MG/DL   CBC    Collection Time: 10/10/22  7:06 AM   Result Value Ref Range    WBC 12.4 (H) 4.3 - 11.1 K/uL    RBC 3.21 (L) 4.23 - 5.6 M/uL    Hemoglobin 9.2 (L) 13.6 - 17.2 g/dL    Hematocrit 29.4 (L) 41.1 - 50.3 %    MCV 91.6 79.6 - 97.8 FL    MCH 28.7 26.1 - 32.9 PG    MCHC 31.3 (L) 31.4 - 35.0 g/dL    RDW 14.1 11.9 - 14.6 %    Platelets 643 440 - 898 K/uL    MPV 10.4 9.4 - 12.3 FL    nRBC 0.00 0.0 - 0.2 K/uL   POCT Glucose    Collection Time: 10/10/22  7:12 AM   Result Value Ref Range    POC Glucose 186 (H) 65 - 100 mg/dL    Performed by: Princess Badillo    POCT Glucose    Collection Time: 10/10/22 11:21 AM   Result Value Ref Range    POC Glucose 219 (H) 65 - 100 mg/dL    Performed by: Princess Badillo    POCT Glucose    Collection Time: 10/10/22  4:41 PM   Result Value Ref Range    POC Glucose 120 (H) 65 - 100 mg/dL    Performed by: Princess Badillo    POCT Glucose    Collection Time: 10/10/22  9:07 PM   Result Value Ref Range    POC Glucose 156 (H) 65 - 100 mg/dL    Performed by: Kathleen    Lipid Panel    Collection Time: 10/11/22  5:28 AM   Result Value Ref Range    Cholesterol, Total 138 <200 MG/DL    Triglycerides 166 (H) 35 - 150 MG/DL    HDL 16 (L) 40 - 60 MG/DL    LDL Calculated 88.8 <100 MG/DL    VLDL Cholesterol Calculated 33.2 (H) 6.0 - 23.0 MG/DL    Chol/HDL Ratio 8.6     CBC    Collection Time: 10/11/22  5:28 AM   Result Value Ref Range    WBC 14.0 (H) 4.3 - 11.1 K/uL    RBC 3.24 (L) 4.23 - 5.6 M/uL    Hemoglobin 9.3 (L) 13.6 - 17.2 g/dL    Hematocrit 30.0 (L) 41.1 - 50.3 %    MCV 92.6 82 - 102 FL    MCH 28.7 26.1 - 32.9 PG    MCHC 31.0 (L) 31.4 - 35.0 g/dL    RDW 14.5 11.9 - 14.6 %    Platelets 859 (H) 930 - 450 K/uL    MPV 10.3 9.4 - 12.3 FL    nRBC 0.00 0.0 - 0.2 K/uL   Basic Metabolic Panel w/ Reflex to MG    Collection Time: 10/11/22  5:28 AM   Result Value Ref Range    Sodium 139 133 - 143 mmol/L    Potassium 4.3 3.5 - 5.1 mmol/L    Chloride 109 101 - 110 mmol/L    CO2 26 21 - 32 mmol/L    Anion Gap 4 2 - 11 mmol/L    Glucose 159 (H) 65 - 100 mg/dL    BUN 18 6 - 23 MG/DL    Creatinine 1.60 (H) 0.8 - 1.5 MG/DL    Est, Glom Filt Rate 50 (L) >60 ml/min/1.73m2    Calcium 8.4 8.3 - 10.4 MG/DL   POCT Glucose    Collection Time: 10/11/22  7:20 AM   Result Value Ref Range POC Glucose 172 (H) 65 - 100 mg/dL    Performed by: Randolph          Other Studies:  CT HEAD WO CONTRAST    Result Date: 10/10/2022  EXAM: Noncontrast CT head. INDICATION: Visual disturbance. COMPARISON: None. TECHNIQUE: Noncontrast CT images of the head were obtained. Radiation dose reduction techniques were used for this study. Our CT scanners use one or all of the following:  Automated exposure control, adjustment of the mA or kV according to patient size, iterative reconstruction. FINDINGS: Brain volume is appropriate for age. No acute infarct, hemorrhage or mass is identified. There is no mass effect, midline shift or depressed fracture. The visualized paranasal sinuses and mastoid air cells are clear, except for mild right maxillary sinus mucosal thickening. No acute process.       Current Meds:  Current Facility-Administered Medications   Medication Dose Route Frequency    aspirin EC tablet 81 mg  81 mg Oral Daily    vancomycin (VANCOCIN) 1500 mg in sodium chloride 0.9% 500 mL IVPB  1,500 mg IntraVENous Q24H    atorvastatin (LIPITOR) tablet 40 mg  40 mg Oral Nightly    insulin glargine (LANTUS) injection vial 34 Units  34 Units SubCUTAneous Daily    docusate sodium (COLACE) capsule 100 mg  100 mg Oral BID    miconazole (MICOTIN) 2 % powder   Topical BID    oxyCODONE (ROXICODONE) immediate release tablet 10 mg  10 mg Oral Q4H PRN    [Held by provider] sodium hypochlorite (DAKINS) 0.125 % external solution   Irrigation BID    gabapentin (NEURONTIN) capsule 100 mg  100 mg Oral TID    methocarbamol (ROBAXIN) tablet 750 mg  750 mg Oral 4x Daily    HYDROmorphone HCl PF (DILAUDID) injection 0.5 mg  0.5 mg IntraVENous Q3H PRN    insulin lispro (HUMALOG) injection vial 4 Units  4 Units SubCUTAneous TID WC    glucose chewable tablet 16 g  4 tablet Oral PRN    dextrose bolus 10% 125 mL  125 mL IntraVENous PRN    Or    dextrose bolus 10% 250 mL  250 mL IntraVENous PRN    glucagon (rDNA) injection 1 mg 1 mg SubCUTAneous PRN    dextrose 10 % infusion   IntraVENous Continuous PRN    insulin lispro (HUMALOG) injection vial 0-8 Units  0-8 Units SubCUTAneous TID WC    insulin lispro (HUMALOG) injection vial 0-4 Units  0-4 Units SubCUTAneous Nightly    sodium chloride flush 0.9 % injection 5-40 mL  5-40 mL IntraVENous 2 times per day    sodium chloride flush 0.9 % injection 5-40 mL  5-40 mL IntraVENous PRN    0.9 % sodium chloride infusion   IntraVENous PRN    enoxaparin (LOVENOX) injection 40 mg  40 mg SubCUTAneous BID    ondansetron (ZOFRAN-ODT) disintegrating tablet 4 mg  4 mg Oral Q8H PRN    Or    ondansetron (ZOFRAN) injection 4 mg  4 mg IntraVENous Q6H PRN    polyethylene glycol (GLYCOLAX) packet 17 g  17 g Oral Daily PRN    acetaminophen (TYLENOL) tablet 650 mg  650 mg Oral Q6H PRN    Or    acetaminophen (TYLENOL) suppository 650 mg  650 mg Rectal Q6H PRN       Signed:  Yury Hooks MD    Part of this note may have been written by using a voice dictation software. The note has been proof read but may still contain some grammatical/other typographical errors.

## 2022-10-11 NOTE — PROGRESS NOTES
Physical Therapy Note:    Attempted to see patient this PM for physical therapy treatment  session. Patient was off floor for MRI. Will follow and re-attempt as schedule permits/patient available.  Thank you,    Raquel Mazariegos, PT     Rehab Caseload Tracker

## 2022-10-11 NOTE — PROGRESS NOTES
END OF SHIFT NOTE:    INTAKE/OUTPUT  10/10 0701 - 10/11 0700  In: -   Out: 3000 [[id number]]  Voiding: Yes  Catheter: Yes  Drain:   Negative Pressure Wound Therapy Groin Anterior (Active)   $ Standard NPWT <=50 sq cm PER TX $ Yes 10/10/22 1333   Wound Type Surgical 10/10/22 1951   Unit Type kci ulta 10/10/22 1951   Dressing Type Black Foam 10/10/22 1951   Number of pieces used 1 10/10/22 1333   Cycle Continuous 10/10/22 1333   Target Pressure (mmHg) 125 10/10/22 1951   Canister changed? Yes 10/10/22 1333   Dressing Status Clean, dry & intact 10/10/22 1951   Dressing Changed Dressing reinforced 10/10/22 1333   Drainage Amount Small 10/10/22 1951   Drainage Description Serosanguinous;Purulent 10/10/22 1333   Dressing Change Due 10/12/22 10/10/22 [address]; Subcutaneous;Granulation tissue 10/10/22 1333   Nemo-wound Assessment Intact 10/10/22 1333   Shape irregular 10/10/22 1333   Odor Mild 10/10/22 1333       Negative Pressure Wound Therapy Back Left;Mid (Active)   $ Standard NPWT <=50 sq cm PER TX $ Yes 10/10/22 1333   Wound Type Surgical 10/10/22 1951   Unit Type kci ulta 10/10/22 1951   Dressing Type Black Foam 10/10/22 1951   Number of pieces used 1 10/10/22 1333   Number of pieces removed 1 10/10/22 1333   Target Pressure (mmHg) 125 10/10/22 1951   Canister changed? Yes 10/10/22 1333   Dressing Status New dressing applied 10/10/22 1333   Dressing Changed Changed/New 10/10/22 1333   Drainage Amount Moderate 10/10/22 1333   Drainage Description Serosanguinous 10/10/22 1333   Dressing Change Due 10/12/22 10/10/22 1333   Wound Assessment Granulation tissue; Subcutaneous; Exposed structure muscle 10/10/22 1333   Shape oval 10/10/22 1333   Odor None 10/10/22 1333       Urinary Catheter 10/06/22 Robles (Active)   $ Urethral catheter insertion Inserted for procedure 10/10/22 0800   Catheter Indications Assist in healing of open sacral or perineal wounds (Stage III, IV, or unstageable documented by a provider or enterostomal therapy) and/or full thickness perineal and lower extremity burns in incontinent patients 10/10/22 1951   Site Assessment Urethral drainage; Swelling 10/10/22 1951   Urine Color Yellow 10/10/22 1951   Urine Appearance Sediment 10/10/22 1951   Urine Odor Other (Comment) 10/07/22 1135   Collection Container Standard 10/10/22 1951   Securement Method Securing device (Describe) 10/10/22 1951   Catheter Care Completed Yes 10/10/22 1951   Catheter Best Practices  Drainage tube clipped to bed;Catheter secured to thigh; Tamper seal intact; Bag below bladder;Bag not on floor; Lack of dependent loop in tubing;Drainage bag less than half full 10/10/22 1951   Status Draining 10/10/22 1951   Output (mL) 375 mL 10/11/22 0325               Flatus: Patient does have flatus present. Stool: 0 occurrences. Characteristics:           Stool Assessment  Last BM (including prior to admit): 10/05/22    Emesis:  occurrences. Characteristics:        VITAL SIGNS  Patient Vitals for the past 12 hrs:   Temp Pulse Resp BP SpO2   10/11/22 0325 98.6 °F (37 °C) 89 18 121/81 94 %   10/10/22 2341 99.3 °F (37.4 °C) 91 18 121/73 90 %   10/10/22 1918 99.6 °F (37.6 °C) 89 18 124/78 95 %       Pain Assessment  Pain Level: 5 (10/11/22 0215)  Pain Location: Abdomen, Back  Patient's Stated Pain Goal: 0 - No pain    Ambulating  No    Shift report given to oncoming nurse at the bedside.     Silvestre Bell RN

## 2022-10-11 NOTE — PROGRESS NOTES
GENERAL SURGERY PROGRESS NOTE    Name:  Connie Gutierrez Date/Time of Admission: 10/5/2022  7:22 PM  CSN: [id number]  Attending Provider: Fadumo Hicks MD  Room/Bed:   : 1965 60 y.o.      10/11/2022   POD#: 4 I&D back abscess and left groin/scrotal and penial abscess    SUBJECTIVE:    Patient seen and examined. Pt doing well, pain moderately well controlled. Tolerating diet. Wound vac in place with a good seal    Current Meds:  Scheduled Meds:   aspirin  81 mg Oral Daily    vancomycin  1,500 mg IntraVENous Q24H    atorvastatin  40 mg Oral Nightly    insulin glargine  34 Units SubCUTAneous Daily    docusate sodium  100 mg Oral BID    miconazole   Topical BID    [Held by provider] sodium hypochlorite   Irrigation BID    gabapentin  100 mg Oral TID    methocarbamol  750 mg Oral 4x Daily    insulin lispro  4 Units SubCUTAneous TID WC    insulin lispro  0-8 Units SubCUTAneous TID WC    insulin lispro  0-4 Units SubCUTAneous Nightly    sodium chloride flush  5-40 mL IntraVENous 2 times per day    enoxaparin  40 mg SubCUTAneous BID     Continuous Infusions:   dextrose      sodium chloride       PRN Meds:.oxyCODONE, HYDROmorphone, glucose, dextrose bolus **OR** dextrose bolus, glucagon (rDNA), dextrose, sodium chloride flush, sodium chloride, ondansetron **OR** ondansetron, polyethylene glycol, acetaminophen **OR** acetaminophen     OBJECTIVE:    Vital Signs:  Vitals:    10/11/22 1129   BP: 118/79   Pulse: 88   Resp: 18   Temp: 100 °F (37.8 °C)   SpO2: 96%        I/O:    I/O last 3 completed shifts:   In: 855 [I.V.:855]  Out: 3600 [Urine:3600]   I/O this shift:  In: -   Out: 600 [Urine:600]      PHYSICAL EXAMINATION  General  Alert, cooperative, NAD  Lungs Unlabored breathing  Heart RRR  Abdominal Soft, nontender, nondistended, no involuntary guarding or rebound appreciated   Extremities No edema or gross deformities noted appreciated   Incisions C/d, induration improving    Labs:   Lab Results Component Value Date    WBC 14.0 (H) 10/11/2022    HGB 9.3 (L) 10/11/2022    HCT 30.0 (L) 10/11/2022    MCV 92.6 10/11/2022     (H) 10/11/2022        Lab Results   Component Value Date/Time     10/11/2022 05:28 AM    K 4.3 10/11/2022 05:28 AM     10/11/2022 05:28 AM    CO2 26 10/11/2022 05:28 AM    BUN 18 10/11/2022 05:28 AM    CREATININE 1.60 10/11/2022 05:28 AM    GLUCOSE 159 10/11/2022 05:28 AM    CALCIUM 8.4 10/11/2022 05:28 AM         Imaging Studies: none    ASSESSMENT:    Principal Problem:    Sepsis ([address]  Active Problems:    Primary hypertension    DM2 (diabetes mellitus, type 2) (HCC)    Cellulitis and abscess of trunk    Abscess of groin, left    Penile abscess    Acute urinary retention  Resolved Problems:    * No resolved hospital problems.  *    -AFVSS      PLAN:    Continue IV abx  Continue wound vac  Pain control  Bowel regimen  Maintain jaimes  SW for DC planning    Electronically signed by:  Mina Muñoz DO  10/11/2022

## 2022-10-11 NOTE — PLAN OF CARE
Problem: Discharge Planning  Goal: Discharge to home or other facility with appropriate resources  Outcome: Progressing     Problem: Safety - Adult  Goal: Free from fall injury  Outcome: Progressing  Flowsheets (Taken 10/11/2022 2594)  Free From Fall Injury: Instruct family/caregiver on patient safety     Problem: Pain  Goal: Verbalizes/displays adequate comfort level or baseline comfort level  Outcome: Progressing     Problem: Chronic Conditions and Co-morbidities  Goal: Patient's chronic conditions and co-morbidity symptoms are monitored and maintained or improved  Outcome: Progressing     Problem: Skin/Tissue Integrity  Goal: Absence of new skin breakdown  Description: 1. Monitor for areas of redness and/or skin breakdown  2. Assess vascular access sites hourly  3. Every 4-6 hours minimum:  Change oxygen saturation probe site  4. Every 4-6 hours:  If on nasal continuous positive airway pressure, respiratory therapy assess nares and determine need for appliance change or resting period.   Outcome: Progressing

## 2022-10-11 NOTE — PROGRESS NOTES
VANCO DAILY FOLLOW UP NOTE  [address] [facility] Pharmacokinetic Monitoring Service - Vancomycin    Consulting Provider: Dr. Kane Krishnan   Indication: SSTI  Target Concentration: Goal trough of 10-15 mg/L and AUC/MADINA <500 mg*hr/L  Day of Therapy: 4  Additional Antimicrobials: none    Pertinent Laboratory Values: Wt Readings from Last 1 Encounters:   10/07/22 (!) 365 lb (165.6 kg)     Temp Readings from Last 1 Encounters:   10/11/22 99.3 °F (37.4 °C) (Oral)     Recent Labs     10/09/22  0627 10/10/22  0706 10/11/22  0528   BUN 21 20 18   CREATININE 1.70* 1.70* 1.60*   WBC 14.4* 12.4* 14.0*     Estimated Creatinine Clearance: 82 mL/min (A) (based on SCr of 1.6 mg/dL (H)). Lab Results   Component Value Date/Time    VANCORANDOM 21.6 10/10/2022 07:06 AM     MRSA Nasal Swab: N/A. Non-respiratory infection.     Assessment:  Date/Time Dose Concentration AUC   10/10 0706 1250 mg q18h 21.6 513   Note: Serum concentrations collected for AUC dosing may appear elevated if collected in close proximity to the dose administered, this is not necessarily an indication of toxicity    Plan:  Continue current dosing regimen of 1500 mg q24h  Repeat vancomycin concentration ordered for 10/12 @ 0400  Pharmacy will continue to monitor patient and adjust therapy as indicated    Thank you for the consult,  Alexandra John, [facility]

## 2022-10-11 NOTE — DIABETES MGMT
Patient admitted with sepsis. Blood glucose ranged 120-219 yesterday with patient receiving Lantus 30 units and Humalog 14 units. Blood glucose this morning was 172. Creatinine 1.60. GFR 50. Reviewed patient current regimen: Lantus 34 units daily, Humalog 4 units prandial, and Humalog correctional insulin. Glycemic control improving on current regimen.

## 2022-10-12 ENCOUNTER — APPOINTMENT (OUTPATIENT)
Dept: CT IMAGING | Age: 57
DRG: 854 | End: 2022-10-12

## 2022-10-12 LAB
ANION GAP SERPL CALC-SCNC: 4 MMOL/L (ref 2–11)
BUN SERPL-MCNC: 17 MG/DL (ref 6–23)
C IMMITIS AB TITR SER CF: NEGATIVE {TITER}
CALCIUM SERPL-MCNC: 8.4 MG/DL (ref 8.3–10.4)
CHLORIDE SERPL-SCNC: 109 MMOL/L (ref 101–110)
CO2 SERPL-SCNC: 23 MMOL/L (ref 21–32)
CREAT SERPL-MCNC: 1.6 MG/DL (ref 0.8–1.5)
ERYTHROCYTE [DISTWIDTH] IN BLOOD BY AUTOMATED COUNT: 14.2 % (ref 11.9–14.6)
GLUCOSE BLD STRIP.AUTO-MCNC: 132 MG/DL (ref 65–100)
GLUCOSE BLD STRIP.AUTO-MCNC: 160 MG/DL (ref 65–100)
GLUCOSE BLD STRIP.AUTO-MCNC: 213 MG/DL (ref 65–100)
GLUCOSE BLD STRIP.AUTO-MCNC: 245 MG/DL (ref 65–100)
GLUCOSE SERPL-MCNC: 185 MG/DL (ref 65–100)
HCT VFR BLD AUTO: 30.5 % (ref 41.1–50.3)
HGB BLD-MCNC: 9.2 G/DL (ref 13.6–17.2)
MCH RBC QN AUTO: 28 PG (ref 26.1–32.9)
MCHC RBC AUTO-ENTMCNC: 30.2 G/DL (ref 31.4–35)
MCV RBC AUTO: 93 FL (ref 82–102)
NRBC # BLD: 0 K/UL (ref 0–0.2)
PLATELET # BLD AUTO: 516 K/UL (ref 150–450)
PMV BLD AUTO: 10.4 FL (ref 9.4–12.3)
POTASSIUM SERPL-SCNC: 4.5 MMOL/L (ref 3.5–5.1)
RBC # BLD AUTO: 3.28 M/UL (ref 4.23–5.6)
SERVICE CMNT-IMP: ABNORMAL
SODIUM SERPL-SCNC: 136 MMOL/L (ref 133–143)
VANCOMYCIN SERPL-MCNC: 23.8 UG/ML
WBC # BLD AUTO: 14.9 K/UL (ref 4.3–11.1)

## 2022-10-12 PROCEDURE — 2580000003 HC RX 258: Performed by: FAMILY MEDICINE

## 2022-10-12 PROCEDURE — 97530 THERAPEUTIC ACTIVITIES: CPT

## 2022-10-12 PROCEDURE — 97605 NEG PRS WND THER DME<=50SQCM: CPT

## 2022-10-12 PROCEDURE — 6360000002 HC RX W HCPCS: Performed by: INTERNAL MEDICINE

## 2022-10-12 PROCEDURE — 85027 COMPLETE CBC AUTOMATED: CPT

## 2022-10-12 PROCEDURE — 97535 SELF CARE MNGMENT TRAINING: CPT

## 2022-10-12 PROCEDURE — 80048 BASIC METABOLIC PNL TOTAL CA: CPT

## 2022-10-12 PROCEDURE — 97607 NEG PRS WND THR NDME<=50SQCM: CPT

## 2022-10-12 PROCEDURE — 1100000000 HC RM PRIVATE

## 2022-10-12 PROCEDURE — 6370000000 HC RX 637 (ALT 250 FOR IP): Performed by: INTERNAL MEDICINE

## 2022-10-12 PROCEDURE — 2580000003 HC RX 258: Performed by: INTERNAL MEDICINE

## 2022-10-12 PROCEDURE — 36415 COLL VENOUS BLD VENIPUNCTURE: CPT

## 2022-10-12 PROCEDURE — 6370000000 HC RX 637 (ALT 250 FOR IP): Performed by: STUDENT IN AN ORGANIZED HEALTH CARE EDUCATION/TRAINING PROGRAM

## 2022-10-12 PROCEDURE — 6360000004 HC RX CONTRAST MEDICATION: Performed by: FAMILY MEDICINE

## 2022-10-12 PROCEDURE — 6360000002 HC RX W HCPCS: Performed by: FAMILY MEDICINE

## 2022-10-12 PROCEDURE — 82962 GLUCOSE BLOOD TEST: CPT

## 2022-10-12 PROCEDURE — 2580000003 HC RX 258

## 2022-10-12 PROCEDURE — 72193 CT PELVIS W/DYE: CPT

## 2022-10-12 PROCEDURE — 99233 SBSQ HOSP IP/OBS HIGH 50: CPT | Performed by: STUDENT IN AN ORGANIZED HEALTH CARE EDUCATION/TRAINING PROGRAM

## 2022-10-12 PROCEDURE — 6370000000 HC RX 637 (ALT 250 FOR IP): Performed by: FAMILY MEDICINE

## 2022-10-12 PROCEDURE — 6370000000 HC RX 637 (ALT 250 FOR IP)

## 2022-10-12 PROCEDURE — 80202 ASSAY OF VANCOMYCIN: CPT

## 2022-10-12 RX ORDER — FLUCONAZOLE 100 MG/1
200 TABLET ORAL DAILY
Status: COMPLETED | OUTPATIENT
Start: 2022-10-12 | End: 2022-10-21

## 2022-10-12 RX ORDER — POLYETHYLENE GLYCOL 3350 17 G/17G
17 POWDER, FOR SOLUTION ORAL 2 TIMES DAILY
Status: DISCONTINUED | OUTPATIENT
Start: 2022-10-12 | End: 2022-10-24

## 2022-10-12 RX ORDER — SODIUM CHLORIDE 0.9 % (FLUSH) 0.9 %
10 SYRINGE (ML) INJECTION
Status: COMPLETED | OUTPATIENT
Start: 2022-10-12 | End: 2022-10-12

## 2022-10-12 RX ORDER — SODIUM CHLORIDE 9 MG/ML
INJECTION, SOLUTION INTRAVENOUS CONTINUOUS
Status: DISCONTINUED | OUTPATIENT
Start: 2022-10-12 | End: 2022-10-14

## 2022-10-12 RX ORDER — 0.9 % SODIUM CHLORIDE 0.9 %
100 INTRAVENOUS SOLUTION INTRAVENOUS
Status: COMPLETED | OUTPATIENT
Start: 2022-10-12 | End: 2022-10-12

## 2022-10-12 RX ADMIN — FLUCONAZOLE 200 MG: 100 TABLET ORAL at 16:33

## 2022-10-12 RX ADMIN — SODIUM CHLORIDE 100 ML: 9 INJECTION, SOLUTION INTRAVENOUS at 15:14

## 2022-10-12 RX ADMIN — ATORVASTATIN CALCIUM 40 MG: 40 TABLET, FILM COATED ORAL at 21:34

## 2022-10-12 RX ADMIN — ANTI-FUNGAL POWDER MICONAZOLE NITRATE TALC FREE: 1.42 POWDER TOPICAL at 09:40

## 2022-10-12 RX ADMIN — GABAPENTIN 100 MG: 100 CAPSULE ORAL at 14:16

## 2022-10-12 RX ADMIN — INSULIN GLARGINE 34 UNITS: 100 INJECTION, SOLUTION SUBCUTANEOUS at 09:56

## 2022-10-12 RX ADMIN — SODIUM CHLORIDE, PRESERVATIVE FREE 10 ML: 5 INJECTION INTRAVENOUS at 09:57

## 2022-10-12 RX ADMIN — SODIUM CHLORIDE: 9 INJECTION, SOLUTION INTRAVENOUS at 20:20

## 2022-10-12 RX ADMIN — Medication 1500 MG: at 21:35

## 2022-10-12 RX ADMIN — INSULIN LISPRO 2 UNITS: 100 INJECTION, SOLUTION INTRAVENOUS; SUBCUTANEOUS at 12:58

## 2022-10-12 RX ADMIN — HYDROMORPHONE HYDROCHLORIDE 0.5 MG: 1 INJECTION, SOLUTION INTRAMUSCULAR; INTRAVENOUS; SUBCUTANEOUS at 03:09

## 2022-10-12 RX ADMIN — ENOXAPARIN SODIUM 40 MG: 100 INJECTION SUBCUTANEOUS at 21:32

## 2022-10-12 RX ADMIN — METHOCARBAMOL TABLETS 750 MG: 750 TABLET, COATED ORAL at 16:33

## 2022-10-12 RX ADMIN — METHOCARBAMOL TABLETS 750 MG: 750 TABLET, COATED ORAL at 09:38

## 2022-10-12 RX ADMIN — GABAPENTIN 100 MG: 100 CAPSULE ORAL at 21:33

## 2022-10-12 RX ADMIN — ENOXAPARIN SODIUM 40 MG: 100 INJECTION SUBCUTANEOUS at 09:39

## 2022-10-12 RX ADMIN — METHOCARBAMOL TABLETS 750 MG: 750 TABLET, COATED ORAL at 13:01

## 2022-10-12 RX ADMIN — SODIUM CHLORIDE: 9 INJECTION, SOLUTION INTRAVENOUS at 14:16

## 2022-10-12 RX ADMIN — GABAPENTIN 100 MG: 100 CAPSULE ORAL at 09:39

## 2022-10-12 RX ADMIN — ASPIRIN 81 MG: 81 TABLET ORAL at 09:39

## 2022-10-12 RX ADMIN — POLYETHYLENE GLYCOL 3350 17 G: 17 POWDER, FOR SOLUTION ORAL at 09:39

## 2022-10-12 RX ADMIN — INSULIN LISPRO 4 UNITS: 100 INJECTION, SOLUTION INTRAVENOUS; SUBCUTANEOUS at 09:55

## 2022-10-12 RX ADMIN — SODIUM CHLORIDE, PRESERVATIVE FREE 10 ML: 5 INJECTION INTRAVENOUS at 21:32

## 2022-10-12 RX ADMIN — INSULIN LISPRO 4 UNITS: 100 INJECTION, SOLUTION INTRAVENOUS; SUBCUTANEOUS at 13:01

## 2022-10-12 RX ADMIN — DOCUSATE SODIUM 100 MG: 100 CAPSULE, LIQUID FILLED ORAL at 09:38

## 2022-10-12 RX ADMIN — OXYCODONE 10 MG: 5 TABLET ORAL at 23:50

## 2022-10-12 RX ADMIN — OXYCODONE 10 MG: 5 TABLET ORAL at 18:07

## 2022-10-12 RX ADMIN — IOPAMIDOL 100 ML: 755 INJECTION, SOLUTION INTRAVENOUS at 15:14

## 2022-10-12 RX ADMIN — HYDROMORPHONE HYDROCHLORIDE 0.5 MG: 1 INJECTION, SOLUTION INTRAMUSCULAR; INTRAVENOUS; SUBCUTANEOUS at 12:55

## 2022-10-12 RX ADMIN — METHOCARBAMOL TABLETS 750 MG: 750 TABLET, COATED ORAL at 21:34

## 2022-10-12 RX ADMIN — SODIUM CHLORIDE, PRESERVATIVE FREE 10 ML: 5 INJECTION INTRAVENOUS at 15:14

## 2022-10-12 RX ADMIN — HYDROMORPHONE HYDROCHLORIDE 0.5 MG: 1 INJECTION, SOLUTION INTRAMUSCULAR; INTRAVENOUS; SUBCUTANEOUS at 21:33

## 2022-10-12 RX ADMIN — DOCUSATE SODIUM 100 MG: 100 CAPSULE, LIQUID FILLED ORAL at 21:34

## 2022-10-12 RX ADMIN — OXYCODONE 10 MG: 5 TABLET ORAL at 09:57

## 2022-10-12 RX ADMIN — POLYETHYLENE GLYCOL 3350 17 G: 17 POWDER, FOR SOLUTION ORAL at 21:33

## 2022-10-12 ASSESSMENT — PAIN SCALES - GENERAL
PAINLEVEL_OUTOF10: 8
PAINLEVEL_OUTOF10: 9
PAINLEVEL_OUTOF10: 5
PAINLEVEL_OUTOF10: 7
PAINLEVEL_OUTOF10: 10
PAINLEVEL_OUTOF10: 9
PAINLEVEL_OUTOF10: 4
PAINLEVEL_OUTOF10: 9
PAINLEVEL_OUTOF10: 4
PAINLEVEL_OUTOF10: 10
PAINLEVEL_OUTOF10: 6

## 2022-10-12 ASSESSMENT — PAIN DESCRIPTION - PAIN TYPE
TYPE: SURGICAL PAIN
TYPE: SURGICAL PAIN

## 2022-10-12 ASSESSMENT — PAIN DESCRIPTION - ONSET: ONSET: OTHER (COMMENT)

## 2022-10-12 ASSESSMENT — PAIN DESCRIPTION - DESCRIPTORS
DESCRIPTORS: ACHING
DESCRIPTORS: ACHING;SORE;THROBBING
DESCRIPTORS: ACHING;SHOOTING;PRESSURE
DESCRIPTORS: ACHING
DESCRIPTORS: ACHING;PRESSURE
DESCRIPTORS: ACHING;PRESSURE;TENDER
DESCRIPTORS: ACHING;THROBBING
DESCRIPTORS: ACHING;PRESSURE
DESCRIPTORS: ACHING

## 2022-10-12 ASSESSMENT — PAIN DESCRIPTION - LOCATION
LOCATION: PENIS
LOCATION: BACK;PENIS
LOCATION: PENIS
LOCATION: INCISION
LOCATION: PENIS
LOCATION: PENIS;BACK
LOCATION: PENIS
LOCATION: PENIS

## 2022-10-12 ASSESSMENT — PAIN DESCRIPTION - ORIENTATION
ORIENTATION: ANTERIOR;LOWER

## 2022-10-12 NOTE — PROGRESS NOTES
Spiritual Care Visit, initial visit. Attempted visit with patient at bedside. Staff was attending patient. Visit by Dontae Davison, Staff .  Reva., Rl.JOSE., B.A.

## 2022-10-12 NOTE — PROGRESS NOTES
Hospitalist Progress Note   Admit Date:  10/5/2022  7:22 PM   Name:  Tomas Bolanos   Age:  60 y.o. Sex:  male  :  1965   MRN:  [id number]   Room:      Reason(s) for Admission: Cellulitis and abscess of trunk [L03.319, L02.219]  Abscess [L02.91]  Hyperglycemia [R73.9]  Abscess, perineum [facility] Course & Interval History:   Mr. Brody Eisenberg is a nice 61 y/o male with a h/o DM2 and HTN who was admitted to our service on 10/5 with an abscess on his upper back for about 2 months. CT was done showing a paraspinal abscess w/o muscular involvement along with L inguinal inflammatory changes with possible early abscess of scrotum. Urology and General Surgery were consulted. He was started on antibiotics. Hyperglycemia, A1C >14, started on basal + bolus insulin. ID consulted, now on Zosyn. HIV neg, other ID orders pending. On 10/7 he underwent I&D of back abscess and L groin abscess. He later underwent debridement of penile abscess with Urology. Wound cultures from 10/5 I&D with staph aureus. Wound vac placed 10/10. Subjective/24hr Events (10/12/22): Patient is seen at the bedside. Reports not feeling well. Complaining of pain and unable to sleep at night. Reports wound VAC is not working since yesterday. Will ask wound care to change Wound VAC. Complaining of blurred vision. Denies chest pain, palpitation, nausea, vomiting or abdominal pain. Assessment & Plan:     # Sepsis 2/2 abscess of upper back, L groin and penis due to MRSA  - Leukocytosis + tachycardia + abscesses on CT. Resolved. - S/p I&D of back and groin abscess, penile debridement on 10/7. 10/5 wound culture with MRSA. - Wound vac placed 10/10.  - Con't vancomycin. Appreciate ID input.  -Wound care       # Visual changes // RUE weakness  - RUE strength improved 10/11 AM  - Head CT unremarkable.  LDL 88  - MRI negative for acute pathology   - Statin added for DM     # Acute urinary retention  - Likely 2/2 penile abscess and edema. Con't Robles. # Uncontrolled DM2  - Con't basal + prandial + SSI at current dosing  - LDL 88, con't statin  - Stop glipizide at discharge. # HTN  - Controlled off meds. Discharge Planning: Pending, CM involved. Diet:  ADULT DIET; Regular; 3 carb choices (45 gm/meal)  DVT PPx: Lovenox  Code status: Full Code    Hospital Problems             Last Modified POA    * (Principal) Sepsis ([facility] [address] 10/8/2022 Yes    Primary hypertension 10/6/2022 Yes    DM2 (diabetes mellitus, type 2) ([facility] [address] 10/6/2022 Yes    Cellulitis and abscess of trunk 10/7/2022 Yes    Abscess of groin, left 10/8/2022 Yes    Penile abscess 10/8/2022 Yes    Acute urinary retention 10/8/2022 Yes      Objective:   Patient Vitals for the past 24 hrs:   Temp Pulse Resp BP SpO2   10/12/22 1137 99 °F (37.2 °C) 86 19 139/82 94 %   10/12/22 0735 99.1 °F (37.3 °C) 91 20 (!) 141/74 96 %   10/12/22 0434 99.1 °F (37.3 °C) 95 19 138/79 94 %   10/11/22 2352 100.4 °F (38 °C) 94 19 (!) 140/81 94 %   10/11/22 1924 98.4 °F (36.9 °C) 92 18 129/77 94 %   10/11/22 1737 -- -- 17 -- --   10/11/22 1653 99.5 °F (37.5 °C) 95 18 136/85 93 %         Estimated body mass index is 46.86 kg/m² as calculated from the following:    Height as of this encounter: 6' 2\" (1.88 m). Weight as of this encounter: 365 lb (165.6 kg). Intake/Output Summary (Last 24 hours) at 10/12/2022 1333  Last data filed at 10/12/2022 1252  Gross per 24 hour   Intake 711 ml   Output 3275 ml   Net -2564 ml           Physical Exam:   Blood pressure 139/82, pulse 86, temperature 99 °F (37.2 °C), temperature source Oral, resp. rate 19, height 6' 2\" (1.88 m), weight (!) 365 lb (165.6 kg), SpO2 94 %. General:    Well nourished. No overt distress. Morbidly obese. Head:  Normocephalic, atraumatic  Eyes:  Sclerae appear normal. Pupils equally round. ENT:  Nares appear normal, no drainage. Moist oral mucosa. Poor dentition. Neck:  No restricted ROM. Trachea midline. CV:   RRR. No m/r/g. No jugular venous distension. Lungs:   CTAB. No wheezing, rhonchi, or rales. Respirations even, unlabored. Abdomen: Bowel sounds present. Soft, nontender, nondistended. :  Robles in place. Extremities: No cyanosis or clubbing. No edema. Skin:     No rashes and normal coloration. Warm and dry. Wound vac to mid-back. Neuro:  CN II-XII grossly intact. A&Ox3. Improved  strength R hand. Psych:  Normal mood and affect.       I have reviewed ordered lab tests and independently visualized imaging below:    Recent Labs:  Recent Results (from the past 48 hour(s))   POCT Glucose    Collection Time: 10/10/22  4:41 PM   Result Value Ref Range    POC Glucose 120 (H) 65 - 100 mg/dL    Performed by: Ryder Conde    POCT Glucose    Collection Time: 10/10/22  9:07 PM   Result Value Ref Range    POC Glucose 156 (H) 65 - 100 mg/dL    Performed by: Kathleen    Lipid Panel    Collection Time: 10/11/22  5:28 AM   Result Value Ref Range    Cholesterol, Total 138 <200 MG/DL    Triglycerides 166 (H) 35 - 150 MG/DL    HDL 16 (L) 40 - 60 MG/DL    LDL Calculated 88.8 <100 MG/DL    VLDL Cholesterol Calculated 33.2 (H) 6.0 - 23.0 MG/DL    Chol/HDL Ratio 8.6     CBC    Collection Time: 10/11/22  5:28 AM   Result Value Ref Range    WBC 14.0 (H) 4.3 - 11.1 K/uL    RBC 3.24 (L) 4.23 - 5.6 M/uL    Hemoglobin 9.3 (L) 13.6 - 17.2 g/dL    Hematocrit 30.0 (L) 41.1 - 50.3 %    MCV 92.6 82 - 102 FL    MCH 28.7 26.1 - 32.9 PG    MCHC 31.0 (L) 31.4 - 35.0 g/dL    RDW 14.5 11.9 - 14.6 %    Platelets 543 (H) 073 - 450 K/uL    MPV 10.3 9.4 - 12.3 FL    nRBC 0.00 0.0 - 0.2 K/uL   Basic Metabolic Panel w/ Reflex to MG    Collection Time: 10/11/22  5:28 AM   Result Value Ref Range    Sodium 139 133 - 143 mmol/L    Potassium 4.3 3.5 - 5.1 mmol/L    Chloride 109 101 - 110 mmol/L    CO2 26 21 - 32 mmol/L    Anion Gap 4 2 - 11 mmol/L    Glucose 159 (H) 65 - 100 mg/dL    BUN 18 6 - 23 MG/DL    Creatinine 1.60 (H) 0.8 - 1.5 MG/DL    Est, Glom Filt Rate 50 (L) >60 ml/min/1.73m2    Calcium 8.4 8.3 - 10.4 MG/DL   POCT Glucose    Collection Time: 10/11/22  7:20 AM   Result Value Ref Range    POC Glucose 172 (H) 65 - 100 mg/dL    Performed by: MichaellkinciaPCT    POCT Glucose    Collection Time: 10/11/22 11:30 AM   Result Value Ref Range    POC Glucose 164 (H) 65 - 100 mg/dL    Performed by: MichaellkinciaPCT    POCT Glucose    Collection Time: 10/11/22  4:51 PM   Result Value Ref Range    POC Glucose 178 (H) 65 - 100 mg/dL    Performed by: [facility]aellkinciaPCT    POCT Glucose    Collection Time: 10/11/22  9:03 PM   Result Value Ref Range    POC Glucose 168 (H) 65 - 100 mg/dL    Performed by: Laney    Vancomycin Level, Random    Collection Time: 10/12/22  5:58 AM   Result Value Ref Range    Vancomycin Rm 23.8 UG/ML   CBC    Collection Time: 10/12/22  5:58 AM   Result Value Ref Range    WBC 14.9 (H) 4.3 - 11.1 K/uL    RBC 3.28 (L) 4.23 - 5.6 M/uL    Hemoglobin 9.2 (L) 13.6 - 17.2 g/dL    Hematocrit 30.5 (L) 41.1 - 50.3 %    MCV 93.0 82 - 102 FL    MCH 28.0 26.1 - 32.9 PG    MCHC 30.2 (L) 31.4 - 35.0 g/dL    RDW 14.2 11.9 - 14.6 %    Platelets 940 (H) 959 - 450 K/uL    MPV 10.4 9.4 - 12.3 FL    nRBC 0.00 0.0 - 0.2 K/uL   Basic Metabolic Panel w/ Reflex to MG    Collection Time: 10/12/22  5:58 AM   Result Value Ref Range    Sodium 136 133 - 143 mmol/L    Potassium 4.5 3.5 - 5.1 mmol/L    Chloride 109 101 - 110 mmol/L    CO2 23 21 - 32 mmol/L    Anion Gap 4 2 - 11 mmol/L    Glucose 185 (H) 65 - 100 mg/dL    BUN 17 6 - 23 MG/DL    Creatinine 1.60 (H) 0.8 - 1.5 MG/DL    Est, Glom Filt Rate 50 (L) >60 ml/min/1.73m2    Calcium 8.4 8.3 - 10.4 MG/DL   POCT Glucose    Collection Time: 10/12/22  7:36 AM   Result Value Ref Range    POC Glucose 160 (H) 65 - 100 mg/dL    Performed by: Veronica    POCT Glucose    Collection Time: 10/12/22 11:43 AM   Result Value Ref Range    POC Glucose 213 (H) 65 - 100 mg/dL    Performed by: Veronica          Other Studies:  CT HEAD WO CONTRAST    Result Date: 10/10/2022  EXAM: Noncontrast CT head. INDICATION: Visual disturbance. COMPARISON: None. TECHNIQUE: Noncontrast CT images of the head were obtained. Radiation dose reduction techniques were used for this study. Our CT scanners use one or all of the following:  Automated exposure control, adjustment of the mA or kV according to patient size, iterative reconstruction. FINDINGS: Brain volume is appropriate for age. No acute infarct, hemorrhage or mass is identified. There is no mass effect, midline shift or depressed fracture. The visualized paranasal sinuses and mastoid air cells are clear, except for mild right maxillary sinus mucosal thickening. No acute process.       Current Meds:  Current Facility-Administered Medications   Medication Dose Route Frequency    polyethylene glycol (GLYCOLAX) packet 17 g  17 g Oral BID    aspirin EC tablet 81 mg  81 mg Oral Daily    vancomycin (VANCOCIN) 1500 mg in sodium chloride 0.9% 500 mL IVPB  1,500 mg IntraVENous Q24H    atorvastatin (LIPITOR) tablet 40 mg  40 mg Oral Nightly    insulin glargine (LANTUS) injection vial 34 Units  34 Units SubCUTAneous Daily    docusate sodium (COLACE) capsule 100 mg  100 mg Oral BID    miconazole (MICOTIN) 2 % powder   Topical BID    oxyCODONE (ROXICODONE) immediate release tablet 10 mg  10 mg Oral Q4H PRN    [Held by provider] sodium hypochlorite (DAKINS) 0.125 % external solution   Irrigation BID    gabapentin (NEURONTIN) capsule 100 mg  100 mg Oral TID    methocarbamol (ROBAXIN) tablet 750 mg  750 mg Oral 4x Daily    HYDROmorphone HCl PF (DILAUDID) injection 0.5 mg  0.5 mg IntraVENous Q3H PRN    insulin lispro (HUMALOG) injection vial 4 Units  4 Units SubCUTAneous TID WC    glucose chewable tablet 16 g  4 tablet Oral PRN    dextrose bolus 10% 125 mL  125 mL IntraVENous PRN    Or    dextrose bolus 10% 250 mL  250 mL IntraVENous PRN    glucagon (rDNA) injection 1 mg  1 mg SubCUTAneous PRN dextrose 10 % infusion   IntraVENous Continuous PRN    insulin lispro (HUMALOG) injection vial 0-8 Units  0-8 Units SubCUTAneous TID WC    insulin lispro (HUMALOG) injection vial 0-4 Units  0-4 Units SubCUTAneous Nightly    sodium chloride flush 0.9 % injection 5-40 mL  5-40 mL IntraVENous 2 times per day    sodium chloride flush 0.9 % injection 5-40 mL  5-40 mL IntraVENous PRN    0.9 % sodium chloride infusion   IntraVENous PRN    enoxaparin (LOVENOX) injection 40 mg  40 mg SubCUTAneous BID    ondansetron (ZOFRAN-ODT) disintegrating tablet 4 mg  4 mg Oral Q8H PRN    Or    ondansetron (ZOFRAN) injection 4 mg  4 mg IntraVENous Q6H PRN    polyethylene glycol (GLYCOLAX) packet 17 g  17 g Oral Daily PRN    acetaminophen (TYLENOL) tablet 650 mg  650 mg Oral Q6H PRN    Or    acetaminophen (TYLENOL) suppository 650 mg  650 mg Rectal Q6H PRN       Signed:  Geri Low MD    Part of this note may have been written by using a voice dictation software. The note has been proof read but may still contain some grammatical/other typographical errors.

## 2022-10-12 NOTE — DIABETES MGMT
Patient admitted with sepsis. Blood glucose ranged 159-178 yesterday with patient receiving Lantus 34 units and Humalog 12 units. Blood glucose this morning was 160. Creatinine 1.60. GFR 50. Reviewed patient current regimen: Lantus 34 units daily, Humalog 4 units with meals and Humalog correctional insulin. Glycemic control overall stable in current ADA recommended hospital target goals. Will follow along loosely. Patient prefers pens. Patient will need prescription for insulin pens and pen needles at discharge. Patient has been educated regarding [employer] insulin pens and insulin affordability programs.

## 2022-10-12 NOTE — WOUND CARE
Wound VAC seal broken last night, wet to dry removed. New induration along the medial edge of wound with increased erythema in an area 8x3cm, also new induration along lateral wound edge with necrotic fat in this area inside wound, this area is 4x2cm, Concern for need for further debridement. Left groin applied Dakin's moist to dry and notified surgery. Intertrigo and rash under groins/ abdomen and scrotum also worsening, all areas cleansed/ powder applied. Assisted patient to turn on right side to change vac on left back. Patient complains of itching perianal and perineum, all areas with rash and patches of denuded skin, most concerning for yeast areas cleansed and antifungal powder used, during cleaning the left buttock was found to be very erythematous and indurated is 6x4cm area, concern for buttock abscess forming. General surgery team notified of all concerns above, consider further debridement if appropriate. If surgery is not indicated would use Dakin's wet to dry to clean up the groin wound for about 1 week then could consider the vac again. Left back wound vac dressing changed, wound base is pink, granular, rash noted around this wound as well, concern for yeast. Dr. Tish Courtney with ID notified of rashes and concerns for groin and buttock areas. Patient has a difficult social economic situation and may be difficult to get home wound vac in the case that home health/ wound vac cannot be established will need to teach the family/ friends Wet to dry daily dressings for both areas. Would use wound vac to back as long as in hospital for that situation.

## 2022-10-12 NOTE — PROGRESS NOTES
VANCO DAILY FOLLOW UP NOTE  [address] [facility] Pharmacokinetic Monitoring Service - Vancomycin    Consulting Provider: Dr. Yohannes Shah   Indication: SSTI  Target Concentration: Goal trough of 10-15 mg/L and AUC/MADINA <500 mg*hr/L  Day of Therapy: 5  Additional Antimicrobials: none    Pertinent Laboratory Values: Wt Readings from Last 1 Encounters:   10/07/22 (!) 365 lb (165.6 kg)     Temp Readings from Last 1 Encounters:   10/12/22 99.1 °F (37.3 °C) (Oral)     Recent Labs     10/10/22  0706 10/11/22  0528 10/12/22  0558   BUN 20 18 17   CREATININE 1.70* 1.60* 1.60*   WBC 12.4* 14.0* 14.9*     Estimated Creatinine Clearance: 82 mL/min (A) (based on SCr of 1.6 mg/dL (H)). Lab Results   Component Value Date/Time    VANCORANDOM 23.8 10/12/2022 05:58 AM     MRSA Nasal Swab: N/A. Non-respiratory infection.     Assessment:  Date/Time Dose Concentration AUC   10/10 0706 1250 mg q18h 21.6 513   10/12  1250 q24h 23.8 452   Note: Serum concentrations collected for AUC dosing may appear elevated if collected in close proximity to the dose administered, this is not necessarily an indication of toxicity    Plan:  Continue current dosing regimen of 1500 mg q24h  Repeat vancomycin concentrations will be ordered as clinically appropriate  Pharmacy will continue to monitor patient and adjust therapy as indicated    Thank you for the consult,  Naresh Taylor, [address]

## 2022-10-12 NOTE — PROGRESS NOTES
END OF SHIFT NOTE:    INTAKE/OUTPUT  10/11 0701 - 10/12 0700  In: -   Out: 1211 [Urine:2975; Drains:100]  Voiding: Yes  Catheter: Yes  Drain:   Negative Pressure Wound Therapy Groin Anterior (Active)   $ Standard NPWT <=50 sq cm PER TX $ Yes 10/10/22 1333   Wound Type Surgical 10/11/22 1905   Unit Type kci ulta 10/11/22 1420   Dressing Type Black Foam 10/11/22 1905   Number of pieces used 1 10/10/22 1333   Cycle Continuous 10/11/22 1420   Target Pressure (mmHg) 125 10/11/22 1420   Canister changed? Yes 10/10/22 1333   Dressing Status Clean, dry & intact 10/11/22 1420   Dressing Changed Dressing reinforced 10/10/22 1333   Drainage Amount Small 10/10/22 1951   Drainage Description Serosanguinous;Purulent 10/11/22 1905   Dressing Change Due 10/12/22 10/11/22 1905   Output (ml) 100 ml 10/11/22 [id number]; Subcutaneous;Granulation tissue 10/10/22 1333   Nemo-wound Assessment Intact 10/10/22 1333   Shape irregular 10/10/22 1333   Odor Mild 10/10/22 1333       Negative Pressure Wound Therapy Back Left;Mid (Active)   $ Standard NPWT <=50 sq cm PER TX $ Yes 10/10/22 1333   Wound Type Surgical 10/12/22 0315   Unit Type kci ulta 10/11/22 1420   Dressing Type Black Foam 10/12/22 0315   Number of pieces used 1 10/10/22 1333   Number of pieces removed 1 10/10/22 1333   Cycle Continuous 10/11/22 1420   Target Pressure (mmHg) 125 10/11/22 1420   Canister changed? Yes 10/10/22 1333   Dressing Status Clean, dry & intact 10/11/22 1420   Dressing Changed Changed/New 10/10/22 1333   Drainage Amount Moderate 10/10/22 1333   Drainage Description Serosanguinous 10/11/22 1420   Dressing Change Due 10/12/22 10/11/22 1420   Wound Assessment Granulation tissue; Subcutaneous; Exposed structure muscle 10/10/22 1333   Shape oval 10/10/22 1333   Odor None 10/10/22 1333       Urinary Catheter 10/06/22 [place] (Active)   $ Urethral catheter insertion Inserted for procedure 10/10/22 0800   Catheter Indications Assist in healing of open sacral or perineal wounds (Stage III, IV, or unstageable documented by a provider or enterostomal therapy) and/or full thickness perineal and lower extremity burns in incontinent patients 10/12/22 0315   Site Assessment Swelling 10/12/22 0315   Urine Color Yellow 10/12/22 0315   Urine Appearance Sediment 10/12/22 0315   Urine Odor Other (Comment) 10/07/22 1135   Collection Container Standard 10/12/22 0315   Securement Method Securing device (Describe) 10/12/22 0315   Catheter Care Completed Yes 10/12/22 0315   Catheter Best Practices  Drainage tube clipped to bed;Catheter secured to thigh; Tamper seal intact; Bag below bladder;Bag not on floor; Lack of dependent loop in tubing;Drainage bag less than half full 10/12/22 0315   Status Draining 10/12/22 0315   Output (mL) 475 mL 10/12/22 0315               Flatus: Patient does have flatus present. Stool:  occurrences. Characteristics:           Stool Assessment  Last BM (including prior to admit): 10/05/22    Emesis:  occurrences. Characteristics:        VITAL SIGNS  Patient Vitals for the past 12 hrs:   Temp Pulse Resp BP SpO2   10/12/22 0434 99.1 °F (37.3 °C) 95 19 138/79 94 %   10/11/22 2352 100.4 °F (38 °C) 94 19 (!) 140/81 94 %   10/11/22 1924 98.4 °F (36.9 °C) 92 18 129/77 94 %   10/11/22 1737 -- -- 17 -- --       Pain Assessment  Pain Level: 9 (10/12/22 0300)  Pain Location: Back, Penis  Patient's Stated Pain Goal: 0 - No pain    Ambulating  No    Shift report given to oncoming nurse at the bedside.     Effie Latham RN

## 2022-10-12 NOTE — CARE COORDINATION
LOS 7d    Chart reviewed by [facility] and discussed in IDR. Patient POD 5, s/p I&D back abscess, left groin/scrotal penial abscess. MRI completed 10/11, indicating no acute intracranial abnormality. Patient needs sunita vac d/t being uninsured. Patient and female  that patient lives with states residence is [place] the back of [facility] [address] Patient would need [facility] for wound vac care; [facility] would not be able to go to business address for wound vac care. RNBERTA has spoken with patient and female  and both confirm the above address. CM following.

## 2022-10-12 NOTE — PROGRESS NOTES
Infectious Disease Progress Note    Today's Date: 10/12/2022   Admit Date: 10/5/2022    Impression:   MRSA penile abscess / perineal soft tissue infection  S/p I&D,wound vac, 10/7/22; Cx: MRSA  MRSA soft tissue paraspinous abscess  Visual disturbance / R arm weakness  Urinary retention  Poorly controlled DM    Plan:   Continue IV vancomycin while inpatient. Will add PO fluconazole for yeast coverage. Possible need for further debridement vs aggressive wound care measures. ID will continue to follow. Anti-infectives:   IV vancomycin    Subjective: Interval history: Seen resting in bed. He reports he is still experiencing some pain. He denies any n/v/d. No Known Allergies     Review of Systems:  A comprehensive review of systems is negative except as stated above.       Objective:     Visit Vitals  BP (!) 141/74   Pulse 91   Temp 99.1 °F (37.3 °C) (Oral)   Resp 20   Ht 6' 2\" (1.88 m)   Wt (!) 365 lb (165.6 kg)   SpO2 96%   BMI 46.86 kg/m²     Temp (24hrs), Av.4 °F (37.4 °C), Min:98.4 °F (36.9 °C), Max:100.4 °F (38 °C)     Patient was seen and examined on 10/12/2022 and physical exam remains unchanged since yesterday, unless noted below:    Lines:  Peripheral IV:       Physical Exam:    General:  In no acute distress   Eyes:     Mouth/Throat:    Neck:    Lungs:   Breathing comfortably   CV:     Abdomen:   Non-distended   Extremities: Trace edema   Skin: Perineal wound vac in place- seal has broken   Lymph nodes:    Musculoskeletal: No swollen or inflamed joints   Lines/Devices:     Psych: Alert and oriented       Data Review:     CBC:  Recent Labs     10/10/22  0706 10/11/22  0528 10/12/22  0558   WBC 12.4* 14.0* 14.9*   HGB 9.2* 9.3* 9.2*   HCT 29.4* 30.0* 30.5*    457* 516*         BMP:  Recent Labs     10/10/22  0706 10/11/22  0528 10/12/22  0558   BUN 20 18 17    139 136   K 4.3 4.3 4.5    109 109   CO2 26 26 23         LFTS:  No results for input(s): ALT, TP, ALB in the last 72 hours.    Invalid input(s): TBILI, SGOT, AP    Microbiology:   Reviewed    Imaging:   10/10/22 CT head:     FINDINGS: Brain volume is appropriate for age. No acute infarct, hemorrhage or   mass is identified. There is no mass effect, midline shift or depressed   fracture. The visualized paranasal sinuses and mastoid air cells are clear,   except for mild right maxillary sinus mucosal thickening.        Signed By: Marilee France, APRN - CNP     October 12, 2022

## 2022-10-12 NOTE — PROGRESS NOTES
Physical Therapy Note:    Attempted to see patient this AM for physical therapy treatment  session. Per RN, wound vac needs to be reconnected, waiting for wound nurse. Will hold PT for now. . Thank you,    LUIS ALBERTO Montejo, PT     Rehab Caseload Tracker

## 2022-10-12 NOTE — PROGRESS NOTES
ACUTE OCCUPATIONAL THERAPY GOALS:   (Developed with and agreed upon by patient and/or caregiver.)  1. Wayne Plaza will be modified independent with functional mobility for ADL in room within 4 - 7 visits. 2. Wayne Plaza will be modified independent with total body bathing and dressing within 4 - 7 visits. 3. Wayne Plaza will state and demonstrate at least 5 energy conservation techniques during ADL/therapeutic activities within 4 - 7 visits. 4. Wayne Plaza will voice a plan for appropriate home modifications for home safety and fall prevention within 7 visits. 5. Wayne Plaza will participate at least 30 minutes of ADL with 3 or less rest breaks within 7 visits. 6. Wayne Plaza will complete a toilet transfer with modified independence within 7 visits. OCCUPATIONAL THERAPY: Daily Note PM   OT Visit Days: 2   Time  OT Charge Capture  Rehab Caseload Tracker  OT Orders    Wayne Plaza is a 60 y.o. male   PRIMARY DIAGNOSIS: Sepsis ([facility] [address]  Cellulitis and abscess of trunk [L03.319, L02.219]  Abscess [L02.91]  Hyperglycemia [R73.9]  Abscess, perineum [L02.215]  Procedure(s) (LRB):  BACK ABSCESS I & D (N/A)  INGUINAL AND PENILE DEBRIDEMENT (N/A)  11 Days Post-Op  Inpatient: Payor: /     ASSESSMENT:     REHAB RECOMMENDATIONS: CURRENT LEVEL OF FUNCTION:  (Most Recently Demonstrated)   Recommendation to date pending progress:  Setting:  Home Health Therapy    Equipment:    None Bathing:  Stand by Assist  Dressing:  Stand by Assist   Feeding/Grooming:  Not Tested   Toileting:  Not Tested  Functional Mobility:  Minimal Assist- Mod A x2 STS      ASSESSMENT:  Mr. Prudencio Arteaga was received supine in bed with wound care RN present and finishing wound care. Bed mobility rolling Min A due to generalized weakness, wound pain. Supine > sit SBA. STS min-mod A x2. Side steps to R CGA-min A x2. In standing changing gown SBA/set up heavily relying on RW for balance. Seated EOB, washing UB SBA. Sit > supine CGA- Min A for legs. Pt is slow/y progressing with functional mobility and ADLs. Biggest limiting factor is pain, then weakness. Continue POC. SUBJECTIVE:     Mr. Gerardo Boucher states, \"I can move. . see?!\"     Social/Functional Lives With: Significant other  Type of Home: House  Home Access: Stairs to enter with rails  Entrance Stairs - Number of Steps: 4  Bathroom Equipment: Shower chair  Home Equipment: [employer]  ADL Assistance: [address]: Independent  Homemaking Responsibilities: Yes  Active : Yes  Mode of Transportation: Car  Occupation: Full time employment    OBJECTIVE:     Jaimee Avery / Antoni Mobley /  Fraction: Wound Vac    RESTRICTIONS/PRECAUTIONS:  Restrictions/Precautions  Restrictions/Precautions: Fall Risk        PAIN: Antoinette Muster / O2:   Pre Treatment: 10; had pain meds           Post Treatment: same Vitals          Oxygen        MOBILITY: I Mod I S SBA CGA Min Mod Max Total  NT x2 Comments:   Bed Mobility    Rolling [] [] [] [] [] [x] [] [] [] [] []    Supine to Sit [] [] [] [x] [] [] [] [] [] [] []    Scooting [] [] [] [] [] [] [] [] [] [] []    Sit to Supine [] [] [] [] [x] [] [] [] [] [] []    Transfers    Sit to Stand [] [] [] [] [] [x] [x] [] [] [] [x]    Bed to Chair [] [] [] [] [] [] [] [] [] [] []    Stand to Sit [] [] [] [] [] [x] [] [] [] [] [x]    Tub/Shower [] [] [] [] [] [] [] [] [] [] []     Toilet [] [] [] [] [] [] [] [] [] [] []      [] [] [] [] [] [] [] [] [] [] []    I=Independent, Mod I=Modified Independent, S=Supervision/Setup, SBA=Standby Assistance, CGA=Contact Guard Assistance, Min=Minimal Assistance, Mod=Moderate Assistance, Max=Maximal Assistance, Total=Total Assistance, NT=Not Tested    ACTIVITIES OF DAILY LIVING: I Mod I S SBA CGA Min Mod Max Total NT Comments   BASIC ADLs:              Upper Body   Bathing [] [] [] [x] [] [] [] [] [] []    Lower Body Bathing [] [] [] [] [] [] [] [] [] []    Toileting [] [] [] [] [] [] [] [] [] []    Upper Body Dressing [] [] [] [x] [] [] [] [] [] []    Lower Body Dressing [] [] [] [] [] [] [] [] [] []    Feeding [] [] [] [] [] [] [] [] [] []    Grooming [] [] [] [] [] [] [] [] [] []    Personal Device Care [] [] [] [] [] [] [] [] [] []    Functional Mobility [] [] [] [] [] [x] [x] [] [] [] X2 STS   I=Independent, Mod I=Modified Independent, S=Supervision/Setup, SBA=Standby Assistance, CGA=Contact Guard Assistance, Min=Minimal Assistance, Mod=Moderate Assistance, Max=Maximal Assistance, Total=Total Assistance, NT=Not Tested    BALANCE: Good Fair+ Fair Fair- Poor NT Comments   Sitting Static [] [x] [x] [] [] []    Sitting Dynamic [] [] [x] [] [] []              Standing Static [] [] [x] [] [] []    Standing Dynamic [] [] [x] [] [] []        PLAN:     FREQUENCY/DURATION   OT Plan of Care: 3 times/week for duration of hospital stay or until stated goals are met, whichever comes first.    TREATMENT:     TREATMENT:   Self Care (32 minutes): Patient participated in upper body bathing and upper body dressing ADLs in unsupported sitting and standing with minimal verbal cueing to increase independence, decrease assistance required, and increase activity tolerance. Patient also participated in functional mobility training to increase independence, decrease assistance required, increase activity tolerance, and increase safety awareness.      TREATMENT GRID:  N/A    AFTER TREATMENT PRECAUTIONS: Bed, Bed/Chair Locked, Call light within reach, Needs within reach, RN notified, and Side rails x3    INTERDISCIPLINARY COLLABORATION:  RN/ PCT, PT/ PTA, and OT/ STEEN    EDUCATION:       TOTAL TREATMENT DURATION AND TIME:  Time In: 1333  Time Out: [facility]  Minutes: [address] [place] [address], OT

## 2022-10-12 NOTE — PROGRESS NOTES
Pt had a leak from  wound vac in the penile area and after three nurses trying to fix it,  was notified and orders were received to do a wet to dry dressing with normal saline until wound nurse come to see pt.

## 2022-10-12 NOTE — PROGRESS NOTES
Admit Date: 10/5/2022    POD 5 Days Post-Op    Procedure:  Procedure(s):  BACK ABSCESS I & D  INGUINAL AND PENILE DEBRIDEMENT    Subjective:   Pt alert with NAD noted. Respirations even and unlabored on room air. Tolerating diet. No bm. Jaimes in place. Pain moderately controlled with prn. Wound vac is alarming. Penile wound vac removed and wet to dry drsg replaced. Plan for wound vac change today 10/12/22  Objective:       Vitals:    10/11/22 2352 10/12/22 0434 10/12/22 0735 10/12/22 1137   BP: (!) 140/81 138/79 (!) 141/74 139/82   Pulse: 94 95 91 86   Resp: 19 19 20 19   Temp: 100.4 °F (38 °C) 99.1 °F (37.3 °C) 99.1 °F (37.3 °C) 99 °F (37.2 °C)   TempSrc: Oral Oral Oral Oral   SpO2: 94% 94% 96% 94%   Weight:       Height:           Temp (24hrs), Av.3 °F (37.4 °C), Min:98.4 °F (36.9 °C), Max:100.4 °F (38 °C)  . I&O reviewed as documented. 2975 ml UOP jaimes past 24 h  +flatus 10/12/22  No bm since admission 10/5/22-on scheduled colace and miralax  100 ml SS drainage past 24 h wound vac    Physical Exam  Constitutional:       General: He is not in acute distress. Appearance: Normal appearance. HENT:      Mouth/Throat:      Mouth: Mucous membranes are moist.   Cardiovascular:      Rate and Rhythm: Normal rate and regular rhythm. Pulses: Normal pulses. Heart sounds: Normal heart sounds. No murmur heard. No friction rub. No gallop. Pulmonary:      Effort: Pulmonary effort is normal. No respiratory distress. Breath sounds: Normal breath sounds. Abdominal:      General: Bowel sounds are normal. There is no distension. Palpations: Abdomen is soft. Genitourinary:     Comments: Wet to dry penile drsg with plan for wound vac. Musculoskeletal:         General: Normal range of motion. Cervical back: Normal range of motion. Skin:     General: Skin is warm and dry. Capillary Refill: Capillary refill takes 2 to 3 seconds.       Comments: Back wound vac, left groin and penile wet to dry with plan for wound vac replacement   Neurological:      General: No focal deficit present. Mental Status: He is alert and oriented to person, place, and time.    Psychiatric:         Mood and Affect: Mood normal.         Behavior: Behavior normal.       Labs:   Recent Results (from the past 24 hour(s))   POCT Glucose    Collection Time: 10/11/22  4:51 PM   Result Value Ref Range    POC Glucose 178 (H) 65 - 100 mg/dL    Performed by: Randolph    POCT Glucose    Collection Time: 10/11/22  9:03 PM   Result Value Ref Range    POC Glucose 168 (H) 65 - 100 mg/dL    Performed by: Laney    Vancomycin Level, Random    Collection Time: 10/12/22  5:58 AM   Result Value Ref Range    Vancomycin Rm 23.8 UG/ML   CBC    Collection Time: 10/12/22  5:58 AM   Result Value Ref Range    WBC 14.9 (H) 4.3 - 11.1 K/uL    RBC 3.28 (L) 4.23 - 5.6 M/uL    Hemoglobin 9.2 (L) 13.6 - 17.2 g/dL    Hematocrit 30.5 (L) 41.1 - 50.3 %    MCV 93.0 82 - 102 FL    MCH 28.0 26.1 - 32.9 PG    MCHC 30.2 (L) 31.4 - 35.0 g/dL    RDW 14.2 11.9 - 14.6 %    Platelets 667 (H) 257 - 450 K/uL    MPV 10.4 9.4 - 12.3 FL    nRBC 0.00 0.0 - 0.2 K/uL   Basic Metabolic Panel w/ Reflex to MG    Collection Time: 10/12/22  5:58 AM   Result Value Ref Range    Sodium 136 133 - 143 mmol/L    Potassium 4.5 3.5 - 5.1 mmol/L    Chloride 109 101 - 110 mmol/L    CO2 23 21 - 32 mmol/L    Anion Gap 4 2 - 11 mmol/L    Glucose 185 (H) 65 - 100 mg/dL    BUN 17 6 - 23 MG/DL    Creatinine 1.60 (H) 0.8 - 1.5 MG/DL    Est, Glom Filt Rate 50 (L) >60 ml/min/1.73m2    Calcium 8.4 8.3 - 10.4 MG/DL   POCT Glucose    Collection Time: 10/12/22  7:36 AM   Result Value Ref Range    POC Glucose 160 (H) 65 - 100 mg/dL    Performed by: Veronica    POCT Glucose    Collection Time: 10/12/22 11:43 AM   Result Value Ref Range    POC Glucose 213 (H) 65 - 100 mg/dL    Performed by: Veronica          Assessment:     Principal Problem:    Sepsis ([facility] [address]  Active Problems:    Primary hypertension    DM2 (diabetes mellitus, type 2) ([facility])    Cellulitis and abscess of trunk    Abscess of groin, left    Penile abscess    Acute urinary retention  Resolved Problems:    * No resolved hospital problems. *        Plan/Recommendations/Medical Decision Making:   Wound vac while inpatient. Acceptable for wet to dry dressings changed daily if wound vac is not an option at discharge.        MAURILIO East - NP

## 2022-10-12 NOTE — PROGRESS NOTES
Physician Progress Note      PATIENT:               Yola Boles  CSN #:                  [id number]  :                       1965  ADMIT DATE:       10/5/2022 7:22 PM  [address] [place] DATE:  RESPONDING  PROVIDER #:        Yury Hooks MD          QUERY TEXT:    Pt admitted with sepsis. Pt noted to have elevated creatinine. If possible,   please document in the progress notes and discharge summary if you are   evaluating and/or treating any of the following: The medical record reflects the following:  Risk Factors: 60 y.o. male with medical history of DM2, HTN who presented to   ED with nausea, vomiting, and concerns about abscess. Per report, patient has   been having intermittent drainage from a painful wound on his back for   2 months. Additionally, patient with areas of groin cellulitis and induration   as well. Clinical Indicators: Creatinine 1.3, 1.4, 1.3, 1.7, 1.6  Treatment: Serial labs    Defined by Kidney Disease Improving Global Outcomes (KDIGO) clinical practice   guideline for acute kidney injury:  -Increase in SCr by greater than or equal to 0.3 mg/dl within 48 hours; or  -Increase or decrease in SCr to greater than or equal to 1.5 times baseline,   which is known or presumed to have occurred within the prior 7 days; or  -Urine volume < 0.5ml/kg/h for 6 hours    Thank you,  Brynn Escobedo RN, BSN, CHRISTELLE Ivey. [email or web address]  . Options provided:  -- Acute kidney failure  -- Acute kidney failure with acute tubular necrosis  -- Acute kidney injury  -- Other - I will add my own diagnosis  -- Disagree - Not applicable / Not valid  -- Disagree - Clinically unable to determine / Unknown  -- Refer to Clinical Documentation Reviewer    PROVIDER RESPONSE TEXT:    This patient is in Acute kidney failure. Query created by: Janet Nichole on 10/11/2022 9:50 PM      QUERY TEXT:    Pt admitted with sepsis and has anemia documented.  If possible, please   document in progress notes

## 2022-10-13 LAB
ANION GAP SERPL CALC-SCNC: 2 MMOL/L (ref 2–11)
BASOPHILS # BLD: 0.1 K/UL (ref 0–0.2)
BASOPHILS NFR BLD: 1 % (ref 0–2)
BUN SERPL-MCNC: 17 MG/DL (ref 6–23)
CALCIUM SERPL-MCNC: 8.2 MG/DL (ref 8.3–10.4)
CHLORIDE SERPL-SCNC: 111 MMOL/L (ref 101–110)
CO2 SERPL-SCNC: 26 MMOL/L (ref 21–32)
CREAT SERPL-MCNC: 1.6 MG/DL (ref 0.8–1.5)
DIFFERENTIAL METHOD BLD: ABNORMAL
EOSINOPHIL # BLD: 0.3 K/UL (ref 0–0.8)
EOSINOPHIL NFR BLD: 3 % (ref 0.5–7.8)
ERYTHROCYTE [DISTWIDTH] IN BLOOD BY AUTOMATED COUNT: 14.4 % (ref 11.9–14.6)
GLUCOSE BLD STRIP.AUTO-MCNC: 132 MG/DL (ref 65–100)
GLUCOSE BLD STRIP.AUTO-MCNC: 144 MG/DL (ref 65–100)
GLUCOSE BLD STRIP.AUTO-MCNC: 162 MG/DL (ref 65–100)
GLUCOSE BLD STRIP.AUTO-MCNC: 163 MG/DL (ref 65–100)
GLUCOSE SERPL-MCNC: 166 MG/DL (ref 65–100)
HCT VFR BLD AUTO: 29.5 % (ref 41.1–50.3)
HGB BLD-MCNC: 9.1 G/DL (ref 13.6–17.2)
IMM GRANULOCYTES # BLD AUTO: 0.1 K/UL (ref 0–0.5)
IMM GRANULOCYTES NFR BLD AUTO: 1 % (ref 0–5)
LYMPHOCYTES # BLD: 2.4 K/UL (ref 0.5–4.6)
LYMPHOCYTES NFR BLD: 21 % (ref 13–44)
MCH RBC QN AUTO: 29 PG (ref 26.1–32.9)
MCHC RBC AUTO-ENTMCNC: 30.8 G/DL (ref 31.4–35)
MCV RBC AUTO: 93.9 FL (ref 82–102)
MONOCYTES # BLD: 0.7 K/UL (ref 0.1–1.3)
MONOCYTES NFR BLD: 6 % (ref 4–12)
NEUTS SEG # BLD: 8.2 K/UL (ref 1.7–8.2)
NEUTS SEG NFR BLD: 68 % (ref 43–78)
NRBC # BLD: 0 K/UL (ref 0–0.2)
PLATELET # BLD AUTO: 557 K/UL (ref 150–450)
PMV BLD AUTO: 10.3 FL (ref 9.4–12.3)
POTASSIUM SERPL-SCNC: 4.5 MMOL/L (ref 3.5–5.1)
RBC # BLD AUTO: 3.14 M/UL (ref 4.23–5.6)
SERVICE CMNT-IMP: ABNORMAL
SODIUM SERPL-SCNC: 139 MMOL/L (ref 133–143)
WBC # BLD AUTO: 11.7 K/UL (ref 4.3–11.1)

## 2022-10-13 PROCEDURE — 2580000003 HC RX 258: Performed by: FAMILY MEDICINE

## 2022-10-13 PROCEDURE — 82962 GLUCOSE BLOOD TEST: CPT

## 2022-10-13 PROCEDURE — 1100000000 HC RM PRIVATE

## 2022-10-13 PROCEDURE — 6360000002 HC RX W HCPCS: Performed by: FAMILY MEDICINE

## 2022-10-13 PROCEDURE — 2580000003 HC RX 258: Performed by: INTERNAL MEDICINE

## 2022-10-13 PROCEDURE — 6370000000 HC RX 637 (ALT 250 FOR IP): Performed by: INTERNAL MEDICINE

## 2022-10-13 PROCEDURE — 6370000000 HC RX 637 (ALT 250 FOR IP): Performed by: STUDENT IN AN ORGANIZED HEALTH CARE EDUCATION/TRAINING PROGRAM

## 2022-10-13 PROCEDURE — 2580000003 HC RX 258

## 2022-10-13 PROCEDURE — 36415 COLL VENOUS BLD VENIPUNCTURE: CPT

## 2022-10-13 PROCEDURE — 6360000002 HC RX W HCPCS: Performed by: INTERNAL MEDICINE

## 2022-10-13 PROCEDURE — 6370000000 HC RX 637 (ALT 250 FOR IP)

## 2022-10-13 PROCEDURE — 85025 COMPLETE CBC W/AUTO DIFF WBC: CPT

## 2022-10-13 PROCEDURE — 80048 BASIC METABOLIC PNL TOTAL CA: CPT

## 2022-10-13 PROCEDURE — 99233 SBSQ HOSP IP/OBS HIGH 50: CPT | Performed by: STUDENT IN AN ORGANIZED HEALTH CARE EDUCATION/TRAINING PROGRAM

## 2022-10-13 PROCEDURE — 6370000000 HC RX 637 (ALT 250 FOR IP): Performed by: FAMILY MEDICINE

## 2022-10-13 RX ORDER — INSULIN GLARGINE 100 [IU]/ML
36 INJECTION, SOLUTION SUBCUTANEOUS DAILY
Status: DISCONTINUED | OUTPATIENT
Start: 2022-10-13 | End: 2022-10-17

## 2022-10-13 RX ORDER — TRAMADOL HYDROCHLORIDE 50 MG/1
50 TABLET ORAL EVERY 6 HOURS
Status: DISCONTINUED | OUTPATIENT
Start: 2022-10-13 | End: 2022-10-24

## 2022-10-13 RX ADMIN — METHOCARBAMOL TABLETS 750 MG: 750 TABLET, COATED ORAL at 12:34

## 2022-10-13 RX ADMIN — INSULIN GLARGINE 36 UNITS: 100 INJECTION, SOLUTION SUBCUTANEOUS at 08:38

## 2022-10-13 RX ADMIN — INSULIN LISPRO 4 UNITS: 100 INJECTION, SOLUTION INTRAVENOUS; SUBCUTANEOUS at 12:34

## 2022-10-13 RX ADMIN — METHOCARBAMOL TABLETS 750 MG: 750 TABLET, COATED ORAL at 21:25

## 2022-10-13 RX ADMIN — ENOXAPARIN SODIUM 40 MG: 100 INJECTION SUBCUTANEOUS at 21:25

## 2022-10-13 RX ADMIN — METHOCARBAMOL TABLETS 750 MG: 750 TABLET, COATED ORAL at 17:26

## 2022-10-13 RX ADMIN — SODIUM CHLORIDE: 9 INJECTION, SOLUTION INTRAVENOUS at 20:00

## 2022-10-13 RX ADMIN — Medication 1500 MG: at 21:24

## 2022-10-13 RX ADMIN — OXYCODONE 10 MG: 5 TABLET ORAL at 09:59

## 2022-10-13 RX ADMIN — INSULIN LISPRO 4 UNITS: 100 INJECTION, SOLUTION INTRAVENOUS; SUBCUTANEOUS at 08:39

## 2022-10-13 RX ADMIN — GABAPENTIN 100 MG: 100 CAPSULE ORAL at 14:42

## 2022-10-13 RX ADMIN — DOCUSATE SODIUM 100 MG: 100 CAPSULE, LIQUID FILLED ORAL at 21:25

## 2022-10-13 RX ADMIN — HYDROMORPHONE HYDROCHLORIDE 0.5 MG: 1 INJECTION, SOLUTION INTRAMUSCULAR; INTRAVENOUS; SUBCUTANEOUS at 19:08

## 2022-10-13 RX ADMIN — GABAPENTIN 100 MG: 100 CAPSULE ORAL at 22:34

## 2022-10-13 RX ADMIN — HYDROMORPHONE HYDROCHLORIDE 0.5 MG: 1 INJECTION, SOLUTION INTRAMUSCULAR; INTRAVENOUS; SUBCUTANEOUS at 11:00

## 2022-10-13 RX ADMIN — OXYCODONE 10 MG: 5 TABLET ORAL at 14:42

## 2022-10-13 RX ADMIN — METHOCARBAMOL TABLETS 750 MG: 750 TABLET, COATED ORAL at 08:36

## 2022-10-13 RX ADMIN — HYDROMORPHONE HYDROCHLORIDE 0.5 MG: 1 INJECTION, SOLUTION INTRAMUSCULAR; INTRAVENOUS; SUBCUTANEOUS at 05:06

## 2022-10-13 RX ADMIN — ASPIRIN 81 MG: 81 TABLET ORAL at 08:36

## 2022-10-13 RX ADMIN — INSULIN LISPRO 4 UNITS: 100 INJECTION, SOLUTION INTRAVENOUS; SUBCUTANEOUS at 17:27

## 2022-10-13 RX ADMIN — DOCUSATE SODIUM 100 MG: 100 CAPSULE, LIQUID FILLED ORAL at 08:37

## 2022-10-13 RX ADMIN — SODIUM CHLORIDE, PRESERVATIVE FREE 10 ML: 5 INJECTION INTRAVENOUS at 21:25

## 2022-10-13 RX ADMIN — TRAMADOL HYDROCHLORIDE 50 MG: 50 TABLET, COATED ORAL at 21:25

## 2022-10-13 RX ADMIN — TRAMADOL HYDROCHLORIDE 50 MG: 50 TABLET, COATED ORAL at 17:27

## 2022-10-13 RX ADMIN — SODIUM CHLORIDE, PRESERVATIVE FREE 10 ML: 5 INJECTION INTRAVENOUS at 08:43

## 2022-10-13 RX ADMIN — POLYETHYLENE GLYCOL 3350 17 G: 17 POWDER, FOR SOLUTION ORAL at 08:40

## 2022-10-13 RX ADMIN — SODIUM CHLORIDE: 9 INJECTION, SOLUTION INTRAVENOUS at 05:06

## 2022-10-13 RX ADMIN — ATORVASTATIN CALCIUM 40 MG: 40 TABLET, FILM COATED ORAL at 21:25

## 2022-10-13 RX ADMIN — Medication: at 21:26

## 2022-10-13 RX ADMIN — FLUCONAZOLE 200 MG: 100 TABLET ORAL at 08:36

## 2022-10-13 RX ADMIN — ENOXAPARIN SODIUM 40 MG: 100 INJECTION SUBCUTANEOUS at 08:40

## 2022-10-13 RX ADMIN — GABAPENTIN 100 MG: 100 CAPSULE ORAL at 08:36

## 2022-10-13 ASSESSMENT — PAIN DESCRIPTION - ORIENTATION
ORIENTATION: ANTERIOR
ORIENTATION: ANTERIOR;POSTERIOR;LEFT
ORIENTATION: ANTERIOR;LOWER
ORIENTATION: ANTERIOR
ORIENTATION: ANTERIOR

## 2022-10-13 ASSESSMENT — PAIN DESCRIPTION - FREQUENCY
FREQUENCY: CONTINUOUS

## 2022-10-13 ASSESSMENT — PAIN DESCRIPTION - ONSET
ONSET: ON-GOING

## 2022-10-13 ASSESSMENT — PAIN SCALES - GENERAL
PAINLEVEL_OUTOF10: 0
PAINLEVEL_OUTOF10: 0
PAINLEVEL_OUTOF10: 8
PAINLEVEL_OUTOF10: 9
PAINLEVEL_OUTOF10: 9
PAINLEVEL_OUTOF10: 8
PAINLEVEL_OUTOF10: 0
PAINLEVEL_OUTOF10: 0
PAINLEVEL_OUTOF10: 8

## 2022-10-13 ASSESSMENT — PAIN DESCRIPTION - LOCATION
LOCATION: PENIS

## 2022-10-13 ASSESSMENT — PAIN DESCRIPTION - DESCRIPTORS
DESCRIPTORS: ACHING

## 2022-10-13 ASSESSMENT — PAIN - FUNCTIONAL ASSESSMENT
PAIN_FUNCTIONAL_ASSESSMENT: ACTIVITIES ARE NOT PREVENTED

## 2022-10-13 ASSESSMENT — PAIN DESCRIPTION - PAIN TYPE
TYPE: SURGICAL PAIN

## 2022-10-13 NOTE — DIABETES MGMT
Patient admitted with sepsis. Blood glucose ranged 132-245 yesterday with patient receiving Lantus 34 units and Humalog 10 units. Blood glucose this morning was 163. Reviewed patient current regimen: Lantus 34 units daily, Humalog 4 units with meals, and Humalog correctional insulin. Patient would likely benefit from an increase in basal insulin if stricter glycemic control is desired. Provider updated via [employer] regarding recommendations and patient glycemic control. New orders received to increase Lantus to 36 units daily. Patient prefers pens. Patient will need prescription for insulin pens and pen needles at discharge. Patient has been educated regarding [employer] insulin pens and insulin affordability programs.

## 2022-10-13 NOTE — PROGRESS NOTES
Physical Therapy Note:    Attempted to see patient this PM for physical therapy treatment  session. Patient states he's in too much pain and reports he will work with therapy tomorrow. He reports it is very painful when his penis moves during mobility. Will try mesh underwear tomorrow. Will follow and re-attempt as schedule permits/patient available.  Thank you,    LUIS ALBERTO Harvey, PT     Rehab Caseload Tracker

## 2022-10-13 NOTE — PROGRESS NOTES
Admit Date: 10/5/2022    POD 6 Days Post-Op    Procedure:  Procedure(s):  BACK ABSCESS I & D  INGUINAL AND PENILE DEBRIDEMENT    Subjective:     Pt alert with NAD noted. Tolerating diet. Pain control is moderate. Wound care noted left buttock induration and pain 10/12/22. CT pelvis 10/12/22 revealed:   1. Interval I and D with large soft tissue defect seen in the left inguinal   region extending to the base of the penis. 2. Fluid collection within the perineum. 3. Fat stranding within the buttock on the left, as was seen previously. This   could represent cellulitis. 4. Scrotal soft tissue edema. This is unchanged       Objective:       Vitals:    10/12/22 1908 10/12/22 2320 10/13/22 0315 10/13/22 0720   BP: 132/75 135/86 133/84 138/89   Pulse: 89 86 85 87   Resp: 18 18 18 18   Temp: 99.1 °F (37.3 °C) 99.5 °F (37.5 °C) 100.2 °F (37.9 °C) 98.8 °F (37.1 °C)   TempSrc: Oral Oral Oral Oral   SpO2: 98% 91% 98% 96%   Weight:       Height:           Temp (24hrs), Av.3 °F (37.4 °C), Min:98.8 °F (37.1 °C), Max:100.2 °F (37.9 °C)  . I&O reviewed as documented. 2950 clear yellow UOP past 24 h jaimes  +flatus  -bm since admission=on scheduled colace and miralax. Afebrile  VSS    Physical Exam  Constitutional:       General: He is not in acute distress. Appearance: Normal appearance. He is obese. HENT:      Mouth/Throat:      Mouth: Mucous membranes are moist.   Cardiovascular:      Rate and Rhythm: Normal rate and regular rhythm. Pulses: Normal pulses. Heart sounds: Normal heart sounds. No murmur heard. No friction rub. No gallop. Pulmonary:      Effort: Pulmonary effort is normal. No respiratory distress. Breath sounds: Normal breath sounds. Abdominal:      General: Bowel sounds are normal. There is no distension. Palpations: Abdomen is soft. Genitourinary:     Comments: JAIMES  Musculoskeletal:         General: No swelling. Normal range of motion.       Cervical back: Normal range of motion. Skin:     General: Skin is warm and dry. Capillary Refill: Capillary refill takes 2 to 3 seconds. Comments: Back wound vac in place with no leaks. Left groin with healthy appearing wound bed.groin incisional edges with improved induration vs yesterday 10/12/22. Palpated perineum with no abscess track definable. Scrotal exam revealed no scrotal abscess. Neurological:      General: No focal deficit present. Mental Status: He is alert and oriented to person, place, and time.    Psychiatric:         Mood and Affect: Mood normal.         Behavior: Behavior normal.        Labs:   Recent Results (from the past 24 hour(s))   POCT Glucose    Collection Time: 10/12/22 11:43 AM   Result Value Ref Range    POC Glucose 213 (H) 65 - 100 mg/dL    Performed by: [employer]    POCT Glucose    Collection Time: 10/12/22  4:06 PM   Result Value Ref Range    POC Glucose 132 (H) 65 - 100 mg/dL    Performed by: [employer]    POCT Glucose    Collection Time: 10/12/22  8:45 PM   Result Value Ref Range    POC Glucose 245 (H) 65 - 100 mg/dL    Performed by: [facility] [employer]    POCT Glucose    Collection Time: 10/13/22  7:20 AM   Result Value Ref Range    POC Glucose 163 (H) 65 - 100 mg/dL    Performed by: [employer]    CBC with Auto Differential    Collection Time: 10/13/22  7:51 AM   Result Value Ref Range    WBC 11.7 (H) 4.3 - 11.1 K/uL    RBC 3.14 (L) 4.23 - 5.6 M/uL    Hemoglobin 9.1 (L) 13.6 - 17.2 g/dL    Hematocrit 29.5 (L) 41.1 - 50.3 %    MCV 93.9 82 - 102 FL    MCH 29.0 26.1 - 32.9 PG    MCHC 30.8 (L) 31.4 - 35.0 g/dL    RDW 14.4 11.9 - 14.6 %    Platelets 878 (H) 281 - 450 K/uL    MPV 10.3 9.4 - 12.3 FL    nRBC 0.00 0.0 - 0.2 K/uL    Differential Type AUTOMATED      Seg Neutrophils 68 43 - 78 %    Lymphocytes 21 13 - 44 %    Monocytes 6 4.0 - 12.0 %    Eosinophils % 3 0.5 - 7.8 %    Basophils 1 0.0 - 2.0 %    Immature Granulocytes 1 0.0 - 5.0 %    Segs Absolute 8.2 1.7 - 8.2 K/UL Absolute Lymph # 2.4 0.5 - 4.6 K/UL    Absolute Mono # 0.7 0.1 - 1.3 K/UL    Absolute Eos # 0.3 0.0 - 0.8 K/UL    Basophils Absolute 0.1 0.0 - 0.2 K/UL    Absolute Immature Granulocyte 0.1 0.0 - 0.5 K/UL   Basic Metabolic Panel w/ Reflex to MG    Collection Time: 10/13/22  7:51 AM   Result Value Ref Range    Sodium 139 133 - 143 mmol/L    Potassium 4.5 3.5 - 5.1 mmol/L    Chloride 111 (H) 101 - 110 mmol/L    CO2 26 21 - 32 mmol/L    Anion Gap 2 2 - 11 mmol/L    Glucose 166 (H) 65 - 100 mg/dL    BUN 17 6 - 23 MG/DL    Creatinine 1.60 (H) 0.8 - 1.5 MG/DL    Est, Glom Filt Rate 50 (L) >60 ml/min/1.73m2    Calcium 8.2 (L) 8.3 - 10.4 MG/DL     CT PELVIS 10/12/22  History: Left buttock abscess. History of prior I and D within the left groin. EXAM: CT pelvis with IV contrast       TECHNIQUE: Thin section axial CT images are obtained from the iliac crests   through the upper thighs after the uneventful administration of 100 cc   [id number]. Radiation dose reduction techniques were used for this study. Our   CT scanners use one or all of the following: Automated exposure control,   adjustment of the mA and/or kV according to patient size, use of iterative   reconstruction. COMPARISON: 10/5/2022       FINDINGS: There is a large soft tissue defect seen within the left inguinal   region extending to the base of the penis. There is infiltration of the   subcutaneous fat within the left buttock, as was seen previously without   discrete, drainable fluid collection in the buttocks. There is a fluid   collection in the perineum measuring 7.6 x 2.3 cm. There is scrotal soft tissue   edema present. Evaluation of the bowel loops in the pelvis is unremarkable. There is a Robles   catheter in the bladder. No definite pelvic adenopathy. Bone window evaluation demonstrates no aggressive osseous lesions. Impression   1.  Interval I and D with large soft tissue defect seen in the left inguinal   region extending to the base of the penis. 2. Fluid collection within the perineum. 3. Fat stranding within the buttock on the left, as was seen previously. This   could represent cellulitis. 4. Scrotal soft tissue edema. This is unchanged         Assessment:     Principal Problem:    Sepsis ([address]  Active Problems:    Primary hypertension    DM2 (diabetes mellitus, type 2) ([facility])    Cellulitis and abscess of trunk    Abscess of groin, left    Penile abscess    Acute urinary retention  Resolved Problems:    * No resolved hospital problems. *        Plan/Recommendations/Medical Decision Making:   Continue present treatment     Wet to dry with dakins solution with dressing changes BID  Perineal compresses QID. No palpable perineal abscess.  Wbc 11.7 10/13/22 from 14.9 10/12/22    MAURILIO Cuenca - NP

## 2022-10-13 NOTE — PROGRESS NOTES
Hospitalist Progress Note   Admit Date:  10/5/2022  7:22 PM   Name:  Sumanth Covarrubias   Age:  60 y.o. Sex:  male  :  1965   MRN:  [id number]   Room:      Reason(s) for Admission: Cellulitis and abscess of trunk [L03.319, L02.219]  Abscess [L02.91]  Hyperglycemia [R73.9]  Abscess, perineum [facility] Course & Interval History:   Mr. Holli Schneider is a nice 59 y/o male with a h/o DM2 and HTN who was admitted to our service on 10/5 with an abscess on his upper back for about 2 months. CT was done showing a paraspinal abscess w/o muscular involvement along with L inguinal inflammatory changes with possible early abscess of scrotum. Urology and General Surgery were consulted. He was started on antibiotics. Hyperglycemia, A1C >14, started on basal + bolus insulin. ID consulted, now on Zosyn. HIV neg, other ID orders pending. On 10/7 he underwent I&D of back abscess and L groin abscess. He later underwent debridement of penile abscess with Urology. Wound cultures from 10/5 I&D with staph aureus. Wound vac placed 10/10. Subjective/24hr Events (10/13/22): Patient is seen at the bedside. Complaining of persistent pain. Also complaining about staff. Reports no one is listening and taking time for his basic needs. Wound VAC removed and surgery recommended wet-to-dry dressing for perianal area. Recommend to continue wound VAC to back while inpatient. Will adjust his pain medicine. Assessment & Plan:     # Sepsis 2/2 abscess of upper back, L groin and penis due to MRSA  - Leukocytosis + tachycardia + abscesses on CT. Resolved. - S/p I&D of back and groin abscess, penile debridement on 10/7. 10/5 wound culture with MRSA. - Wound vac placed 10/10.  - Con't vancomycin. Appreciate ID input.  - Wound care  - Wound VAC removed and surgery recommended wet-to-dry dressing for perianal area.   Recommend to continue wound VAC to back while inpatient  - Start pt on tramadol every 6 hours and continue as needed pain medicine     # Visual changes // RUE weakness  - RUE strength improved 10/11 AM  - Head CT unremarkable. LDL 88  - MRI negative for acute pathology   - Statin added for DM     # Acute urinary retention  - Likely 2/2 penile abscess and edema. Con't Robles. # Uncontrolled DM2  - Con't basal + prandial + SSI at current dosing  - LDL 88, con't statin  - Stop glipizide at discharge. # HTN  - Controlled off meds. Discharge Planning: Pending, CM involved. Diet:  ADULT DIET; Regular; 3 carb choices (45 gm/meal)  DVT PPx: Lovenox  Code status: Full Code    Hospital Problems             Last Modified POA    * (Principal) Sepsis ([facility] [address] 10/8/2022 Yes    Primary hypertension 10/6/2022 Yes    DM2 (diabetes mellitus, type 2) ([facility] [address] 10/6/2022 Yes    Cellulitis and abscess of trunk 10/7/2022 Yes    Abscess of groin, left 10/8/2022 Yes    Penile abscess 10/8/2022 Yes    Acute urinary retention 10/8/2022 Yes      Objective:   Patient Vitals for the past 24 hrs:   Temp Pulse Resp BP SpO2   10/13/22 1139 97.9 °F (36.6 °C) 78 20 123/76 92 %   10/13/22 0720 98.8 °F (37.1 °C) 87 18 138/89 96 %   10/13/22 0315 100.2 °F (37.9 °C) 85 18 133/84 98 %   10/12/22 2320 99.5 °F (37.5 °C) 86 18 135/86 91 %   10/12/22 1908 99.1 °F (37.3 °C) 89 18 132/75 98 %   10/12/22 1545 99.1 °F (37.3 °C) 92 20 (!) 145/79 95 %         Estimated body mass index is 46.86 kg/m² as calculated from the following:    Height as of this encounter: 6' 2\" (1.88 m). Weight as of this encounter: 365 lb (165.6 kg). Intake/Output Summary (Last 24 hours) at 10/13/2022 1422  Last data filed at 10/13/2022 1316  Gross per 24 hour   Intake 1687.6 ml   Output 3550 ml   Net -1862.4 ml           Physical Exam:   Blood pressure 123/76, pulse 78, temperature 97.9 °F (36.6 °C), temperature source Axillary, resp. rate 20, height 6' 2\" (1.88 m), weight (!) 365 lb (165.6 kg), SpO2 92 %. General:    Well nourished. No overt distress.  Morbidly obese.  Head:  Normocephalic, atraumatic  Eyes:  Sclerae appear normal. Pupils equally round. ENT:  Nares appear normal, no drainage. Moist oral mucosa. Poor dentition. Neck:  No restricted ROM. Trachea midline. CV:   RRR. No m/r/g. No jugular venous distension. Lungs:   CTAB. No wheezing, rhonchi, or rales. Respirations even, unlabored. Abdomen: Bowel sounds present. Soft, nontender, nondistended. :  Robles in place. Extremities: No cyanosis or clubbing. No edema. Skin:     No rashes and normal coloration. Warm and dry. Wound vac to mid-back. Neuro:  CN II-XII grossly intact. A&Ox3. Improved  strength R hand. Psych:  Normal mood and affect.       I have reviewed ordered lab tests and independently visualized imaging below:    Recent Labs:  Recent Results (from the past 48 hour(s))   POCT Glucose    Collection Time: 10/11/22  4:51 PM   Result Value Ref Range    POC Glucose 178 (H) 65 - 100 mg/dL    Performed by: [facility]    POCT Glucose    Collection Time: 10/11/22  9:03 PM   Result Value Ref Range    POC Glucose 168 (H) 65 - 100 mg/dL    Performed by: Lane[facility]    Vancomycin Level, Random    Collection Time: 10/12/22  5:58 AM   Result Value Ref Range    Vancomycin Rm 23.8 UG/ML   CBC    Collection Time: 10/12/22  5:58 AM   Result Value Ref Range    WBC 14.9 (H) 4.3 - 11.1 K/uL    RBC 3.28 (L) 4.23 - 5.6 M/uL    Hemoglobin 9.2 (L) 13.6 - 17.2 g/dL    Hematocrit 30.5 (L) 41.1 - 50.3 %    MCV 93.0 82 - 102 FL    MCH 28.0 26.1 - 32.9 PG    MCHC 30.2 (L) 31.4 - 35.0 g/dL    RDW 14.2 11.9 - 14.6 %    Platelets 689 (H) 971 - 450 K/uL    MPV 10.4 9.4 - 12.3 FL    nRBC 0.00 0.0 - 0.2 K/uL   Basic Metabolic Panel w/ Reflex to MG    Collection Time: 10/12/22  5:58 AM   Result Value Ref Range    Sodium 136 133 - 143 mmol/L    Potassium 4.5 3.5 - 5.1 mmol/L    Chloride 109 101 - 110 mmol/L    CO2 23 21 - 32 mmol/L    Anion Gap 4 2 - 11 mmol/L    Glucose 185 (H) 65 - 100 mg/dL    BUN 17 6 - 23 MG/DL    Creatinine 1.60 (H) 0.8 - 1.5 MG/DL    Est, Glom Filt Rate 50 (L) >60 ml/min/1.73m2    Calcium 8.4 8.3 - 10.4 MG/DL   POCT Glucose    Collection Time: 10/12/22  7:36 AM   Result Value Ref Range    POC Glucose 160 (H) 65 - 100 mg/dL    Performed by: [facility]    POCT Glucose    Collection Time: 10/12/22 11:43 AM   Result Value Ref Range    POC Glucose 213 (H) 65 - 100 mg/dL    Performed by: [facility]    POCT Glucose    Collection Time: 10/12/22  4:06 PM   Result Value Ref Range    POC Glucose 132 (H) 65 - 100 mg/dL    Performed by: [employer]    POCT Glucose    Collection Time: 10/12/22  8:45 PM   Result Value Ref Range    POC Glucose 245 (H) 65 - 100 mg/dL    Performed by: [facility]    POCT Glucose    Collection Time: 10/13/22  7:20 AM   Result Value Ref Range    POC Glucose 163 (H) 65 - 100 mg/dL    Performed by: [facility]    CBC with Auto Differential    Collection Time: 10/13/22  7:51 AM   Result Value Ref Range    WBC 11.7 (H) 4.3 - 11.1 K/uL    RBC 3.14 (L) 4.23 - 5.6 M/uL    Hemoglobin 9.1 (L) 13.6 - 17.2 g/dL    Hematocrit 29.5 (L) 41.1 - 50.3 %    MCV 93.9 82 - 102 FL    MCH 29.0 26.1 - 32.9 PG    MCHC 30.8 (L) 31.4 - 35.0 g/dL    RDW 14.4 11.9 - 14.6 %    Platelets 609 (H) 539 - 450 K/uL    MPV 10.3 9.4 - 12.3 FL    nRBC 0.00 0.0 - 0.2 K/uL    Differential Type AUTOMATED      Seg Neutrophils 68 43 - 78 %    Lymphocytes 21 13 - 44 %    Monocytes 6 4.0 - 12.0 %    Eosinophils % 3 0.5 - 7.8 %    Basophils 1 0.0 - 2.0 %    Immature Granulocytes 1 0.0 - 5.0 %    Segs Absolute 8.2 1.7 - 8.2 K/UL    Absolute Lymph # 2.4 0.5 - 4.6 K/UL    Absolute Mono # 0.7 0.1 - 1.3 K/UL    Absolute Eos # 0.3 0.0 - 0.8 K/UL    Basophils Absolute 0.1 0.0 - 0.2 K/UL    Absolute Immature Granulocyte 0.1 0.0 - 0.5 K/UL   Basic Metabolic Panel w/ Reflex to MG    Collection Time: 10/13/22  7:51 AM   Result Value Ref Range    Sodium 139 133 - 143 mmol/L    Potassium 4.5 3.5 - 5.1 mmol/L    Chloride 111 (H) 101 - 110 mmol/L    CO2 26 21 - 32 mmol/L    Anion Gap 2 2 - 11 mmol/L    Glucose 166 (H) 65 - 100 mg/dL    BUN 17 6 - 23 MG/DL    Creatinine 1.60 (H) 0.8 - 1.5 MG/DL    Est, Glom Filt Rate 50 (L) >60 ml/min/1.73m2    Calcium 8.2 (L) 8.3 - 10.4 MG/DL   POCT Glucose    Collection Time: 10/13/22 11:36 AM   Result Value Ref Range    POC Glucose 144 (H) 65 - 100 mg/dL    Performed by: Veronica          Other Studies:  CT HEAD WO CONTRAST    Result Date: 10/10/2022  EXAM: Noncontrast CT head. INDICATION: Visual disturbance. COMPARISON: None. TECHNIQUE: Noncontrast CT images of the head were obtained. Radiation dose reduction techniques were used for this study. Our CT scanners use one or all of the following:  Automated exposure control, adjustment of the mA or kV according to patient size, iterative reconstruction. FINDINGS: Brain volume is appropriate for age. No acute infarct, hemorrhage or mass is identified. There is no mass effect, midline shift or depressed fracture. The visualized paranasal sinuses and mastoid air cells are clear, except for mild right maxillary sinus mucosal thickening. No acute process.       Current Meds:  Current Facility-Administered Medications   Medication Dose Route Frequency    insulin glargine (LANTUS) injection vial 36 Units  36 Units SubCUTAneous Daily    polyethylene glycol (GLYCOLAX) packet 17 g  17 g Oral BID    0.9 % sodium chloride infusion   IntraVENous Continuous    fluconazole (DIFLUCAN) tablet 200 mg  200 mg Oral Daily    aspirin EC tablet 81 mg  81 mg Oral Daily    vancomycin (VANCOCIN) 1500 mg in sodium chloride 0.9% 500 mL IVPB  1,500 mg IntraVENous Q24H    atorvastatin (LIPITOR) tablet 40 mg  40 mg Oral Nightly    docusate sodium (COLACE) capsule 100 mg  100 mg Oral BID    miconazole (MICOTIN) 2 % powder   Topical BID    oxyCODONE (ROXICODONE) immediate release tablet 10 mg  10 mg Oral Q4H PRN    sodium hypochlorite (DAKINS) 0.125 % external solution Irrigation BID    gabapentin (NEURONTIN) capsule 100 mg  100 mg Oral TID    methocarbamol (ROBAXIN) tablet 750 mg  750 mg Oral 4x Daily    HYDROmorphone HCl PF (DILAUDID) injection 0.5 mg  0.5 mg IntraVENous Q3H PRN    insulin lispro (HUMALOG) injection vial 4 Units  4 Units SubCUTAneous TID WC    glucose chewable tablet 16 g  4 tablet Oral PRN    dextrose bolus 10% 125 mL  125 mL IntraVENous PRN    Or    dextrose bolus 10% 250 mL  250 mL IntraVENous PRN    glucagon (rDNA) injection 1 mg  1 mg SubCUTAneous PRN    dextrose 10 % infusion   IntraVENous Continuous PRN    insulin lispro (HUMALOG) injection vial 0-8 Units  0-8 Units SubCUTAneous TID WC    insulin lispro (HUMALOG) injection vial 0-4 Units  0-4 Units SubCUTAneous Nightly    sodium chloride flush 0.9 % injection 5-40 mL  5-40 mL IntraVENous 2 times per day    sodium chloride flush 0.9 % injection 5-40 mL  5-40 mL IntraVENous PRN    0.9 % sodium chloride infusion   IntraVENous PRN    enoxaparin (LOVENOX) injection 40 mg  40 mg SubCUTAneous BID    ondansetron (ZOFRAN-ODT) disintegrating tablet 4 mg  4 mg Oral Q8H PRN    Or    ondansetron (ZOFRAN) injection 4 mg  4 mg IntraVENous Q6H PRN    polyethylene glycol (GLYCOLAX) packet 17 g  17 g Oral Daily PRN    acetaminophen (TYLENOL) tablet 650 mg  650 mg Oral Q6H PRN    Or    acetaminophen (TYLENOL) suppository 650 mg  650 mg Rectal Q6H PRN       Signed:  Evans Lara MD    Part of this note may have been written by using a voice dictation software. The note has been proof read but may still contain some grammatical/other typographical errors.

## 2022-10-13 NOTE — PROGRESS NOTES
Family member came to the nurse station yelling and demanding stating \"He asked for ice 30 minutes ago and no one ever came, and he wanted pain meds. \" [employer] was reviewing chart for available time to give next dose of meds. Writer explained to family member we have quiet hours at this time and verbally abusing staff is frowned upon. Family member continued to be belligerent toward writer and staff. Writer requested family member to leave the floor d/t patient becoming more agitated. Family member states \"I gotta leave for work anyway. \" Family member left the floor. Writer administered meds, retrieved ice water and met all patient needs at that time.

## 2022-10-13 NOTE — PROGRESS NOTES
VANCO DAILY FOLLOW UP NOTE  [facility] Pharmacokinetic Monitoring Service - Vancomycin    Consulting Provider: Dr. Maritza Griffin   Indication: SSTI  Target Concentration: Goal trough of 10-15 mg/L and AUC/MADINA <500 mg*hr/L  Day of Therapy: 6  Additional Antimicrobials: none    Pertinent Laboratory Values: Wt Readings from Last 1 Encounters:   10/07/22 (!) 365 lb (165.6 kg)     Temp Readings from Last 1 Encounters:   10/13/22 98.8 °F (37.1 °C) (Oral)     Recent Labs     10/11/22  0528 10/12/22  0558   BUN 18 17   CREATININE 1.60* 1.60*   WBC 14.0* 14.9*     Estimated Creatinine Clearance: 82 mL/min (A) (based on SCr of 1.6 mg/dL (H)). Lab Results   Component Value Date/Time    VANCORANDOM 23.8 10/12/2022 05:58 AM     MRSA Nasal Swab: N/A. Non-respiratory infection. Assessment:  Date/Time Dose Concentration AUC   10/10 0706 1250 mg q18h 21.6 513   10/12 0558 1250 mg q24h 23.8 452   Note: Serum concentrations collected for AUC dosing may appear elevated if collected in close proximity to the dose administered, this is not necessarily an indication of toxicity    Plan:  Current dosing regimen is therapeutic  Continue current dose  Repeat vancomycin concentrations will be ordered as clinically appropriate   Pharmacy will continue to monitor patient and adjust therapy as indicated    Thank you for the consult,  Marietta Bone, [facility]

## 2022-10-13 NOTE — WOUND CARE
Right groin assessed, induration remains a both medial and lateral sides of wound. Rash is improving, patient does not like the powder, used antifungal ointment today, won't be able to get the wound vac to stick to powder so may need to use powder,  if vac is tried again next week and still needed. Left buttocks has increased erythema and induration, painful. Noted not plans for surgery, used zinc barrier and funal ointment for rash. Hopefully this area will begin resolving with antibiotics. Noted perineal compress is ordered QID by surgical team. Will plan wound vac change and assessment again tomorrow. Will monitor.

## 2022-10-13 NOTE — PROGRESS NOTES
END OF SHIFT NOTE:    INTAKE/OUTPUT  10/12 0701 - 10/13 0700  In: 1678.6 [P.O.:951; I.V.:727.6]  Out: 2650 [Urine:2650]  Voiding: Yes  Catheter: Yes  Drain:   Negative Pressure Wound Therapy Groin Anterior (Active)   $ Standard NPWT <=50 sq cm PER TX $ Yes 10/10/22 1333   Wound Type Surgical 10/12/22 1300   Unit Type kci ulta 10/11/22 1420   Dressing Type Black Foam 10/11/22 1905   Number of pieces used 1 10/10/22 1333   Cycle Continuous 10/11/22 1420   Target Pressure (mmHg) 125 10/11/22 1420   Canister changed? Yes 10/10/22 1333   Dressing Status Clean, dry & intact 10/11/22 1420   Dressing Changed Dressing reinforced 10/10/22 1333   Drainage Amount Small 10/10/22 1951   Drainage Description Serosanguinous;Purulent 10/11/22 1905   Dressing Change Due 10/12/22 10/11/22 1905   Output (ml) 100 ml 10/11/22 [address]; Subcutaneous;Granulation tissue 10/10/22 1333   Nemo-wound Assessment Intact 10/10/22 1333   Shape irregular 10/10/22 1333   Odor Mild 10/10/22 1333       Negative Pressure Wound Therapy Back Left;Mid (Active)   $ Standard NPWT <=50 sq cm PER TX $ Yes 10/12/22 1427   Wound Type Surgical 10/12/22 1427   Unit Type kci ulta 10/12/22 1427   Dressing Type Black Foam 10/12/22 2025   Number of pieces used 1 10/12/22 1427   Number of pieces removed 1 10/12/22 1427   Cycle Continuous 10/12/22 1427   Target Pressure (mmHg) 125 10/12/22 1427   Canister changed?  No 10/12/22 0710   Dressing Status Clean, dry & intact 10/13/22 0230   Dressing Changed Changed/New 10/12/22 1427   Drainage Amount Moderate 10/12/22 0710   Drainage Description Serosanguinous;Purulent 10/12/22 1427   Dressing Change Due 10/14/22 10/12/22 1427   Wound Assessment Granulation tissue 10/12/22 1427   Nemo-wound Assessment Other (Comment) 10/12/22 1427   Shape irrgular oval 10/12/22 1427   Odor Mild 10/12/22 1427       Urinary Catheter 10/06/22 Robles (Active)   $ Urethral catheter insertion Inserted for procedure 10/10/22 0800 Catheter Indications Assist in healing of open sacral or perineal wounds (Stage III, IV, or unstageable documented by a provider or enterostomal therapy) and/or full thickness perineal and lower extremity burns in incontinent patients 10/12/22 2025   Site Assessment Swelling 10/12/22 2025   Urine Color Jinny 10/12/22 2025   Urine Appearance Sediment 10/12/22 2025   Urine Odor Other (Comment) 10/07/22 1135   Collection Container Standard 10/12/22 2025   Securement Method Securing device (Describe) 10/12/22 2025   Catheter Care Completed Yes 10/12/22 1300   Catheter Best Practices  Drainage tube clipped to bed;Catheter secured to thigh; Tamper seal intact; Bag below bladder;Bag not on floor; Lack of dependent loop in tubing;Drainage bag less than half full 10/12/22 2025   Status Draining 10/12/22 2025   Output (mL) 550 mL 10/12/22 2025               Flatus: Patient does have flatus present. Stool:  occurrences. Characteristics:           Stool Assessment  Last BM (including prior to admit): 10/05/22    Emesis:  occurrences. Characteristics:        VITAL SIGNS  Patient Vitals for the past 12 hrs:   Temp Pulse Resp BP SpO2   10/13/22 0315 100.2 °F (37.9 °C) 85 18 133/84 98 %   10/12/22 2320 99.5 °F (37.5 °C) 86 18 135/86 91 %   10/12/22 1908 99.1 °F (37.3 °C) 89 18 132/75 98 %   10/12/22 1545 99.1 °F (37.3 °C) 92 20 (!) 145/79 95 %       Pain Assessment  Pain Level: 9 (10/12/22 4345)  Pain Location: Penis  Patient's Stated Pain Goal: 0 - No pain    Ambulating  No    Shift report given to oncoming nurse at the bedside.     Zenia Aguilar RN

## 2022-10-13 NOTE — PROGRESS NOTES
Infectious Disease Progress Note    Today's Date: 10/13/2022   Admit Date: 10/5/2022    Impression:   MRSA penile abscess / perineal soft tissue infection  S/p I&D,wound vac, 10/7/22; Cx: MRSA  MRSA soft tissue paraspinous abscess  Visual disturbance / R arm weakness  Urinary retention  Poorly controlled DM    Plan:   Continue IV vancomycin while inpatient. PO fluconazole for yeast coverage added yesterday - per wound care & patient rash and itching improving   Possible need for further debridement vs aggressive wound care measures. Per general surgery - continue with wet to dry dressing changes BID with Dakins solution. No plans for surgical debridement at this time. ID will continue to follow. Anti-infectives:   PO fluconazole 10/12 -  IV vancomycin    Subjective: Interval history: WBC 11.7. Afebrile. Patient resting in bed with family at bedside. Patient feels he is very slowly improving. He is still having persistent pain. He denies any nausea, vomiting or diarrhea     No Known Allergies     Review of Systems:  A comprehensive review of systems is negative except as stated above. Objective:     Visit Vitals  /84   Pulse 83   Temp 98.6 °F (37 °C) (Oral)   Resp 20   Ht 6' 2\" (1.88 m)   Wt (!) 365 lb (165.6 kg)   SpO2 98%   BMI 46.86 kg/m²     Temp (24hrs), Av °F (37.2 °C), Min:97.9 °F (36.6 °C), Max:100.2 °F (37.9 °C)     Patient was seen and examined on 10/13/2022 and physical exam remains unchanged since yesterday, unless noted below:    Lines:  Peripheral IV:       Physical Exam:    General:  In no acute distress   Eyes:     Mouth/Throat:    Neck:    Lungs:   Breathing comfortably, breath sounds clear, no wheezing   CV:   S1/S2; no murmurs noted   Abdomen:   Non-distended, soft   Extremities: Trace edema   Skin:  wound vac on back in place with seal intact, Left groin dressing intact   Lymph nodes:    Musculoskeletal: No swollen or inflamed joints   Lines/Devices:    Robles   Psych: Alert and oriented       Data Review:     CBC:  Recent Labs     10/11/22  0528 10/12/22  0558 10/13/22  0751   WBC 14.0* 14.9* 11.7*   HGB 9.3* 9.2* 9.1*   HCT 30.0* 30.5* 29.5*   * 516* 557*         BMP:  Recent Labs     10/11/22  0528 10/12/22  0558 10/13/22  0751   BUN 18 17 17    136 139   K 4.3 4.5 4.5    109 111*   CO2 26 23 26         LFTS:  No results for input(s): ALT, TP, ALB in the last 72 hours. Invalid input(s): TBILI, SGOT, AP    Microbiology:   Reviewed    Imaging:   10/10/22 CT head:     FINDINGS: Brain volume is appropriate for age. No acute infarct, hemorrhage or   mass is identified. There is no mass effect, midline shift or depressed   fracture. The visualized paranasal sinuses and mastoid air cells are clear,   except for mild right maxillary sinus mucosal thickening.        Signed By: LILLIAN Lugo     October 13, 2022

## 2022-10-14 LAB
ANION GAP SERPL CALC-SCNC: 6 MMOL/L (ref 2–11)
BASOPHILS # BLD: 0.1 K/UL (ref 0–0.2)
BASOPHILS NFR BLD: 0 % (ref 0–2)
BUN SERPL-MCNC: 14 MG/DL (ref 6–23)
CALCIUM SERPL-MCNC: 8 MG/DL (ref 8.3–10.4)
CHLORIDE SERPL-SCNC: 110 MMOL/L (ref 101–110)
CO2 SERPL-SCNC: 23 MMOL/L (ref 21–32)
CREAT SERPL-MCNC: 1.5 MG/DL (ref 0.8–1.5)
DIFFERENTIAL METHOD BLD: ABNORMAL
EOSINOPHIL # BLD: 0.4 K/UL (ref 0–0.8)
EOSINOPHIL NFR BLD: 3 % (ref 0.5–7.8)
ERYTHROCYTE [DISTWIDTH] IN BLOOD BY AUTOMATED COUNT: 14.2 % (ref 11.9–14.6)
GLUCOSE BLD STRIP.AUTO-MCNC: 119 MG/DL (ref 65–100)
GLUCOSE BLD STRIP.AUTO-MCNC: 135 MG/DL (ref 65–100)
GLUCOSE BLD STRIP.AUTO-MCNC: 136 MG/DL (ref 65–100)
GLUCOSE BLD STRIP.AUTO-MCNC: 155 MG/DL (ref 65–100)
GLUCOSE BLD STRIP.AUTO-MCNC: 187 MG/DL (ref 65–100)
GLUCOSE SERPL-MCNC: 166 MG/DL (ref 65–100)
HCT VFR BLD AUTO: 28.2 % (ref 41.1–50.3)
HGB BLD-MCNC: 8.6 G/DL (ref 13.6–17.2)
IMM GRANULOCYTES # BLD AUTO: 0.1 K/UL (ref 0–0.5)
IMM GRANULOCYTES NFR BLD AUTO: 1 % (ref 0–5)
LYMPHOCYTES # BLD: 2.2 K/UL (ref 0.5–4.6)
LYMPHOCYTES NFR BLD: 19 % (ref 13–44)
MCH RBC QN AUTO: 28.9 PG (ref 26.1–32.9)
MCHC RBC AUTO-ENTMCNC: 30.5 G/DL (ref 31.4–35)
MCV RBC AUTO: 94.6 FL (ref 82–102)
MONOCYTES # BLD: 0.7 K/UL (ref 0.1–1.3)
MONOCYTES NFR BLD: 6 % (ref 4–12)
NEUTS SEG # BLD: 8.2 K/UL (ref 1.7–8.2)
NEUTS SEG NFR BLD: 71 % (ref 43–78)
NRBC # BLD: 0 K/UL (ref 0–0.2)
PLATELET # BLD AUTO: 529 K/UL (ref 150–450)
PMV BLD AUTO: 10 FL (ref 9.4–12.3)
POTASSIUM SERPL-SCNC: 4.3 MMOL/L (ref 3.5–5.1)
RBC # BLD AUTO: 2.98 M/UL (ref 4.23–5.6)
SERVICE CMNT-IMP: ABNORMAL
SODIUM SERPL-SCNC: 139 MMOL/L (ref 133–143)
WBC # BLD AUTO: 11.5 K/UL (ref 4.3–11.1)

## 2022-10-14 PROCEDURE — 97530 THERAPEUTIC ACTIVITIES: CPT

## 2022-10-14 PROCEDURE — 6370000000 HC RX 637 (ALT 250 FOR IP): Performed by: INTERNAL MEDICINE

## 2022-10-14 PROCEDURE — 2500000003 HC RX 250 WO HCPCS: Performed by: INTERNAL MEDICINE

## 2022-10-14 PROCEDURE — 6370000000 HC RX 637 (ALT 250 FOR IP): Performed by: STUDENT IN AN ORGANIZED HEALTH CARE EDUCATION/TRAINING PROGRAM

## 2022-10-14 PROCEDURE — 2580000003 HC RX 258: Performed by: INTERNAL MEDICINE

## 2022-10-14 PROCEDURE — 1100000000 HC RM PRIVATE

## 2022-10-14 PROCEDURE — 6370000000 HC RX 637 (ALT 250 FOR IP): Performed by: FAMILY MEDICINE

## 2022-10-14 PROCEDURE — 97605 NEG PRS WND THER DME<=50SQCM: CPT

## 2022-10-14 PROCEDURE — 6370000000 HC RX 637 (ALT 250 FOR IP)

## 2022-10-14 PROCEDURE — 36415 COLL VENOUS BLD VENIPUNCTURE: CPT

## 2022-10-14 PROCEDURE — 80048 BASIC METABOLIC PNL TOTAL CA: CPT

## 2022-10-14 PROCEDURE — 97607 NEG PRS WND THR NDME<=50SQCM: CPT

## 2022-10-14 PROCEDURE — 82962 GLUCOSE BLOOD TEST: CPT

## 2022-10-14 PROCEDURE — 85025 COMPLETE CBC W/AUTO DIFF WBC: CPT

## 2022-10-14 PROCEDURE — 6360000002 HC RX W HCPCS: Performed by: INTERNAL MEDICINE

## 2022-10-14 PROCEDURE — 2580000003 HC RX 258: Performed by: FAMILY MEDICINE

## 2022-10-14 PROCEDURE — 99233 SBSQ HOSP IP/OBS HIGH 50: CPT | Performed by: STUDENT IN AN ORGANIZED HEALTH CARE EDUCATION/TRAINING PROGRAM

## 2022-10-14 PROCEDURE — 6360000002 HC RX W HCPCS: Performed by: FAMILY MEDICINE

## 2022-10-14 PROCEDURE — 2580000003 HC RX 258

## 2022-10-14 PROCEDURE — 6370000000 HC RX 637 (ALT 250 FOR IP): Performed by: UROLOGY

## 2022-10-14 RX ORDER — DIMETHICONE, CAMPHOR (SYNTHETIC), MENTHOL, AND PHENOL 1.1; .5; .625; .5 G/100G; G/100G; G/100G; G/100G
OINTMENT TOPICAL PRN
Status: DISCONTINUED | OUTPATIENT
Start: 2022-10-14 | End: 2022-10-25 | Stop reason: HOSPADM

## 2022-10-14 RX ADMIN — METHOCARBAMOL TABLETS 750 MG: 750 TABLET, COATED ORAL at 13:56

## 2022-10-14 RX ADMIN — ATORVASTATIN CALCIUM 40 MG: 40 TABLET, FILM COATED ORAL at 21:31

## 2022-10-14 RX ADMIN — FLUCONAZOLE 200 MG: 100 TABLET ORAL at 08:41

## 2022-10-14 RX ADMIN — INSULIN LISPRO 4 UNITS: 100 INJECTION, SOLUTION INTRAVENOUS; SUBCUTANEOUS at 08:43

## 2022-10-14 RX ADMIN — METHOCARBAMOL TABLETS 750 MG: 750 TABLET, COATED ORAL at 17:30

## 2022-10-14 RX ADMIN — SODIUM CHLORIDE: 9 INJECTION, SOLUTION INTRAVENOUS at 02:30

## 2022-10-14 RX ADMIN — GABAPENTIN 100 MG: 100 CAPSULE ORAL at 21:30

## 2022-10-14 RX ADMIN — TRAMADOL HYDROCHLORIDE 50 MG: 50 TABLET, COATED ORAL at 13:56

## 2022-10-14 RX ADMIN — GABAPENTIN 100 MG: 100 CAPSULE ORAL at 13:58

## 2022-10-14 RX ADMIN — OXYCODONE 10 MG: 5 TABLET ORAL at 04:10

## 2022-10-14 RX ADMIN — DOCUSATE SODIUM 100 MG: 100 CAPSULE, LIQUID FILLED ORAL at 21:31

## 2022-10-14 RX ADMIN — INSULIN LISPRO 4 UNITS: 100 INJECTION, SOLUTION INTRAVENOUS; SUBCUTANEOUS at 17:28

## 2022-10-14 RX ADMIN — Medication 1500 MG: at 19:58

## 2022-10-14 RX ADMIN — SODIUM CHLORIDE: 9 INJECTION, SOLUTION INTRAVENOUS at 08:38

## 2022-10-14 RX ADMIN — ANTI-FUNGAL POWDER MICONAZOLE NITRATE TALC FREE: 1.42 POWDER TOPICAL at 12:17

## 2022-10-14 RX ADMIN — HYDROMORPHONE HYDROCHLORIDE 0.5 MG: 1 INJECTION, SOLUTION INTRAMUSCULAR; INTRAVENOUS; SUBCUTANEOUS at 19:58

## 2022-10-14 RX ADMIN — INSULIN GLARGINE 36 UNITS: 100 INJECTION, SOLUTION SUBCUTANEOUS at 09:33

## 2022-10-14 RX ADMIN — INSULIN LISPRO 4 UNITS: 100 INJECTION, SOLUTION INTRAVENOUS; SUBCUTANEOUS at 13:49

## 2022-10-14 RX ADMIN — Medication: at 21:34

## 2022-10-14 RX ADMIN — TRAMADOL HYDROCHLORIDE 50 MG: 50 TABLET, COATED ORAL at 02:40

## 2022-10-14 RX ADMIN — Medication: at 12:15

## 2022-10-14 RX ADMIN — SODIUM CHLORIDE, PRESERVATIVE FREE 10 ML: 5 INJECTION INTRAVENOUS at 21:00

## 2022-10-14 RX ADMIN — Medication: at 10:15

## 2022-10-14 RX ADMIN — SODIUM CHLORIDE, PRESERVATIVE FREE 10 ML: 5 INJECTION INTRAVENOUS at 10:16

## 2022-10-14 RX ADMIN — GABAPENTIN 100 MG: 100 CAPSULE ORAL at 08:41

## 2022-10-14 RX ADMIN — METHOCARBAMOL TABLETS 750 MG: 750 TABLET, COATED ORAL at 21:31

## 2022-10-14 RX ADMIN — ASPIRIN 81 MG: 81 TABLET ORAL at 08:41

## 2022-10-14 RX ADMIN — ENOXAPARIN SODIUM 40 MG: 100 INJECTION SUBCUTANEOUS at 21:30

## 2022-10-14 RX ADMIN — TRAMADOL HYDROCHLORIDE 50 MG: 50 TABLET, COATED ORAL at 08:42

## 2022-10-14 RX ADMIN — METHOCARBAMOL TABLETS 750 MG: 750 TABLET, COATED ORAL at 08:41

## 2022-10-14 RX ADMIN — TRAMADOL HYDROCHLORIDE 50 MG: 50 TABLET, COATED ORAL at 19:58

## 2022-10-14 RX ADMIN — ENOXAPARIN SODIUM 40 MG: 100 INJECTION SUBCUTANEOUS at 08:42

## 2022-10-14 RX ADMIN — HYDROMORPHONE HYDROCHLORIDE 0.5 MG: 1 INJECTION, SOLUTION INTRAMUSCULAR; INTRAVENOUS; SUBCUTANEOUS at 10:54

## 2022-10-14 RX ADMIN — OXYCODONE 10 MG: 5 TABLET ORAL at 12:12

## 2022-10-14 RX ADMIN — ANTI-FUNGAL POWDER MICONAZOLE NITRATE TALC FREE: 1.42 POWDER TOPICAL at 21:34

## 2022-10-14 RX ADMIN — OXYCODONE 10 MG: 5 TABLET ORAL at 17:30

## 2022-10-14 RX ADMIN — POLYETHYLENE GLYCOL 3350 17 G: 17 POWDER, FOR SOLUTION ORAL at 21:33

## 2022-10-14 RX ADMIN — OXYCODONE 10 MG: 5 TABLET ORAL at 23:28

## 2022-10-14 RX ADMIN — DOCUSATE SODIUM 100 MG: 100 CAPSULE, LIQUID FILLED ORAL at 08:41

## 2022-10-14 ASSESSMENT — PAIN DESCRIPTION - DESCRIPTORS
DESCRIPTORS: ACHING;PRESSURE;SHOOTING
DESCRIPTORS: ACHING;THROBBING
DESCRIPTORS: ACHING
DESCRIPTORS: ACHING;THROBBING
DESCRIPTORS: ACHING;PRESSURE;THROBBING
DESCRIPTORS: ACHING
DESCRIPTORS: ACHING;PRESSURE;SHOOTING
DESCRIPTORS: ACHING;THROBBING
DESCRIPTORS: ACHING

## 2022-10-14 ASSESSMENT — PAIN SCALES - GENERAL
PAINLEVEL_OUTOF10: 9
PAINLEVEL_OUTOF10: 8
PAINLEVEL_OUTOF10: 7
PAINLEVEL_OUTOF10: 9
PAINLEVEL_OUTOF10: 8
PAINLEVEL_OUTOF10: 9

## 2022-10-14 ASSESSMENT — PAIN DESCRIPTION - LOCATION
LOCATION: PENIS
LOCATION: PENIS
LOCATION: PERINEUM
LOCATION: PENIS;BACK
LOCATION: PELVIS
LOCATION: PENIS

## 2022-10-14 ASSESSMENT — PAIN DESCRIPTION - ORIENTATION
ORIENTATION: ANTERIOR

## 2022-10-14 ASSESSMENT — PAIN DESCRIPTION - ONSET: ONSET: OTHER (COMMENT)

## 2022-10-14 NOTE — CARE COORDINATION
LOS 9d    Chart reviewed by [facility] and discussed in IDR. Patient POD 7, s/p I&D back abscess, left groin/scrotal, penile abscess. Wet to dry started 10/13 BID; increase activity with PT. CM following.

## 2022-10-14 NOTE — PROGRESS NOTES
END OF SHIFT NOTE:    INTAKE/OUTPUT  10/13 0701 - 10/14 0700  In: 8647 [P.O.:1200; I.V.:3404]  Out: 3050 [Urine:3050]  Voiding: Yes  Catheter: Yes  Drain:   Negative Pressure Wound Therapy Back Left;Mid (Active)   $ Standard NPWT <=50 sq cm PER TX $ Yes 10/12/22 1427   Wound Type Surgical 10/12/22 1427   Unit Type kci ulta 10/12/22 1427   Dressing Type Black Foam 10/14/22 0250   Number of pieces used 1 10/12/22 1427   Number of pieces removed 1 10/12/22 1427   Cycle Continuous 10/13/22 1442   Target Pressure (mmHg) 125 10/13/22 1442   Canister changed? No 10/13/22 1442   Dressing Status Clean, dry & intact 10/13/22 1442   Dressing Changed Changed/New 10/12/22 1427   Drainage Amount Moderate 10/12/22 0710   Drainage Description Serosanguinous;Purulent 10/12/22 1427   Dressing Change Due 10/14/22 10/12/22 1427   Output (ml) 0 ml 10/13/22 1442   Wound Assessment Granulation tissue 10/12/22 1427   Nemo-wound Assessment Other (Comment) 10/12/22 1427   Shape irrgular oval 10/12/22 1427   Odor Mild 10/12/22 1427       Urinary Catheter 10/06/22 Robles (Active)   $ Urethral catheter insertion Inserted for procedure 10/10/22 0800   Catheter Indications Assist in healing of open sacral or perineal wounds (Stage III, IV, or unstageable documented by a provider or enterostomal therapy) and/or full thickness perineal and lower extremity burns in incontinent patients 10/13/22 2000   Site Assessment Swelling 10/13/22 2000   Urine Color Jinny 10/13/22 2000   Urine Appearance Sediment 10/13/22 2000   Urine Odor Other (Comment) 10/07/22 1135   Collection Container Standard 10/13/22 2000   Securement Method Securing device (Describe) 10/13/22 2000   Catheter Care Completed Yes 10/14/22 0250   Catheter Best Practices  Drainage tube clipped to bed;Catheter secured to thigh; Tamper seal intact; Bag below bladder;Bag not on floor; Lack of dependent loop in tubing;Drainage bag less than half full 10/13/22 2000   Status Draining 10/13/22 2000 Output (mL) 200 mL 10/13/22 2342               Flatus: Patient does have flatus present. Stool:  occurrences. Characteristics:           Stool Assessment  Last BM (including prior to admit): 10/05/22    Emesis:  occurrences. Characteristics:        VITAL SIGNS  Patient Vitals for the past 12 hrs:   Temp Pulse Resp BP SpO2   10/13/22 2342 99.9 °F (37.7 °C) 89 20 (!) 143/82 92 %   10/13/22 1940 98.8 °F (37.1 °C) 84 20 (!) 145/80 93 %       Pain Assessment  Pain Level: 0 (10/13/22 1944)  Pain Location: Penis  Patient's Stated Pain Goal: 0 - No pain    Ambulating  No    Shift report given to oncoming nurse at the bedside.     Radha Coleman RN

## 2022-10-14 NOTE — PLAN OF CARE
Problem: Discharge Planning  Goal: Discharge to home or other facility with appropriate resources  10/14/2022 0310 by Andria Moncada RN  Outcome: Progressing  10/13/2022 1940 by Manuel Wright RN  Outcome: Progressing     Problem: Safety - Adult  Goal: Free from fall injury  10/14/2022 0310 by Andria Moncada RN  Outcome: Progressing  10/13/2022 1940 by Manuel Wright RN  Outcome: Progressing     Problem: Pain  Goal: Verbalizes/displays adequate comfort level or baseline comfort level  10/14/2022 0310 by Andria Moncada RN  Outcome: Progressing  10/13/2022 1940 by Manuel Wright RN  Outcome: Progressing     Problem: Chronic Conditions and Co-morbidities  Goal: Patient's chronic conditions and co-morbidity symptoms are monitored and maintained or improved  10/14/2022 0310 by Andria Moncada RN  Outcome: Progressing  10/13/2022 1940 by Manuel Wright RN  Outcome: Progressing     Problem: Skin/Tissue Integrity  Goal: Absence of new skin breakdown  Description: 1. Monitor for areas of redness and/or skin breakdown  2. Assess vascular access sites hourly  3. Every 4-6 hours minimum:  Change oxygen saturation probe site  4. Every 4-6 hours:  If on nasal continuous positive airway pressure, respiratory therapy assess nares and determine need for appliance change or resting period.   10/14/2022 0310 by Andria Moncada RN  Outcome: Progressing  10/13/2022 1940 by Manuel Wright RN  Outcome: Progressing

## 2022-10-14 NOTE — PROGRESS NOTES
ACUTE PHYSICAL THERAPY GOALS:   (Developed with and agreed upon by patient and/or caregiver. )  LTG:  (1.)Mr. Gerardo Boucher will move from supine to sit and sit to supine , scoot up and down, and roll side to side in bed with STAND BY ASSIST within 7 treatment day(s). (2.)Mr. Gerardo Boucher will transfer from bed to chair and chair to bed with CONTACT GUARD ASSIST using the least restrictive device within 7 treatment day(s). (3.)Mr. Gerardo Boucher will ambulate with CONTACT GUARD ASSIST for 250+ feet with the least restrictive device within 7 treatment day(s). (4.)Mr. Gerardo Boucher will perform 4 stairs with HR and CGA within 7 treatment days for ascending and descending stairs for home. PHYSICAL THERAPY: Daily Note PM   (Link to Caseload Tracking: PT Visit Days : 3  Time In/Out PT Charge Capture  Rehab Caseload Tracker  Orders    Nadia Catherine is a 60 y.o. male   PRIMARY DIAGNOSIS: Sepsis ([address]  Cellulitis and abscess of trunk [L03.319, L02.219]  Abscess [L02.91]  Hyperglycemia [R73.9]  Abscess, perineum [L02.215]  Procedure(s) (LRB):  BACK ABSCESS I & D (N/A)  INGUINAL AND PENILE DEBRIDEMENT (N/A)  7 Days Post-Op  Inpatient: Payor: /     ASSESSMENT:     REHAB RECOMMENDATIONS:   Recommendation to date pending progress:  Setting:  Home Health Therapy    Equipment:    To Be Determined     ASSESSMENT:  Mr. Gerardo Boucher was asleep in bed and agreeable for PT when awakened. He endorses 910 pain, assisted patient with donning mesh underwear. He transitioned to sit with  min A. Stood with min A to RW. Patient ambulated in room several times with RW and CGA. Attempted sitting in recliner with foam cushion, but patient unable to tolerate longer than ~5 minutes at a time. Patient demonstrated progress with gait today. He is primarily limited by pain and wounds. .  Would benefit from longer standing tolerance to offload his wounds.      SUBJECTIVE:   Mr. Gerardo Boucher states, \"I need a pillow\"     Social/Functional Lives With: Significant other  Type of Home: House  Home Access: Stairs to enter with rails  Entrance Stairs - Number of Steps: 4  Bathroom Equipment: Shower chair  Home Equipment: 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   OBJECTIVE:     PAIN: [place] Min / O2: PRECAUTION / Izell Blazing / DRAINS:   Pre Treatment:   Pain Assessment: 0-10  Pain Level: 9  Pain Location: Perineum  Non-Pharmaceutical Pain Intervention(s): Repositioned      Post Treatment: 10 Vitals        Oxygen    IV and Wound Vac    RESTRICTIONS/PRECAUTIONS:  Restrictions/Precautions  Restrictions/Precautions: Fall Risk  Restrictions/Precautions: Fall Risk     MOBILITY: I Mod I S SBA CGA Min Mod Max Total  NT x2 Comments:   Bed Mobility    Rolling [] [] [] [] [] [x] [] [] [] [] []    Supine to Sit [] [] [] [] [] [x] [] [] [] [] []    Scooting [] [] [] [] [x] [x] [] [] [] [] []    Sit to Supine [] [] [] [] [] [x] [] [] [] [] []    Transfers    Sit to Stand [] [] [] [] [x] [x] [] [] [] [] []    Bed to Chair [] [] [] [] [] [x] [] [] [] [] []    Stand to Sit [] [] [] [] [x] [x] [] [] [] [] []     [] [] [] [] [] [] [] [] [] [] []    I=Independent, Mod I=Modified Independent, S=Supervision, SBA=Standby Assistance, CGA=Contact Guard Assistance,   Min=Minimal Assistance, Mod=Moderate Assistance, Max=Maximal Assistance, Total=Total Assistance, NT=Not Tested    BALANCE: Good Fair+ Fair Fair- Poor NT Comments   Sitting Static [x] [] [] [] [] []    Sitting Dynamic [x] [] [] [] [] []              Standing Static [] [] [] [] [x] []    Standing Dynamic [] [] [] [] [x] []      GAIT: I Mod I S SBA CGA Min Mod Max Total  NT x2 Comments:   Level of Assistance [] [] [] [] [] [x] [] [] [] [] []    Distance 16x2,10 feet    DME Rolling Walker    Gait Quality Decreased miki , Decreased step clearance, Decreased step length, Trunk flexion, Trunk sway increased, and Wide base of support    Weightbearing Status      Stairs      I=Independent, Mod I=Modified Independent, S=Supervision, SBA=Standby Assistance, CGA=Contact Guard Assistance,   Min=Minimal Assistance, Mod=Moderate Assistance, Max=Maximal Assistance, Total=Total Assistance, NT=Not Tested    PLAN:   FREQUENCY AND DURATION: 3 times/week for duration of hospital stay or until stated goals are met, whichever comes first.    TREATMENT:   TREATMENT:   Therapeutic Activity (40 Minutes): Therapeutic activity included Rolling, Supine to Sit, Sit to Supine, Scooting, Transfer Training, Ambulation on level ground, Standing balance, and activities in standing to improve functional Activity tolerance, Balance, Mobility, and Strength.     TREATMENT GRID:  N/A    AFTER TREATMENT PRECAUTIONS: Bed, Bed/Chair Locked, Call light within reach, and Needs within reach    INTERDISCIPLINARY COLLABORATION:  MD/ PA/ NP , RN/ PCT, PT/ PTA, and OT/ STEEN    EDUCATION:      TIME IN/OUT:  Time In: 1315  Time Out: 1400  Minutes: [facility], PT

## 2022-10-14 NOTE — PROGRESS NOTES
Comprehensive Nutrition Assessment    Type and Reason for Visit: Initial, RD Nutrition Re-Screen/LOS  Length of Stay    Nutrition Recommendations/Plan:   Food and/or Nutrient Delivery: Continue Current Diet                   Malnutrition Assessment:  Malnutrition Status: Insufficient data (Deferred, pt asleep)    Nutrition Assessment:  Nutrition History: [id number]: Deferred, pt asleep        Nutrition Background:  H/O DM2, HTN who presented to ED with nausea, vomiting, and concerns about abscess. Has been having intermittent drainage from a painful wound on his back for ~2 months. Additionally, patient with areas of groin cellulitis and induration as well. I&D performed in ER for back abscess. Findings sepsis d/t groin, back and penile wounds, poorly controlled DM. Nutrition Interval:  10/7: S/P I and D of penile, back and left groin abscess   [id number]: Pt lying in bed with eyes closed in dark room. Did not awaken           Current Nutrition Therapies:  ADULT DIET;  Regular; 3 carb choices (45 gm/meal)    Current Intake:   Average Meal Intake: % (For 7 recorded meals)        Anthropometric Measures:  Height: 6' 2\" (188 cm)  Current Body Wt: 365 lb (165.6 kg) (10/7), Weight source: Stated  BMI: 46.8  Admission Body Weight: 365 lb (165.6 kg) (stated)  Ideal Body Weight (Kg) (Calculated): 86 kg (190 lbs),    Edema: No data recorded  BMI Category Obese Class 3 (BMI 40.0 or greater)  Estimated Daily Nutrient Needs:  Energy (kcal/day): 1436-7314 (11-14 kcal/kg) (Kcal/kg Weight used: 165.6 kg Admission (stated)  Protein (g/day):  (20% of kcal) Weight Used: (Other (Comment) (20% of kcal))    Fluid (ml/day):   (1 ml/kcal)    Nutrition Diagnosis:   No nutrition diagnosis at this time -no acute            Nutrition Monitoring and Evaluation:      Food/Nutrient Intake Outcomes: Food and Nutrient Intake       Discharge Planning:    Continue current diet    Barrera Mahoney, RD,LD, [address] [facility]

## 2022-10-14 NOTE — PROGRESS NOTES
VANCO DAILY FOLLOW UP NOTE  [address] [facility] Pharmacokinetic Monitoring Service - Vancomycin    Consulting Provider: Dr. Zuleyma Cotton   Indication: SSTI  Target Concentration: Goal trough of 10-15 mg/L and AUC/MADINA <500 mg*hr/L  Day of Therapy: 7  Additional Antimicrobials: none    Pertinent Laboratory Values: Wt Readings from Last 1 Encounters:   10/07/22 (!) 365 lb (165.6 kg)     Temp Readings from Last 1 Encounters:   10/14/22 99.5 °F (37.5 °C) (Oral)     Recent Labs     10/12/22  0558 10/13/22  0751 10/14/22  0635   BUN 17 17 14   CREATININE 1.60* 1.60* 1.50   WBC 14.9* 11.7* 11.5*     Estimated Creatinine Clearance: 88 mL/min (based on SCr of 1.5 mg/dL). Lab Results   Component Value Date/Time    VANCORANDOM 23.8 10/12/2022 05:58 AM     MRSA Nasal Swab: N/A. Non-respiratory infection.     Assessment:  Date/Time Dose Concentration AUC   10/10 0706 1250 mg q18h 21.6 513   10/12 0558 1250 mg q24h 23.8 452   Note: Serum concentrations collected for AUC dosing may appear elevated if collected in close proximity to the dose administered, this is not necessarily an indication of toxicity    Plan:  Current dosing regimen is therapeutic  Continue current dose  Repeat vancomycin concentration ordered for 10/15 @0600  Pharmacy will continue to monitor patient and adjust therapy as indicated    Thank you for the consult,  CAMILO Mendosa [facility]

## 2022-10-14 NOTE — PROGRESS NOTES
Admit Date: 10/5/2022    POD 7 Days Post-Op    Procedure:  Procedure(s):  BACK ABSCESS I & D  INGUINAL AND PENILE DEBRIDEMENT    Subjective:     Pt alert in bed with NAD noted. Family at bedside. Tmax past 24 h 99.9. VSS. Endorses flatus. Denies bm, but stool charted in I/O 10/14/22 AM. Nursing notified to change colace and miralax to prn when pt with regular bms. Pain moderately controlled with prn. Wound vac in place to back with no leaks. Objective:       Vitals:    10/13/22 2342 10/14/22 0354 10/14/22 0717 10/14/22 1113   BP: (!) 143/82 136/84 (!) 141/91 (!) 143/78   Pulse: 89 89 83 86   Resp: 20 20 20 20   Temp: 99.9 °F (37.7 °C) 99.5 °F (37.5 °C) 99.1 °F (37.3 °C) 99.1 °F (37.3 °C)   TempSrc: Oral Oral Oral Oral   SpO2: 92% 95% 93% 96%   Weight:       Height:           Temp (24hrs), Av.2 °F (37.3 °C), Min:98.6 °F (37 °C), Max:99.9 °F (37.7 °C)  . I&O reviewed as documented. Jaimes 3850 ml clear yellow UOP past 24 h  BM 10/14/22 AM     Physical Exam  Constitutional:       General: He is not in acute distress. Appearance: He is obese. HENT:      Mouth/Throat:      Mouth: Mucous membranes are moist.   Cardiovascular:      Rate and Rhythm: Normal rate and regular rhythm. Pulses: Normal pulses. Heart sounds: Normal heart sounds. No murmur heard. No friction rub. No gallop. Pulmonary:      Effort: Pulmonary effort is normal. No respiratory distress. Breath sounds: Normal breath sounds. Abdominal:      General: Bowel sounds are normal. There is no distension. Palpations: Abdomen is soft. Genitourinary:     Comments: jaimes  Musculoskeletal:         General: No swelling. Normal range of motion. Cervical back: Normal range of motion. Skin:     General: Skin is warm and dry. Comments:  Back wound vac in place with no leaks. Left groin with drsg c/d/I. Perineal groin fold yeast improved vs 10/13/22   Neurological:      General: No focal deficit present.       Mental Status: He is alert and oriented to person, place, and time.    Psychiatric:         Mood and Affect: Mood normal.         Behavior: Behavior normal.        Labs:   Recent Results (from the past 24 hour(s))   POCT Glucose    Collection Time: 10/13/22  4:48 PM   Result Value Ref Range    POC Glucose 132 (H) 65 - 100 mg/dL    Performed by: Veronica    POCT Glucose    Collection Time: 10/13/22  8:51 PM   Result Value Ref Range    POC Glucose 162 (H) 65 - 100 mg/dL    Performed by: Jose Rafael    CBC with Auto Differential    Collection Time: 10/14/22  6:35 AM   Result Value Ref Range    WBC 11.5 (H) 4.3 - 11.1 K/uL    RBC 2.98 (L) 4.23 - 5.6 M/uL    Hemoglobin 8.6 (L) 13.6 - 17.2 g/dL    Hematocrit 28.2 (L) 41.1 - 50.3 %    MCV 94.6 82 - 102 FL    MCH 28.9 26.1 - 32.9 PG    MCHC 30.5 (L) 31.4 - 35.0 g/dL    RDW 14.2 11.9 - 14.6 %    Platelets 460 (H) 181 - 450 K/uL    MPV 10.0 9.4 - 12.3 FL    nRBC 0.00 0.0 - 0.2 K/uL    Differential Type AUTOMATED      Seg Neutrophils 71 43 - 78 %    Lymphocytes 19 13 - 44 %    Monocytes 6 4.0 - 12.0 %    Eosinophils % 3 0.5 - 7.8 %    Basophils 0 0.0 - 2.0 %    Immature Granulocytes 1 0.0 - 5.0 %    Segs Absolute 8.2 1.7 - 8.2 K/UL    Absolute Lymph # 2.2 0.5 - 4.6 K/UL    Absolute Mono # 0.7 0.1 - 1.3 K/UL    Absolute Eos # 0.4 0.0 - 0.8 K/UL    Basophils Absolute 0.1 0.0 - 0.2 K/UL    Absolute Immature Granulocyte 0.1 0.0 - 0.5 K/UL   Basic Metabolic Panel w/ Reflex to MG    Collection Time: 10/14/22  6:35 AM   Result Value Ref Range    Sodium 139 133 - 143 mmol/L    Potassium 4.3 3.5 - 5.1 mmol/L    Chloride 110 101 - 110 mmol/L    CO2 23 21 - 32 mmol/L    Anion Gap 6 2 - 11 mmol/L    Glucose 166 (H) 65 - 100 mg/dL    BUN 14 6 - 23 MG/DL    Creatinine 1.50 0.8 - 1.5 MG/DL    Est, Glom Filt Rate 54 (L) >60 ml/min/1.73m2    Calcium 8.0 (L) 8.3 - 10.4 MG/DL   POCT Glucose    Collection Time: 10/14/22  7:17 AM   Result Value Ref Range    POC Glucose 155 (H) 65 - 100 mg/dL Performed by: Juan Jose Coker    POCT Glucose    Collection Time: 10/14/22 11:14 AM   Result Value Ref Range    POC Glucose 187 (H) 65 - 100 mg/dL    Performed by: Juan Jose Coker           Assessment:     Principal Problem:    Sepsis ([facility] [address]  Active Problems:    Primary hypertension    DM2 (diabetes mellitus, type 2) ([facility] [address]    Cellulitis and abscess of trunk    Abscess of groin, left    Penile abscess    Acute urinary retention  Resolved Problems:    * No resolved hospital problems. *        Plan/Recommendations/Medical Decision Making:     Continue present treatment   Wet to dry with dakins solution with dressing changes BID started 10/13/22  Perineal compresses QID for 2 days started 10/13/22    More activity with PT- spoke with PT and therapist agrees to increase activity. ordered mesh underwear for activity and OOB since pt complains of pain when penis moves when repositioning. Wheel chair cushion ordered for up in chair. Supplies ordered per PT recommendation.    Wbc stable 11.5 10/12/22 from 11.7 10/13/22  Tissue and wound culture positive MRSA-Pt on vancomycin IV and diflucan PO.     MAURILIO Trejo - NP

## 2022-10-14 NOTE — DIABETES MGMT
Patient admitted with sepsis. Blood glucose ranged 132-166 yesterday with patient receiving Lantus 36 units and Humalog 12 units. Blood glucose this morning was 155. Creatinine 1.50. GFR 54. Reviewed patient current regimen: Lantus 36 units daily, Humalog 4 units prandial, and Humalog correctional insulin. Glycemic control in hospital target goals.

## 2022-10-14 NOTE — PROGRESS NOTES
Hospitalist Progress Note   Admit Date:  10/5/2022  7:22 PM   Name:  Chuckie Lomeli   Age:  60 y.o. Sex:  male  :  1965   MRN:  [id number]   Room:      Reason(s) for Admission: Cellulitis and abscess of trunk [L03.319, L02.219]  Abscess [L02.91]  Hyperglycemia [R73.9]  Abscess, perineum [facility] Course & Interval History:   Mr. Everardo Burgos is a nice 61 y/o male with a h/o DM2 and HTN who was admitted to our service on 10/5 with an abscess on his upper back for about 2 months. CT was done showing a paraspinal abscess w/o muscular involvement along with L inguinal inflammatory changes with possible early abscess of scrotum. Urology and General Surgery were consulted. He was started on antibiotics. Hyperglycemia, A1C >14, started on basal + bolus insulin. ID consulted, now on Zosyn. HIV neg, other ID orders pending. On 10/7 he underwent I&D of back abscess and L groin abscess. He later underwent debridement of penile abscess with Urology. Wound cultures from 10/5 I&D with staph aureus. Wound vac to back placed 10/10. Wound care recommend possible further debridement versus active type wound care minute. Evaluation recommend continue wet-to-dry dressing twice daily, no surgical debridement at this time. ID recommend to continue IV vancomycin while inpatient. Also started on p.o. fluconazole for yeast coverage on . Subjective/24hr Events (10/14/22): Patient is seen at the bedside. Reports feeling little better today. Scheduled pain medicine helped with the pain. Wound VAC in place to back. Wet-to-dry dressing twice daily. Assessment & Plan:     # Sepsis 2/2 abscess of upper back, L groin and penis due to MRSA  - Leukocytosis + tachycardia + abscesses on CT. Resolved. - S/p I&D of back and groin abscess, penile debridement on 10/7. 10/5 wound culture with MRSA. - Wound vac placed 10/10.  - Con't vancomycin.  Appreciate ID input.  - Wound care  - Wound VAC removed and surgery recommended wet-to-dry dressing for perianal area. Recommend to continue wound VAC to back while inpatient  - Start ed pt on tramadol every 6 hours and continue as needed pain medicine     # Visual changes // RUE weakness  - RUE strength improved 10/11 AM  - Head CT unremarkable. LDL 88  - MRI negative for acute pathology   - Statin added for DM     # Acute urinary retention  - Likely 2/2 penile abscess and edema. Con't Robles. # Uncontrolled DM2  - Con't basal + prandial + SSI at current dosing  - LDL 88, con't statin  - Stop glipizide at discharge. # HTN  - Controlled off meds. Discharge Planning: Pending, CM involved. Diet:  ADULT DIET; Regular; 3 carb choices (45 gm/meal)  DVT PPx: Lovenox  Code status: Full Code    Hospital Problems             Last Modified POA    * (Principal) Sepsis ([facility] [address] 10/8/2022 Yes    Primary hypertension 10/6/2022 Yes    DM2 (diabetes mellitus, type 2) ([facility] [address] 10/6/2022 Yes    Cellulitis and abscess of trunk 10/7/2022 Yes    Abscess of groin, left 10/8/2022 Yes    Penile abscess 10/8/2022 Yes    Acute urinary retention 10/8/2022 Yes    Objective:   Patient Vitals for the past 24 hrs:   Temp Pulse Resp BP SpO2   10/14/22 1113 99.1 °F (37.3 °C) 86 20 (!) 143/78 96 %   10/14/22 0717 99.1 °F (37.3 °C) 83 20 (!) 141/91 93 %   10/14/22 0354 99.5 °F (37.5 °C) 89 20 136/84 95 %   10/13/22 2342 99.9 °F (37.7 °C) 89 20 (!) 143/82 92 %   10/13/22 1940 98.8 °F (37.1 °C) 84 20 (!) 145/80 93 %   10/13/22 1452 98.6 °F (37 °C) 83 20 137/84 98 %         Estimated body mass index is 46.86 kg/m² as calculated from the following:    Height as of this encounter: 6' 2\" (1.88 m). Weight as of this encounter: 365 lb (165.6 kg).     Intake/Output Summary (Last 24 hours) at 10/14/2022 1337  Last data filed at 10/14/2022 0925  Gross per 24 hour   Intake 3883.97 ml   Output 2925 ml   Net 958.97 ml           Physical Exam:   Blood pressure (!) 143/78, pulse 86, temperature 99.1 °F (37.3 °C), temperature source Oral, resp. rate 20, height 6' 2\" (1.88 m), weight (!) 365 lb (165.6 kg), SpO2 96 %. General:    Well nourished. No overt distress. Morbidly obese. Head:  Normocephalic, atraumatic  Eyes:  Sclerae appear normal. Pupils equally round. ENT:  Nares appear normal, no drainage. Moist oral mucosa. Poor dentition. Neck:  No restricted ROM. Trachea midline. CV:   RRR. No m/r/g. No jugular venous distension. Lungs:   CTAB. No wheezing, rhonchi, or rales. Respirations even, unlabored. Abdomen: Bowel sounds present. Soft, nontender, nondistended. :  Robles in place. Extremities: No cyanosis or clubbing. No edema. Skin:     No rashes and normal coloration. Warm and dry. Wound vac to mid-back. Neuro:  CN II-XII grossly intact. A&Ox3. Improved  strength R hand. Psych:  Normal mood and affect.       I have reviewed ordered lab tests and independently visualized imaging below:    Recent Labs:  Recent Results (from the past 48 hour(s))   POCT Glucose    Collection Time: 10/12/22  4:06 PM   Result Value Ref Range    POC Glucose 132 (H) 65 - 100 mg/dL    Performed by: Teliportme    POCT Glucose    Collection Time: 10/12/22  8:45 PM   Result Value Ref Range    POC Glucose 245 (H) 65 - 100 mg/dL    Performed by: RigobertoCT    POCT Glucose    Collection Time: 10/13/22  7:20 AM   Result Value Ref Range    POC Glucose 163 (H) 65 - 100 mg/dL    Performed by: Teliportme    CBC with Auto Differential    Collection Time: 10/13/22  7:51 AM   Result Value Ref Range    WBC 11.7 (H) 4.3 - 11.1 K/uL    RBC 3.14 (L) 4.23 - 5.6 M/uL    Hemoglobin 9.1 (L) 13.6 - 17.2 g/dL    Hematocrit 29.5 (L) 41.1 - 50.3 %    MCV 93.9 82 - 102 FL    MCH 29.0 26.1 - 32.9 PG    MCHC 30.8 (L) 31.4 - 35.0 g/dL    RDW 14.4 11.9 - 14.6 %    Platelets 175 (H) 025 - 450 K/uL    MPV 10.3 9.4 - 12.3 FL    nRBC 0.00 0.0 - 0.2 K/uL    Differential Type AUTOMATED      Seg Neutrophils 68 43 - 78 %    Lymphocytes 21 13 - 44 % Monocytes 6 4.0 - 12.0 %    Eosinophils % 3 0.5 - 7.8 %    Basophils 1 0.0 - 2.0 %    Immature Granulocytes 1 0.0 - 5.0 %    Segs Absolute 8.2 1.7 - 8.2 K/UL    Absolute Lymph # 2.4 0.5 - 4.6 K/UL    Absolute Mono # 0.7 0.1 - 1.3 K/UL    Absolute Eos # 0.3 0.0 - 0.8 K/UL    Basophils Absolute 0.1 0.0 - 0.2 K/UL    Absolute Immature Granulocyte 0.1 0.0 - 0.5 K/UL   Basic Metabolic Panel w/ Reflex to MG    Collection Time: 10/13/22  7:51 AM   Result Value Ref Range    Sodium 139 133 - 143 mmol/L    Potassium 4.5 3.5 - 5.1 mmol/L    Chloride 111 (H) 101 - 110 mmol/L    CO2 26 21 - 32 mmol/L    Anion Gap 2 2 - 11 mmol/L    Glucose 166 (H) 65 - 100 mg/dL    BUN 17 6 - 23 MG/DL    Creatinine 1.60 (H) 0.8 - 1.5 MG/DL    Est, Glom Filt Rate 50 (L) >60 ml/min/1.73m2    Calcium 8.2 (L) 8.3 - 10.4 MG/DL   POCT Glucose    Collection Time: 10/13/22 11:36 AM   Result Value Ref Range    POC Glucose 144 (H) 65 - 100 mg/dL    Performed by: Childcare BridgeCT    POCT Glucose    Collection Time: 10/13/22  4:48 PM   Result Value Ref Range    POC Glucose 132 (H) 65 - 100 mg/dL    Performed by: Childcare BridgeCT    POCT Glucose    Collection Time: 10/13/22  8:51 PM   Result Value Ref Range    POC Glucose 162 (H) 65 - 100 mg/dL    Performed by: Jose Rafael    CBC with Auto Differential    Collection Time: 10/14/22  6:35 AM   Result Value Ref Range    WBC 11.5 (H) 4.3 - 11.1 K/uL    RBC 2.98 (L) 4.23 - 5.6 M/uL    Hemoglobin 8.6 (L) 13.6 - 17.2 g/dL    Hematocrit 28.2 (L) 41.1 - 50.3 %    MCV 94.6 82 - 102 FL    MCH 28.9 26.1 - 32.9 PG    MCHC 30.5 (L) 31.4 - 35.0 g/dL    RDW 14.2 11.9 - 14.6 %    Platelets 076 (H) 853 - 450 K/uL    MPV 10.0 9.4 - 12.3 FL    nRBC 0.00 0.0 - 0.2 K/uL    Differential Type AUTOMATED      Seg Neutrophils 71 43 - 78 %    Lymphocytes 19 13 - 44 %    Monocytes 6 4.0 - 12.0 %    Eosinophils % 3 0.5 - 7.8 %    Basophils 0 0.0 - 2.0 %    Immature Granulocytes 1 0.0 - 5.0 %    Segs Absolute 8.2 1.7 - 8.2 K/UL Absolute Lymph # 2.2 0.5 - 4.6 K/UL    Absolute Mono # 0.7 0.1 - 1.3 K/UL    Absolute Eos # 0.4 0.0 - 0.8 K/UL    Basophils Absolute 0.1 0.0 - 0.2 K/UL    Absolute Immature Granulocyte 0.1 0.0 - 0.5 K/UL   Basic Metabolic Panel w/ Reflex to MG    Collection Time: 10/14/22  6:35 AM   Result Value Ref Range    Sodium 139 133 - 143 mmol/L    Potassium 4.3 3.5 - 5.1 mmol/L    Chloride 110 101 - 110 mmol/L    CO2 23 21 - 32 mmol/L    Anion Gap 6 2 - 11 mmol/L    Glucose 166 (H) 65 - 100 mg/dL    BUN 14 6 - 23 MG/DL    Creatinine 1.50 0.8 - 1.5 MG/DL    Est, Glom Filt Rate 54 (L) >60 ml/min/1.73m2    Calcium 8.0 (L) 8.3 - 10.4 MG/DL   POCT Glucose    Collection Time: 10/14/22  7:17 AM   Result Value Ref Range    POC Glucose 155 (H) 65 - 100 mg/dL    Performed by: Yoon Hernandez    POCT Glucose    Collection Time: 10/14/22 11:14 AM   Result Value Ref Range    POC Glucose 187 (H) 65 - 100 mg/dL    Performed by: Yoon Hernandez          Other Studies:  CT HEAD WO CONTRAST    Result Date: 10/10/2022  EXAM: Noncontrast CT head. INDICATION: Visual disturbance. COMPARISON: None. TECHNIQUE: Noncontrast CT images of the head were obtained. Radiation dose reduction techniques were used for this study. Our CT scanners use one or all of the following:  Automated exposure control, adjustment of the mA or kV according to patient size, iterative reconstruction. FINDINGS: Brain volume is appropriate for age. No acute infarct, hemorrhage or mass is identified. There is no mass effect, midline shift or depressed fracture. The visualized paranasal sinuses and mastoid air cells are clear, except for mild right maxillary sinus mucosal thickening. No acute process.       Current Meds:  Current Facility-Administered Medications   Medication Dose Route Frequency    medicated lip ointment (BLISTEX)   Topical PRN    insulin glargine (LANTUS) injection vial 36 Units  36 Units SubCUTAneous Daily    traMADol (ULTRAM) tablet 50 mg  50 mg Oral Q6H    polyethylene glycol (GLYCOLAX) packet 17 g  17 g Oral BID    fluconazole (DIFLUCAN) tablet 200 mg  200 mg Oral Daily    aspirin EC tablet 81 mg  81 mg Oral Daily    vancomycin (VANCOCIN) 1500 mg in sodium chloride 0.9% 500 mL IVPB  1,500 mg IntraVENous Q24H    atorvastatin (LIPITOR) tablet 40 mg  40 mg Oral Nightly    docusate sodium (COLACE) capsule 100 mg  100 mg Oral BID    miconazole (MICOTIN) 2 % powder   Topical BID    oxyCODONE (ROXICODONE) immediate release tablet 10 mg  10 mg Oral Q4H PRN    sodium hypochlorite (DAKINS) 0.125 % external solution   Irrigation BID    gabapentin (NEURONTIN) capsule 100 mg  100 mg Oral TID    methocarbamol (ROBAXIN) tablet 750 mg  750 mg Oral 4x Daily    HYDROmorphone HCl PF (DILAUDID) injection 0.5 mg  0.5 mg IntraVENous Q3H PRN    insulin lispro (HUMALOG) injection vial 4 Units  4 Units SubCUTAneous TID WC    glucose chewable tablet 16 g  4 tablet Oral PRN    dextrose bolus 10% 125 mL  125 mL IntraVENous PRN    Or    dextrose bolus 10% 250 mL  250 mL IntraVENous PRN    glucagon (rDNA) injection 1 mg  1 mg SubCUTAneous PRN    dextrose 10 % infusion   IntraVENous Continuous PRN    insulin lispro (HUMALOG) injection vial 0-8 Units  0-8 Units SubCUTAneous TID WC    insulin lispro (HUMALOG) injection vial 0-4 Units  0-4 Units SubCUTAneous Nightly    sodium chloride flush 0.9 % injection 5-40 mL  5-40 mL IntraVENous 2 times per day    sodium chloride flush 0.9 % injection 5-40 mL  5-40 mL IntraVENous PRN    0.9 % sodium chloride infusion   IntraVENous PRN    enoxaparin (LOVENOX) injection 40 mg  40 mg SubCUTAneous BID    ondansetron (ZOFRAN-ODT) disintegrating tablet 4 mg  4 mg Oral Q8H PRN    Or    ondansetron (ZOFRAN) injection 4 mg  4 mg IntraVENous Q6H PRN    polyethylene glycol (GLYCOLAX) packet 17 g  17 g Oral Daily PRN    acetaminophen (TYLENOL) tablet 650 mg  650 mg Oral Q6H PRN    Or    acetaminophen (TYLENOL) suppository 650 mg  650 mg Rectal Q6H PRN Signed:  Britany Phillips MD    Part of this note may have been written by using a voice dictation software. The note has been proof read but may still contain some grammatical/other typographical errors.

## 2022-10-14 NOTE — WOUND CARE
Wound left back wound vac dressing changed, wound base pink granular scattered slough, over all improved. Buttocks rash has improved, continue antifungals and zinc, left buttock induration has not changed, no improvement no worsening, patient states \" a little less pain\". Intertrigo of the thighs and scrotum have improved continue antifungals and zinc.  Left groin/ penis wound cleansed with wound  and Dakin's moist Kerlix packing to area with dry ABD's, induration on medial and lateral areas remain, large amounts of seropurulent mix drainage, odor has decreased, scattered slough and decreased, continue daily packing to this left groin wound, will consider attempt with wound vac again mid week. Plans for home pending, may not be able to get home wound vac, likely will need to teach family wet to dry dressing both days.

## 2022-10-14 NOTE — PROGRESS NOTES
Occupational Therapy Note:    OT checked with RN to see if pt could be seen for occupational therapy treament. RN approved. OT dressed for contact precautions and explained to pt plan for OT/PT today. Pt agreed. OT set up chair with new cushion. Pt then refused. Pt is not participating in therapy consistently. PT had also checked in with pt earlier this morning and explained OT/PT plan for today as well.      Thank you,    Alvester Lesch, OT    Rehab [facility] Tracker

## 2022-10-15 LAB
ANION GAP SERPL CALC-SCNC: 1 MMOL/L (ref 2–11)
BASOPHILS # BLD: 0.1 K/UL (ref 0–0.2)
BASOPHILS NFR BLD: 1 % (ref 0–2)
BUN SERPL-MCNC: 15 MG/DL (ref 6–23)
CALCIUM SERPL-MCNC: 8.1 MG/DL (ref 8.3–10.4)
CHLORIDE SERPL-SCNC: 114 MMOL/L (ref 101–110)
CO2 SERPL-SCNC: 27 MMOL/L (ref 21–32)
CREAT SERPL-MCNC: 1.6 MG/DL (ref 0.8–1.5)
DIFFERENTIAL METHOD BLD: ABNORMAL
EOSINOPHIL # BLD: 0.3 K/UL (ref 0–0.8)
EOSINOPHIL NFR BLD: 3 % (ref 0.5–7.8)
ERYTHROCYTE [DISTWIDTH] IN BLOOD BY AUTOMATED COUNT: 14.4 % (ref 11.9–14.6)
GLUCOSE BLD STRIP.AUTO-MCNC: 116 MG/DL (ref 65–100)
GLUCOSE BLD STRIP.AUTO-MCNC: 124 MG/DL (ref 65–100)
GLUCOSE BLD STRIP.AUTO-MCNC: 130 MG/DL (ref 65–100)
GLUCOSE BLD STRIP.AUTO-MCNC: 86 MG/DL (ref 65–100)
GLUCOSE SERPL-MCNC: 102 MG/DL (ref 65–100)
HCT VFR BLD AUTO: 29.3 % (ref 41.1–50.3)
HGB BLD-MCNC: 8.6 G/DL (ref 13.6–17.2)
IMM GRANULOCYTES # BLD AUTO: 0.1 K/UL (ref 0–0.5)
IMM GRANULOCYTES NFR BLD AUTO: 1 % (ref 0–5)
LYMPHOCYTES # BLD: 2.7 K/UL (ref 0.5–4.6)
LYMPHOCYTES NFR BLD: 26 % (ref 13–44)
MCH RBC QN AUTO: 28.2 PG (ref 26.1–32.9)
MCHC RBC AUTO-ENTMCNC: 29.4 G/DL (ref 31.4–35)
MCV RBC AUTO: 96.1 FL (ref 82–102)
MONOCYTES # BLD: 0.6 K/UL (ref 0.1–1.3)
MONOCYTES NFR BLD: 6 % (ref 4–12)
NEUTS SEG # BLD: 6.7 K/UL (ref 1.7–8.2)
NEUTS SEG NFR BLD: 65 % (ref 43–78)
NRBC # BLD: 0 K/UL (ref 0–0.2)
PLATELET # BLD AUTO: 567 K/UL (ref 150–450)
PMV BLD AUTO: 10.1 FL (ref 9.4–12.3)
POTASSIUM SERPL-SCNC: 4.8 MMOL/L (ref 3.5–5.1)
RBC # BLD AUTO: 3.05 M/UL (ref 4.23–5.6)
SERVICE CMNT-IMP: ABNORMAL
SERVICE CMNT-IMP: NORMAL
SODIUM SERPL-SCNC: 142 MMOL/L (ref 133–143)
VANCOMYCIN SERPL-MCNC: 23.8 UG/ML
WBC # BLD AUTO: 10.3 K/UL (ref 4.3–11.1)

## 2022-10-15 PROCEDURE — 80202 ASSAY OF VANCOMYCIN: CPT

## 2022-10-15 PROCEDURE — 6360000002 HC RX W HCPCS: Performed by: INTERNAL MEDICINE

## 2022-10-15 PROCEDURE — 6370000000 HC RX 637 (ALT 250 FOR IP)

## 2022-10-15 PROCEDURE — 85025 COMPLETE CBC W/AUTO DIFF WBC: CPT

## 2022-10-15 PROCEDURE — 6370000000 HC RX 637 (ALT 250 FOR IP): Performed by: STUDENT IN AN ORGANIZED HEALTH CARE EDUCATION/TRAINING PROGRAM

## 2022-10-15 PROCEDURE — 1100000000 HC RM PRIVATE

## 2022-10-15 PROCEDURE — 2580000003 HC RX 258: Performed by: FAMILY MEDICINE

## 2022-10-15 PROCEDURE — 6370000000 HC RX 637 (ALT 250 FOR IP): Performed by: FAMILY MEDICINE

## 2022-10-15 PROCEDURE — 82962 GLUCOSE BLOOD TEST: CPT

## 2022-10-15 PROCEDURE — 80048 BASIC METABOLIC PNL TOTAL CA: CPT

## 2022-10-15 PROCEDURE — 36415 COLL VENOUS BLD VENIPUNCTURE: CPT

## 2022-10-15 PROCEDURE — 6370000000 HC RX 637 (ALT 250 FOR IP): Performed by: INTERNAL MEDICINE

## 2022-10-15 PROCEDURE — 2580000003 HC RX 258: Performed by: INTERNAL MEDICINE

## 2022-10-15 PROCEDURE — 6360000002 HC RX W HCPCS: Performed by: FAMILY MEDICINE

## 2022-10-15 RX ORDER — LACTULOSE 10 G/15ML
30 SOLUTION ORAL ONCE
Status: COMPLETED | OUTPATIENT
Start: 2022-10-15 | End: 2022-10-15

## 2022-10-15 RX ADMIN — POLYETHYLENE GLYCOL 3350 17 G: 17 POWDER, FOR SOLUTION ORAL at 20:48

## 2022-10-15 RX ADMIN — FLUCONAZOLE 200 MG: 100 TABLET ORAL at 08:55

## 2022-10-15 RX ADMIN — ANTI-FUNGAL POWDER MICONAZOLE NITRATE TALC FREE: 1.42 POWDER TOPICAL at 21:20

## 2022-10-15 RX ADMIN — SODIUM CHLORIDE, PRESERVATIVE FREE 10 ML: 5 INJECTION INTRAVENOUS at 09:04

## 2022-10-15 RX ADMIN — GABAPENTIN 100 MG: 100 CAPSULE ORAL at 13:19

## 2022-10-15 RX ADMIN — METHOCARBAMOL TABLETS 750 MG: 750 TABLET, COATED ORAL at 13:19

## 2022-10-15 RX ADMIN — Medication: at 20:50

## 2022-10-15 RX ADMIN — OXYCODONE 10 MG: 5 TABLET ORAL at 23:33

## 2022-10-15 RX ADMIN — ANTI-FUNGAL POWDER MICONAZOLE NITRATE TALC FREE: 1.42 POWDER TOPICAL at 13:25

## 2022-10-15 RX ADMIN — METHOCARBAMOL TABLETS 750 MG: 750 TABLET, COATED ORAL at 08:55

## 2022-10-15 RX ADMIN — DOCUSATE SODIUM 100 MG: 100 CAPSULE, LIQUID FILLED ORAL at 08:56

## 2022-10-15 RX ADMIN — GABAPENTIN 100 MG: 100 CAPSULE ORAL at 20:46

## 2022-10-15 RX ADMIN — METHOCARBAMOL TABLETS 750 MG: 750 TABLET, COATED ORAL at 16:39

## 2022-10-15 RX ADMIN — TRAMADOL HYDROCHLORIDE 50 MG: 50 TABLET, COATED ORAL at 13:18

## 2022-10-15 RX ADMIN — SODIUM CHLORIDE, PRESERVATIVE FREE 10 ML: 5 INJECTION INTRAVENOUS at 21:21

## 2022-10-15 RX ADMIN — INSULIN LISPRO 4 UNITS: 100 INJECTION, SOLUTION INTRAVENOUS; SUBCUTANEOUS at 13:15

## 2022-10-15 RX ADMIN — TRAMADOL HYDROCHLORIDE 50 MG: 50 TABLET, COATED ORAL at 20:46

## 2022-10-15 RX ADMIN — ATORVASTATIN CALCIUM 40 MG: 40 TABLET, FILM COATED ORAL at 20:48

## 2022-10-15 RX ADMIN — Medication: at 13:25

## 2022-10-15 RX ADMIN — HYDROMORPHONE HYDROCHLORIDE 0.5 MG: 1 INJECTION, SOLUTION INTRAMUSCULAR; INTRAVENOUS; SUBCUTANEOUS at 19:28

## 2022-10-15 RX ADMIN — METHOCARBAMOL TABLETS 750 MG: 750 TABLET, COATED ORAL at 20:46

## 2022-10-15 RX ADMIN — OXYCODONE 10 MG: 5 TABLET ORAL at 15:26

## 2022-10-15 RX ADMIN — INSULIN LISPRO 4 UNITS: 100 INJECTION, SOLUTION INTRAVENOUS; SUBCUTANEOUS at 08:14

## 2022-10-15 RX ADMIN — LACTULOSE 30 G: 20 SOLUTION ORAL at 16:40

## 2022-10-15 RX ADMIN — ENOXAPARIN SODIUM 40 MG: 100 INJECTION SUBCUTANEOUS at 20:48

## 2022-10-15 RX ADMIN — DOCUSATE SODIUM 100 MG: 100 CAPSULE, LIQUID FILLED ORAL at 20:46

## 2022-10-15 RX ADMIN — ASPIRIN 81 MG: 81 TABLET ORAL at 08:55

## 2022-10-15 RX ADMIN — Medication 1500 MG: at 21:20

## 2022-10-15 RX ADMIN — TRAMADOL HYDROCHLORIDE 50 MG: 50 TABLET, COATED ORAL at 08:55

## 2022-10-15 RX ADMIN — ENOXAPARIN SODIUM 40 MG: 100 INJECTION SUBCUTANEOUS at 09:04

## 2022-10-15 RX ADMIN — HYDROMORPHONE HYDROCHLORIDE 0.5 MG: 1 INJECTION, SOLUTION INTRAMUSCULAR; INTRAVENOUS; SUBCUTANEOUS at 13:28

## 2022-10-15 RX ADMIN — TRAMADOL HYDROCHLORIDE 50 MG: 50 TABLET, COATED ORAL at 02:03

## 2022-10-15 RX ADMIN — INSULIN LISPRO 4 UNITS: 100 INJECTION, SOLUTION INTRAVENOUS; SUBCUTANEOUS at 17:18

## 2022-10-15 RX ADMIN — OXYCODONE 10 MG: 5 TABLET ORAL at 11:12

## 2022-10-15 RX ADMIN — INSULIN GLARGINE 36 UNITS: 100 INJECTION, SOLUTION SUBCUTANEOUS at 08:12

## 2022-10-15 RX ADMIN — GABAPENTIN 100 MG: 100 CAPSULE ORAL at 08:56

## 2022-10-15 ASSESSMENT — PAIN DESCRIPTION - LOCATION
LOCATION: GROIN;PENIS
LOCATION: PENIS
LOCATION: GROIN;PENIS
LOCATION: PENIS
LOCATION: PENIS
LOCATION: GROIN;PENIS
LOCATION: PENIS;GROIN
LOCATION: PENIS
LOCATION: PENIS

## 2022-10-15 ASSESSMENT — PAIN SCALES - GENERAL
PAINLEVEL_OUTOF10: 5
PAINLEVEL_OUTOF10: 9
PAINLEVEL_OUTOF10: 8
PAINLEVEL_OUTOF10: 7
PAINLEVEL_OUTOF10: 8
PAINLEVEL_OUTOF10: 9
PAINLEVEL_OUTOF10: 9
PAINLEVEL_OUTOF10: 8
PAINLEVEL_OUTOF10: 9
PAINLEVEL_OUTOF10: 7
PAINLEVEL_OUTOF10: 9
PAINLEVEL_OUTOF10: 8

## 2022-10-15 ASSESSMENT — PAIN DESCRIPTION - DESCRIPTORS
DESCRIPTORS: ACHING;PRESSURE
DESCRIPTORS: ACHING
DESCRIPTORS: ACHING;PRESSURE
DESCRIPTORS: ACHING
DESCRIPTORS: ACHING
DESCRIPTORS: ACHING;PRESSURE
DESCRIPTORS: ACHING
DESCRIPTORS: ACHING;PRESSURE
DESCRIPTORS: ACHING;PRESSURE
DESCRIPTORS: ACHING
DESCRIPTORS: ACHING;SORE
DESCRIPTORS: ACHING

## 2022-10-15 ASSESSMENT — PAIN DESCRIPTION - ORIENTATION
ORIENTATION: ANTERIOR
ORIENTATION: ANTERIOR
ORIENTATION: POSTERIOR;ANTERIOR
ORIENTATION: ANTERIOR
ORIENTATION: ANTERIOR

## 2022-10-15 ASSESSMENT — PAIN DESCRIPTION - PAIN TYPE: TYPE: SURGICAL PAIN

## 2022-10-15 NOTE — PROGRESS NOTES
Admit Date: 10/5/2022    POD 8 Days Post-Op    Procedure:  Procedure(s):  BACK ABSCESS I & D  INGUINAL AND PENILE DEBRIDEMENT    Subjective:     Pt alert in bed with no complaints or new issues. Pt is tolerating a Regular Diabetic diet. No nausea or vomiting. Pain currently controlled. Reports +flatus/+BM 10/14. Tmax  24h Temp 99.7. NAD. Objective:       Vitals:    10/15/22 0207 10/15/22 0724 10/15/22 0925 10/15/22 1112   BP: 123/73 136/81  128/83   Pulse: 84 78  80   Resp: 18 18 18 20   Temp: 99.5 °F (37.5 °C) 98.6 °F (37 °C)  99.1 °F (37.3 °C)   TempSrc: Oral Oral  Oral   SpO2: 97% 93%  96%   Weight:       Height:           Temp (24hrs), Av.3 °F (37.4 °C), Min:98.6 °F (37 °C), Max:99.7 °F (37.6 °C)  . I&O reviewed as documented. Jaimes 2550 ml clear yellow UOP past 24 h  BM 10/14/22      Physical Exam  Constitutional:       General: He is not in acute distress. Appearance: He is obese. HENT:      Mouth/Throat:      Mouth: Mucous membranes are moist.   Cardiovascular:      Rate and Rhythm: Normal rate and regular rhythm. Pulses: Normal pulses. Heart sounds: Normal heart sounds. No murmur heard. No friction rub. No gallop. Pulmonary:      Effort: Pulmonary effort is normal. No respiratory distress. Breath sounds: Normal breath sounds. Abdominal:      General: Bowel sounds are normal. There is no distension. Palpations: Abdomen is soft. Genitourinary:     Comments: jaimes  Musculoskeletal:         General: No swelling. Normal range of motion. Cervical back: Normal range of motion. Skin:     General: Skin is warm and dry. Comments: Left groin with drsg c/d/I. Neurological:      General: No focal deficit present. Mental Status: He is alert and oriented to person, place, and time.    Psychiatric:         Mood and Affect: Mood normal.         Behavior: Behavior normal.        Labs:   Recent Results (from the past 24 hour(s))   POCT Glucose    Collection Time: 10/14/22  5:08 PM   Result Value Ref Range    POC Glucose 135 (H) 65 - 100 mg/dL    Performed by: Tavia Pierre    POCT Glucose    Collection Time: 10/14/22  8:38 PM   Result Value Ref Range    POC Glucose 119 (H) 65 - 100 mg/dL    Performed by: Nickie    POCT Glucose    Collection Time: 10/14/22  9:03 PM   Result Value Ref Range    POC Glucose 136 (H) 65 - 100 mg/dL    Performed by: Jose Rafael    Vancomycin Level, Random    Collection Time: 10/15/22  6:33 AM   Result Value Ref Range    Vancomycin Rm 23.8 UG/ML   CBC with Auto Differential    Collection Time: 10/15/22  6:33 AM   Result Value Ref Range    WBC 10.3 4.3 - 11.1 K/uL    RBC 3.05 (L) 4.23 - 5.6 M/uL    Hemoglobin 8.6 (L) 13.6 - 17.2 g/dL    Hematocrit 29.3 (L) 41.1 - 50.3 %    MCV 96.1 82 - 102 FL    MCH 28.2 26.1 - 32.9 PG    MCHC 29.4 (L) 31.4 - 35.0 g/dL    RDW 14.4 11.9 - 14.6 %    Platelets 703 (H) 117 - 450 K/uL    MPV 10.1 9.4 - 12.3 FL    nRBC 0.00 0.0 - 0.2 K/uL    Differential Type AUTOMATED      Seg Neutrophils 65 43 - 78 %    Lymphocytes 26 13 - 44 %    Monocytes 6 4.0 - 12.0 %    Eosinophils % 3 0.5 - 7.8 %    Basophils 1 0.0 - 2.0 %    Immature Granulocytes 1 0.0 - 5.0 %    Segs Absolute 6.7 1.7 - 8.2 K/UL    Absolute Lymph # 2.7 0.5 - 4.6 K/UL    Absolute Mono # 0.6 0.1 - 1.3 K/UL    Absolute Eos # 0.3 0.0 - 0.8 K/UL    Basophils Absolute 0.1 0.0 - 0.2 K/UL    Absolute Immature Granulocyte 0.1 0.0 - 0.5 K/UL   Basic Metabolic Panel w/ Reflex to MG    Collection Time: 10/15/22  6:33 AM   Result Value Ref Range    Sodium 142 133 - 143 mmol/L    Potassium 4.8 3.5 - 5.1 mmol/L    Chloride 114 (H) 101 - 110 mmol/L    CO2 27 21 - 32 mmol/L    Anion Gap 1 (L) 2 - 11 mmol/L    Glucose 102 (H) 65 - 100 mg/dL    BUN 15 6 - 23 MG/DL    Creatinine 1.60 (H) 0.8 - 1.5 MG/DL    Est, Glom Filt Rate 50 (L) >60 ml/min/1.73m2    Calcium 8.1 (L) 8.3 - 10.4 MG/DL   POCT Glucose    Collection Time: 10/15/22  7:21 AM   Result Value Ref Range POC Glucose 116 (H) 65 - 100 mg/dL    Performed by: Veronica    POCT Glucose    Collection Time: 10/15/22 11:13 AM   Result Value Ref Range    POC Glucose 130 (H) 65 - 100 mg/dL    Performed by: JorgediBarbaraCT           Assessment:     Principal Problem:    Sepsis ([facility]  Active Problems:    Primary hypertension    DM2 (diabetes mellitus, type 2) ([facility] [address]    Cellulitis and abscess of trunk    Abscess of groin, left    Penile abscess    Acute urinary retention  Resolved Problems:    * No resolved hospital problems. *        Plan/Recommendations/Medical Decision Making:     Continue present treatment   Wet to dry with dakins solution with dressing changes BID started 10/13/22  Perineal compresses QID for 2 days started 10/13/22  Nursing will change colace and miralax to prn when pt with regular bms. Increased activity with PT. Mesh underwear for activity and OOB since pt complains of pain when penis moves when repositioning. Wheel chair cushion ordered for up in chair. Supplies ordered per PT recommendation. Wbc normalized - 10.3 10/15/22  Tissue and wound culture positive MRSA-Pt on Vancomycin IV and Diflucan PO.      Anders Roberto NP      I personally interviewed and examined this patient and discussed findings with Balbir SNYDER-BC, and  agree with the above assessment and plan and wish to incorporated into documentation

## 2022-10-15 NOTE — PROGRESS NOTES
.END OF SHIFT NOTE:    INTAKE/OUTPUT  10/14 0701 - 10/15 0700  In: -   Out: 2550 [Urine:2550]  Voiding: No  Catheter: Yes  Drain:   Negative Pressure Wound Therapy Back Left;Mid (Active)   $ Standard NPWT <=50 sq cm PER [place] $ Yes 10/14/22 1230   Wound Type Surgical 10/15/22 0710   Unit Type kci 10/15/22 0710   Dressing Type Black Foam 10/15/22 0710   Number of pieces used 1 10/14/22 1230   Number of pieces removed 1 10/14/22 1230   Cycle Continuous 10/13/22 1442   Target Pressure (mmHg) 125 10/15/22 0710   Canister changed? No 10/13/22 1442   Dressing Status Clean, dry & intact 10/15/22 0710   Dressing Changed Changed/New 10/14/22 1230   Drainage Amount Small 10/14/22 1230   Drainage Description Serosanguinous 10/15/22 0710   Dressing Change Due 10/17/22 10/14/22 1230   Output (ml) 0 ml 10/15/22 1300   Wound Assessment Granulation tissue 10/14/22 1230   Nemo-wound Assessment Blanchable erythema 10/14/22 1230   Shape irregular 10/14/22 1230   Odor None 10/14/22 1230       Urinary Catheter 10/06/22 Robles (Active)   $ Urethral catheter insertion Inserted for procedure 10/10/22 0800   Catheter Indications Perioperative use for selected surgical procedures;Assist in healing of open sacral or perineal wounds (Stage III, IV, or unstageable documented by a provider or enterostomal therapy) and/or full thickness perineal and lower extremity burns in incontinent patients 10/15/22 1300   Site Assessment Swelling 10/15/22 0710   Urine Color Jinny 10/15/22 1300   Urine Appearance Sediment 10/15/22 1300   Urine Odor Other (Comment) 10/07/22 1135   Collection Container Standard 10/15/22 0710   Securement Method Securing device (Describe) 10/14/22 2149   Catheter Care Completed Yes 10/14/22 2149   Catheter Best Practices  Drainage tube clipped to bed;Catheter secured to thigh; Tamper seal intact; Bag below bladder;Bag not on floor; Lack of dependent loop in tubing;Drainage bag less than half full 10/15/22 0710   Status Draining;Patent 10/15/22 1300   Output (mL) 600 mL 10/15/22 1802               Flatus: Patient does have flatus present. Stool:  occurrences. 0  Characteristics:           Stool Assessment  Last BM (including prior to admit): 10/13/22    Emesis:  occurrences. 0  Characteristics:        VITAL SIGNS  Patient Vitals for the past 12 hrs:   Temp Pulse Resp BP SpO2   10/15/22 1556 -- -- 18 -- --   10/15/22 1518 98.8 °F (37.1 °C) 85 18 (!) 147/88 96 %   10/15/22 1348 -- -- 18 -- --   10/15/22 1142 -- -- 18 -- --   10/15/22 1112 99.1 °F (37.3 °C) 80 20 128/83 96 %   10/15/22 0925 -- -- 18 -- --   10/15/22 0724 98.6 °F (37 °C) 78 18 136/81 93 %       Pain Assessment  Pain Level: 8 (10/15/22 1639)  Pain Location: Penis, Groin  Patient's Stated Pain Goal: 0 - No pain    Ambulating  No    Shift report given to oncoming nurse at the bedside.     Ashanti Veliz RN

## 2022-10-15 NOTE — PROGRESS NOTES
END OF SHIFT NOTE:    INTAKE/OUTPUT  10/14 0701 - 10/15 0700  In: -   Out: 2550 [Urine:2550]  Voiding: Yes  Catheter: Yes  Drain:   Negative Pressure Wound Therapy Back Left;Mid (Active)   $ Standard NPWT <=50 sq cm PER TX $ Yes 10/14/22 1230   Wound Type Surgical 10/14/22 1948   Unit Type kci ulta 10/14/22 1948   Dressing Type Black Foam 10/14/22 1948   Number of pieces used 1 10/14/22 1230   Number of pieces removed 1 10/14/22 1230   Cycle Continuous 10/13/22 1442   Target Pressure (mmHg) 125 10/14/22 1948   Canister changed? No 10/13/22 1442   Dressing Status New dressing applied 10/14/22 1230   Dressing Changed Changed/New 10/14/22 1230   Drainage Amount Small 10/14/22 1230   Drainage Description Serosanguinous 10/14/22 1230   Dressing Change Due 10/17/22 10/14/22 1230   Output (ml) 0 ml 10/13/22 1442   Wound Assessment Granulation tissue 10/14/22 1230   Nemo-wound Assessment Blanchable erythema 10/14/22 1230   Shape irregular 10/14/22 1230   Odor None 10/14/22 1230       Urinary Catheter 10/06/22 Robles (Active)   $ Urethral catheter insertion Inserted for procedure 10/10/22 0800   Catheter Indications Assist in healing of open sacral or perineal wounds (Stage III, IV, or unstageable documented by a provider or enterostomal therapy) and/or full thickness perineal and lower extremity burns in incontinent patients 10/14/22 2149   Site Assessment Swelling 10/14/22 2149   Urine Color Yellow 10/14/22 2149   Urine Appearance Sediment 10/14/22 2149   Urine Odor Other (Comment) 10/07/22 1135   Collection Container Standard 10/14/22 2149   Securement Method Securing device (Describe) 10/14/22 2149   Catheter Care Completed Yes 10/14/22 2149   Catheter Best Practices  Drainage tube clipped to bed;Catheter secured to thigh; Tamper seal intact; Bag below bladder;Bag not on floor; Lack of dependent loop in tubing;Drainage bag less than half full 10/14/22 2149   Status Draining 10/14/22 2149   Output (mL) 550 mL 10/14/22 8560 Flatus: Patient does have flatus present. Stool:  occurrences. Characteristics:           Stool Assessment  Last BM (including prior to admit): 10/07/22 (per pt)    Emesis:  occurrences. Characteristics:        VITAL SIGNS  Patient Vitals for the past 12 hrs:   Temp Pulse Resp BP SpO2   10/15/22 0207 99.5 °F (37.5 °C) 84 18 123/73 97 %   10/14/22 2345 99.1 °F (37.3 °C) 88 20 127/76 94 %       Pain Assessment  Pain Level: 8 (10/15/22 0203)  Pain Location: Groin, Back, Penis  Patient's Stated Pain Goal: 0 - No pain    Ambulating  No    Shift report given to oncoming nurse at the bedside.     Arnold Gonzalez RN

## 2022-10-15 NOTE — PROGRESS NOTES
Hospitalist Progress Note   Admit Date:  10/5/2022  7:22 PM   Name:  Alondra Zamora   Age:  60 y.o. Sex:  male  :  1965   MRN:  [id number]   Room:      Reason(s) for Admission: Cellulitis and abscess of trunk [L03.319, L02.219]  Abscess [L02.91]  Hyperglycemia [R73.9]  Abscess, perineum [facility] Course & Interval History:   Mr. Bella Lloyd is a nice 61 y/o male with a h/o DM2 and HTN who was admitted to our service on 10/5 with an abscess on his upper back for about 2 months. CT was done showing a paraspinal abscess w/o muscular involvement along with L inguinal inflammatory changes with possible early abscess of scrotum. Urology and General Surgery were consulted. He was started on antibiotics. Hyperglycemia, A1C >14, started on basal + bolus insulin. ID consulted, now on Zosyn. HIV neg, other ID orders pending. On 10/7 he underwent I&D of back abscess and L groin abscess. He later underwent debridement of penile abscess with Urology. Wound cultures from 10/5 I&D with staph aureus. Wound vac to back placed 10/10. Wound care recommend possible further debridement versus active type wound care minute. Evaluation recommend continue wet-to-dry dressing twice daily, no surgical debridement at this time. ID recommend to continue IV vancomycin while inpatient. Also started on p.o. fluconazole for yeast coverage on . Subjective/24hr Events (10/15/22): Patient is seen at the bedside. Reports pain better controlled. Denies nausea, vomiting, chest pain or shortness of breath. Complaining of constipation and has no bowel movement since admission. On MiraLAX twice daily. Will give lactulose today. Assessment & Plan:     # Sepsis 2/2 abscess of upper back, L groin and penis due to MRSA  - Leukocytosis + tachycardia + abscesses on CT. Resolved. - S/p I&D of back and groin abscess, penile debridement on 10/7. 10/5 wound culture with MRSA.  - Wound vac placed 10/10.  - Con't vancomycin. Appreciate ID input.  - Wound care  - Wound VAC removed and surgery recommended wet-to-dry dressing for perianal area. Recommend to continue wound VAC to back while inpatient  - Started pt on tramadol every 6 hours and continue as needed pain medicine  - Bowel Regimen     # Visual changes // RUE weakness  - RUE strength improved 10/11 AM  - Head CT unremarkable. LDL 88  - MRI negative for acute pathology   - Statin added for DM     # Acute urinary retention  - Likely 2/2 penile abscess and edema. Con't Robles. # Uncontrolled DM2  - Con't basal + prandial + SSI at current dosing  - LDL 88, con't statin  - Stop glipizide at discharge. # HTN  - Controlled off meds. Discharge Planning: Pending, CM involved. Diet:  ADULT DIET; Regular; 3 carb choices (45 gm/meal)  DVT PPx: Lovenox  Code status: Full Code    Hospital Problems             Last Modified POA    * (Principal) Sepsis ([facility] [address] 10/8/2022 Yes    Primary hypertension 10/6/2022 Yes    DM2 (diabetes mellitus, type 2) ([facility] [address] 10/6/2022 Yes    Cellulitis and abscess of trunk 10/7/2022 Yes    Abscess of groin, left 10/8/2022 Yes    Penile abscess 10/8/2022 Yes    Acute urinary retention 10/8/2022 Yes    Objective:   Patient Vitals for the past 24 hrs:   Temp Pulse Resp BP SpO2   10/15/22 1518 98.8 °F (37.1 °C) 85 18 (!) 147/88 96 %   10/15/22 1112 99.1 °F (37.3 °C) 80 20 128/83 96 %   10/15/22 0925 -- -- 18 -- --   10/15/22 0724 98.6 °F (37 °C) 78 18 136/81 93 %   10/15/22 0207 99.5 °F (37.5 °C) 84 18 123/73 97 %   10/14/22 2345 99.1 °F (37.3 °C) 88 20 127/76 94 %   10/14/22 1827 99.6 °F (37.6 °C) 92 20 136/84 96 %   10/14/22 1535 99.7 °F (37.6 °C) 89 20 135/66 97 %         Estimated body mass index is 46.86 kg/m² as calculated from the following:    Height as of this encounter: 6' 2\" (1.88 m). Weight as of this encounter: 365 lb (165.6 kg).     Intake/Output Summary (Last 24 hours) at 10/15/2022 1522  Last data filed at 10/15/2022 1521  Gross per 24 hour   Intake 1190 ml   Output 4350 ml   Net -3160 ml           Physical Exam:   Blood pressure (!) 147/88, pulse 85, temperature 98.8 °F (37.1 °C), temperature source Oral, resp. rate 18, height 6' 2\" (1.88 m), weight (!) 365 lb (165.6 kg), SpO2 96 %. General:    Well nourished. No overt distress. Morbidly obese. Head:  Normocephalic, atraumatic  Eyes:  Sclerae appear normal. Pupils equally round. ENT:  Nares appear normal, no drainage. Moist oral mucosa. Poor dentition. Neck:  No restricted ROM. Trachea midline. CV:   RRR. No m/r/g. No jugular venous distension. Lungs:   CTAB. No wheezing, rhonchi, or rales. Respirations even, unlabored. Abdomen: Bowel sounds present. Soft, nontender, nondistended. :  Robles in place. Extremities: No cyanosis or clubbing. No edema. Skin:     No rashes and normal coloration. Warm and dry. Wound vac to mid-back. Neuro:  CN II-XII grossly intact. A&Ox3. Improved  strength R hand. Psych:  Normal mood and affect.       I have reviewed ordered lab tests and independently visualized imaging below:    Recent Labs:  Recent Results (from the past 48 hour(s))   POCT Glucose    Collection Time: 10/13/22  4:48 PM   Result Value Ref Range    POC Glucose 132 (H) 65 - 100 mg/dL    Performed by: Veronica    POCT Glucose    Collection Time: 10/13/22  8:51 PM   Result Value Ref Range    POC Glucose 162 (H) 65 - 100 mg/dL    Performed by: Jose Rafael    CBC with Auto Differential    Collection Time: 10/14/22  6:35 AM   Result Value Ref Range    WBC 11.5 (H) 4.3 - 11.1 K/uL    RBC 2.98 (L) 4.23 - 5.6 M/uL    Hemoglobin 8.6 (L) 13.6 - 17.2 g/dL    Hematocrit 28.2 (L) 41.1 - 50.3 %    MCV 94.6 82 - 102 FL    MCH 28.9 26.1 - 32.9 PG    MCHC 30.5 (L) 31.4 - 35.0 g/dL    RDW 14.2 11.9 - 14.6 %    Platelets 402 (H) 808 - 450 K/uL    MPV 10.0 9.4 - 12.3 FL    nRBC 0.00 0.0 - 0.2 K/uL    Differential Type AUTOMATED      Seg Neutrophils 71 43 - 78 % Lymphocytes 19 13 - 44 %    Monocytes 6 4.0 - 12.0 %    Eosinophils % 3 0.5 - 7.8 %    Basophils 0 0.0 - 2.0 %    Immature Granulocytes 1 0.0 - 5.0 %    Segs Absolute 8.2 1.7 - 8.2 K/UL    Absolute Lymph # 2.2 0.5 - 4.6 K/UL    Absolute Mono # 0.7 0.1 - 1.3 K/UL    Absolute Eos # 0.4 0.0 - 0.8 K/UL    Basophils Absolute 0.1 0.0 - 0.2 K/UL    Absolute Immature Granulocyte 0.1 0.0 - 0.5 K/UL   Basic Metabolic Panel w/ Reflex to MG    Collection Time: 10/14/22  6:35 AM   Result Value Ref Range    Sodium 139 133 - 143 mmol/L    Potassium 4.3 3.5 - 5.1 mmol/L    Chloride 110 101 - 110 mmol/L    CO2 23 21 - 32 mmol/L    Anion Gap 6 2 - 11 mmol/L    Glucose 166 (H) 65 - 100 mg/dL    BUN 14 6 - 23 MG/DL    Creatinine 1.50 0.8 - 1.5 MG/DL    Est, Glom Filt Rate 54 (L) >60 ml/min/1.73m2    Calcium 8.0 (L) 8.3 - 10.4 MG/DL   POCT Glucose    Collection Time: 10/14/22  7:17 AM   Result Value Ref Range    POC Glucose 155 (H) 65 - 100 mg/dL    Performed by: Pamela Emerson    POCT Glucose    Collection Time: 10/14/22 11:14 AM   Result Value Ref Range    POC Glucose 187 (H) 65 - 100 mg/dL    Performed by: Pamela [employer]    POCT Glucose    Collection Time: 10/14/22  5:08 PM   Result Value Ref Range    POC Glucose 135 (H) 65 - 100 mg/dL    Performed by: Pamela [employer]    POCT Glucose    Collection Time: 10/14/22  8:38 PM   Result Value Ref Range    POC Glucose 119 (H) 65 - 100 mg/dL    Performed by: Nickie    POCT Glucose    Collection Time: 10/14/22  9:03 PM   Result Value Ref Range    POC Glucose 136 (H) 65 - 100 mg/dL    Performed by: Jose Rafael    Vancomycin Level, Random    Collection Time: 10/15/22  6:33 AM   Result Value Ref Range    Vancomycin Rm 23.8 UG/ML   CBC with Auto Differential    Collection Time: 10/15/22  6:33 AM   Result Value Ref Range    WBC 10.3 4.3 - 11.1 K/uL    RBC 3.05 (L) 4.23 - 5.6 M/uL    Hemoglobin 8.6 (L) 13.6 - 17.2 g/dL    Hematocrit 29.3 (L) 41.1 - 50.3 %    MCV 96.1 82 - 102 FL    MCH 28.2 26.1 - 32.9 PG    MCHC 29.4 (L) 31.4 - 35.0 g/dL    RDW 14.4 11.9 - 14.6 %    Platelets 352 (H) 639 - 450 K/uL    MPV 10.1 9.4 - 12.3 FL    nRBC 0.00 0.0 - 0.2 K/uL    Differential Type AUTOMATED      Seg Neutrophils 65 43 - 78 %    Lymphocytes 26 13 - 44 %    Monocytes 6 4.0 - 12.0 %    Eosinophils % 3 0.5 - 7.8 %    Basophils 1 0.0 - 2.0 %    Immature Granulocytes 1 0.0 - 5.0 %    Segs Absolute 6.7 1.7 - 8.2 K/UL    Absolute Lymph # 2.7 0.5 - 4.6 K/UL    Absolute Mono # 0.6 0.1 - 1.3 K/UL    Absolute Eos # 0.3 0.0 - 0.8 K/UL    Basophils Absolute 0.1 0.0 - 0.2 K/UL    Absolute Immature Granulocyte 0.1 0.0 - 0.5 K/UL   Basic Metabolic Panel w/ Reflex to MG    Collection Time: 10/15/22  6:33 AM   Result Value Ref Range    Sodium 142 133 - 143 mmol/L    Potassium 4.8 3.5 - 5.1 mmol/L    Chloride 114 (H) 101 - 110 mmol/L    CO2 27 21 - 32 mmol/L    Anion Gap 1 (L) 2 - 11 mmol/L    Glucose 102 (H) 65 - 100 mg/dL    BUN 15 6 - 23 MG/DL    Creatinine 1.60 (H) 0.8 - 1.5 MG/DL    Est, Glom Filt Rate 50 (L) >60 ml/min/1.73m2    Calcium 8.1 (L) 8.3 - 10.4 MG/DL   POCT Glucose    Collection Time: 10/15/22  7:21 AM   Result Value Ref Range    POC Glucose 116 (H) 65 - 100 mg/dL    Performed by: [employer]    POCT Glucose    Collection Time: 10/15/22 11:13 AM   Result Value Ref Range    POC Glucose 130 (H) 65 - 100 mg/dL    Performed by: [employer]          Other Studies:  CT HEAD WO CONTRAST    Result Date: 10/10/2022  EXAM: Noncontrast CT head. INDICATION: Visual disturbance. COMPARISON: None. TECHNIQUE: Noncontrast CT images of the head were obtained. Radiation dose reduction techniques were used for this study. Our CT scanners use one or all of the following:  Automated exposure control, adjustment of the mA or kV according to patient size, iterative reconstruction. FINDINGS: Brain volume is appropriate for age. No acute infarct, hemorrhage or mass is identified.  There is no mass effect, midline shift or depressed fracture. The visualized paranasal sinuses and mastoid air cells are clear, except for mild right maxillary sinus mucosal thickening. No acute process.       Current Meds:  Current Facility-Administered Medications   Medication Dose Route Frequency    lactulose (CHRONULAC) 10 GM/15ML solution 30 g  30 g Oral Once    medicated lip ointment (BLISTEX)   Topical PRN    insulin glargine (LANTUS) injection vial 36 Units  36 Units SubCUTAneous Daily    traMADol (ULTRAM) tablet 50 mg  50 mg Oral Q6H    polyethylene glycol (GLYCOLAX) packet 17 g  17 g Oral BID    fluconazole (DIFLUCAN) tablet 200 mg  200 mg Oral Daily    aspirin EC tablet 81 mg  81 mg Oral Daily    vancomycin (VANCOCIN) 1500 mg in sodium chloride 0.9% 500 mL IVPB  1,500 mg IntraVENous Q24H    atorvastatin (LIPITOR) tablet 40 mg  40 mg Oral Nightly    docusate sodium (COLACE) capsule 100 mg  100 mg Oral BID    miconazole (MICOTIN) 2 % powder   Topical BID    oxyCODONE (ROXICODONE) immediate release tablet 10 mg  10 mg Oral Q4H PRN    sodium hypochlorite (DAKINS) 0.125 % external solution   Irrigation BID    gabapentin (NEURONTIN) capsule 100 mg  100 mg Oral TID    methocarbamol (ROBAXIN) tablet 750 mg  750 mg Oral 4x Daily    HYDROmorphone HCl PF (DILAUDID) injection 0.5 mg  0.5 mg IntraVENous Q3H PRN    insulin lispro (HUMALOG) injection vial 4 Units  4 Units SubCUTAneous TID WC    glucose chewable tablet 16 g  4 tablet Oral PRN    dextrose bolus 10% 125 mL  125 mL IntraVENous PRN    Or    dextrose bolus 10% 250 mL  250 mL IntraVENous PRN    glucagon (rDNA) injection 1 mg  1 mg SubCUTAneous PRN    dextrose 10 % infusion   IntraVENous Continuous PRN    insulin lispro (HUMALOG) injection vial 0-8 Units  0-8 Units SubCUTAneous TID WC    insulin lispro (HUMALOG) injection vial 0-4 Units  0-4 Units SubCUTAneous Nightly    sodium chloride flush 0.9 % injection 5-40 mL  5-40 mL IntraVENous 2 times per day    sodium chloride flush 0.9 % injection 5-40 mL  5-40 mL IntraVENous PRN    0.9 % sodium chloride infusion   IntraVENous PRN    enoxaparin (LOVENOX) injection 40 mg  40 mg SubCUTAneous BID    ondansetron (ZOFRAN-ODT) disintegrating tablet 4 mg  4 mg Oral Q8H PRN    Or    ondansetron (ZOFRAN) injection 4 mg  4 mg IntraVENous Q6H PRN    polyethylene glycol (GLYCOLAX) packet 17 g  17 g Oral Daily PRN    acetaminophen (TYLENOL) tablet 650 mg  650 mg Oral Q6H PRN    Or    acetaminophen (TYLENOL) suppository 650 mg  650 mg Rectal Q6H PRN       Signed:  Toya Vernon MD    Part of this note may have been written by using a voice dictation software. The note has been proof read but may still contain some grammatical/other typographical errors.

## 2022-10-15 NOTE — PROGRESS NOTES
VANCO DAILY FOLLOW UP NOTE  [address] [facility] [place], [place] Pharmacokinetic Monitoring Service - Vancomycin    Consulting Provider: Dr. Martin Range   Indication: SSTI  Target Concentration: Goal trough of 10-15 mg/L and AUC/MADINA <500 mg*hr/L  Day of Therapy: 8  Additional Antimicrobials: none    Pertinent Laboratory Values: Wt Readings from Last 1 Encounters:   10/07/22 (!) 365 lb (165.6 kg)     Temp Readings from Last 1 Encounters:   10/15/22 98.6 °F (37 °C) (Oral)     Recent Labs     10/13/22  0751 10/14/22  0635 10/15/22  0633   BUN 17 14 15   CREATININE 1.60* 1.50 1.60*   WBC 11.7* 11.5*  --      Estimated Creatinine Clearance: 82 mL/min (A) (based on SCr of 1.6 mg/dL (H)). Lab Results   Component Value Date/Time    VANCORANDOM 23.8 10/12/2022 05:58 AM     MRSA Nasal Swab: N/A. Non-respiratory infection.     Assessment:  Date/Time Dose Concentration AUC   10/10 0706 1250 mg q18h 21.6 513   10/12 0558 1250 mg q24h 23.8 452   Note: Serum concentrations collected for AUC dosing may appear elevated if collected in close proximity to the dose administered, this is not necessarily an indication of toxicity    Plan:  Current dosing regimen is therapeutic  Continue current dose  Repeat vancomycin concentrations will be ordered as clinically appropriate   Pharmacy will continue to monitor patient and adjust therapy as indicated    Thank you for the consult,  Susy Ruano [facility]

## 2022-10-16 LAB
GLUCOSE BLD STRIP.AUTO-MCNC: 122 MG/DL (ref 65–100)
GLUCOSE BLD STRIP.AUTO-MCNC: 123 MG/DL (ref 65–100)
GLUCOSE BLD STRIP.AUTO-MCNC: 132 MG/DL (ref 65–100)
GLUCOSE BLD STRIP.AUTO-MCNC: 75 MG/DL (ref 65–100)
SERVICE CMNT-IMP: ABNORMAL
SERVICE CMNT-IMP: NORMAL

## 2022-10-16 PROCEDURE — 2580000003 HC RX 258: Performed by: FAMILY MEDICINE

## 2022-10-16 PROCEDURE — 6360000002 HC RX W HCPCS: Performed by: FAMILY MEDICINE

## 2022-10-16 PROCEDURE — 6370000000 HC RX 637 (ALT 250 FOR IP): Performed by: FAMILY MEDICINE

## 2022-10-16 PROCEDURE — 6370000000 HC RX 637 (ALT 250 FOR IP): Performed by: STUDENT IN AN ORGANIZED HEALTH CARE EDUCATION/TRAINING PROGRAM

## 2022-10-16 PROCEDURE — 1100000000 HC RM PRIVATE

## 2022-10-16 PROCEDURE — 82962 GLUCOSE BLOOD TEST: CPT

## 2022-10-16 PROCEDURE — 6370000000 HC RX 637 (ALT 250 FOR IP): Performed by: INTERNAL MEDICINE

## 2022-10-16 PROCEDURE — 6370000000 HC RX 637 (ALT 250 FOR IP)

## 2022-10-16 PROCEDURE — 6360000002 HC RX W HCPCS: Performed by: INTERNAL MEDICINE

## 2022-10-16 RX ORDER — GABAPENTIN 300 MG/1
300 CAPSULE ORAL 3 TIMES DAILY
Status: DISCONTINUED | OUTPATIENT
Start: 2022-10-16 | End: 2022-10-25 | Stop reason: HOSPADM

## 2022-10-16 RX ADMIN — METHOCARBAMOL TABLETS 750 MG: 750 TABLET, COATED ORAL at 08:27

## 2022-10-16 RX ADMIN — GABAPENTIN 300 MG: 300 CAPSULE ORAL at 20:53

## 2022-10-16 RX ADMIN — Medication 1500 MG: at 19:46

## 2022-10-16 RX ADMIN — SODIUM CHLORIDE, PRESERVATIVE FREE 10 ML: 5 INJECTION INTRAVENOUS at 20:55

## 2022-10-16 RX ADMIN — GABAPENTIN 100 MG: 100 CAPSULE ORAL at 13:29

## 2022-10-16 RX ADMIN — OXYCODONE 10 MG: 5 TABLET ORAL at 22:21

## 2022-10-16 RX ADMIN — ASPIRIN 81 MG: 81 TABLET ORAL at 08:27

## 2022-10-16 RX ADMIN — TRAMADOL HYDROCHLORIDE 50 MG: 50 TABLET, COATED ORAL at 20:53

## 2022-10-16 RX ADMIN — FLUCONAZOLE 200 MG: 100 TABLET ORAL at 08:28

## 2022-10-16 RX ADMIN — Medication: at 13:49

## 2022-10-16 RX ADMIN — INSULIN GLARGINE 36 UNITS: 100 INJECTION, SOLUTION SUBCUTANEOUS at 08:19

## 2022-10-16 RX ADMIN — TRAMADOL HYDROCHLORIDE 50 MG: 50 TABLET, COATED ORAL at 08:27

## 2022-10-16 RX ADMIN — DOCUSATE SODIUM 100 MG: 100 CAPSULE, LIQUID FILLED ORAL at 08:28

## 2022-10-16 RX ADMIN — ANTI-FUNGAL POWDER MICONAZOLE NITRATE TALC FREE: 1.42 POWDER TOPICAL at 13:49

## 2022-10-16 RX ADMIN — POLYETHYLENE GLYCOL 3350 17 G: 17 POWDER, FOR SOLUTION ORAL at 08:25

## 2022-10-16 RX ADMIN — HYDROMORPHONE HYDROCHLORIDE 0.5 MG: 1 INJECTION, SOLUTION INTRAMUSCULAR; INTRAVENOUS; SUBCUTANEOUS at 16:41

## 2022-10-16 RX ADMIN — METHOCARBAMOL TABLETS 750 MG: 750 TABLET, COATED ORAL at 16:44

## 2022-10-16 RX ADMIN — HYDROMORPHONE HYDROCHLORIDE 0.5 MG: 1 INJECTION, SOLUTION INTRAMUSCULAR; INTRAVENOUS; SUBCUTANEOUS at 19:45

## 2022-10-16 RX ADMIN — ENOXAPARIN SODIUM 40 MG: 100 INJECTION SUBCUTANEOUS at 20:53

## 2022-10-16 RX ADMIN — Medication: at 20:55

## 2022-10-16 RX ADMIN — GABAPENTIN 100 MG: 100 CAPSULE ORAL at 08:28

## 2022-10-16 RX ADMIN — OXYCODONE 10 MG: 5 TABLET ORAL at 08:28

## 2022-10-16 RX ADMIN — POLYETHYLENE GLYCOL 3350 17 G: 17 POWDER, FOR SOLUTION ORAL at 20:52

## 2022-10-16 RX ADMIN — SODIUM CHLORIDE, PRESERVATIVE FREE 10 ML: 5 INJECTION INTRAVENOUS at 08:30

## 2022-10-16 RX ADMIN — ATORVASTATIN CALCIUM 40 MG: 40 TABLET, FILM COATED ORAL at 20:53

## 2022-10-16 RX ADMIN — TRAMADOL HYDROCHLORIDE 50 MG: 50 TABLET, COATED ORAL at 14:04

## 2022-10-16 RX ADMIN — HYDROMORPHONE HYDROCHLORIDE 0.5 MG: 1 INJECTION, SOLUTION INTRAMUSCULAR; INTRAVENOUS; SUBCUTANEOUS at 13:30

## 2022-10-16 RX ADMIN — ENOXAPARIN SODIUM 40 MG: 100 INJECTION SUBCUTANEOUS at 08:27

## 2022-10-16 RX ADMIN — METHOCARBAMOL TABLETS 750 MG: 750 TABLET, COATED ORAL at 13:29

## 2022-10-16 RX ADMIN — DOCUSATE SODIUM 100 MG: 100 CAPSULE, LIQUID FILLED ORAL at 20:54

## 2022-10-16 RX ADMIN — ANTI-FUNGAL POWDER MICONAZOLE NITRATE TALC FREE: 1.42 POWDER TOPICAL at 20:54

## 2022-10-16 RX ADMIN — METHOCARBAMOL TABLETS 750 MG: 750 TABLET, COATED ORAL at 20:53

## 2022-10-16 RX ADMIN — TRAMADOL HYDROCHLORIDE 50 MG: 50 TABLET, COATED ORAL at 01:59

## 2022-10-16 ASSESSMENT — PAIN DESCRIPTION - DESCRIPTORS
DESCRIPTORS: ACHING;PRESSURE
DESCRIPTORS: ACHING;BURNING
DESCRIPTORS: ACHING
DESCRIPTORS: STABBING;TENDER
DESCRIPTORS: ACHING;BURNING
DESCRIPTORS: ACHING
DESCRIPTORS: ACHING;PRESSURE

## 2022-10-16 ASSESSMENT — PAIN DESCRIPTION - LOCATION
LOCATION: GROIN;PENIS
LOCATION: PENIS;GROIN
LOCATION: GROIN;PENIS

## 2022-10-16 ASSESSMENT — PAIN SCALES - GENERAL
PAINLEVEL_OUTOF10: 10
PAINLEVEL_OUTOF10: 10
PAINLEVEL_OUTOF10: 8
PAINLEVEL_OUTOF10: 9
PAINLEVEL_OUTOF10: 9
PAINLEVEL_OUTOF10: 8
PAINLEVEL_OUTOF10: 10
PAINLEVEL_OUTOF10: 9
PAINLEVEL_OUTOF10: 9
PAINLEVEL_OUTOF10: 10

## 2022-10-16 ASSESSMENT — PAIN DESCRIPTION - FREQUENCY: FREQUENCY: CONTINUOUS

## 2022-10-16 ASSESSMENT — PAIN DESCRIPTION - ORIENTATION
ORIENTATION: ANTERIOR
ORIENTATION: ANTERIOR;LEFT
ORIENTATION: ANTERIOR

## 2022-10-16 ASSESSMENT — PAIN DESCRIPTION - PAIN TYPE: TYPE: ACUTE PAIN

## 2022-10-16 ASSESSMENT — PAIN DESCRIPTION - ONSET: ONSET: ON-GOING

## 2022-10-16 ASSESSMENT — PAIN - FUNCTIONAL ASSESSMENT: PAIN_FUNCTIONAL_ASSESSMENT: PREVENTS OR INTERFERES WITH MANY ACTIVE NOT PASSIVE ACTIVITIES

## 2022-10-16 ASSESSMENT — PAIN DESCRIPTION - DIRECTION: RADIATING_TOWARDS: THIGH, LEGS

## 2022-10-16 NOTE — PLAN OF CARE
Problem: Safety - Adult  Goal: Free from fall injury  Outcome: Progressing     Problem: Pain  Goal: Verbalizes/displays adequate comfort level or baseline comfort level  Outcome: Progressing     Problem: Chronic Conditions and Co-morbidities  Goal: Patient's chronic conditions and co-morbidity symptoms are monitored and maintained or improved  Outcome: Progressing     Problem: Skin/Tissue Integrity  Goal: Absence of new skin breakdown  Description: 1. Monitor for areas of redness and/or skin breakdown  2. Assess vascular access sites hourly  3. Every 4-6 hours minimum:  Change oxygen saturation probe site  4. Every 4-6 hours:  If on nasal continuous positive airway pressure, respiratory therapy assess nares and determine need for appliance change or resting period.   Outcome: Progressing     Problem: Discharge Planning  Goal: Discharge to home or other facility with appropriate resources  Outcome: Not Progressing

## 2022-10-16 NOTE — PROGRESS NOTES
END OF SHIFT NOTE:    INTAKE/OUTPUT  10/15 0701 - 10/16 0700  In: 5355 [P.O.:1545]  Out: 7261 [Urine:5375]  Voiding: Yes  Catheter: Yes  Drain:   Negative Pressure Wound Therapy Back Left;Mid (Active)   $ Standard NPWT <=50 sq cm PER [place] $ Yes 10/14/22 1230   Wound Type Surgical 10/15/22 1928   Unit Type kci ulta 10/15/22 1928   Dressing Type Black Foam 10/15/22 1928   Number of pieces used 1 10/14/22 1230   Number of pieces removed 1 10/14/22 1230   Cycle Continuous 10/13/22 1442   Target Pressure (mmHg) 125 10/15/22 1928   Canister changed? No 10/13/22 1442   Dressing Status Clean, dry & intact 10/15/22 0710   Dressing Changed Changed/New 10/14/22 1230   Drainage Amount Small 10/14/22 1230   Drainage Description Serosanguinous 10/15/22 0710   Dressing Change Due 10/17/22 10/14/22 1230   Output (ml) 0 ml 10/15/22 1928   Wound Assessment Granulation tissue 10/14/22 1230   Nemo-wound Assessment Blanchable erythema 10/14/22 1230   Shape irregular 10/14/22 1230   Odor None 10/14/22 1230       Urinary Catheter 10/06/22 Robles (Active)   $ Urethral catheter insertion Inserted for procedure 10/10/22 0800   Catheter Indications Assist in healing of open sacral or perineal wounds (Stage III, IV, or unstageable documented by a provider or enterostomal therapy) and/or full thickness perineal and lower extremity burns in incontinent patients 10/15/22 1928   Site Assessment Swelling 10/15/22 1928   Urine Color Jinny 10/15/22 1928   Urine Appearance Sediment 10/15/22 1928   Urine Odor Other (Comment) 10/07/22 1135   Collection Container Standard 10/15/22 1928   Securement Method Securing device (Describe) 10/15/22 1928   Catheter Care Completed Yes 10/14/22 2149   Catheter Best Practices  Drainage tube clipped to bed;Catheter secured to thigh; Tamper seal intact; Bag below bladder;Bag not on floor; Lack of dependent loop in tubing;Drainage bag less than half full 10/15/22 1928   Status Draining 10/15/22 1928   Output (mL) 600 mL 10/16/22 0412               Flatus: Patient does have flatus present. Stool:  occurrences. Characteristics:           Stool Assessment  Last BM (including prior to admit): 10/13/22    Emesis:  occurrences. Characteristics:        VITAL SIGNS  Patient Vitals for the past 12 hrs:   Temp Pulse Resp BP SpO2   10/16/22 0412 98.6 °F (37 °C) 77 18 (!) 150/87 94 %   10/15/22 2308 99.5 °F (37.5 °C) 83 18 (!) 141/84 93 %   10/15/22 1918 99.2 °F (37.3 °C) 80 18 (!) 144/83 90 %       Pain Assessment  Pain Level: 9 (10/15/22 2330)  Pain Location: Groin, Penis  Patient's Stated Pain Goal: 0 - No pain    Ambulating  Yes    Shift report given to oncoming nurse at the bedside.     Silvia Gonzalez RN

## 2022-10-16 NOTE — PROGRESS NOTES
Hospitalist Progress Note   Admit Date:  10/5/2022  7:22 PM   Name:  Sumanth Covarrubias   Age:  60 y.o. Sex:  male  :  1965   MRN:  [id number]   Room:      Reason(s) for Admission: Cellulitis and abscess of trunk [L03.319, L02.219]  Abscess [L02.91]  Hyperglycemia [R73.9]  Abscess, perineum [facility] Course & Interval History:   Mr. Holli Schneider is a nice 61 y/o male with a h/o DM2 and HTN who was admitted to our service on 10/5 with an abscess on his upper back for about 2 months. CT was done showing a paraspinal abscess w/o muscular involvement along with L inguinal inflammatory changes with possible early abscess of scrotum. Urology and General Surgery were consulted. He was started on antibiotics. Hyperglycemia, A1C >14, started on basal + bolus insulin. ID consulted, now on Zosyn. HIV neg, other ID orders pending. On 10/7 he underwent I&D of back abscess and L groin abscess. He later underwent debridement of penile abscess with Urology. Wound cultures from 10/5 I&D with staph aureus. Wound vac to back placed 10/10. Wound care recommend possible further debridement versus active type wound care minute. Evaluation recommend continue wet-to-dry dressing twice daily, no surgical debridement at this time. ID recommend to continue IV vancomycin while inpatient. Also started on p.o. fluconazole for yeast coverage on . Subjective/24hr Events (10/16/22): Patient is seen at the bedside. Reports feeling better. Pain better controlled. Denies chest pain, palpitation, nausea, vomiting or abdominal pain. Complaining of weak  bilaterally. Also complaining of numbness tingling in the hands. Will increase dose of gabapentin. Assessment & Plan:     # Sepsis 2/2 abscess of upper back, L groin and penis due to MRSA  - Leukocytosis + tachycardia + abscesses on CT. Resolved. - S/p I&D of back and groin abscess, penile debridement on 10/7. 10/5 wound culture with MRSA.  - Wound vac placed 10/10.  - Con't vancomycin. Appreciate ID input.  - Wound care  - Wound VAC removed and surgery recommended wet-to-dry dressing for perianal area. Recommend to continue wound VAC to back while inpatient  - Started pt on tramadol every 6 hours and continue as needed pain medicine  - Bowel Regimen     # Visual changes // RUE weakness  - RUE strength improved 10/11 AM  - Head CT unremarkable. LDL 88  - MRI negative for acute pathology   - Statin added for DM     # Acute urinary retention  - Likely 2/2 penile abscess and edema. Con't Robles. # Uncontrolled DM2  - Con't basal + prandial + SSI at current dosing  - LDL 88, con't statin  - Stop glipizide at discharge. # HTN  - Controlled off meds. Discharge Planning: Pending, CM involved. Diet:  ADULT DIET; Regular; 3 carb choices (45 gm/meal)  DVT PPx: Lovenox  Code status: Full Code    Hospital Problems             Last Modified POA    * (Principal) Sepsis ([facility] [address] 10/8/2022 Yes    Primary hypertension 10/6/2022 Yes    DM2 (diabetes mellitus, type 2) ([facility] [address] 10/6/2022 Yes    Cellulitis and abscess of trunk 10/7/2022 Yes    Abscess of groin, left 10/8/2022 Yes    Penile abscess 10/8/2022 Yes    Acute urinary retention 10/8/2022 Yes    Objective:   Patient Vitals for the past 24 hrs:   Temp Pulse Resp BP SpO2   10/16/22 1434 -- -- 18 -- --   10/16/22 1400 -- -- 18 -- --   10/16/22 1330 -- -- 18 -- --   10/16/22 1139 98.8 °F (37.1 °C) 86 17 (!) 143/80 96 %   10/16/22 0858 -- -- 19 -- --   10/16/22 0857 -- -- 19 -- --   10/16/22 0753 98.8 °F (37.1 °C) 84 19 (!) 155/87 --   10/16/22 0412 98.6 °F (37 °C) 77 18 (!) 150/87 94 %   10/15/22 2308 99.5 °F (37.5 °C) 83 18 (!) 141/84 93 %   10/15/22 1918 99.2 °F (37.3 °C) 80 18 (!) 144/83 90 %   10/15/22 1556 -- -- 18 -- --   10/15/22 1518 98.8 °F (37.1 °C) 85 18 (!) 147/88 96 %         Estimated body mass index is 46.86 kg/m² as calculated from the following:    Height as of this encounter: 6' 2\" (1.88 m). Weight as of this encounter: 365 lb (165.6 kg). Intake/Output Summary (Last 24 hours) at 10/16/2022 1454  Last data filed at 10/16/2022 1440  Gross per 24 hour   Intake 935 ml   Output 5075 ml   Net -4140 ml           Physical Exam:   Blood pressure (!) 143/80, pulse 86, temperature 98.8 °F (37.1 °C), temperature source Oral, resp. rate 18, height 6' 2\" (1.88 m), weight (!) 365 lb (165.6 kg), SpO2 96 %. General:    Well nourished. No overt distress. Morbidly obese. Head:  Normocephalic, atraumatic  Eyes:  Sclerae appear normal. Pupils equally round. ENT:  Nares appear normal, no drainage. Moist oral mucosa. Poor dentition. Neck:  No restricted ROM. Trachea midline. CV:   RRR. No m/r/g. No jugular venous distension. Lungs:   CTAB. No wheezing, rhonchi, or rales. Respirations even, unlabored. Abdomen: Bowel sounds present. Soft, nontender, nondistended. :  Robles in place. Extremities: No cyanosis or clubbing. No edema. Skin:     No rashes and normal coloration. Warm and dry. Wound vac to mid-back. Neuro:  CN II-XII grossly intact. A&Ox3. Improved  strength R hand. Psych:  Normal mood and affect.       I have reviewed ordered lab tests and independently visualized imaging below:    Recent Labs:  Recent Results (from the past 48 hour(s))   POCT Glucose    Collection Time: 10/14/22  5:08 PM   Result Value Ref Range    POC Glucose 135 (H) 65 - 100 mg/dL    Performed by: Messi Anderson    POCT Glucose    Collection Time: 10/14/22  8:38 PM   Result Value Ref Range    POC Glucose 119 (H) 65 - 100 mg/dL    Performed by: Nickie    POCT Glucose    Collection Time: 10/14/22  9:03 PM   Result Value Ref Range    POC Glucose 136 (H) 65 - 100 mg/dL    Performed by: Jose Rafael    Vancomycin Level, Random    Collection Time: 10/15/22  6:33 AM   Result Value Ref Range    Vancomycin Rm 23.8 UG/ML   CBC with Auto Differential    Collection Time: 10/15/22  6:33 AM   Result Value Ref Range    WBC 10.3 4.3 - 11.1 K/uL    RBC 3.05 (L) 4.23 - 5.6 M/uL    Hemoglobin 8.6 (L) 13.6 - 17.2 g/dL    Hematocrit 29.3 (L) 41.1 - 50.3 %    MCV 96.1 82 - 102 FL    MCH 28.2 26.1 - 32.9 PG    MCHC 29.4 (L) 31.4 - 35.0 g/dL    RDW 14.4 11.9 - 14.6 %    Platelets 764 (H) 087 - 450 K/uL    MPV 10.1 9.4 - 12.3 FL    nRBC 0.00 0.0 - 0.2 K/uL    Differential Type AUTOMATED      Seg Neutrophils 65 43 - 78 %    Lymphocytes 26 13 - 44 %    Monocytes 6 4.0 - 12.0 %    Eosinophils % 3 0.5 - 7.8 %    Basophils 1 0.0 - 2.0 %    Immature Granulocytes 1 0.0 - 5.0 %    Segs Absolute 6.7 1.7 - 8.2 K/UL    Absolute Lymph # 2.7 0.5 - 4.6 K/UL    Absolute Mono # 0.6 0.1 - 1.3 K/UL    Absolute Eos # 0.3 0.0 - 0.8 K/UL    Basophils Absolute 0.1 0.0 - 0.2 K/UL    Absolute Immature Granulocyte 0.1 0.0 - 0.5 K/UL   Basic Metabolic Panel w/ Reflex to MG    Collection Time: 10/15/22  6:33 AM   Result Value Ref Range    Sodium 142 133 - 143 mmol/L    Potassium 4.8 3.5 - 5.1 mmol/L    Chloride 114 (H) 101 - 110 mmol/L    CO2 27 21 - 32 mmol/L    Anion Gap 1 (L) 2 - 11 mmol/L    Glucose 102 (H) 65 - 100 mg/dL    BUN 15 6 - 23 MG/DL    Creatinine 1.60 (H) 0.8 - 1.5 MG/DL    Est, Glom Filt Rate 50 (L) >60 ml/min/1.73m2    Calcium 8.1 (L) 8.3 - 10.4 MG/DL   POCT Glucose    Collection Time: 10/15/22  7:21 AM   Result Value Ref Range    POC Glucose 116 (H) 65 - 100 mg/dL    Performed by: Veronica    POCT Glucose    Collection Time: 10/15/22 11:13 AM   Result Value Ref Range    POC Glucose 130 (H) 65 - 100 mg/dL    Performed by: JorgedicePCT    POCT Glucose    Collection Time: 10/15/22  4:11 PM   Result Value Ref Range    POC Glucose 124 (H) 65 - 100 mg/dL    Performed by: JorgedicePCT    POCT Glucose    Collection Time: 10/15/22  8:36 PM   Result Value Ref Range    POC Glucose 86 65 - 100 mg/dL    Performed by: Theodore    POCT Glucose    Collection Time: 10/16/22  7:46 AM   Result Value Ref Range    POC Glucose 75 65 - 100 mg/dL    Performed by: Ronda    POCT Glucose    Collection Time: 10/16/22 11:41 AM   Result Value Ref Range    POC Glucose 122 (H) 65 - 100 mg/dL    Performed by: Ronda          Other Studies:  CT HEAD WO CONTRAST    Result Date: 10/10/2022  EXAM: Noncontrast CT head. INDICATION: Visual disturbance. COMPARISON: None. TECHNIQUE: Noncontrast CT images of the head were obtained. Radiation dose reduction techniques were used for this study. Our CT scanners use one or all of the following:  Automated exposure control, adjustment of the mA or kV according to patient size, iterative reconstruction. FINDINGS: Brain volume is appropriate for age. No acute infarct, hemorrhage or mass is identified. There is no mass effect, midline shift or depressed fracture. The visualized paranasal sinuses and mastoid air cells are clear, except for mild right maxillary sinus mucosal thickening. No acute process.       Current Meds:  Current Facility-Administered Medications   Medication Dose Route Frequency    gabapentin (NEURONTIN) capsule 300 mg  300 mg Oral TID    medicated lip ointment (BLISTEX)   Topical PRN    insulin glargine (LANTUS) injection vial 36 Units  36 Units SubCUTAneous Daily    traMADol (ULTRAM) tablet 50 mg  50 mg Oral Q6H    polyethylene glycol (GLYCOLAX) packet 17 g  17 g Oral BID    fluconazole (DIFLUCAN) tablet 200 mg  200 mg Oral Daily    aspirin EC tablet 81 mg  81 mg Oral Daily    vancomycin (VANCOCIN) 1500 mg in sodium chloride 0.9% 500 mL IVPB  1,500 mg IntraVENous Q24H    atorvastatin (LIPITOR) tablet 40 mg  40 mg Oral Nightly    docusate sodium (COLACE) capsule 100 mg  100 mg Oral BID    miconazole (MICOTIN) 2 % powder   Topical BID    oxyCODONE (ROXICODONE) immediate release tablet 10 mg  10 mg Oral Q4H PRN    sodium hypochlorite (DAKINS) 0.125 % external solution   Irrigation BID    methocarbamol (ROBAXIN) tablet 750 mg  750 mg Oral 4x Daily    HYDROmorphone HCl PF (DILAUDID) injection 0.5 mg  0.5 mg IntraVENous Q3H PRN    insulin lispro (HUMALOG) injection vial 4 Units  4 Units SubCUTAneous TID WC    glucose chewable tablet 16 g  4 tablet Oral PRN    dextrose bolus 10% 125 mL  125 mL IntraVENous PRN    Or    dextrose bolus 10% 250 mL  250 mL IntraVENous PRN    glucagon (rDNA) injection 1 mg  1 mg SubCUTAneous PRN    dextrose 10 % infusion   IntraVENous Continuous PRN    insulin lispro (HUMALOG) injection vial 0-8 Units  0-8 Units SubCUTAneous TID WC    insulin lispro (HUMALOG) injection vial 0-4 Units  0-4 Units SubCUTAneous Nightly    sodium chloride flush 0.9 % injection 5-40 mL  5-40 mL IntraVENous 2 times per day    sodium chloride flush 0.9 % injection 5-40 mL  5-40 mL IntraVENous PRN    0.9 % sodium chloride infusion   IntraVENous PRN    enoxaparin (LOVENOX) injection 40 mg  40 mg SubCUTAneous BID    ondansetron (ZOFRAN-ODT) disintegrating tablet 4 mg  4 mg Oral Q8H PRN    Or    ondansetron (ZOFRAN) injection 4 mg  4 mg IntraVENous Q6H PRN    polyethylene glycol (GLYCOLAX) packet 17 g  17 g Oral Daily PRN    acetaminophen (TYLENOL) tablet 650 mg  650 mg Oral Q6H PRN    Or    acetaminophen (TYLENOL) suppository 650 mg  650 mg Rectal Q6H PRN       Signed:  Evans Lara MD    Part of this note may have been written by using a voice dictation software. The note has been proof read but may still contain some grammatical/other typographical errors.

## 2022-10-16 NOTE — PROGRESS NOTES
Admit Date: 10/5/2022    POD 9 Days Post-Op    Procedure:  Procedure(s):  BACK ABSCESS I & D  INGUINAL AND PENILE DEBRIDEMENT    Subjective:     Pt alert in bed with no complaints or new issues. Pt is tolerating a Regular Diabetic diet. No nausea or vomiting. Pain currently controlled. Reports +flatus/+BM. AF. NAD. Objective:       Vitals:    10/16/22 0753 10/16/22 0857 10/16/22 0858 10/16/22 1139   BP: (!) 155/87   (!) 143/80   Pulse: 84   86   Resp:    Temp: 98.8 °F (37.1 °C)   98.8 °F (37.1 °C)   TempSrc: Oral   Oral   SpO2:    96%   Weight:       Height:           Temp (24hrs), Av °F (37.2 °C), Min:98.6 °F (37 °C), Max:99.5 °F (37.5 °C)  . I&O reviewed as documented. Jaimes 5375 ml clear yellow UOP past 24 h  +BM     Physical Exam  Constitutional:       General: He is not in acute distress. Appearance: He is obese. HENT:      Mouth/Throat:      Mouth: Mucous membranes are moist.   Cardiovascular:      Rate and Rhythm: Normal rate and regular rhythm. Pulses: Normal pulses. Heart sounds: Normal heart sounds. No murmur heard. No friction rub. No gallop. Pulmonary:      Effort: Pulmonary effort is normal. No respiratory distress. Breath sounds: Normal breath sounds. Abdominal:      General: Bowel sounds are normal. There is no distension. Palpations: Abdomen is soft. Genitourinary:     Comments: jaimes  Musculoskeletal:         General: No swelling. Normal range of motion. Cervical back: Normal range of motion. Skin:     General: Skin is warm and dry. Comments: Left groin with drsg c/d/I. Neurological:      General: No focal deficit present. Mental Status: He is alert and oriented to person, place, and time.    Psychiatric:         Mood and Affect: Mood normal.         Behavior: Behavior normal.        Labs:   Recent Results (from the past 24 hour(s))   POCT Glucose    Collection Time: 10/15/22  4:11 PM   Result Value Ref Range    POC Glucose 124 (H) 65 - 100 mg/dL    Performed by: Veronica    POCT Glucose    Collection Time: 10/15/22  8:36 PM   Result Value Ref Range    POC Glucose 86 65 - 100 mg/dL    Performed by: Theodore    POCT Glucose    Collection Time: 10/16/22  7:46 AM   Result Value Ref Range    POC Glucose 75 65 - 100 mg/dL    Performed by: DadaPCA    POCT Glucose    Collection Time: 10/16/22 11:41 AM   Result Value Ref Range    POC Glucose 122 (H) 65 - 100 mg/dL    Performed by: ChemaourtneyPCA           Assessment:     Principal Problem:    Sepsis ([address]  Active Problems:    Primary hypertension    DM2 (diabetes mellitus, type 2) (HCC)    Cellulitis and abscess of trunk    Abscess of groin, left    Penile abscess    Acute urinary retention  Resolved Problems:    * No resolved hospital problems. *        Plan/Recommendations/Medical Decision Making:     Continue present treatment   Wet to dry with dakins solution with dressing changes BID started 10/13/22  Perineal compresses QID for 2 days started 10/13/22  Nursing will change colace and miralax to prn when pt with regular bms. Increased activity with PT. Mesh underwear for activity and OOB since pt complains of pain when penis moves when repositioning. Wheel chair cushion ordered for up in chair. Supplies ordered per PT recommendation. Wbc normalized - 10.3 10/15/22  Tissue and wound culture positive MRSA-Pt on Vancomycin IV and Diflucan PO.      Nicole Duckworth, NP

## 2022-10-16 NOTE — PROGRESS NOTES
VANCO DAILY FOLLOW UP NOTE  [address] [facility] Pharmacokinetic Monitoring Service - Vancomycin    Consulting Provider: Dr. Karla Ardon   Indication: SSTI  Target Concentration: Goal trough of 10-15 mg/L and AUC/MADINA <500 mg*hr/L  Day of Therapy: 9  Additional Antimicrobials: none    Pertinent Laboratory Values: Wt Readings from Last 1 Encounters:   10/07/22 (!) 365 lb (165.6 kg)     Temp Readings from Last 1 Encounters:   10/16/22 98.6 °F (37 °C) (Oral)     Recent Labs     10/14/22  0635 10/15/22  0633   BUN 14 15   CREATININE 1.50 1.60*   WBC 11.5* 10.3     Estimated Creatinine Clearance: 82 mL/min (A) (based on SCr of 1.6 mg/dL (H)). Lab Results   Component Value Date/Time    VANCORANDOM 23.8 10/15/2022 06:33 AM     MRSA Nasal Swab: N/A. Non-respiratory infection.     Assessment:  Date/Time Dose Concentration AUC   10/10 0706 1250 mg q18h 21.6 513   10/12 0558 1250 mg q24h 23.8 452   Note: Serum concentrations collected for AUC dosing may appear elevated if collected in close proximity to the dose administered, this is not necessarily an indication of toxicity    Plan:  Current dosing regimen is therapeutic  Continue current dose  Repeat vancomycin concentrations will be ordered as clinically appropriate   Pharmacy will continue to monitor patient and adjust therapy as indicated    Thank you for the consult,  Elsy Dyson, [facility]

## 2022-10-17 LAB
ANION GAP SERPL CALC-SCNC: 5 MMOL/L (ref 2–11)
BACTERIA SPEC CULT: NORMAL
BACTERIA SPEC CULT: NORMAL
BASOPHILS # BLD: 0.1 K/UL (ref 0–0.2)
BASOPHILS NFR BLD: 1 % (ref 0–2)
BUN SERPL-MCNC: 12 MG/DL (ref 6–23)
CALCIUM SERPL-MCNC: 9.2 MG/DL (ref 8.3–10.4)
CHLORIDE SERPL-SCNC: 110 MMOL/L (ref 101–110)
CO2 SERPL-SCNC: 28 MMOL/L (ref 21–32)
CREAT SERPL-MCNC: 1.6 MG/DL (ref 0.8–1.5)
DIFFERENTIAL METHOD BLD: ABNORMAL
EOSINOPHIL # BLD: 0.3 K/UL (ref 0–0.8)
EOSINOPHIL NFR BLD: 3 % (ref 0.5–7.8)
ERYTHROCYTE [DISTWIDTH] IN BLOOD BY AUTOMATED COUNT: 14.4 % (ref 11.9–14.6)
GLUCOSE BLD STRIP.AUTO-MCNC: 104 MG/DL (ref 65–100)
GLUCOSE BLD STRIP.AUTO-MCNC: 122 MG/DL (ref 65–100)
GLUCOSE BLD STRIP.AUTO-MCNC: 143 MG/DL (ref 65–100)
GLUCOSE BLD STRIP.AUTO-MCNC: 76 MG/DL (ref 65–100)
GLUCOSE SERPL-MCNC: 76 MG/DL (ref 65–100)
HCT VFR BLD AUTO: 30.6 % (ref 41.1–50.3)
HGB BLD-MCNC: 9.1 G/DL (ref 13.6–17.2)
IMM GRANULOCYTES # BLD AUTO: 0 K/UL (ref 0–0.5)
IMM GRANULOCYTES NFR BLD AUTO: 0 % (ref 0–5)
LYMPHOCYTES # BLD: 2.5 K/UL (ref 0.5–4.6)
LYMPHOCYTES NFR BLD: 27 % (ref 13–44)
MCH RBC QN AUTO: 28.3 PG (ref 26.1–32.9)
MCHC RBC AUTO-ENTMCNC: 29.7 G/DL (ref 31.4–35)
MCV RBC AUTO: 95 FL (ref 82–102)
MONOCYTES # BLD: 0.5 K/UL (ref 0.1–1.3)
MONOCYTES NFR BLD: 6 % (ref 4–12)
NEUTS SEG # BLD: 5.8 K/UL (ref 1.7–8.2)
NEUTS SEG NFR BLD: 63 % (ref 43–78)
NRBC # BLD: 0 K/UL (ref 0–0.2)
PLATELET # BLD AUTO: 603 K/UL (ref 150–450)
PMV BLD AUTO: 9.9 FL (ref 9.4–12.3)
POTASSIUM SERPL-SCNC: 4.3 MMOL/L (ref 3.5–5.1)
RBC # BLD AUTO: 3.22 M/UL (ref 4.23–5.6)
SERVICE CMNT-IMP: ABNORMAL
SERVICE CMNT-IMP: NORMAL
SODIUM SERPL-SCNC: 143 MMOL/L (ref 133–143)
VANCOMYCIN SERPL-MCNC: 22.6 UG/ML
WBC # BLD AUTO: 9.3 K/UL (ref 4.3–11.1)

## 2022-10-17 PROCEDURE — 80202 ASSAY OF VANCOMYCIN: CPT

## 2022-10-17 PROCEDURE — 97530 THERAPEUTIC ACTIVITIES: CPT

## 2022-10-17 PROCEDURE — 80048 BASIC METABOLIC PNL TOTAL CA: CPT

## 2022-10-17 PROCEDURE — 82962 GLUCOSE BLOOD TEST: CPT

## 2022-10-17 PROCEDURE — 6370000000 HC RX 637 (ALT 250 FOR IP): Performed by: INTERNAL MEDICINE

## 2022-10-17 PROCEDURE — 1100000000 HC RM PRIVATE

## 2022-10-17 PROCEDURE — 6360000002 HC RX W HCPCS: Performed by: FAMILY MEDICINE

## 2022-10-17 PROCEDURE — 97605 NEG PRS WND THER DME<=50SQCM: CPT

## 2022-10-17 PROCEDURE — 6370000000 HC RX 637 (ALT 250 FOR IP): Performed by: FAMILY MEDICINE

## 2022-10-17 PROCEDURE — 36415 COLL VENOUS BLD VENIPUNCTURE: CPT

## 2022-10-17 PROCEDURE — 85025 COMPLETE CBC W/AUTO DIFF WBC: CPT

## 2022-10-17 PROCEDURE — 2580000003 HC RX 258: Performed by: FAMILY MEDICINE

## 2022-10-17 PROCEDURE — 97607 NEG PRS WND THR NDME<=50SQCM: CPT

## 2022-10-17 PROCEDURE — 2580000003 HC RX 258: Performed by: INTERNAL MEDICINE

## 2022-10-17 PROCEDURE — 99024 POSTOP FOLLOW-UP VISIT: CPT | Performed by: STUDENT IN AN ORGANIZED HEALTH CARE EDUCATION/TRAINING PROGRAM

## 2022-10-17 PROCEDURE — 6360000002 HC RX W HCPCS: Performed by: INTERNAL MEDICINE

## 2022-10-17 PROCEDURE — 6370000000 HC RX 637 (ALT 250 FOR IP): Performed by: STUDENT IN AN ORGANIZED HEALTH CARE EDUCATION/TRAINING PROGRAM

## 2022-10-17 PROCEDURE — 6370000000 HC RX 637 (ALT 250 FOR IP)

## 2022-10-17 RX ORDER — INSULIN GLARGINE 100 [IU]/ML
30 INJECTION, SOLUTION SUBCUTANEOUS DAILY
Status: DISCONTINUED | OUTPATIENT
Start: 2022-10-17 | End: 2022-10-18

## 2022-10-17 RX ORDER — HYDRALAZINE HYDROCHLORIDE 20 MG/ML
10 INJECTION INTRAMUSCULAR; INTRAVENOUS EVERY 6 HOURS PRN
Status: DISCONTINUED | OUTPATIENT
Start: 2022-10-17 | End: 2022-10-25 | Stop reason: HOSPADM

## 2022-10-17 RX ADMIN — TRAMADOL HYDROCHLORIDE 50 MG: 50 TABLET, COATED ORAL at 20:37

## 2022-10-17 RX ADMIN — DOCUSATE SODIUM 100 MG: 100 CAPSULE, LIQUID FILLED ORAL at 08:51

## 2022-10-17 RX ADMIN — FLUCONAZOLE 200 MG: 100 TABLET ORAL at 08:50

## 2022-10-17 RX ADMIN — METHOCARBAMOL TABLETS 750 MG: 750 TABLET, COATED ORAL at 08:51

## 2022-10-17 RX ADMIN — GABAPENTIN 300 MG: 300 CAPSULE ORAL at 20:37

## 2022-10-17 RX ADMIN — TRAMADOL HYDROCHLORIDE 50 MG: 50 TABLET, COATED ORAL at 02:08

## 2022-10-17 RX ADMIN — POLYETHYLENE GLYCOL 3350 17 G: 17 POWDER, FOR SOLUTION ORAL at 08:51

## 2022-10-17 RX ADMIN — DOCUSATE SODIUM 100 MG: 100 CAPSULE, LIQUID FILLED ORAL at 20:37

## 2022-10-17 RX ADMIN — ATORVASTATIN CALCIUM 40 MG: 40 TABLET, FILM COATED ORAL at 20:37

## 2022-10-17 RX ADMIN — TRAMADOL HYDROCHLORIDE 50 MG: 50 TABLET, COATED ORAL at 15:14

## 2022-10-17 RX ADMIN — METHOCARBAMOL TABLETS 750 MG: 750 TABLET, COATED ORAL at 13:05

## 2022-10-17 RX ADMIN — TRAMADOL HYDROCHLORIDE 50 MG: 50 TABLET, COATED ORAL at 08:50

## 2022-10-17 RX ADMIN — METHOCARBAMOL TABLETS 750 MG: 750 TABLET, COATED ORAL at 20:37

## 2022-10-17 RX ADMIN — HYDROMORPHONE HYDROCHLORIDE 0.5 MG: 1 INJECTION, SOLUTION INTRAMUSCULAR; INTRAVENOUS; SUBCUTANEOUS at 13:05

## 2022-10-17 RX ADMIN — HYDRALAZINE HYDROCHLORIDE 10 MG: 20 INJECTION INTRAMUSCULAR; INTRAVENOUS at 16:37

## 2022-10-17 RX ADMIN — GABAPENTIN 300 MG: 300 CAPSULE ORAL at 08:51

## 2022-10-17 RX ADMIN — ENOXAPARIN SODIUM 40 MG: 100 INJECTION SUBCUTANEOUS at 08:50

## 2022-10-17 RX ADMIN — ANTI-FUNGAL POWDER MICONAZOLE NITRATE TALC FREE: 1.42 POWDER TOPICAL at 08:55

## 2022-10-17 RX ADMIN — Medication 1500 MG: at 20:52

## 2022-10-17 RX ADMIN — SODIUM CHLORIDE, PRESERVATIVE FREE 10 ML: 5 INJECTION INTRAVENOUS at 08:56

## 2022-10-17 RX ADMIN — ASPIRIN 81 MG: 81 TABLET ORAL at 08:51

## 2022-10-17 RX ADMIN — GABAPENTIN 300 MG: 300 CAPSULE ORAL at 15:14

## 2022-10-17 RX ADMIN — OXYCODONE 10 MG: 5 TABLET ORAL at 05:48

## 2022-10-17 RX ADMIN — INSULIN GLARGINE 30 UNITS: 100 INJECTION, SOLUTION SUBCUTANEOUS at 09:42

## 2022-10-17 RX ADMIN — ENOXAPARIN SODIUM 40 MG: 100 INJECTION SUBCUTANEOUS at 20:39

## 2022-10-17 RX ADMIN — METHOCARBAMOL TABLETS 750 MG: 750 TABLET, COATED ORAL at 16:37

## 2022-10-17 RX ADMIN — OXYCODONE 10 MG: 5 TABLET ORAL at 23:27

## 2022-10-17 RX ADMIN — SODIUM CHLORIDE, PRESERVATIVE FREE 10 ML: 5 INJECTION INTRAVENOUS at 20:59

## 2022-10-17 RX ADMIN — Medication: at 08:55

## 2022-10-17 ASSESSMENT — PAIN SCALES - GENERAL
PAINLEVEL_OUTOF10: 9
PAINLEVEL_OUTOF10: 10
PAINLEVEL_OUTOF10: 9
PAINLEVEL_OUTOF10: 7

## 2022-10-17 ASSESSMENT — PAIN DESCRIPTION - LOCATION
LOCATION: GROIN;PENIS
LOCATION: BACK;GROIN
LOCATION: GROIN

## 2022-10-17 ASSESSMENT — PAIN DESCRIPTION - DESCRIPTORS
DESCRIPTORS: ACHING
DESCRIPTORS: ACHING
DESCRIPTORS: NUMBNESS;TINGLING
DESCRIPTORS: ACHING
DESCRIPTORS: STABBING

## 2022-10-17 ASSESSMENT — PAIN DESCRIPTION - ORIENTATION
ORIENTATION: LEFT;INNER;LOWER
ORIENTATION: ANTERIOR

## 2022-10-17 ASSESSMENT — PAIN DESCRIPTION - FREQUENCY: FREQUENCY: CONTINUOUS

## 2022-10-17 ASSESSMENT — PAIN DESCRIPTION - ONSET: ONSET: ON-GOING

## 2022-10-17 ASSESSMENT — PAIN - FUNCTIONAL ASSESSMENT: PAIN_FUNCTIONAL_ASSESSMENT: PREVENTS OR INTERFERES SOME ACTIVE ACTIVITIES AND ADLS

## 2022-10-17 ASSESSMENT — PAIN DESCRIPTION - PAIN TYPE: TYPE: SURGICAL PAIN

## 2022-10-17 NOTE — PROGRESS NOTES
1400   Status Draining;Patent 10/16/22 1400   Output (mL) 500 mL 10/17/22 0551               Flatus: Patient does have flatus present. Stool: 0 occurrences. Characteristics:           Stool Assessment  Last BM (including prior to admit): 10/13/22    Emesis:  occurrences. Characteristics:        VITAL SIGNS  Patient Vitals for the past 12 hrs:   Temp Pulse Resp BP SpO2   10/17/22 0208 98.3 °F (36.8 °C) 88 20 (!) 148/85 --   10/16/22 2229 98.8 °F (37.1 °C) 81 20 (!) 154/90 97 %   10/16/22 1955 100.2 °F (37.9 °C) 93 -- (!) 152/86 96 %   10/16/22 1928 99.7 °F (37.6 °C) 92 19 134/89 96 %       Pain Assessment  Pain Level: 7 (10/17/22 0645)  Pain Location: Groin, Penis  Patient's Stated Pain Goal: 0 - No pain    Ambulating  No    Shift report given to oncoming nurse at the bedside.     Sohan Mackey RN

## 2022-10-17 NOTE — PROGRESS NOTES
END OF SHIFT NOTE:    INTAKE/OUTPUT  10/16 0701 - 10/17 0700  In: 660 [P.O.:660]  Out: 3900 [Urine:3900]  Voiding: No  Catheter: Yes  Drain:   Negative Pressure Wound Therapy Back Left;Mid (Active)   $ Standard NPWT <=50 sq cm PER TX $ Yes 10/17/22 1529   Wound Type Surgical 10/17/22 1529   Unit Type kci ulta 10/17/22 1529   Dressing Type Black Foam 10/17/22 1529   Number of pieces used 1 10/17/22 1529   Number of pieces removed 1 10/17/22 1529   Cycle Continuous 10/17/22 1529   Target Pressure (mmHg) 125 10/17/22 1529   Canister changed? No 10/17/22 1529   Dressing Status New dressing applied 10/17/22 1529   Dressing Changed Changed/New 10/17/22 1529   Drainage Amount Moderate 10/17/22 1529   Drainage Description Serosanguinous 10/17/22 1529   Dressing Change Due 10/19/22 10/17/22 1529   Output (ml) 0 ml 10/17/22 1305   Wound Assessment Granulation tissue 10/17/22 1529   Nemo-wound Assessment Intact 10/17/22 1529   Shape oval 10/17/22 1529   Odor None 10/17/22 1529       Urinary Catheter 10/06/22 Robles (Active)   $ Urethral catheter insertion Inserted for procedure 10/10/22 0800   Catheter Indications Perioperative use for selected surgical procedures;Assist in healing of open sacral or perineal wounds (Stage III, IV, or unstageable documented by a provider or enterostomal therapy) and/or full thickness perineal and lower extremity burns in incontinent patients 10/17/22 1305   Site Assessment No urethral drainage 10/17/22 1305   Urine Color Yellow 10/17/22 1305   Urine Appearance Sediment 10/17/22 1305   Urine Odor Other (Comment) 10/07/22 1135   Collection Container Standard 10/17/22 1305   Securement Method Securing device (Describe) 10/17/22 1305   Catheter Care Completed Yes 10/17/22 1305   Catheter Best Practices  Drainage tube clipped to bed;Catheter secured to thigh; Tamper seal intact; Bag below bladder;Bag not on floor; Lack of dependent loop in tubing;Drainage bag less than half full 10/17/22 1305   Status Draining;Patent 10/17/22 1305   Output (mL) 600 mL 10/17/22 1644               Flatus: Patient does have flatus present. Stool:  occurrences. Characteristics:           Stool Assessment  Last BM (including prior to admit): 10/13/22    Emesis:  occurrences. Characteristics:        VITAL SIGNS  Patient Vitals for the past 12 hrs:   Temp Pulse Resp BP SpO2   10/17/22 1532 98.2 °F (36.8 °C) 87 19 (!) 144/93 95 %   10/17/22 1149 97.2 °F (36.2 °C) 86 19 (!) 158/93 94 %   10/17/22 0739 98.2 °F (36.8 °C) 80 16 (!) 148/87 94 %       Pain Assessment  Pain Level: 10 (10/17/22 0739)  Pain Location: Groin, Penis  Patient's Stated Pain Goal: 0 - No pain    Ambulating  Yes    Shift report given to oncoming nurse at the bedside.     Rafael Jarquin RN

## 2022-10-17 NOTE — PROGRESS NOTES
Hospitalist Progress Note   Admit Date:  10/5/2022  7:22 PM   Name:  Osmar Cadet   Age:  60 y.o. Sex:  male  :  1965   MRN:  [id number]   Room:      Reason(s) for Admission: Cellulitis and abscess of trunk [L03.319, L02.219]  Abscess [L02.91]  Hyperglycemia [R73.9]  Abscess, perineum [facility] Course & Interval History:   Mr. Jerrell Sheth is a nice 61 y/o male with a h/o DM2 and HTN who was admitted to our service on 10/5 with an abscess on his upper back for about 2 months. CT was done showing a paraspinal abscess w/o muscular involvement along with L inguinal inflammatory changes with possible early abscess of scrotum. Urology and General Surgery were consulted. He was started on antibiotics. Hyperglycemia, A1C >14, started on basal + bolus insulin. ID consulted, now on Zosyn. HIV neg, other ID orders pending. On 10/7 he underwent I&D of back abscess and L groin abscess. He later underwent debridement of penile abscess with Urology. Wound cultures from 10/5 I&D with staph aureus. Wound vac to back placed 10/10. Wound care recommend possible further debridement versus aggressive wound care. Surgery following patient and recommend continue wet-to-dry dressing twice daily, no surgical debridement at this time. ID recommend to continue IV vancomycin while inpatient. Also started on p.o. fluconazole for yeast coverage on . Subjective/24hr Events (10/17/22): Patient is seen at the bedside. Reports pain suboptimally controlled and he is not getting pain medicine on time. Sitting in chair and no other complaint. Wound care following for aggressive wound care. Pending final infectious disease recommendation. Assessment & Plan:     # Sepsis 2/2 abscess of upper back, L groin and penis due to MRSA  - Leukocytosis + tachycardia + abscesses on CT. Resolved. - S/p I&D of back and groin abscess, penile debridement on 10/7. 10/5 wound culture with MRSA.  - Wound vac kg).    Intake/Output Summary (Last 24 hours) at 10/17/2022 1015  Last data filed at 10/17/2022 0900  Gross per 24 hour   Intake 660 ml   Output 3625 ml   Net -2965 ml           Physical Exam:   Blood pressure (!) 148/87, pulse 80, temperature 98.2 °F (36.8 °C), temperature source Oral, resp. rate 16, height 6' 2\" (1.88 m), weight (!) 365 lb (165.6 kg), SpO2 94 %. General:    Well nourished. No overt distress. Morbidly obese. Head:  Normocephalic, atraumatic  Eyes:  Sclerae appear normal. Pupils equally round. ENT:  Nares appear normal, no drainage. Moist oral mucosa. Poor dentition. Neck:  No restricted ROM. Trachea midline. CV:   RRR. No m/r/g. No jugular venous distension. Lungs:   CTAB. No wheezing, rhonchi, or rales. Respirations even, unlabored. Abdomen: Bowel sounds present. Soft, nontender, nondistended. :  Robles in place. Extremities: No cyanosis or clubbing. No edema. Skin:     Perennial dressing dry intact, wound vac to mid-back. Neuro:  CN II-XII grossly intact. A&Ox3. Improved  strength R hand. Psych:  Normal mood and affect.       I have reviewed ordered lab tests and independently visualized imaging below:    Recent Labs:  Recent Results (from the past 48 hour(s))   POCT Glucose    Collection Time: 10/15/22 11:13 AM   Result Value Ref Range    POC Glucose 130 (H) 65 - 100 mg/dL    Performed by: [employer]    POCT Glucose    Collection Time: 10/15/22  4:11 PM   Result Value Ref Range    POC Glucose 124 (H) 65 - 100 mg/dL    Performed by: [employer]    POCT Glucose    Collection Time: 10/15/22  8:36 PM   Result Value Ref Range    POC Glucose 86 65 - 100 mg/dL    Performed by: Theodore    POCT Glucose    Collection Time: 10/16/22  7:46 AM   Result Value Ref Range    POC Glucose 75 65 - 100 mg/dL    Performed by: Ronda    POCT Glucose    Collection Time: 10/16/22 11:41 AM   Result Value Ref Range    POC Glucose 122 (H) 65 - 100 mg/dL    Performed by: [employer]    POCT Glucose    Collection Time: 10/16/22  4:43 PM   Result Value Ref Range    POC Glucose 123 (H) 65 - 100 mg/dL    Performed by: [employer]    POCT Glucose    Collection Time: 10/16/22  8:19 PM   Result Value Ref Range    POC Glucose 132 (H) 65 - 100 mg/dL    Performed by: Eliza    Vancomycin Level, Random    Collection Time: 10/17/22  6:54 AM   Result Value Ref Range    Vancomycin Rm 22.6 UG/ML   POCT Glucose    Collection Time: 10/17/22  7:41 AM   Result Value Ref Range    POC Glucose 76 65 - 100 mg/dL    Performed by: [employer]    CBC with Auto Differential    Collection Time: 10/17/22  8:00 AM   Result Value Ref Range    WBC 9.3 4.3 - 11.1 K/uL    RBC 3.22 (L) 4.23 - 5.6 M/uL    Hemoglobin 9.1 (L) 13.6 - 17.2 g/dL    Hematocrit 30.6 (L) 41.1 - 50.3 %    MCV 95.0 82 - 102 FL    MCH 28.3 26.1 - 32.9 PG    MCHC 29.7 (L) 31.4 - 35.0 g/dL    RDW 14.4 11.9 - 14.6 %    Platelets 995 (H) 436 - 450 K/uL    MPV 9.9 9.4 - 12.3 FL    nRBC 0.00 0.0 - 0.2 K/uL    Differential Type AUTOMATED      Seg Neutrophils 63 43 - 78 %    Lymphocytes 27 13 - 44 %    Monocytes 6 4.0 - 12.0 %    Eosinophils % 3 0.5 - 7.8 %    Basophils 1 0.0 - 2.0 %    Immature Granulocytes 0 0.0 - 5.0 %    Segs Absolute 5.8 1.7 - 8.2 K/UL    Absolute Lymph # 2.5 0.5 - 4.6 K/UL    Absolute Mono # 0.5 0.1 - 1.3 K/UL    Absolute Eos # 0.3 0.0 - 0.8 K/UL    Basophils Absolute 0.1 0.0 - 0.2 K/UL    Absolute Immature Granulocyte 0.0 0.0 - 0.5 K/UL   Basic Metabolic Panel w/ Reflex to MG    Collection Time: 10/17/22  8:00 AM   Result Value Ref Range    Sodium 143 133 - 143 mmol/L    Potassium 4.3 3.5 - 5.1 mmol/L    Chloride 110 101 - 110 mmol/L    CO2 28 21 - 32 mmol/L    Anion Gap 5 2 - 11 mmol/L    Glucose 76 65 - 100 mg/dL    BUN 12 6 - 23 MG/DL    Creatinine 1.60 (H) 0.8 - 1.5 MG/DL    Est, Glom Filt Rate 50 (L) >60 ml/min/1.73m2    Calcium 9.2 8.3 - 10.4 MG/DL         Other Studies:  CT HEAD WO CONTRAST    Result Date: 10/10/2022  EXAM: Noncontrast CT head. INDICATION: Visual disturbance. COMPARISON: None. TECHNIQUE: Noncontrast CT images of the head were obtained. Radiation dose reduction techniques were used for this study. Our CT scanners use one or all of the following:  Automated exposure control, adjustment of the mA or kV according to patient size, iterative reconstruction. FINDINGS: Brain volume is appropriate for age. No acute infarct, hemorrhage or mass is identified. There is no mass effect, midline shift or depressed fracture. The visualized paranasal sinuses and mastoid air cells are clear, except for mild right maxillary sinus mucosal thickening. No acute process.       Current Meds:  Current Facility-Administered Medications   Medication Dose Route Frequency    insulin glargine (LANTUS) injection vial 30 Units  30 Units SubCUTAneous Daily    gabapentin (NEURONTIN) capsule 300 mg  300 mg Oral TID    medicated lip ointment (BLISTEX)   Topical PRN    traMADol (ULTRAM) tablet 50 mg  50 mg Oral Q6H    polyethylene glycol (GLYCOLAX) packet 17 g  17 g Oral BID    fluconazole (DIFLUCAN) tablet 200 mg  200 mg Oral Daily    aspirin EC tablet 81 mg  81 mg Oral Daily    vancomycin (VANCOCIN) 1500 mg in sodium chloride 0.9% 500 mL IVPB  1,500 mg IntraVENous Q24H    atorvastatin (LIPITOR) tablet 40 mg  40 mg Oral Nightly    docusate sodium (COLACE) capsule 100 mg  100 mg Oral BID    miconazole (MICOTIN) 2 % powder   Topical BID    oxyCODONE (ROXICODONE) immediate release tablet 10 mg  10 mg Oral Q4H PRN    sodium hypochlorite (DAKINS) 0.125 % external solution   Irrigation BID    methocarbamol (ROBAXIN) tablet 750 mg  750 mg Oral 4x Daily    HYDROmorphone HCl PF (DILAUDID) injection 0.5 mg  0.5 mg IntraVENous Q3H PRN    [Held by provider] insulin lispro (HUMALOG) injection vial 4 Units  4 Units SubCUTAneous TID WC    glucose chewable tablet 16 g  4 tablet Oral PRN    dextrose bolus 10% 125 mL  125 mL IntraVENous PRN    Or    dextrose bolus 10% 250 mL  250 mL IntraVENous PRN    glucagon (rDNA) injection 1 mg  1 mg SubCUTAneous PRN    dextrose 10 % infusion   IntraVENous Continuous PRN    insulin lispro (HUMALOG) injection vial 0-8 Units  0-8 Units SubCUTAneous TID WC    insulin lispro (HUMALOG) injection vial 0-4 Units  0-4 Units SubCUTAneous Nightly    sodium chloride flush 0.9 % injection 5-40 mL  5-40 mL IntraVENous 2 times per day    sodium chloride flush 0.9 % injection 5-40 mL  5-40 mL IntraVENous PRN    0.9 % sodium chloride infusion   IntraVENous PRN    enoxaparin (LOVENOX) injection 40 mg  40 mg SubCUTAneous BID    ondansetron (ZOFRAN-ODT) disintegrating tablet 4 mg  4 mg Oral Q8H PRN    Or    ondansetron (ZOFRAN) injection 4 mg  4 mg IntraVENous Q6H PRN    polyethylene glycol (GLYCOLAX) packet 17 g  17 g Oral Daily PRN    acetaminophen (TYLENOL) tablet 650 mg  650 mg Oral Q6H PRN    Or    acetaminophen (TYLENOL) suppository 650 mg  650 mg Rectal Q6H PRN       Signed:  Toya Vernon MD    Part of this note may have been written by using a voice dictation software. The note has been proof read but may still contain some grammatical/other typographical errors.

## 2022-10-17 NOTE — CARE COORDINATION
LOS 12d    Chart reviewed by [facility] and discussed in IDR. Patient POD 10, s/p back abscess I&D, inguinal, penile debridement. Patient with wet to dry dressing changes BID, perineal compresses QID.

## 2022-10-17 NOTE — PROGRESS NOTES
VANCO DAILY FOLLOW UP NOTE  [facility] Pharmacokinetic Monitoring Service - Vancomycin    Consulting Provider: Dr. Noris Bentley   Indication: SSTI  Target Concentration: Goal trough of 10-15 mg/L and AUC/MADINA <500 mg*hr/L  Day of Therapy: 10  Additional Antimicrobials: none    Pertinent Laboratory Values: Wt Readings from Last 1 Encounters:   10/07/22 (!) 365 lb (165.6 kg)     Temp Readings from Last 1 Encounters:   10/17/22 98.2 °F (36.8 °C) (Oral)     Recent Labs     10/15/22  0633 10/17/22  0800   BUN 15 12   CREATININE 1.60* 1.60*   WBC 10.3 9.3     Estimated Creatinine Clearance: 82 mL/min (A) (based on SCr of 1.6 mg/dL (H)). Lab Results   Component Value Date/Time    VANCORANDOM 22.6 10/17/2022 06:54 AM     MRSA Nasal Swab: N/A. Non-respiratory infection.     Assessment:  Date/Time Dose Concentration AUC   10/10 0706 1250 mg q18h 21.6 513   10/12 0558 1250 mg q24h 23.8 452   10/17 0654 1500 mg q24h 22.6 513   Note: Serum concentrations collected for AUC dosing may appear elevated if collected in close proximity to the dose administered, this is not necessarily an indication of toxicity    Plan:  Current dosing regimen is slightly supra-therapeutic  Continue current dose   Repeat vancomycin concentrations will be ordered as clinically appropriate   Pharmacy will continue to monitor patient and adjust therapy as indicated    Thank you for the consult,  Tiera Linda, [facility]

## 2022-10-17 NOTE — PROGRESS NOTES
Admit Date: 10/5/2022    POD 10 Days Post-Op    Procedure:  Procedure(s):  BACK ABSCESS I & D  INGUINAL AND PENILE DEBRIDEMENT    Subjective:     Pt alert in bed with no complaints or new issues. Pt is tolerating a Regular Diabetic diet. No nausea or vomiting. Pain currently controlled. Reports +flatus/+BM. AF. NAD. Objective:       Vitals:    10/16/22 1955 10/16/22 2229 10/17/22 0208 10/17/22 0739   BP: (!) 152/86 (!) 154/90 (!) 148/85 (!) 148/87   Pulse: 93 81 88 80   Resp:   16   Temp: 100.2 °F (37.9 °C) 98.8 °F (37.1 °C) 98.3 °F (36.8 °C) 98.2 °F (36.8 °C)   TempSrc:   Oral Oral   SpO2: 96% 97%  94%   Weight:       Height:           Temp (24hrs), Av.9 °F (37.2 °C), Min:98.2 °F (36.8 °C), Max:100.2 °F (37.9 °C)  . I&O reviewed as documented. Jaimes 5375 ml clear yellow UOP past 24 h  +BM     Physical Exam  Constitutional:       General: He is not in acute distress. Appearance: He is obese. HENT:      Mouth/Throat:      Mouth: Mucous membranes are moist.   Cardiovascular:      Rate and Rhythm: Normal rate and regular rhythm. Pulses: Normal pulses. Heart sounds: Normal heart sounds. No murmur heard. No friction rub. No gallop. Pulmonary:      Effort: Pulmonary effort is normal. No respiratory distress. Breath sounds: Normal breath sounds. Abdominal:      General: Bowel sounds are normal. There is no distension. Palpations: Abdomen is soft. Genitourinary:     Comments: jaimes  Musculoskeletal:         General: No swelling. Normal range of motion. Cervical back: Normal range of motion. Skin:     General: Skin is warm and dry. Comments: Left groin with drsg c/d/I. Neurological:      General: No focal deficit present. Mental Status: He is alert and oriented to person, place, and time.    Psychiatric:         Mood and Affect: Mood normal.         Behavior: Behavior normal.        Labs:   Recent Results (from the past 24 hour(s))   POCT Glucose Collection Time: 10/16/22 11:41 AM   Result Value Ref Range    POC Glucose 122 (H) 65 - 100 mg/dL    Performed by: [employer]    POCT Glucose    Collection Time: 10/16/22  4:43 PM   Result Value Ref Range    POC Glucose 123 (H) 65 - 100 mg/dL    Performed by: [employer]CA    POCT Glucose    Collection Time: 10/16/22  8:19 PM   Result Value Ref Range    POC Glucose 132 (H) 65 - 100 mg/dL    Performed by: Eliza    Vancomycin Level, Random    Collection Time: 10/17/22  6:54 AM   Result Value Ref Range    Vancomycin Rm 22.6 UG/ML   POCT Glucose    Collection Time: 10/17/22  7:41 AM   Result Value Ref Range    POC Glucose 76 65 - 100 mg/dL    Performed by: [employer]    CBC with Auto Differential    Collection Time: 10/17/22  8:00 AM   Result Value Ref Range    WBC 9.3 4.3 - 11.1 K/uL    RBC 3.22 (L) 4.23 - 5.6 M/uL    Hemoglobin 9.1 (L) 13.6 - 17.2 g/dL    Hematocrit 30.6 (L) 41.1 - 50.3 %    MCV 95.0 82 - 102 FL    MCH 28.3 26.1 - 32.9 PG    MCHC 29.7 (L) 31.4 - 35.0 g/dL    RDW 14.4 11.9 - 14.6 %    Platelets 461 (H) 329 - 450 K/uL    MPV 9.9 9.4 - 12.3 FL    nRBC 0.00 0.0 - 0.2 K/uL    Differential Type AUTOMATED      Seg Neutrophils 63 43 - 78 %    Lymphocytes 27 13 - 44 %    Monocytes 6 4.0 - 12.0 %    Eosinophils % 3 0.5 - 7.8 %    Basophils 1 0.0 - 2.0 %    Immature Granulocytes 0 0.0 - 5.0 %    Segs Absolute 5.8 1.7 - 8.2 K/UL    Absolute Lymph # 2.5 0.5 - 4.6 K/UL    Absolute Mono # 0.5 0.1 - 1.3 K/UL    Absolute Eos # 0.3 0.0 - 0.8 K/UL    Basophils Absolute 0.1 0.0 - 0.2 K/UL    Absolute Immature Granulocyte 0.0 0.0 - 0.5 K/UL   Basic Metabolic Panel w/ Reflex to MG    Collection Time: 10/17/22  8:00 AM   Result Value Ref Range    Sodium 143 133 - 143 mmol/L    Potassium 4.3 3.5 - 5.1 mmol/L    Chloride 110 101 - 110 mmol/L    CO2 28 21 - 32 mmol/L    Anion Gap 5 2 - 11 mmol/L    Glucose 76 65 - 100 mg/dL    BUN 12 6 - 23 MG/DL    Creatinine 1.60 (H) 0.8 - 1.5 MG/DL    Est, Glom Filt Rate 50 (L) >60 ml/min/1.73m2    Calcium 9.2 8.3 - 10.4 MG/DL          Assessment:     Principal Problem:    Sepsis ([address]  Active Problems:    Primary hypertension    DM2 (diabetes mellitus, type 2) ([facility])    Cellulitis and abscess of trunk    Abscess of groin, left    Penile abscess    Acute urinary retention  Resolved Problems:    * No resolved hospital problems. *        Plan/Recommendations/Medical Decision Making:     Continue present treatment   Wet to dry with dakins solution with dressing changes BID started 10/13/22  Perineal compresses QID for 2 days started 10/13/22  Nursing will change colace and miralax to prn when pt with regular bms. Increased activity with PT. Mesh underwear for activity and OOB since pt complains of pain when penis moves when repositioning. Wheel chair cushion ordered for up in chair. Supplies ordered per PT recommendation.    Wbc remains normalized - 10.3 10/15/22  Tissue and wound culture positive MRSA-Pt on Vancomycin IV and [address], APRN - CNP

## 2022-10-17 NOTE — WOUND CARE
Left back wound improving, rash resolving, granular base, will continue wound vac dressings in hospital, however unable to get wound vac for home or home health per , will use wet to dry bid at discharge. Left groin wounds improving, changed by nursing, doubtful a wound vac seal would stay, using bid wet to dry dressing. The freda skin and buttocks are softening and improving. Left back wound wound vac dressing changed.

## 2022-10-17 NOTE — PROGRESS NOTES
Infectious Disease Progress Note    Today's Date: 10/17/2022   Admit Date: 10/5/2022    Impression:   MRSA soft tissue paraspinous abscess: [id number]  abscess abutting the left paraspinous musculatures, abundant inflammatory changes in left pubic region and a left 3.8cm pubic abscess. S/P I&D 10/7/22 with General Surgery and . Cx with MRSA, no bactermia  MRSA penile abscess / perineal soft tissue infection  S/p I&D 10/,wound vac, 10/7/22; Cx: MRSA  Visual disturbance / R arm weakness  Urinary retention  Poorly controlled DM;insulin started here and statin    Plan:   Continue IV vancomycin while inpatient, can transition to Bactrim or Doxy when ready for discharge. ID will continue to follow. Anti-infectives:   PO fluconazole 10/12 -  IV vancomycin 10/5-  Zosyn 10/5-    Subjective: Interval history: Up in chair, wound vac on. Patient is a 60 y.o. male with PMH of poorly controlled DM, who  has lived in the [address] [place] [address] since he was 1years old. He has lived in [place] and [place]. He moved to the [address] [place] [address] when he was 2 from [facility]. He is visiting [place], history of DM 2 HgbA1c of >14, reports that he is on metformin, admitted on 10/5/22 , with primary complaint of persistent draining back wound for about 2 months. S/p bedside I&D of groin abscess by urology on 10/06/22. A CT done showed a [id number]  abscess abutting the left paraspinous musculatures, abundant inflammatory changes in left pubic region and a left 3.8cm pubic abscess. Patient denies any trauma the area. He also has drainage from his left groin and shaft of his penis. No Known Allergies     Review of Systems:  A comprehensive review of systems is negative except as stated above.       Objective:     Visit Vitals  BP (!) 148/85   Pulse 88   Temp 98.3 °F (36.8 °C) (Oral)   Resp 20   Ht 6' 2\" (1.88 m)   Wt (!) 365 lb (165.6 kg)   SpO2 97%   BMI 46.86 kg/m²     Temp (24hrs), Av °F (37.2 °C), Min:98.3 °F (36.8 °C), Max:100.2 °F (37.9 °C) Patient was seen and examined on 10/17/2022 and physical exam remains unchanged since yesterday, unless noted below:    Lines:  Peripheral IV:       Physical Exam:    General:  In no acute distress   Eyes:  Non-icteric, PERRL   Mouth/Throat: No thrush, mucosa pink   Neck: Supple and symmetric   Lungs:   Breathing comfortably, breath sounds clear, no wheezing   CV:   S1/S2; no murmurs noted   Abdomen:   Non-distended, soft, BS normal   Extremities: Trace edema LE   Skin:  wound vac on back in place with seal intact, Left groin dressing intact   Lymph nodes:    Musculoskeletal: No swollen or inflamed joints   Lines/Devices: Robles   Psych: Alert and oriented       Data Review:     CBC:  Recent Labs     10/15/22  0633   WBC 10.3   HGB 8.6*   HCT 29.3*   *         BMP:  Recent Labs     10/15/22  0633   BUN 15      K 4.8   *   CO2 27         LFTS:  No results for input(s): ALT, TP, ALB in the last 72 hours. Invalid input(s): TBILI, SGOT, AP    Microbiology:   Reviewed  Susceptibility    Methicillin-Resistant Staphylococcus aureus (1)    Antibiotic Interpretation Microscan  Method Status    trimethoprim-sulfamethoxazole Sensitive <=0.5/9.5 ug/mL BACTERIAL SUSCEPTIBILITY PANEL MADINA Final    tetracycline Sensitive <=0.5 ug/mL BACTERIAL SUSCEPTIBILITY PANEL MADINA Final    clindamycin Sensitive <=0.5 ug/mL BACTERIAL SUSCEPTIBILITY PANEL MADINA Final    vancomycin Sensitive 1 ug/mL BACTERIAL SUSCEPTIBILITY PANEL MADINA Final    oxacillin Resistant >2 ug/mL BACTERIAL SUSCEPTIBILITY PANEL MADINA Final    rifampin Sensitive <=0.5 ug/mL BACTERIAL SUSCEPTIBILITY PANEL MADINA Final     Rifampin is not to be used for mono-therapy. Specimen Collected: 10/05/22 20:23 EDT Last Resulted: 10/08/22 10:53 EDT        Imaging:   10/10/22 CT head:     FINDINGS: Brain volume is appropriate for age. No acute infarct, hemorrhage or   mass is identified. There is no mass effect, midline shift or depressed   fracture.  The visualized paranasal sinuses and mastoid air cells are clear,   except for mild right maxillary sinus mucosal thickening.        Signed By: Fidel Roberts MD     October 17, 2022

## 2022-10-17 NOTE — PROGRESS NOTES
ACUTE PHYSICAL THERAPY GOALS:   (Developed with and agreed upon by patient and/or caregiver. )  LTG:  (1.)Mr. Colby Block will move from supine to sit and sit to supine , scoot up and down, and roll side to side in bed with STAND BY ASSIST within 7 treatment day(s). (2.)Mr. Colby Block will transfer from bed to chair and chair to bed with CONTACT GUARD ASSIST using the least restrictive device within 7 treatment day(s). (3.)Mr. Colby Block will ambulate with CONTACT GUARD ASSIST for 250+ feet with the least restrictive device within 7 treatment day(s). (4.)Mr. Colby Block will perform 4 stairs with HR and CGA within 7 treatment days for ascending and descending stairs for home. PHYSICAL THERAPY: Daily Note PM   (Link to Caseload Tracking: PT Visit Days : 3  Time In/Out PT Charge Capture  Rehab Caseload Tracker  Orders    Cole Gutierrez is a 60 y.o. male   PRIMARY DIAGNOSIS: Sepsis ([facility] [address]  Cellulitis and abscess of trunk [L03.319, L02.219]  Abscess [L02.91]  Hyperglycemia [R73.9]  Abscess, perineum [L02.215]  Procedure(s) (LRB):  BACK ABSCESS I & D (N/A)  INGUINAL AND PENILE DEBRIDEMENT (N/A)  10 Days Post-Op  Inpatient: Payor: /     ASSESSMENT:     REHAB RECOMMENDATIONS:   Recommendation to date pending progress:  Setting:  Home Health Therapy    Equipment:    To Be Determined     ASSESSMENT:  Mr. Colby Block is supine in bed and not wanting to participate but states that he would tomorrow but after a talk with the RN he changes him mind. He was able to don his underwear supine, got to the edge of the bed but needed to sit for a minute to get his head right. Sit to stand with contact guard assist to the walker. Gait training with rolling walker x 40 feet with slow and unsteady gait with min to mod assist to maintain safety. Patient required both verbal and tactile cues throughout. He is returned to supine in bed and positioned for comfort, needed cues to remove underwear.   Safety issues present, not sure if the patient want to improved because he states that he wanted to stay here because we do everything for him. Patient demonstrated progress with gait today. Continue PT efforts. HHPT at d/c     SUBJECTIVE:   Mr. 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   OBJECTIVE:     PAIN: Rebeka Love / O2: PRECAUTION / Marsha Garre / DRAINS:   Pre Treatment: 0/10         Post Treatment: 0/10 Vitals        Oxygen    Robles Catheter and Wound Vac    RESTRICTIONS/PRECAUTIONS:  Restrictions/Precautions  Restrictions/Precautions: Fall Risk  Restrictions/Precautions: Fall Risk     MOBILITY: I Mod I S SBA CGA Min Mod Max Total  NT x2 Comments:   Bed Mobility    Rolling [] [] [x] [] [] [] [] [] [] [] []    Supine to Sit [] [] [x] [] [] [] [] [] [] [] []    Scooting [] [] [x] [] [] [] [] [] [] [] []    Sit to Supine [] [] [x] [] [] [] [] [] [] [] []    Transfers    Sit to Stand [] [] [] [] [] [x] [] [] [] [] []    Bed to Chair [] [] [] [] [] [x] [] [] [] [] []    Stand to Sit [] [] [] [] [] [x] [] [] [] [] []     [] [] [] [] [] [] [] [] [] [] []    I=Independent, Mod I=Modified Independent, S=Supervision, SBA=Standby Assistance, CGA=Contact Guard Assistance,   Min=Minimal Assistance, Mod=Moderate Assistance, Max=Maximal Assistance, Total=Total Assistance, NT=Not Tested    BALANCE: Good Fair+ Fair Fair- Poor NT Comments   Sitting Static [x] [] [] [] [] []    Sitting Dynamic [x] [] [] [] [] []              Standing Static [] [] [] [] [x] []    Standing Dynamic [] [] [] [] [x] []      GAIT: I Mod I S SBA CGA Min Mod Max Total  NT x2 Comments:   Level of Assistance [] [] [] [] [] [x] [x] [] [] [] []    Distance  40 feet    DME Rolling Walker    Gait Quality Decreased miki , Decreased step clearance, Decreased step length, Trunk flexion, Trunk sway increased, and Wide base of support    Weightbearing Status      Stairs      I=Independent, Mod I=Modified Independent, S=Supervision, SBA=Standby Assistance, CGA=Contact Guard Assistance,   Min=Minimal Assistance, Mod=Moderate Assistance, Max=Maximal Assistance, Total=Total Assistance, NT=Not Tested    PLAN:   FREQUENCY AND DURATION: 3 times/week for duration of hospital stay or until stated goals are met, whichever comes first.    TREATMENT:   TREATMENT:   Therapeutic Activity (13 Minutes): Therapeutic activity included Rolling, Supine to Sit, Sit to Supine, Scooting, Transfer Training, Ambulation on level ground, Standing balance, and activities in standing to improve functional Activity tolerance, Balance, Mobility, and Strength.     TREATMENT GRID:  N/A    AFTER TREATMENT PRECAUTIONS: Bed, Bed/Chair Locked, Call light within reach, Needs within reach, and RN notified    INTERDISCIPLINARY COLLABORATION:  RN/ PCT and PT/ PTA    EDUCATION:  review POC and importance of mobility in the recovery process    TIME IN/OUT:  Time In: 1446  Time Out: 1055 [facility]  Minutes: 15    Sulma Segundo PTA

## 2022-10-17 NOTE — PROGRESS NOTES
.END OF SHIFT NOTE:    INTAKE/OUTPUT  10/15 0701 - 10/16 0700  In: 4848 [P.O.:1545]  Out: 7660 [Urine:5375]  Voiding: No  Catheter: Yes  Drain:   Negative Pressure Wound Therapy Back Left;Mid (Active)   $ Standard NPWT <=50 sq cm PER TX $ Yes 10/14/22 1230   Wound Type Surgical 10/15/22 1928   Unit Type kci ulta 10/15/22 1928   Dressing Type Black Foam 10/15/22 1928   Number of pieces used 1 10/14/22 1230   Number of pieces removed 1 10/14/22 1230   Cycle Continuous 10/13/22 1442   Target Pressure (mmHg) 125 10/15/22 1928   Canister changed? No 10/13/22 1442   Dressing Status Clean, dry & intact 10/15/22 0710   Dressing Changed Changed/New 10/14/22 1230   Drainage Amount Small 10/14/22 1230   Drainage Description Serosanguinous 10/15/22 0710   Dressing Change Due 10/17/22 10/14/22 1230   Output (ml) 0 ml 10/16/22 1400   Wound Assessment Granulation tissue 10/14/22 1230   Nemo-wound Assessment Blanchable erythema 10/14/22 1230   Shape irregular 10/14/22 1230   Odor None 10/14/22 1230       Urinary Catheter 10/06/22 Robles (Active)   $ Urethral catheter insertion Inserted for procedure 10/10/22 0800   Catheter Indications Perioperative use for selected surgical procedures;Assist in healing of open sacral or perineal wounds (Stage III, IV, or unstageable documented by a provider or enterostomal therapy) and/or full thickness perineal and lower extremity burns in incontinent patients 10/16/22 1400   Site Assessment No urethral drainage 10/16/22 1400   Urine Color Jinny 10/16/22 1400   Urine Appearance Sediment 10/16/22 1400   Urine Odor Other (Comment) 10/07/22 1135   Collection Container Standard 10/16/22 1400   Securement Method Securing device (Describe) 10/16/22 1400   Catheter Care Completed Yes 10/16/22 1400   Catheter Best Practices  Drainage tube clipped to bed;Catheter secured to thigh; Tamper seal intact; Bag below bladder;Bag not on floor; Lack of dependent loop in tubing;Drainage bag less than half full 10/16/22 1400   Status Draining;Patent 10/16/22 1400   Output (mL) 300 mL 10/16/22 1648               Flatus: Patient does not have flatus present. Stool:  occurrences. Characteristics:           Stool Assessment  Last BM (including prior to admit): 10/13/22    Emesis:  occurrences. Characteristics:        VITAL SIGNS  Patient Vitals for the past 12 hrs:   Temp Pulse Resp BP SpO2   10/16/22 1955 100.2 °F (37.9 °C) 93 -- (!) 152/86 96 %   10/16/22 1928 99.7 °F (37.6 °C) 92 19 134/89 96 %   10/16/22 1524 98.6 °F (37 °C) 85 18 (!) 140/83 94 %   10/16/22 1434 -- -- 18 -- --   10/16/22 1400 -- -- 18 -- --   10/16/22 1330 -- -- 18 -- --   10/16/22 1139 98.8 °F (37.1 °C) 86 17 (!) 143/80 96 %   10/16/22 0858 -- -- 19 -- --   10/16/22 0857 -- -- 19 -- --       Pain Assessment  Pain Level: 10 (10/16/22 1945)  Pain Location: Groin, Penis  Patient's Stated Pain Goal: 0 - No pain    Ambulating  Yes; up to chair for dinner. Shift report given to oncoming nurse at the bedside.     Judy Khan RN

## 2022-10-17 NOTE — DIABETES MGMT
Patient admitted with sepsis. Blood glucose ranged  yesterday with patient receiving Lantus 36 units. Blood glucose this morning was 76. Creatinine 1.60. GFR 50. Reviewed patient current regimen: Lantus 36 units dailly, Humalog 4 units with meals, and Humalog correctional insulin. Noted patient did not receive any prandial insulin yesterday. Patient would likely benefit from a decrease in basal insulin to reduce risk of morning hypoglycemia. Patient would also likely benefit from holding prandial insulin and re-assessment of daytime insulin needs to reduce risk of hypoglycemia. Provider updated via Table8 regarding recommendations and patient glycemic control. New orders received to decrease Lantus to 30 units daily and hold prandial insulin.

## 2022-10-18 ENCOUNTER — APPOINTMENT (OUTPATIENT)
Dept: CT IMAGING | Age: 57
DRG: 854 | End: 2022-10-18

## 2022-10-18 PROBLEM — R29.898 BILATERAL ARM WEAKNESS: Status: ACTIVE | Noted: 2022-10-18

## 2022-10-18 PROBLEM — G56.21 ULNAR NEUROPATHY AT ELBOW OF RIGHT UPPER EXTREMITY: Status: ACTIVE | Noted: 2022-10-18

## 2022-10-18 LAB
ANION GAP SERPL CALC-SCNC: 6 MMOL/L (ref 2–11)
BASOPHILS # BLD: 0.1 K/UL (ref 0–0.2)
BASOPHILS NFR BLD: 1 % (ref 0–2)
BUN SERPL-MCNC: 11 MG/DL (ref 6–23)
CALCIUM SERPL-MCNC: 8.7 MG/DL (ref 8.3–10.4)
CHLORIDE SERPL-SCNC: 110 MMOL/L (ref 101–110)
CO2 SERPL-SCNC: 27 MMOL/L (ref 21–32)
CREAT SERPL-MCNC: 1.5 MG/DL (ref 0.8–1.5)
DIFFERENTIAL METHOD BLD: ABNORMAL
EOSINOPHIL # BLD: 0.3 K/UL (ref 0–0.8)
EOSINOPHIL NFR BLD: 3 % (ref 0.5–7.8)
ERYTHROCYTE [DISTWIDTH] IN BLOOD BY AUTOMATED COUNT: 14.6 % (ref 11.9–14.6)
GLUCOSE BLD STRIP.AUTO-MCNC: 106 MG/DL (ref 65–100)
GLUCOSE BLD STRIP.AUTO-MCNC: 111 MG/DL (ref 65–100)
GLUCOSE BLD STRIP.AUTO-MCNC: 113 MG/DL (ref 65–100)
GLUCOSE BLD STRIP.AUTO-MCNC: 79 MG/DL (ref 65–100)
GLUCOSE SERPL-MCNC: 72 MG/DL (ref 65–100)
HCT VFR BLD AUTO: 30.9 % (ref 41.1–50.3)
HGB BLD-MCNC: 9 G/DL (ref 13.6–17.2)
IMM GRANULOCYTES # BLD AUTO: 0 K/UL (ref 0–0.5)
IMM GRANULOCYTES NFR BLD AUTO: 0 % (ref 0–5)
LYMPHOCYTES # BLD: 2.4 K/UL (ref 0.5–4.6)
LYMPHOCYTES NFR BLD: 30 % (ref 13–44)
MCH RBC QN AUTO: 28.1 PG (ref 26.1–32.9)
MCHC RBC AUTO-ENTMCNC: 29.1 G/DL (ref 31.4–35)
MCV RBC AUTO: 96.6 FL (ref 82–102)
MONOCYTES # BLD: 0.6 K/UL (ref 0.1–1.3)
MONOCYTES NFR BLD: 7 % (ref 4–12)
NEUTS SEG # BLD: 4.8 K/UL (ref 1.7–8.2)
NEUTS SEG NFR BLD: 59 % (ref 43–78)
NRBC # BLD: 0 K/UL (ref 0–0.2)
PLATELET # BLD AUTO: 580 K/UL (ref 150–450)
PMV BLD AUTO: 10 FL (ref 9.4–12.3)
POTASSIUM SERPL-SCNC: 4.2 MMOL/L (ref 3.5–5.1)
RBC # BLD AUTO: 3.2 M/UL (ref 4.23–5.6)
SERVICE CMNT-IMP: ABNORMAL
SERVICE CMNT-IMP: NORMAL
SODIUM SERPL-SCNC: 143 MMOL/L (ref 133–143)
WBC # BLD AUTO: 8.1 K/UL (ref 4.3–11.1)

## 2022-10-18 PROCEDURE — 6370000000 HC RX 637 (ALT 250 FOR IP): Performed by: STUDENT IN AN ORGANIZED HEALTH CARE EDUCATION/TRAINING PROGRAM

## 2022-10-18 PROCEDURE — 82962 GLUCOSE BLOOD TEST: CPT

## 2022-10-18 PROCEDURE — 2580000003 HC RX 258: Performed by: FAMILY MEDICINE

## 2022-10-18 PROCEDURE — 6370000000 HC RX 637 (ALT 250 FOR IP): Performed by: INTERNAL MEDICINE

## 2022-10-18 PROCEDURE — 6360000002 HC RX W HCPCS: Performed by: FAMILY MEDICINE

## 2022-10-18 PROCEDURE — 1100000000 HC RM PRIVATE

## 2022-10-18 PROCEDURE — 6370000000 HC RX 637 (ALT 250 FOR IP)

## 2022-10-18 PROCEDURE — APPNB30 APP NON BILLABLE TIME 0-30 MINS: Performed by: NURSE PRACTITIONER

## 2022-10-18 PROCEDURE — 80048 BASIC METABOLIC PNL TOTAL CA: CPT

## 2022-10-18 PROCEDURE — 97530 THERAPEUTIC ACTIVITIES: CPT

## 2022-10-18 PROCEDURE — 2580000003 HC RX 258: Performed by: INTERNAL MEDICINE

## 2022-10-18 PROCEDURE — 72125 CT NECK SPINE W/O DYE: CPT

## 2022-10-18 PROCEDURE — 85025 COMPLETE CBC W/AUTO DIFF WBC: CPT

## 2022-10-18 PROCEDURE — 6370000000 HC RX 637 (ALT 250 FOR IP): Performed by: FAMILY MEDICINE

## 2022-10-18 PROCEDURE — 97116 GAIT TRAINING THERAPY: CPT

## 2022-10-18 PROCEDURE — 6360000002 HC RX W HCPCS: Performed by: INTERNAL MEDICINE

## 2022-10-18 PROCEDURE — 99222 1ST HOSP IP/OBS MODERATE 55: CPT | Performed by: PSYCHIATRY & NEUROLOGY

## 2022-10-18 PROCEDURE — 97535 SELF CARE MNGMENT TRAINING: CPT

## 2022-10-18 PROCEDURE — 36415 COLL VENOUS BLD VENIPUNCTURE: CPT

## 2022-10-18 RX ORDER — ENOXAPARIN SODIUM 100 MG/ML
30 INJECTION SUBCUTANEOUS 2 TIMES DAILY
Status: DISCONTINUED | OUTPATIENT
Start: 2022-10-18 | End: 2022-10-25 | Stop reason: HOSPADM

## 2022-10-18 RX ORDER — INSULIN GLARGINE 100 [IU]/ML
20 INJECTION, SOLUTION SUBCUTANEOUS DAILY
Status: DISCONTINUED | OUTPATIENT
Start: 2022-10-18 | End: 2022-10-19

## 2022-10-18 RX ADMIN — DOCUSATE SODIUM 100 MG: 100 CAPSULE, LIQUID FILLED ORAL at 09:04

## 2022-10-18 RX ADMIN — ENOXAPARIN SODIUM 40 MG: 100 INJECTION SUBCUTANEOUS at 09:03

## 2022-10-18 RX ADMIN — ENOXAPARIN SODIUM 30 MG: 100 INJECTION SUBCUTANEOUS at 21:36

## 2022-10-18 RX ADMIN — ANTI-FUNGAL POWDER MICONAZOLE NITRATE TALC FREE: 1.42 POWDER TOPICAL at 09:04

## 2022-10-18 RX ADMIN — OXYCODONE 10 MG: 5 TABLET ORAL at 23:14

## 2022-10-18 RX ADMIN — TRAMADOL HYDROCHLORIDE 50 MG: 50 TABLET, COATED ORAL at 04:59

## 2022-10-18 RX ADMIN — METHOCARBAMOL TABLETS 750 MG: 750 TABLET, COATED ORAL at 17:13

## 2022-10-18 RX ADMIN — METHOCARBAMOL TABLETS 750 MG: 750 TABLET, COATED ORAL at 09:03

## 2022-10-18 RX ADMIN — SODIUM CHLORIDE, PRESERVATIVE FREE 10 ML: 5 INJECTION INTRAVENOUS at 09:05

## 2022-10-18 RX ADMIN — Medication 1500 MG: at 19:59

## 2022-10-18 RX ADMIN — ASPIRIN 81 MG: 81 TABLET ORAL at 09:03

## 2022-10-18 RX ADMIN — ATORVASTATIN CALCIUM 40 MG: 40 TABLET, FILM COATED ORAL at 19:55

## 2022-10-18 RX ADMIN — OXYCODONE 10 MG: 5 TABLET ORAL at 10:25

## 2022-10-18 RX ADMIN — Medication: at 00:04

## 2022-10-18 RX ADMIN — TRAMADOL HYDROCHLORIDE 50 MG: 50 TABLET, COATED ORAL at 14:27

## 2022-10-18 RX ADMIN — ANTI-FUNGAL POWDER MICONAZOLE NITRATE TALC FREE: 1.42 POWDER TOPICAL at 23:16

## 2022-10-18 RX ADMIN — GABAPENTIN 300 MG: 300 CAPSULE ORAL at 19:55

## 2022-10-18 RX ADMIN — Medication: at 23:16

## 2022-10-18 RX ADMIN — SODIUM CHLORIDE, PRESERVATIVE FREE 10 ML: 5 INJECTION INTRAVENOUS at 20:02

## 2022-10-18 RX ADMIN — TRAMADOL HYDROCHLORIDE 50 MG: 50 TABLET, COATED ORAL at 09:03

## 2022-10-18 RX ADMIN — ANTI-FUNGAL POWDER MICONAZOLE NITRATE TALC FREE: 1.42 POWDER TOPICAL at 00:04

## 2022-10-18 RX ADMIN — DOCUSATE SODIUM 100 MG: 100 CAPSULE, LIQUID FILLED ORAL at 19:55

## 2022-10-18 RX ADMIN — GABAPENTIN 300 MG: 300 CAPSULE ORAL at 09:03

## 2022-10-18 RX ADMIN — METHOCARBAMOL TABLETS 750 MG: 750 TABLET, COATED ORAL at 13:07

## 2022-10-18 RX ADMIN — Medication: at 09:05

## 2022-10-18 RX ADMIN — FLUCONAZOLE 200 MG: 100 TABLET ORAL at 09:04

## 2022-10-18 RX ADMIN — TRAMADOL HYDROCHLORIDE 50 MG: 50 TABLET, COATED ORAL at 19:55

## 2022-10-18 RX ADMIN — GABAPENTIN 300 MG: 300 CAPSULE ORAL at 14:27

## 2022-10-18 RX ADMIN — METHOCARBAMOL TABLETS 750 MG: 750 TABLET, COATED ORAL at 19:55

## 2022-10-18 RX ADMIN — POLYETHYLENE GLYCOL 3350 17 G: 17 POWDER, FOR SOLUTION ORAL at 09:04

## 2022-10-18 RX ADMIN — INSULIN GLARGINE 20 UNITS: 100 INJECTION, SOLUTION SUBCUTANEOUS at 09:06

## 2022-10-18 ASSESSMENT — PAIN DESCRIPTION - LOCATION
LOCATION: GROIN
LOCATION: GROIN
LOCATION: GROIN;PENIS

## 2022-10-18 ASSESSMENT — PAIN SCALES - GENERAL
PAINLEVEL_OUTOF10: 10
PAINLEVEL_OUTOF10: 3
PAINLEVEL_OUTOF10: 10

## 2022-10-18 ASSESSMENT — PAIN DESCRIPTION - DESCRIPTORS
DESCRIPTORS: SORE
DESCRIPTORS: ACHING
DESCRIPTORS: ACHING

## 2022-10-18 ASSESSMENT — PAIN DESCRIPTION - ORIENTATION
ORIENTATION: LEFT;INNER
ORIENTATION: LEFT

## 2022-10-18 ASSESSMENT — PAIN DESCRIPTION - FREQUENCY: FREQUENCY: CONTINUOUS

## 2022-10-18 ASSESSMENT — PAIN DESCRIPTION - ONSET: ONSET: ON-GOING

## 2022-10-18 ASSESSMENT — PAIN DESCRIPTION - PAIN TYPE: TYPE: SURGICAL PAIN

## 2022-10-18 NOTE — PROGRESS NOTES
Hospitalist Progress Note   Admit Date:  10/5/2022  7:22 PM   Name:  Lamin Barrett   Age:  60 y.o. Sex:  male  :  1965   MRN:  [id number]   Room:      Reason(s) for Admission: Cellulitis and abscess of trunk [L03.319, L02.219]  Abscess [L02.91]  Hyperglycemia [R73.9]  Abscess, perineum [facility] Course & Interval History:   Mr. Rimma Mendoza is a nice 59 y/o male with a h/o DM2 and HTN who was admitted to our service on 10/5 with an abscess on his upper back for about 2 months. CT was done showing a paraspinal abscess w/o muscular involvement along with L inguinal inflammatory changes with possible early abscess of scrotum. Urology and General Surgery were consulted. He was started on antibiotics. Hyperglycemia, A1C >14, started on basal + bolus insulin. ID consulted, now on Zosyn. HIV neg, other ID orders pending. On 10/7 he underwent I&D of back abscess and L groin abscess. He later underwent debridement of penile abscess with Urology. Wound cultures from 10/5 I&D with staph aureus. Wound vac to back placed 10/10. Wound care recommend possible further debridement versus aggressive wound care. Surgery following patient and recommend continue wet-to-dry dressing twice daily, no surgical debridement at this time. ID recommend to continue IV vancomycin while inpatient. Also started on p.o. fluconazole for yeast coverage on . Subjective/24hr Events (10/18/22): Patient is seen at the bedside. Reports feeling better today. Pain better controlled today. He needs to remain inpatient due to aggressive wound care. Patient complaining of bilateral decreased lower upper extremity strength, also complaining of numbness and tingling. Reports his  is very weak. Will order CT cervical spine. Also will consult neurology. Assessment & Plan:     # Sepsis 2/2 abscess of upper back, L groin and penis due to MRSA  - Leukocytosis + tachycardia + abscesses on CT. Resolved.   - S/p I&D of back and groin abscess, penile debridement on 10/7. 10/5 wound culture with MRSA. - Wound vac placed 10/10.  - Con't vancomycin. Appreciate ID input.  - Wound care  - Wound VAC removed and surgery recommended wet-to-dry dressing for perianal area. Recommend to continue wound VAC to back while inpatient  - Started pt on tramadol every 6 hours and continue as needed pain medicine  - Bowel Regimen    # Bilateral upper extremity weakness, more on right side  - CT head negative for acute pathology  - MRI brain negative for acute pathology  - CT cervical spine with and without contrast  - Neurology consult  - PT/OT     # Acute urinary retention  - Likely 2/2 penile abscess and edema. Con't Robles. # Uncontrolled DM2  - Decrease Lantus to 20 unit daily, and hold Premeal insulin to avoid hypoglycemia, continue sliding scale  - LDL 88, con't statin  - Stop glipizide at discharge. # HTN  - Controlled off meds. Discharge Planning: Pending, CM involved. Diet:  ADULT DIET;  Regular; 3 carb choices (45 gm/meal)  DVT PPx: Lovenox  Code status: Full Code    Hospital Problems             Last Modified POA    * (Principal) Sepsis ([facility] [address] 10/8/2022 Yes    Primary hypertension 10/6/2022 Yes    DM2 (diabetes mellitus, type 2) ([facility] [address] 10/6/2022 Yes    Cellulitis and abscess of trunk 10/7/2022 Yes    Abscess of groin, left 10/8/2022 Yes    Penile abscess 10/8/2022 Yes    Acute urinary retention 10/8/2022 Yes    Objective:   Patient Vitals for the past 24 hrs:   Temp Pulse Resp BP SpO2   10/18/22 1100 98.7 °F (37.1 °C) 88 19 (!) 135/90 --   10/18/22 0750 97.9 °F (36.6 °C) 78 17 (!) 151/87 98 %   10/18/22 0356 98.4 °F (36.9 °C) 79 20 (!) 143/93 96 %   10/18/22 0050 98.5 °F (36.9 °C) 80 16 (!) 158/89 --   10/18/22 0006 98.4 °F (36.9 °C) 89 20 (!) 155/91 90 %   10/17/22 1928 98.2 °F (36.8 °C) 91 20 (!) 145/85 96 %   10/17/22 1532 98.2 °F (36.8 °C) 87 19 (!) 144/93 95 %         Estimated body mass index is 39.96 kg/m² as calculated from the following:    Height as of this encounter: 6' 2\" (1.88 m). Weight as of this encounter: 311 lb 3.2 oz (141.2 kg). Intake/Output Summary (Last 24 hours) at 10/18/2022 1317  Last data filed at 10/18/2022 1144  Gross per 24 hour   Intake 1638.3 ml   Output 4930 ml   Net -3291.7 ml           Physical Exam:   Blood pressure (!) 135/90, pulse 88, temperature 98.7 °F (37.1 °C), temperature source Oral, resp. rate 19, height 6' 2\" (1.88 m), weight (!) 311 lb 3.2 oz (141.2 kg), SpO2 98 %. General:    Well nourished. No overt distress. Morbidly obese. Head:  Normocephalic, atraumatic  Eyes:  Sclerae appear normal. Pupils equally round. ENT:  Nares appear normal, no drainage. Moist oral mucosa. Poor dentition. Neck:  No restricted ROM. Trachea midline. CV:   RRR. No m/r/g. No jugular venous distension. Lungs:   CTAB. No wheezing, rhonchi, or rales. Respirations even, unlabored. Abdomen: Bowel sounds present. Soft, nontender, nondistended. :  Robles in place. Extremities: No cyanosis or clubbing. No edema. Skin:     Perennial dressing dry intact, wound vac to mid-back. Neuro:  CN II-XII grossly intact. Decrease upper extremity strength, weak   Psych:  Normal mood and affect.       I have reviewed ordered lab tests and independently visualized imaging below:    Recent Labs:  Recent Results (from the past 48 hour(s))   POCT Glucose    Collection Time: 10/16/22  4:43 PM   Result Value Ref Range    POC Glucose 123 (H) 65 - 100 mg/dL    Performed by: [email or web address]    POCT Glucose    Collection Time: 10/16/22  8:19 PM   Result Value Ref Range    POC Glucose 132 (H) 65 - 100 mg/dL    Performed by: Eliza    Vancomycin Level, Random    Collection Time: 10/17/22  6:54 AM   Result Value Ref Range    Vancomycin Rm 22.6 UG/ML   POCT Glucose    Collection Time: 10/17/22  7:41 AM   Result Value Ref Range    POC Glucose 76 65 - 100 mg/dL    Performed by: LeenasCourtneyPCA    CBC with Auto Differential    Collection Time: 10/17/22  8:00 AM   Result Value Ref Range    WBC 9.3 4.3 - 11.1 K/uL    RBC 3.22 (L) 4.23 - 5.6 M/uL    Hemoglobin 9.1 (L) 13.6 - 17.2 g/dL    Hematocrit 30.6 (L) 41.1 - 50.3 %    MCV 95.0 82 - 102 FL    MCH 28.3 26.1 - 32.9 PG    MCHC 29.7 (L) 31.4 - 35.0 g/dL    RDW 14.4 11.9 - 14.6 %    Platelets 642 (H) 947 - 450 K/uL    MPV 9.9 9.4 - 12.3 FL    nRBC 0.00 0.0 - 0.2 K/uL    Differential Type AUTOMATED      Seg Neutrophils 63 43 - 78 %    Lymphocytes 27 13 - 44 %    Monocytes 6 4.0 - 12.0 %    Eosinophils % 3 0.5 - 7.8 %    Basophils 1 0.0 - 2.0 %    Immature Granulocytes 0 0.0 - 5.0 %    Segs Absolute 5.8 1.7 - 8.2 K/UL    Absolute Lymph # 2.5 0.5 - 4.6 K/UL    Absolute Mono # 0.5 0.1 - 1.3 K/UL    Absolute Eos # 0.3 0.0 - 0.8 K/UL    Basophils Absolute 0.1 0.0 - 0.2 K/UL    Absolute Immature Granulocyte 0.0 0.0 - 0.5 K/UL   Basic Metabolic Panel w/ Reflex to MG    Collection Time: 10/17/22  8:00 AM   Result Value Ref Range    Sodium 143 133 - 143 mmol/L    Potassium 4.3 3.5 - 5.1 mmol/L    Chloride 110 101 - 110 mmol/L    CO2 28 21 - 32 mmol/L    Anion Gap 5 2 - 11 mmol/L    Glucose 76 65 - 100 mg/dL    BUN 12 6 - 23 MG/DL    Creatinine 1.60 (H) 0.8 - 1.5 MG/DL    Est, Glom Filt Rate 50 (L) >60 ml/min/1.73m2    Calcium 9.2 8.3 - 10.4 MG/DL   POCT Glucose    Collection Time: 10/17/22 11:50 AM   Result Value Ref Range    POC Glucose 143 (H) 65 - 100 mg/dL    Performed by: AzuroneyPCA    POCT Glucose    Collection Time: 10/17/22  4:44 PM   Result Value Ref Range    POC Glucose 104 (H) 65 - 100 mg/dL    Performed by: DadaPCLUIS ALBERTO    POCT Glucose    Collection Time: 10/17/22  8:51 PM   Result Value Ref Range    POC Glucose 122 (H) 65 - 100 mg/dL    Performed by:  Nguyễn    CBC with Auto Differential    Collection Time: 10/18/22  6:00 AM   Result Value Ref Range    WBC 8.1 4.3 - 11.1 K/uL    RBC 3.20 (L) 4.23 - 5.6 M/uL    Hemoglobin 9.0 (L) 13.6 - 17.2 g/dL Hematocrit 30.9 (L) 41.1 - 50.3 %    MCV 96.6 82 - 102 FL    MCH 28.1 26.1 - 32.9 PG    MCHC 29.1 (L) 31.4 - 35.0 g/dL    RDW 14.6 11.9 - 14.6 %    Platelets 190 (H) 532 - 450 K/uL    MPV 10.0 9.4 - 12.3 FL    nRBC 0.00 0.0 - 0.2 K/uL    Differential Type AUTOMATED      Seg Neutrophils 59 43 - 78 %    Lymphocytes 30 13 - 44 %    Monocytes 7 4.0 - 12.0 %    Eosinophils % 3 0.5 - 7.8 %    Basophils 1 0.0 - 2.0 %    Immature Granulocytes 0 0.0 - 5.0 %    Segs Absolute 4.8 1.7 - 8.2 K/UL    Absolute Lymph # 2.4 0.5 - 4.6 K/UL    Absolute Mono # 0.6 0.1 - 1.3 K/UL    Absolute Eos # 0.3 0.0 - 0.8 K/UL    Basophils Absolute 0.1 0.0 - 0.2 K/UL    Absolute Immature Granulocyte 0.0 0.0 - 0.5 K/UL   Basic Metabolic Panel w/ Reflex to MG    Collection Time: 10/18/22  6:00 AM   Result Value Ref Range    Sodium 143 133 - 143 mmol/L    Potassium 4.2 3.5 - 5.1 mmol/L    Chloride 110 101 - 110 mmol/L    CO2 27 21 - 32 mmol/L    Anion Gap 6 2 - 11 mmol/L    Glucose 72 65 - 100 mg/dL    BUN 11 6 - 23 MG/DL    Creatinine 1.50 0.8 - 1.5 MG/DL    Est, Glom Filt Rate 54 (L) >60 ml/min/1.73m2    Calcium 8.7 8.3 - 10.4 MG/DL   POCT Glucose    Collection Time: 10/18/22  7:52 AM   Result Value Ref Range    POC Glucose 79 65 - 100 mg/dL    Performed by: DeliveredA    POCT Glucose    Collection Time: 10/18/22 11:44 AM   Result Value Ref Range    POC Glucose 106 (H) 65 - 100 mg/dL    Performed by: DeliveredA          Other Studies:  CT HEAD WO CONTRAST    Result Date: 10/10/2022  EXAM: Noncontrast CT head. INDICATION: Visual disturbance. COMPARISON: None. TECHNIQUE: Noncontrast CT images of the head were obtained. Radiation dose reduction techniques were used for this study. Our CT scanners use one or all of the following:  Automated exposure control, adjustment of the mA or kV according to patient size, iterative reconstruction. FINDINGS: Brain volume is appropriate for age. No acute infarct, hemorrhage or mass is identified. There is no mass effect, midline shift or depressed fracture. The visualized paranasal sinuses and mastoid air cells are clear, except for mild right maxillary sinus mucosal thickening. No acute process.       Current Meds:  Current Facility-Administered Medications   Medication Dose Route Frequency    insulin glargine (LANTUS) injection vial 20 Units  20 Units SubCUTAneous Daily    enoxaparin Sodium (LOVENOX) injection 30 mg  30 mg SubCUTAneous BID    hydrALAZINE (APRESOLINE) injection 10 mg  10 mg IntraVENous Q6H PRN    gabapentin (NEURONTIN) capsule 300 mg  300 mg Oral TID    medicated lip ointment (BLISTEX)   Topical PRN    traMADol (ULTRAM) tablet 50 mg  50 mg Oral Q6H    polyethylene glycol (GLYCOLAX) packet 17 g  17 g Oral BID    fluconazole (DIFLUCAN) tablet 200 mg  200 mg Oral Daily    aspirin EC tablet 81 mg  81 mg Oral Daily    vancomycin (VANCOCIN) 1500 mg in sodium chloride 0.9% 500 mL IVPB  1,500 mg IntraVENous Q24H    atorvastatin (LIPITOR) tablet 40 mg  40 mg Oral Nightly    docusate sodium (COLACE) capsule 100 mg  100 mg Oral BID    miconazole (MICOTIN) 2 % powder   Topical BID    oxyCODONE (ROXICODONE) immediate release tablet 10 mg  10 mg Oral Q4H PRN    sodium hypochlorite (DAKINS) 0.125 % external solution   Irrigation BID    methocarbamol (ROBAXIN) tablet 750 mg  750 mg Oral 4x Daily    HYDROmorphone HCl PF (DILAUDID) injection 0.5 mg  0.5 mg IntraVENous Q3H PRN    [Held by provider] insulin lispro (HUMALOG) injection vial 4 Units  4 Units SubCUTAneous TID WC    glucose chewable tablet 16 g  4 tablet Oral PRN    dextrose bolus 10% 125 mL  125 mL IntraVENous PRN    Or    dextrose bolus 10% 250 mL  250 mL IntraVENous PRN    glucagon (rDNA) injection 1 mg  1 mg SubCUTAneous PRN    dextrose 10 % infusion   IntraVENous Continuous PRN    insulin lispro (HUMALOG) injection vial 0-8 Units  0-8 Units SubCUTAneous TID WC    insulin lispro (HUMALOG) injection vial 0-4 Units  0-4 Units SubCUTAneous Nightly    sodium chloride flush 0.9 % injection 5-40 mL  5-40 mL IntraVENous 2 times per day    sodium chloride flush 0.9 % injection 5-40 mL  5-40 mL IntraVENous PRN    0.9 % sodium chloride infusion   IntraVENous PRN    ondansetron (ZOFRAN-ODT) disintegrating tablet 4 mg  4 mg Oral Q8H PRN    Or    ondansetron (ZOFRAN) injection 4 mg  4 mg IntraVENous Q6H PRN    polyethylene glycol (GLYCOLAX) packet 17 g  17 g Oral Daily PRN    acetaminophen (TYLENOL) tablet 650 mg  650 mg Oral Q6H PRN    Or    acetaminophen (TYLENOL) suppository 650 mg  650 mg Rectal Q6H PRN       Signed:  Ace Mcdermott MD    Part of this note may have been written by using a voice dictation software. The note has been proof read but may still contain some grammatical/other typographical errors.

## 2022-10-18 NOTE — PROGRESS NOTES
Admit Date: 10/5/2022    POD 11 Days Post-Op    Procedure:  Procedure(s):  BACK ABSCESS I & D  INGUINAL AND PENILE DEBRIDEMENT (Urology)    Subjective:     Pt alert in bed with no complaints or new issues. Pt is tolerating a Regular Diabetic diet. No nausea or vomiting. Pain currently controlled. Reports +flatus/+BM. AF. NAD. Objective:       Vitals:    10/17/22 1928 10/18/22 0006 10/18/22 0050 10/18/22 0356   BP: (!) 145/85 (!) 155/91 (!) 158/89 (!) 143/93   Pulse: 91 89 80 79   Resp: 20 20 16 20   Temp: 98.2 °F (36.8 °C) 98.4 °F (36.9 °C) 98.5 °F (36.9 °C) 98.4 °F (36.9 °C)   TempSrc: Oral Oral Oral Oral   SpO2: 96% 90%  96%   Weight:       Height:           Temp (24hrs), Av.2 °F (36.8 °C), Min:97.2 °F (36.2 °C), Max:98.5 °F (36.9 °C)  . I&O reviewed as documented. Robles 5375 ml clear yellow UOP past 24 h  +BM     Physical Exam  Constitutional:       General: He is not in acute distress. Appearance: He is obese. HENT:      Mouth/Throat:      Mouth: Mucous membranes are moist.   Cardiovascular:      Rate and Rhythm: Normal rate and regular rhythm. Pulses: Normal pulses. Heart sounds: Normal heart sounds. No murmur heard. No friction rub. No gallop. Pulmonary:      Effort: Pulmonary effort is normal. No respiratory distress. Breath sounds: Normal breath sounds. Abdominal:      General: Bowel sounds are normal. There is no distension. Palpations: Abdomen is soft. Genitourinary:     Comments: Robles  Musculoskeletal:         General: No swelling. Normal range of motion. Cervical back: Normal range of motion. Comments: Vac in place on back debridement area   Skin:     General: Skin is warm and dry. Comments: Left groin with drsg c/d/I. Neurological:      General: No focal deficit present. Mental Status: He is alert and oriented to person, place, and time.    Psychiatric:         Mood and Affect: Mood normal.         Behavior: Behavior normal. Labs:   Recent Results (from the past 24 hour(s))   CBC with Auto Differential    Collection Time: 10/17/22  8:00 AM   Result Value Ref Range    WBC 9.3 4.3 - 11.1 K/uL    RBC 3.22 (L) 4.23 - 5.6 M/uL    Hemoglobin 9.1 (L) 13.6 - 17.2 g/dL    Hematocrit 30.6 (L) 41.1 - 50.3 %    MCV 95.0 82 - 102 FL    MCH 28.3 26.1 - 32.9 PG    MCHC 29.7 (L) 31.4 - 35.0 g/dL    RDW 14.4 11.9 - 14.6 %    Platelets 093 (H) 371 - 450 K/uL    MPV 9.9 9.4 - 12.3 FL    nRBC 0.00 0.0 - 0.2 K/uL    Differential Type AUTOMATED      Seg Neutrophils 63 43 - 78 %    Lymphocytes 27 13 - 44 %    Monocytes 6 4.0 - 12.0 %    Eosinophils % 3 0.5 - 7.8 %    Basophils 1 0.0 - 2.0 %    Immature Granulocytes 0 0.0 - 5.0 %    Segs Absolute 5.8 1.7 - 8.2 K/UL    Absolute Lymph # 2.5 0.5 - 4.6 K/UL    Absolute Mono # 0.5 0.1 - 1.3 K/UL    Absolute Eos # 0.3 0.0 - 0.8 K/UL    Basophils Absolute 0.1 0.0 - 0.2 K/UL    Absolute Immature Granulocyte 0.0 0.0 - 0.5 K/UL   Basic Metabolic Panel w/ Reflex to MG    Collection Time: 10/17/22  8:00 AM   Result Value Ref Range    Sodium 143 133 - 143 mmol/L    Potassium 4.3 3.5 - 5.1 mmol/L    Chloride 110 101 - 110 mmol/L    CO2 28 21 - 32 mmol/L    Anion Gap 5 2 - 11 mmol/L    Glucose 76 65 - 100 mg/dL    BUN 12 6 - 23 MG/DL    Creatinine 1.60 (H) 0.8 - 1.5 MG/DL    Est, Glom Filt Rate 50 (L) >60 ml/min/1.73m2    Calcium 9.2 8.3 - 10.4 MG/DL   POCT Glucose    Collection Time: 10/17/22 11:50 AM   Result Value Ref Range    POC Glucose 143 (H) 65 - 100 mg/dL    Performed by: Post-A-VoxtneyPCA    POCT Glucose    Collection Time: 10/17/22  4:44 PM   Result Value Ref Range    POC Glucose 104 (H) 65 - 100 mg/dL    Performed by: CombsCourtneyPCA    POCT Glucose    Collection Time: 10/17/22  8:51 PM   Result Value Ref Range    POC Glucose 122 (H) 65 - 100 mg/dL    Performed by:  Nguyễn    Basic Metabolic Panel w/ Reflex to MG    Collection Time: 10/18/22  6:00 AM   Result Value Ref Range    Sodium 143 133 - 143 mmol/L    Potassium 4.2 3.5 - 5.1 mmol/L    Chloride 110 101 - 110 mmol/L    CO2 27 21 - 32 mmol/L    Anion Gap 6 2 - 11 mmol/L    Glucose 72 65 - 100 mg/dL    BUN 11 6 - 23 MG/DL    Creatinine 1.50 0.8 - 1.5 MG/DL    Est, Glom Filt Rate 54 (L) >60 ml/min/1.73m2    Calcium 8.7 8.3 - 10.4 MG/DL          Assessment:     Principal Problem:    Sepsis (HCC)  Active Problems:    Primary hypertension    DM2 (diabetes mellitus, type 2) (HCC)    Cellulitis and abscess of trunk    Abscess of groin, left    Penile abscess    Acute urinary retention  Resolved Problems:    * No resolved hospital problems. *        Plan/Recommendations/Medical Decision Making:     Continue present treatment   Vac to L back - appreciate [facility] assist.  Granulating. Will be dc with wet to dry. L Groin dressing wet to dry dakins  Nursing will change colace and miralax to prn when pt with regular bms. Increased activity with PT. Mesh underwear for activity and OOB since pt complains of pain when penis moves when repositioning. Wheel chair cushion ordered for up in chair. Supplies ordered per PT recommendation.    Wbc remains normalized - 10.3 10/15/22  Tissue and wound culture positive MRSA-Pt on Vancomycin IV and G[place] 96, APRN - CNP

## 2022-10-18 NOTE — PROGRESS NOTES
Infectious Disease Progress Note    Today's Date: 10/18/2022   Admit Date: 10/5/2022    Impression:   MRSA soft tissue paraspinous abscess: [id number]  abscess abutting the left paraspinous musculatures, abundant inflammatory changes in left pubic region and a left 3.8cm pubic abscess. S/P I&D 10/7/22 with General Surgery and . Cx with MRSA, no bactermia  MRSA penile abscess / perineal soft tissue infection  S/p I&D 10/7/22; Cx: MRSA  Visual disturbance / R arm weakness  Urinary retention  Poorly controlled DM;insulin started here and statin    Plan:   Continue IV vancomycin while inpatient, can transition to Bactrim or Doxy when ready for discharge. ID will continue to follow. Anti-infectives:   PO fluconazole 10/12 -  IV vancomycin 10/5-  Zosyn 10/5-9    Subjective: Interval history: Afebrile. Wound vac to back intact, good seal. Patient up to chair. States he feels he is slowly getting better but is frustrated by lack of pain control. Patient is a 60 y.o. male with PMH of poorly controlled DM, who  has lived in the [facility] since he was 1years old. He has lived in [place] and [place]. He moved to the [facility] when he was 2 from [facility]. He is visiting [place], history of DM 2 HgbA1c of >14, reports that he is on metformin, admitted on 10/5/22 , with primary complaint of persistent draining back wound for about 2 months. S/p bedside I&D of groin abscess by urology on 10/06/22. A CT done showed a [id number]  abscess abutting the left paraspinous musculatures, abundant inflammatory changes in left pubic region and a left 3.8cm pubic abscess. Patient denies any trauma the area. He also has drainage from his left groin and shaft of his penis. No Known Allergies     Review of Systems:  A comprehensive review of systems is negative except as stated above.       Objective:     Visit Vitals  BP (!) 151/87   Pulse 78   Temp 97.9 °F (36.6 °C) (Oral)   Resp 17   Ht 6' 2\" (1.88 m)   Wt (!) 311 lb 3.2 oz (141.2 kg) SpO2 98%   BMI 39.96 kg/m²     Temp (24hrs), Av.1 °F (36.7 °C), Min:97.2 °F (36.2 °C), Max:98.5 °F (36.9 °C)     Patient was seen and examined on 10/18/2022 and physical exam remains unchanged since yesterday, unless noted below:    Lines:  Peripheral IV:       Physical Exam:    General:  In no acute distress   Eyes:  Non-icteric, PERRL   Mouth/Throat: No thrush, mucosa pink   Neck: Supple and symmetric   Lungs:   Breathing comfortably, breath sounds clear, no wheezing   CV:   S1/S2; no murmurs noted   Abdomen:   Non-distended, soft, BS normal   Extremities: Trace edema LE   Skin:  wound vac on back in place with seal intact, Left groin dressing intact   Lymph nodes:    Musculoskeletal: No swollen or inflamed joints   Lines/Devices: Robles   Psych: Alert and oriented       Data Review:     CBC:  Recent Labs     10/17/22  0800   WBC 9.3   HGB 9.1*   HCT 30.6*   *         BMP:  Recent Labs     10/17/22  0800 10/18/22  0600   BUN 12 11    143   K 4.3 4.2    110   CO2 28 27         LFTS:  No results for input(s): ALT, TP, ALB in the last 72 hours. Invalid input(s): TBILI, SGOT, AP    Microbiology:   Reviewed  Susceptibility    Methicillin-Resistant Staphylococcus aureus (1)    Antibiotic Interpretation Microscan  Method Status    trimethoprim-sulfamethoxazole Sensitive <=0.5/9.5 ug/mL BACTERIAL SUSCEPTIBILITY PANEL MADINA Final    tetracycline Sensitive <=0.5 ug/mL BACTERIAL SUSCEPTIBILITY PANEL MADINA Final    clindamycin Sensitive <=0.5 ug/mL BACTERIAL SUSCEPTIBILITY PANEL MADINA Final    vancomycin Sensitive 1 ug/mL BACTERIAL SUSCEPTIBILITY PANEL MADINA Final    oxacillin Resistant >2 ug/mL BACTERIAL SUSCEPTIBILITY PANEL MADINA Final    rifampin Sensitive <=0.5 ug/mL BACTERIAL SUSCEPTIBILITY PANEL MADINA Final     Rifampin is not to be used for mono-therapy.              Specimen Collected: 10/05/22 20:23 EDT Last Resulted: 10/08/22 10:53 EDT        Imaging:   10/10/22 CT head:     FINDINGS: Brain volume is appropriate for age. No acute infarct, hemorrhage or   mass is identified. There is no mass effect, midline shift or depressed   fracture. The visualized paranasal sinuses and mastoid air cells are clear,   except for mild right maxillary sinus mucosal thickening.        Signed By: LILLIAN Lugo     October 18, 2022

## 2022-10-18 NOTE — PROGRESS NOTES
ACUTE OCCUPATIONAL THERAPY GOALS:   (Developed with and agreed upon by patient and/or caregiver.)  1. Nadia Catherine will be modified independent with functional mobility for ADL in room within 4 - 7 visits. 2. Nadia Catherine will be modified independent with total body bathing and dressing within 4 - 7 visits. 3. Nadia Catherine will state and demonstrate at least 5 energy conservation techniques during ADL/therapeutic activities within 4 - 7 visits. 4. Nadia Catherine will voice a plan for appropriate home modifications for home safety and fall prevention within 7 visits. 5. Nadia Catherine will participate at least 30 minutes of ADL with 3 or less rest breaks within 7 visits. GOAL MET 10/18/22  6. Nadia Catherine will complete a toilet transfer with modified independence within 7 visits. OCCUPATIONAL THERAPY: Daily Note PM   OT Visit Days: 3   Time  OT Charge Capture  Rehab Caseload Tracker  OT Orders    Nadia Catherine is a 60 y.o. male   PRIMARY DIAGNOSIS: Sepsis ([facility] [address]  Cellulitis and abscess of trunk [L03.319, L02.219]  Abscess [L02.91]  Hyperglycemia [R73.9]  Abscess, perineum [L02.215]  Procedure(s) (LRB):  BACK ABSCESS I & D (N/A)  INGUINAL AND PENILE DEBRIDEMENT (N/A)  11 Days Post-Op  Inpatient: Payor: /     ASSESSMENT:     REHAB RECOMMENDATIONS: CURRENT LEVEL OF FUNCTION:  (Most Recently Demonstrated)   Recommendation to date pending progress:  Setting:  Home Health Therapy    Equipment:    None Bathing:  Stand by Assist  Dressing: Moderate Assist   Feeding/Grooming:  Not Tested   Toileting:  Not Tested  Functional Mobility:  Minimal Assist modified SPT     ASSESSMENT:  Mr. Gerardo Boucher was received supine in bed with RN completing wound care. In supine, donning underwear Mod A for threading, pt then bridged hips to lavonne. HOB elevated, bed mobility SBA. Functional mobility bed > chair Min A modified SPT with surface brought close to one another.  Max A doffing/donning socks sitting in chair. SBA sponge bath seated. Set up seated donning gown. Put in more effort with therapy today. Not much progress with mobility. Pain and weakness are most limiting factors. Continue POC. SUBJECTIVE:     Mr. Brittney Reich states, \"Ok. \"    Social/Functional Lives With: Significant other  Type of Home: House  Home Access: Stairs to enter with rails  Entrance Stairs - Number of Steps: 4  Bathroom Equipment: Shower chair  Home Equipment: Lexara  ADL Assistance: [address]: Independent  Homemaking Responsibilities: Yes  Active : Yes  Mode of Transportation: Car  Occupation: Full time employment    OBJECTIVE:     LINES / Thea Butt / AIRWAY: Wound Vac    RESTRICTIONS/PRECAUTIONS:  Restrictions/Precautions  Restrictions/Precautions: Fall Risk        PAIN: VITALS / O2:   Pre Treatment:   Pain Assessment: 0-10  Pain Level:  (not quantified)        Post Treatment: not quanitfied Vitals          Oxygen        MOBILITY: I Mod I S SBA CGA Min Mod Max Total  NT x2 Comments:   Bed Mobility    Rolling [] [] [] [] [] [] [] [] [] [] []    Supine to Sit [] [] [] [x] [] [] [] [] [] [] []    Scooting [] [] [] [] [] [] [] [] [] [] []    Sit to Supine [] [] [] [] [] [] [] [] [] [] []    Transfers    Sit to Stand [] [] [] [] [x] [] [] [] [] [] []    Bed to Chair [] [] [] [] [] [x] [] [] [] [] []    Stand to Sit [] [] [] [] [x] [] [] [] [] [] []    Tub/Shower [] [] [] [] [] [] [] [] [] [] []     Toilet [] [] [] [] [] [] [] [] [] [] []      [] [] [] [] [] [] [] [] [] [] []    I=Independent, Mod I=Modified Independent, S=Supervision/Setup, SBA=Standby Assistance, CGA=Contact Guard Assistance, Min=Minimal Assistance, Mod=Moderate Assistance, Max=Maximal Assistance, Total=Total Assistance, NT=Not Tested    ACTIVITIES OF DAILY LIVING: I Mod I S SBA CGA Min Mod Max Total NT Comments   BASIC ADLs:              Upper Body   Bathing [] [] [] [x] [] [] [] [] [] []    Lower Body Bathing [] [] [] [x] [] [] [] [] [] [] Toileting [] [] [] [] [] [] [] [] [] []    Upper Body Dressing [] [] [] [x] [] [] [] [] [] []    Lower Body Dressing [] [] [] [] [] [] [x] [] [] []    Feeding [] [] [] [] [] [] [] [] [] []    Grooming [] [] [] [] [] [] [] [] [] []    Personal Device Care [] [] [] [] [] [] [] [] [] []    Functional Mobility [] [] [] [] [] [x] [] [] [] [] X2 STS   I=Independent, Mod I=Modified Independent, S=Supervision/Setup, SBA=Standby Assistance, CGA=Contact Guard Assistance, Min=Minimal Assistance, Mod=Moderate Assistance, Max=Maximal Assistance, Total=Total Assistance, NT=Not Tested    BALANCE: Good Fair+ Fair Fair- Poor NT Comments   Sitting Static [] [x] [] [] [] []    Sitting Dynamic [] [x] [] [] [] []              Standing Static [] [] [x] [] [] []    Standing Dynamic [] [] [x] [] [] []        PLAN:     FREQUENCY/DURATION   OT Plan of Care: 3 times/week for duration of hospital stay or until stated goals are met, whichever comes first.    TREATMENT:     TREATMENT:   Self Care (32 minutes): Patient participated in upper body bathing, lower body bathing, upper body dressing, and lower body dressing ADLs in unsupported sitting and standing with moderate manual cueing to increase independence, decrease assistance required, increase activity tolerance, and increase safety awareness. Patient also participated in functional transfer training to increase independence, decrease assistance required, increase activity tolerance, and increase safety awareness.      TREATMENT GRID:  N/A    AFTER TREATMENT PRECAUTIONS: Bed/Chair Locked, Call light within reach, Chair, Needs within reach, and RN notified    INTERDISCIPLINARY COLLABORATION:  RN/ PCT and OT/ STEEN    EDUCATION:  Education Given To: Patient  Education Provided: Role of Therapy;Plan of Care  Education Outcome: Verbalized understanding;Continued education needed    TOTAL TREATMENT DURATION AND TIME:  Time In: 1123  Time Out: [address] [facility]  Minutes: [address] OT

## 2022-10-18 NOTE — CONSULTS
[facility] Neurology [facility]  [address]  [address], [address] [facility]            Kela Libman is a 60 y.o. male who presents on referral from the hospitalist service with reference to upper extremity weakness specifically in the intrinsic hand muscles in both hands  This is a 78-year-old gentleman who is being in the hospital for several weeks receiving IV antibiotics with substantial sepsis in his perineum paraspinal muscles extending to the genitalia      Past Medical History:   Diagnosis Date    Diabetes mellitus ([address]     Hypertension        Past Surgical History:   Procedure Laterality Date    SKIN LESION EXCISION N/A 10/7/2022    BACK ABSCESS I & D performed by Venessa Abraham DO at [facility] 10/7/2022    INGUINAL AND PENILE DEBRIDEMENT performed by Venessa Abraham DO at [facility] MAIN OR        History reviewed. No pertinent family history.      Social History     Socioeconomic History    Marital status: Single     Spouse name: None    Number of children: None    Years of education: None    Highest education level: None   Tobacco Use    Smoking status: Former     Types: Cigarettes    Smokeless tobacco: Never         Current Facility-Administered Medications   Medication Dose Route Frequency Provider Last Rate Last Admin    insulin glargine (LANTUS) injection vial 20 Units  20 Units SubCUTAneous Daily Lizbeth Collado MD   20 Units at 10/18/22 0906    enoxaparin Sodium (LOVENOX) injection 30 mg  30 mg SubCUTAneous BID Lizbeth Collado MD        hydrALAZINE (APRESOLINE) injection 10 mg  10 mg IntraVENous Q6H PRN Lizbeth Collado MD   10 mg at 10/17/22 1637    gabapentin (NEURONTIN) capsule 300 mg  300 mg Oral TID Lizbeth Collado MD   300 mg at 10/18/22 1427    medicated lip ointment (BLISTEX)   Topical PRN Gris Murphy MD   Given at 10/14/22 1015    traMADol (ULTRAM) tablet 50 mg  50 mg Oral Q6H Lizbeth Collado MD   50 mg at 10/18/22 1427    polyethylene glycol (GLYCOLAX) packet 17 g  17 g Oral BID Cristian Babin APRN - NP   17 g at 10/18/22 0904    fluconazole (DIFLUCAN) tablet 200 mg  200 mg Oral Daily Dakota Wills MD   200 mg at 10/18/22 0657    aspirin EC tablet 81 mg  81 mg Oral Daily Teresa Lilly MD   81 mg at 10/18/22 0903    vancomycin (VANCOCIN) 1500 mg in sodium chloride 0.9% 500 mL IVPB  1,500 mg IntraVENous Q24H Teresa Lilly MD   Stopped at 10/17/22 2059    atorvastatin (LIPITOR) tablet 40 mg  40 mg Oral Nightly Teresa Lilly MD   40 mg at 10/17/22 2037    docusate sodium (COLACE) capsule 100 mg  100 mg Oral BID MAURILIO Woodard - NP   100 mg at 10/18/22 0904    miconazole (MICOTIN) 2 % powder   Topical BID Teresa Lilly MD   Given at 10/18/22 2879    oxyCODONE (ROXICODONE) immediate release tablet 10 mg  10 mg Oral Q4H PRN Teresa Lilly MD   10 mg at 10/18/22 1025    sodium hypochlorite (DAKINS) 0.125 % external solution   Irrigation BID Teresa Lilly MD   Given at 10/18/22 6904    methocarbamol (ROBAXIN) tablet 750 mg  750 mg Oral 4x Daily [facility], DO   750 mg at 10/18/22 1307    HYDROmorphone HCl PF (DILAUDID) injection 0.5 mg  0.5 mg IntraVENous Q3H PRN Teresa Lilly MD   0.5 mg at 10/17/22 1305    [Held by provider] insulin lispro (HUMALOG) injection vial 4 Units  4 Units SubCUTAneous TID  Teresa Lilly MD   4 Units at 10/15/22 1718    glucose chewable tablet 16 g  4 tablet Oral PRN Chuck Salvador MD        dextrose bolus 10% 125 mL  125 mL IntraVENous PRN Chuck Salvador MD        Or    dextrose bolus 10% 250 mL  250 mL IntraVENous PRN Chuck Salvador MD        glucagon (rDNA) injection 1 mg  1 mg SubCUTAneous PRN Chuck Salvador MD        dextrose 10 % infusion   IntraVENous Continuous PRN Chuck Salvador MD        insulin lispro (HUMALOG) injection vial 0-8 Units  0-8 Units SubCUTAneous TID  Enedina Rico MD   2 Units at 10/12/22 1258    insulin lispro (HUMALOG) injection vial 0-4 Units  0-4 Units SubCUTAneous Nightly Chichi Gardner MD   4 Units at 10/06/22 0248    sodium chloride flush 0.9 % injection 5-40 mL  5-40 mL IntraVENous 2 times per day Chichi Gardner MD   10 mL at 10/18/22 0905    sodium chloride flush 0.9 % injection 5-40 mL  5-40 mL IntraVENous PRN Chichi Gardner MD        0.9 % sodium chloride infusion   IntraVENous PRN Chichi Gardner MD        ondansetron (ZOFRAN-ODT) disintegrating tablet 4 mg  4 mg Oral Q8H PRN Chichi Gardner MD        Or    ondansetron [facility]) injection 4 mg  4 mg IntraVENous Q6H PRN Chichi Gardner MD   4 mg at 10/09/22 2056    polyethylene glycol (GLYCOLAX) packet 17 g  17 g Oral Daily PRN Chichi Gardner MD   17 g at 10/08/22 0134    acetaminophen (TYLENOL) tablet 650 mg  650 mg Oral Q6H PRN Chichi Gardner MD   650 mg at 10/07/22 8442    Or    acetaminophen (TYLENOL) suppository 650 mg  650 mg Rectal Q6H PRN Chichi Gardner MD            No Known Allergies    Review of Systems  Review of systems  General reports normal energy. Sleep wake cycle appetite  ENT no sinus congestion no epistaxis no unilateral hearing loss no swallowing difficulty  Pulmonary-denies shortness of breath, no orthopnea, no wheezing, no productive sputum no hemoptysis  Cardiac denies chest pain palpitations peripheral edema  GI weight is stable appetite steady no constipation diarrhea abdominal pain  Joints back pain  Skin no rash or ecchymosis  Neuro no syncope vertigo diplopia dysarthria dysphagia difficulty with balance or coordination unilateral weakness   see progress notes with reference to  issues  Psychiatric no mood disorder no matthew no depression no outbursts or belligerent behavior    BP (!) 146/90 Comment: nurse notified  Pulse 93   Temp 98.9 °F (37.2 °C) (Oral)   Resp 19   Ht 6' 2\" (1.88 m)   Wt (!) 311 lb 3.2 oz (141.2 kg)   SpO2 96%   BMI 39.96 kg/m²     Neurologic Exam  Alert man.   There are no signs in the cranial nerves  In the upper extremities there is significant atrophy of the first dorsal interosseous bilaterally and some atrophy of the dorsal interossei and hypothenar eminence  There is clear-cut weakness which is substantial of the ulna are innervated intrinsic hand muscles with preservation of median and radial innervated distal arm musculature  Strength in the distal lower extremities is symmetric and normal with normal deep tendon reflexes  No Osler's nodes are appreciated no splinter hemorrhages    Most recent MRI   Results for orders placed during the hospital encounter of 10/05/22    MRI BRAIN WO CONTRAST    Narrative  Exam: MRI BRAIN WO CONTRAST on 10/11/2022 4:45 PM    Clinical History: The Male patient is 60years old presenting for visual changes  in right upper extremity weakness. Comparison:  Head CT 10/10/2022    Technique:  Axial T2, axial FLAIR, axial diffusion-weighted,  sagittal T1 and  coronal gradient-echo scans were performed. Findings: There is no evidence of acute intracranial abnormality. No evidence of  restricted diffusion is seen to suggest acute ischemia. There are no abnormal extra-axial fluid collections. No evidence of mass or mass  effect is seen. Expected flow voids are maintained in the major intracranial  vessels. The cerebellum and brainstem are unremarkable. There is no evidence of Chiari  malformation. The ventricular system and CSF containing spaces are unremarkable in appearance. Visualized extracranial soft tissues are unremarkable. The paranasal sinuses are well pneumatized and aerated. Mucosal retention  cyst/polyp right maxillary antrum. Impression  1. No acute intracranial abnormality. CPT code(s) [id number]       Most recent MRA   No results found for this or any previous visit.         Most recent CTA  Results for orders placed during the hospital encounter of 10/05/22    CT HEAD WO CONTRAST    Narrative  EXAM: Noncontrast CT head.    INDICATION: Visual disturbance. COMPARISON: None. TECHNIQUE: Noncontrast CT images of the head were obtained. Radiation dose  reduction techniques were used for this study. Our CT scanners use one or all  of the following:  Automated exposure control, adjustment of the mA or kV  according to patient size, iterative reconstruction. FINDINGS: Brain volume is appropriate for age. No acute infarct, hemorrhage or  mass is identified. There is no mass effect, midline shift or depressed  fracture. The visualized paranasal sinuses and mastoid air cells are clear,  except for mild right maxillary sinus mucosal thickening. Impression  No acute process. Most recent Echo  Results for orders placed during the hospital encounter of 10/05/22    Transthoracic echocardiogram (TTE) complete with contrast, bubble, strain, and 3D PRN    Interpretation Summary    Left Ventricle: Normal left ventricular systolic function with a visually estimated EF of 60 - 65%. Left ventricle size is normal. Normal wall thickness. Normal wall motion. Normal diastolic function. Technical qualifiers: Color flow Doppler was performed and pulse wave and/or continuous wave Doppler was performed. Contrast used: Definity. 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   Major treatment at this point is avoiding pressure on the cubital tunnel by padding the elbows bilaterally  EMG nerve conduction studies are not performed in the hospital.  He may require decompression of the ulnar nerves at the elbow bilaterally but this would require electrical or ultrasound diagnosis for confirmation.   Peripheral nerve ultrasound is a specific subspecialty and cannot be done here  Conservative management is however indicated prior to consideration of operative therapy          Bernadine Dennison MD

## 2022-10-18 NOTE — DIABETES MGMT
Patient admitted with sepsis. Blood glucose ranged  yesterday with patient receiving Lantus 30 units. Lab blood glucose this morning was 72. Most recent FSBS 79. Creatinine 1.50. GFR 54. Reviewed patient current regimen: Lantus 30 units daily and Humalog correctional insulin. Patient would likely benefit from a reduction in basal to reduce risk of morning hypoglycemia. Provider updated via [employer] regarding recommendations and patient glycemic control. New orders received to decrease Lantus to 20 units daily.

## 2022-10-18 NOTE — PROGRESS NOTES
END OF SHIFT NOTE:    INTAKE/OUTPUT  10/17 0701 - 10/18 0700  In: 1638.3 [P.O.:1160; I.V.:478.3]  Out: 4980 [Urine:4975; Drains:5]  Voiding: No  Catheter: Yes  Drain:   Negative Pressure Wound Therapy Back Left;Mid (Active)   $ Standard NPWT <=50 sq cm PER TX $ Yes 10/17/22 1529   Wound Type Surgical 10/18/22 1323   Unit Type kci ulta 10/18/22 1323   Dressing Type Black Foam 10/18/22 1323   Number of pieces used 1 10/17/22 1529   Number of pieces removed 1 10/17/22 1529   Cycle Continuous 10/17/22 2030   Target Pressure (mmHg) 125 10/18/22 1323   Canister changed? No 10/18/22 1323   Dressing Status Clean, dry & intact 10/18/22 1323   Dressing Changed Changed/New 10/17/22 1529   Drainage Amount Moderate 10/17/22 2030   Drainage Description Serosanguinous 10/18/22 1323   Dressing Change Due 10/19/22 10/18/22 1323   Output (ml) 0 ml 10/18/22 1323   Wound Assessment Granulation tissue 10/17/22 1529   Nemo-wound Assessment Intact 10/17/22 2030   Shape oval 10/17/22 1529   Odor None 10/17/22 1529       Urinary Catheter 10/06/22 Robles (Active)   $ Urethral catheter insertion Inserted for procedure 10/10/22 0800   Catheter Indications Perioperative use for selected surgical procedures;Assist in healing of open sacral or perineal wounds (Stage III, IV, or unstageable documented by a provider or enterostomal therapy) and/or full thickness perineal and lower extremity burns in incontinent patients 10/18/22 1323   Site Assessment No urethral drainage 10/18/22 1323   Urine Color Yellow 10/18/22 1323   Urine Appearance Clear 10/18/22 1323   Urine Odor Other (Comment) 10/18/22 0735   Collection Container Standard 10/18/22 1323   Securement Method Tape 10/18/22 1323   Catheter Care Completed Yes 10/18/22 0735   Catheter Best Practices  Drainage tube clipped to bed;Catheter secured to thigh; Tamper seal intact; Bag below bladder;Bag not on floor; Lack of dependent loop in tubing;Drainage bag less than half full 10/18/22 1323   Status Draining;Patent 10/18/22 1323   Output (mL) 825 mL 10/18/22 1700               Flatus: Patient does have flatus present. Stool:  occurrences. Characteristics:           Stool Assessment  Last BM (including prior to admit): 10/03/22    Emesis:  occurrences. Characteristics:        VITAL SIGNS  Patient Vitals for the past 12 hrs:   Temp Pulse Resp BP SpO2   10/18/22 1540 98.9 °F (37.2 °C) 93 19 (!) 146/90 96 %   10/18/22 1100 98.7 °F (37.1 °C) 88 19 (!) 135/90 --   10/18/22 0750 97.9 °F (36.6 °C) 78 17 (!) 151/87 98 %       Pain Assessment  Pain Level:  (not quantified) (10/18/22 1244)  Pain Location: Groin, Penis  Patient's Stated Pain Goal: 2    Ambulating  Yes    Shift report given to oncoming nurse at the bedside.     Harry Hannon RN

## 2022-10-18 NOTE — PROGRESS NOTES
ACUTE PHYSICAL THERAPY GOALS:   (Developed with and agreed upon by patient and/or caregiver. )  LTG:  (1.)Mr. Abel Muñoz will move from supine to sit and sit to supine , scoot up and down, and roll side to side in bed with STAND BY ASSIST within 7 treatment day(s). (2.)Mr. Abel Muñoz will transfer from bed to chair and chair to bed with CONTACT GUARD ASSIST using the least restrictive device within 7 treatment day(s). (3.)Mr. Abel Muñoz will ambulate with CONTACT GUARD ASSIST for 250+ feet with the least restrictive device within 7 treatment day(s). (4.)Mr. Abel Muñoz will perform 4 stairs with HR and CGA within 7 treatment days for ascending and descending stairs for home. PHYSICAL THERAPY: Daily Note PM   (Link to Caseload Tracking: PT Visit Days : 4  Time In/Out PT Charge Capture  Rehab Caseload Tracker  Orders    Kela Libman is a 60 y.o. male   PRIMARY DIAGNOSIS: Sepsis ([facility] [address]  Cellulitis and abscess of trunk [L03.319, L02.219]  Abscess [L02.91]  Hyperglycemia [R73.9]  Abscess, perineum [L02.215]  Procedure(s) (LRB):  BACK ABSCESS I & D (N/A)  INGUINAL AND PENILE DEBRIDEMENT (N/A)  11 Days Post-Op  Inpatient: Payor: /     ASSESSMENT:     REHAB RECOMMENDATIONS:   Recommendation to date pending progress:  Setting:  Home Health Therapy    Equipment:    To Be Determined     ASSESSMENT:  Mr. Abel Muñoz is supine in bed and agreeable to therapy. He was able to don his underwear supine, got to the edge of the bed but needed to sit for a minute  Sit to stand  several times but fell backwards onto the bed but  then was able to stand with contact guard assist to the walker. Gait training with rolling walker x 250 feet with slow and slightly unsteady gait with assist to maintain safety. ( Needed another person to manage the wound vac) Patient required both verbal and tactile cues throughout but less of them. He is returned to supine in bed and positioned for comfort, needed cues to remove underwear.   Safety issues present, Encouraged OOB activities and increased mobility. Patient demonstrated progress with gait today. Continue PT efforts. HHPT at d/c     SUBJECTIVE:   Mr. 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   OBJECTIVE:     PAIN: Sriram Rodarte / O2: PRECAUTION / Jolena Jewels / DRAINS:   Pre Treatment: 0/10         Post Treatment: 0/10 Vitals        Oxygen    Robles Catheter and Wound Vac    RESTRICTIONS/PRECAUTIONS:  Restrictions/Precautions  Restrictions/Precautions: Fall Risk  Restrictions/Precautions: Fall Risk     MOBILITY: I Mod I S SBA CGA Min Mod Max Total  NT x2 Comments:   Bed Mobility    Rolling [] [] [x] [] [] [] [] [] [] [] []    Supine to Sit [] [] [x] [] [] [] [] [] [] [] []    Scooting [] [] [x] [] [] [] [] [] [] [] []    Sit to Supine [] [] [x] [] [] [] [] [] [] [] []    Transfers    Sit to Stand [] [] [] [] [] [x] [] [] [] [] []    Bed to Chair [] [] [] [] [] [x] [] [] [] [] []    Stand to Sit [] [] [] [] [] [x] [] [] [] [] []     [] [] [] [] [] [] [] [] [] [] []    I=Independent, Mod I=Modified Independent, S=Supervision, SBA=Standby Assistance, CGA=Contact Guard Assistance,   Min=Minimal Assistance, Mod=Moderate Assistance, Max=Maximal Assistance, Total=Total Assistance, NT=Not Tested    BALANCE: Good Fair+ Fair Fair- Poor NT Comments   Sitting Static [x] [] [] [] [] []    Sitting Dynamic [x] [] [] [] [] []              Standing Static [] [] [x] [] [] []    Standing Dynamic [] [] [x] [] [] []      GAIT: I Mod I S SBA CGA Min Mod Max Total  NT x2 Comments:   Level of Assistance [] [] [] [] [] [x] [] [] [] [] []    Distance  250 feet    DME Gait Belt and Rolling Walker    Gait Quality Decreased miki , Decreased step clearance, Decreased step length, Trunk flexion, Trunk sway increased, and Wide base of support    Weightbearing Status      Stairs      I=Independent, Mod I=Modified Independent, S=Supervision, SBA=Standby Assistance, CGA=Contact Guard Assistance,   Min=Minimal Assistance, Mod=Moderate Assistance, Max=Maximal Assistance, Total=Total Assistance, NT=Not Tested    PLAN:   FREQUENCY AND DURATION: 3 times/week for duration of hospital stay or until stated goals are met, whichever comes first.    TREATMENT:   TREATMENT:   Therapeutic Activity (10 Minutes): Therapeutic activity included Rolling, Supine to Sit, Sit to Supine, Scooting, Transfer Training, Ambulation on level ground, and Standing balance to improve functional Activity tolerance, Balance, Mobility, and Strength. Gait Training (8 Minutes): Gait training for 250 feet utilizing Gait Belt and [address]. Patient required Tactile and Verbal cueing to improve Activity Pacing, Assistive Device Utilization, and Gait Mechanics.      TREATMENT GRID:  N/A    AFTER TREATMENT PRECAUTIONS: Bed, Bed/Chair Locked, Call light within reach, Needs within reach, and RN notified    INTERDISCIPLINARY COLLABORATION:  RN/ PCT and PT/ PTA    EDUCATION:  review POC and importance of mobility in the recovery process    TIME IN/OUT:  Time In: 1440  Time Out: [address]  Minutes: 18    Sulma Segundo PTA

## 2022-10-19 LAB
ANION GAP SERPL CALC-SCNC: 4 MMOL/L (ref 2–11)
BUN SERPL-MCNC: 12 MG/DL (ref 6–23)
CALCIUM SERPL-MCNC: 8.7 MG/DL (ref 8.3–10.4)
CHLORIDE SERPL-SCNC: 109 MMOL/L (ref 101–110)
CO2 SERPL-SCNC: 30 MMOL/L (ref 21–32)
CREAT SERPL-MCNC: 1.7 MG/DL (ref 0.8–1.5)
GLUCOSE BLD STRIP.AUTO-MCNC: 102 MG/DL (ref 65–100)
GLUCOSE BLD STRIP.AUTO-MCNC: 115 MG/DL (ref 65–100)
GLUCOSE BLD STRIP.AUTO-MCNC: 122 MG/DL (ref 65–100)
GLUCOSE BLD STRIP.AUTO-MCNC: 159 MG/DL (ref 65–100)
GLUCOSE SERPL-MCNC: 101 MG/DL (ref 65–100)
POTASSIUM SERPL-SCNC: 4.1 MMOL/L (ref 3.5–5.1)
SERVICE CMNT-IMP: ABNORMAL
SODIUM SERPL-SCNC: 143 MMOL/L (ref 133–143)

## 2022-10-19 PROCEDURE — 82962 GLUCOSE BLOOD TEST: CPT

## 2022-10-19 PROCEDURE — 6370000000 HC RX 637 (ALT 250 FOR IP)

## 2022-10-19 PROCEDURE — 97605 NEG PRS WND THER DME<=50SQCM: CPT

## 2022-10-19 PROCEDURE — 2580000003 HC RX 258: Performed by: FAMILY MEDICINE

## 2022-10-19 PROCEDURE — 36415 COLL VENOUS BLD VENIPUNCTURE: CPT

## 2022-10-19 PROCEDURE — 1100000000 HC RM PRIVATE

## 2022-10-19 PROCEDURE — 6360000002 HC RX W HCPCS: Performed by: FAMILY MEDICINE

## 2022-10-19 PROCEDURE — 6370000000 HC RX 637 (ALT 250 FOR IP): Performed by: INTERNAL MEDICINE

## 2022-10-19 PROCEDURE — 6370000000 HC RX 637 (ALT 250 FOR IP): Performed by: STUDENT IN AN ORGANIZED HEALTH CARE EDUCATION/TRAINING PROGRAM

## 2022-10-19 PROCEDURE — 6370000000 HC RX 637 (ALT 250 FOR IP): Performed by: FAMILY MEDICINE

## 2022-10-19 PROCEDURE — 97607 NEG PRS WND THR NDME<=50SQCM: CPT

## 2022-10-19 PROCEDURE — 80048 BASIC METABOLIC PNL TOTAL CA: CPT

## 2022-10-19 RX ORDER — INSULIN GLARGINE 100 [IU]/ML
15 INJECTION, SOLUTION SUBCUTANEOUS DAILY
Status: DISCONTINUED | OUTPATIENT
Start: 2022-10-19 | End: 2022-10-23

## 2022-10-19 RX ADMIN — SODIUM CHLORIDE, PRESERVATIVE FREE 10 ML: 5 INJECTION INTRAVENOUS at 20:43

## 2022-10-19 RX ADMIN — ENOXAPARIN SODIUM 30 MG: 100 INJECTION SUBCUTANEOUS at 08:09

## 2022-10-19 RX ADMIN — ANTI-FUNGAL POWDER MICONAZOLE NITRATE TALC FREE: 1.42 POWDER TOPICAL at 08:20

## 2022-10-19 RX ADMIN — POLYETHYLENE GLYCOL 3350 17 G: 17 POWDER, FOR SOLUTION ORAL at 08:09

## 2022-10-19 RX ADMIN — DOCUSATE SODIUM 100 MG: 100 CAPSULE, LIQUID FILLED ORAL at 20:41

## 2022-10-19 RX ADMIN — METHOCARBAMOL TABLETS 750 MG: 750 TABLET, COATED ORAL at 17:21

## 2022-10-19 RX ADMIN — METHOCARBAMOL TABLETS 750 MG: 750 TABLET, COATED ORAL at 20:41

## 2022-10-19 RX ADMIN — SODIUM CHLORIDE, PRESERVATIVE FREE 10 ML: 5 INJECTION INTRAVENOUS at 08:21

## 2022-10-19 RX ADMIN — ATORVASTATIN CALCIUM 40 MG: 40 TABLET, FILM COATED ORAL at 20:41

## 2022-10-19 RX ADMIN — TRAMADOL HYDROCHLORIDE 50 MG: 50 TABLET, COATED ORAL at 08:10

## 2022-10-19 RX ADMIN — INSULIN GLARGINE 15 UNITS: 100 INJECTION, SOLUTION SUBCUTANEOUS at 08:16

## 2022-10-19 RX ADMIN — FLUCONAZOLE 200 MG: 100 TABLET ORAL at 08:10

## 2022-10-19 RX ADMIN — TRAMADOL HYDROCHLORIDE 50 MG: 50 TABLET, COATED ORAL at 15:49

## 2022-10-19 RX ADMIN — METHOCARBAMOL TABLETS 750 MG: 750 TABLET, COATED ORAL at 13:09

## 2022-10-19 RX ADMIN — DOCUSATE SODIUM 100 MG: 100 CAPSULE, LIQUID FILLED ORAL at 08:10

## 2022-10-19 RX ADMIN — ENOXAPARIN SODIUM 30 MG: 100 INJECTION SUBCUTANEOUS at 20:42

## 2022-10-19 RX ADMIN — GABAPENTIN 300 MG: 300 CAPSULE ORAL at 20:41

## 2022-10-19 RX ADMIN — TRAMADOL HYDROCHLORIDE 50 MG: 50 TABLET, COATED ORAL at 20:41

## 2022-10-19 RX ADMIN — METHOCARBAMOL TABLETS 750 MG: 750 TABLET, COATED ORAL at 08:10

## 2022-10-19 RX ADMIN — TRAMADOL HYDROCHLORIDE 50 MG: 50 TABLET, COATED ORAL at 01:38

## 2022-10-19 RX ADMIN — GABAPENTIN 300 MG: 300 CAPSULE ORAL at 13:09

## 2022-10-19 RX ADMIN — ASPIRIN 81 MG: 81 TABLET ORAL at 08:10

## 2022-10-19 RX ADMIN — GABAPENTIN 300 MG: 300 CAPSULE ORAL at 08:10

## 2022-10-19 RX ADMIN — OXYCODONE 10 MG: 5 TABLET ORAL at 05:21

## 2022-10-19 RX ADMIN — Medication: at 08:20

## 2022-10-19 RX ADMIN — VANCOMYCIN HYDROCHLORIDE 1500 MG: 100 INJECTION, POWDER, LYOPHILIZED, FOR SOLUTION INTRAVENOUS at 21:33

## 2022-10-19 RX ADMIN — OXYCODONE 10 MG: 5 TABLET ORAL at 23:45

## 2022-10-19 ASSESSMENT — PAIN SCALES - GENERAL
PAINLEVEL_OUTOF10: 10
PAINLEVEL_OUTOF10: 10

## 2022-10-19 ASSESSMENT — PAIN DESCRIPTION - LOCATION
LOCATION: GROIN
LOCATION: GROIN

## 2022-10-19 ASSESSMENT — PAIN DESCRIPTION - ORIENTATION
ORIENTATION: INNER
ORIENTATION: INNER;RIGHT

## 2022-10-19 ASSESSMENT — PAIN DESCRIPTION - DESCRIPTORS
DESCRIPTORS: THROBBING
DESCRIPTORS: ACHING

## 2022-10-19 NOTE — PROGRESS NOTES
VANCO DAILY FOLLOW UP NOTE  [address] [facility] Pharmacokinetic Monitoring Service - Vancomycin    Consulting Provider: Dr. Jessa Bosch   Indication: SSTI due to MRSA  Target Concentration: Goal trough of 10-15 mg/L and AUC/MADINA <500 mg*hr/L  Day of Therapy: 12  Additional Antimicrobials: none    Pertinent Laboratory Values: Wt Readings from Last 1 Encounters:   10/19/22 (!) 310 lb (140.6 kg)     Temp Readings from Last 1 Encounters:   10/19/22 98.8 °F (37.1 °C) (Oral)     Recent Labs     10/17/22  0800 10/18/22  0600 10/19/22  0554   BUN 12 11 12   CREATININE 1.60* 1.50 1.70*   WBC 9.3 8.1  --      Estimated Creatinine Clearance: 71 mL/min (A) (based on SCr of 1.7 mg/dL (H)). Lab Results   Component Value Date/Time    VANCORANDOM 22.6 10/17/2022 06:54 AM     MRSA Nasal Swab: N/A. Non-respiratory infection. Assessment:  Date/Time Dose Concentration AUC   10/10 0706 1250 mg q18h 21.6 513   10/12 0558 1250 mg q24h 23.8 452   10/17 0654 1500 mg q24h 22.6 513   Note: Serum concentrations collected for AUC dosing may appear elevated if collected in close proximity to the dose administered, this is not necessarily an indication of toxicity    Plan:  Dosing per [employer]. 2.  Continue current dose of 1500 mg Q24H  3. Repeat vancomycin concentrations ordered for 10/20 0600  4.   Pharmacy will continue to monitor patient and adjust therapy as indicated    Thank you for the consult,  Benito Morrow, [facility]

## 2022-10-19 NOTE — CARE COORDINATION
LOS 14d    Chart reviewed by [facility] and discussed in IDR. Patient POD 12, s/p back absces I&D, inguinal, penile debridement. Wound cultures with MRSA. ID following. On IV abx, with EOT 11/5 - 11/19. Per ID, will transition to PO when stable for discharge. 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 CM following.

## 2022-10-19 NOTE — PROGRESS NOTES
Admit Date: 10/5/2022    POD 12 Days Post-Op    Procedure:  Procedure(s):  BACK ABSCESS I & D  INGUINAL AND PENILE DEBRIDEMENT (Urology)    Subjective:     Pt alert in bed with no complaints or new issues. Pt is tolerating a Regular Diabetic diet. No nausea or vomiting. Pain currently controlled. Reports +flatus/+BM. AF. NAD. Objective:       Vitals:    10/18/22 2357 10/19/22 0421 10/19/22 0609 10/19/22 0734   BP: (!) 154/87 (!) 146/87  (!) 143/86   Pulse: 80 76  76   Resp:   16   Temp: 98.1 °F (36.7 °C) 98.2 °F (36.8 °C)  98.8 °F (37.1 °C)   TempSrc: Oral Oral  Oral   SpO2: 92% 96%  95%   Weight:   (!) 310 lb (140.6 kg)    Height:           Temp (24hrs), Av.5 °F (36.9 °C), Min:98.1 °F (36.7 °C), Max:98.9 °F (37.2 °C)  . I&O reviewed as documented. Robles 5375 ml clear yellow UOP past 24 h  +BM     Physical Exam  Constitutional:       General: He is not in acute distress. Appearance: He is obese. HENT:      Mouth/Throat:      Mouth: Mucous membranes are moist.   Cardiovascular:      Rate and Rhythm: Normal rate and regular rhythm. Pulses: Normal pulses. Heart sounds: Normal heart sounds. No murmur heard. No friction rub. No gallop. Pulmonary:      Effort: Pulmonary effort is normal. No respiratory distress. Breath sounds: Normal breath sounds. Abdominal:      General: Bowel sounds are normal. There is no distension. Palpations: Abdomen is soft. Genitourinary:     Comments: Robles  Musculoskeletal:         General: No swelling. Normal range of motion. Cervical back: Normal range of motion. Comments: Vac in place on back debridement area (see Catherene Bridges detailed note) granulating. Skin:     General: Skin is warm and dry. Comments: Left groin with drsg c/d/I. Neurological:      General: No focal deficit present. Mental Status: He is alert and oriented to person, place, and time.    Psychiatric:         Mood and Affect: Mood normal. Behavior: Behavior normal.        Labs:   Recent Results (from the past 24 hour(s))   POCT Glucose    Collection Time: 10/18/22 11:44 AM   Result Value Ref Range    POC Glucose 106 (H) 65 - 100 mg/dL    Performed by: [employer]    POCT Glucose    Collection Time: 10/18/22  4:44 PM   Result Value Ref Range    POC Glucose 113 (H) 65 - 100 mg/dL    Performed by: [employer] Englis[employer]CA    POCT Glucose    Collection Time: 10/18/22  9:31 PM   Result Value Ref Range    POC Glucose 111 (H) 65 - 100 mg/dL    Performed by: [employer]    Basic Metabolic Panel w/ Reflex to MG    Collection Time: 10/19/22  5:54 AM   Result Value Ref Range    Sodium 143 133 - 143 mmol/L    Potassium 4.1 3.5 - 5.1 mmol/L    Chloride 109 101 - 110 mmol/L    CO2 30 21 - 32 mmol/L    Anion Gap 4 2 - 11 mmol/L    Glucose 101 (H) 65 - 100 mg/dL    BUN 12 6 - 23 MG/DL    Creatinine 1.70 (H) 0.8 - 1.5 MG/DL    Est, Glom Filt Rate 46 (L) >60 ml/min/1.73m2    Calcium 8.7 8.3 - 10.4 MG/DL   POCT Glucose    Collection Time: 10/19/22  7:37 AM   Result Value Ref Range    POC Glucose 102 (H) 65 - 100 mg/dL    Performed by: ReevesTaylorRN           Assessment:     Principal Problem:    Sepsis ([address]  Active Problems:    Primary hypertension    DM2 (diabetes mellitus, type 2) ([facility])    Cellulitis and abscess of trunk    Abscess of groin, left    Penile abscess    Acute urinary retention    Bilateral arm weakness    Ulnar neuropathy at elbow of right upper extremity  Resolved Problems:    * No resolved hospital problems. *        Plan/Recommendations/Medical Decision Making:     Continue present treatment   Vac to L back - appreciate [facility] assist.  Granulating. Will be dc with wet to dry. L Groin dressing wet to dry dakins  Increased activity with PT. Mesh underwear for activity and OOB since pt complains of pain when penis moves when repositioning. Wheel chair cushion ordered for up in chair. Supplies ordered per PT recommendation.    Wbc remains normalized Tissue and wound culture positive MRSA-Pt on Vancomycin IV (can be transitioned to PO bactrim or doxy when ready for dc) and Diflucan PO (last dose 10/21). Gen Surg will follow loosely. Will have patient go to wound care center for daily dressing changes at dc as social situation precludes [facility] or wound vac at discharge (see case management notes).      Ayaan Fam, APRN - CNP

## 2022-10-19 NOTE — WOUND CARE
Patient seen for wound VAC dressing to back. Wound clean and granulating. Tracked tubing to left flank. Patient updated and all questions answered. Primary nurse will do remaining dressings as discussed.

## 2022-10-19 NOTE — PROGRESS NOTES
Infectious Disease Progress Note     Today's Date: 10/19/2022   Admit Date: 10/5/2022     Impression:   MRSA soft tissue paraspinous abscess: [id number]  abscess abutting the left paraspinous musculatures, abundant inflammatory changes in left pubic region and a left 3.8cm pubic abscess. S/P I&D 10/7/22 with General Surgery and . Cx with MRSA, no bactermia  MRSA penile abscess / perineal soft tissue infection  S/p I&D 10/7/22; Cx: MRSA  Visual disturbance / R arm weakness; Neurology feel arm weakness related to compression of ulnar nerves at elbows. Urinary retention  Poorly controlled DM;insulin started here and statin     Plan:   Continue IV vancomycin while inpatient, can transition to Bactrim or Doxy when ready for discharge. Anticipate 4-6 weeks total ABX, EOT 11/5-11/19/22. ID will continue to follow.       Anti-infectives:   PO fluconazole 10/12 -  IV vancomycin 10/5-  Zosyn 10/5-9

## 2022-10-19 NOTE — PROGRESS NOTES
END OF SHIFT NOTE:    INTAKE/OUTPUT  10/18 0701 - 10/19 0700  In: 1617.5 [P.O.:1085; I.V.:532.5]  Out: 0430 [Urine:3950; Drains:10]  Voiding: No  Catheter: Yes  Drain:   Negative Pressure Wound Therapy Back Left;Mid (Active)   $ Standard NPWT <=50 sq cm PER TX $ Yes 10/17/22 1529   Wound Type Surgical 10/18/22 1323   Unit Type kci ulta 10/18/22 1323   Dressing Type Black Foam 10/18/22 1323   Number of pieces used 1 10/17/22 1529   Number of pieces removed 1 10/17/22 1529   Cycle Continuous 10/18/22 1910   Target Pressure (mmHg) 125 10/18/22 1910   Canister changed? No 10/18/22 1910   Dressing Status Clean, dry & intact 10/18/22 1910   Dressing Changed Changed/New 10/17/22 1529   Drainage Amount Moderate 10/18/22 1910   Drainage Description Serosanguinous 10/18/22 1910   Dressing Change Due 10/19/22 10/18/22 1910   Output (ml) 10 ml 10/18/22 1910   Wound Assessment Granulation tissue 10/17/22 1529   Nemo-wound Assessment Intact 10/18/22 1910   Shape oval 10/17/22 1529   Odor None 10/17/22 1529       Urinary Catheter 10/06/22 Robles (Active)   $ Urethral catheter insertion Inserted for procedure 10/10/22 0800   Catheter Indications Perioperative use for selected surgical procedures;Assist in healing of open sacral or perineal wounds (Stage III, IV, or unstageable documented by a provider or enterostomal therapy) and/or full thickness perineal and lower extremity burns in incontinent patients 10/18/22 1910   Site Assessment No urethral drainage 10/18/22 1910   Urine Color Yellow 10/18/22 1910   Urine Appearance Clear 10/18/22 1910   Urine Odor Other (Comment) 10/18/22 0735   Collection Container Standard 10/18/22 1910   Securement Method Securing device (Describe) 10/18/22 1910   Catheter Care Completed Yes 10/19/22 0015   Catheter Best Practices  Drainage tube clipped to bed;Catheter secured to thigh; Tamper seal intact; Bag below bladder;Bag not on floor; Lack of dependent loop in tubing;Drainage bag less than half full 10/18/22 1910   Status Draining;Patent 10/18/22 1910   Output (mL) 900 mL 10/19/22 0421               Flatus: Patient does have flatus present. Stool:  occurrences. Characteristics:           Stool Assessment  Last BM (including prior to admit): 10/03/22 (per patient)    Emesis:  occurrences. Characteristics:        VITAL SIGNS  Patient Vitals for the past 12 hrs:   Temp Pulse Resp BP SpO2   10/19/22 0421 98.2 °F (36.8 °C) 76 18 (!) 146/87 96 %   10/18/22 2357 98.1 °F (36.7 °C) 80 18 (!) 154/87 92 %   10/18/22 1937 98.1 °F (36.7 °C) 74 18 (!) 152/89 93 %       Pain Assessment  Pain Level: 10 (10/19/22 0521)  Pain Location: Groin  Patient's Stated Pain Goal: 5    Ambulating  No    Shift report to be given to oncoming nurse at the bedside.     Jerald Herrera RN

## 2022-10-19 NOTE — DIABETES MGMT
Patient admitted with sepsis. Blood glucose ranged  yesterday with patient receiving Lantus 20 units. Blood glucose this morning was 102. Creatinine 1.70. GFR 46. Reviewed patient current regimen: Lantus 20 units daily and Humalog correctional insulin. Patient would likely benefit from a reduction in basal insulin to reduce risk of morning hypoglycemia. Provider updated via [employer] regarding recommendations and patient glycemic control. New orders received to decrease Lantus from 20 units to 15 units daily.

## 2022-10-20 LAB
BASOPHILS # BLD: 0.1 K/UL (ref 0–0.2)
BASOPHILS NFR BLD: 1 % (ref 0–2)
DIFFERENTIAL METHOD BLD: ABNORMAL
EOSINOPHIL # BLD: 0.3 K/UL (ref 0–0.8)
EOSINOPHIL NFR BLD: 4 % (ref 0.5–7.8)
ERYTHROCYTE [DISTWIDTH] IN BLOOD BY AUTOMATED COUNT: 14.3 % (ref 11.9–14.6)
GLUCOSE BLD STRIP.AUTO-MCNC: 133 MG/DL (ref 65–100)
GLUCOSE BLD STRIP.AUTO-MCNC: 136 MG/DL (ref 65–100)
GLUCOSE BLD STRIP.AUTO-MCNC: 146 MG/DL (ref 65–100)
GLUCOSE BLD STRIP.AUTO-MCNC: 184 MG/DL (ref 65–100)
HCT VFR BLD AUTO: 29 % (ref 41.1–50.3)
HGB BLD-MCNC: 9 G/DL (ref 13.6–17.2)
IMM GRANULOCYTES # BLD AUTO: 0 K/UL (ref 0–0.5)
IMM GRANULOCYTES NFR BLD AUTO: 0 % (ref 0–5)
LYMPHOCYTES # BLD: 2.4 K/UL (ref 0.5–4.6)
LYMPHOCYTES NFR BLD: 30 % (ref 13–44)
MCH RBC QN AUTO: 28.9 PG (ref 26.1–32.9)
MCHC RBC AUTO-ENTMCNC: 31 G/DL (ref 31.4–35)
MCV RBC AUTO: 93.2 FL (ref 82–102)
MONOCYTES # BLD: 0.6 K/UL (ref 0.1–1.3)
MONOCYTES NFR BLD: 7 % (ref 4–12)
NEUTS SEG # BLD: 4.5 K/UL (ref 1.7–8.2)
NEUTS SEG NFR BLD: 58 % (ref 43–78)
NRBC # BLD: 0 K/UL (ref 0–0.2)
PLATELET # BLD AUTO: 511 K/UL (ref 150–450)
PMV BLD AUTO: 10.2 FL (ref 9.4–12.3)
RBC # BLD AUTO: 3.11 M/UL (ref 4.23–5.6)
SERVICE CMNT-IMP: ABNORMAL
VANCOMYCIN SERPL-MCNC: 26.7 UG/ML
WBC # BLD AUTO: 7.8 K/UL (ref 4.3–11.1)

## 2022-10-20 PROCEDURE — 82962 GLUCOSE BLOOD TEST: CPT

## 2022-10-20 PROCEDURE — 97530 THERAPEUTIC ACTIVITIES: CPT

## 2022-10-20 PROCEDURE — 97112 NEUROMUSCULAR REEDUCATION: CPT

## 2022-10-20 PROCEDURE — 6370000000 HC RX 637 (ALT 250 FOR IP): Performed by: INTERNAL MEDICINE

## 2022-10-20 PROCEDURE — 85025 COMPLETE CBC W/AUTO DIFF WBC: CPT

## 2022-10-20 PROCEDURE — 6370000000 HC RX 637 (ALT 250 FOR IP): Performed by: FAMILY MEDICINE

## 2022-10-20 PROCEDURE — 6370000000 HC RX 637 (ALT 250 FOR IP): Performed by: STUDENT IN AN ORGANIZED HEALTH CARE EDUCATION/TRAINING PROGRAM

## 2022-10-20 PROCEDURE — 1100000000 HC RM PRIVATE

## 2022-10-20 PROCEDURE — 6360000002 HC RX W HCPCS: Performed by: FAMILY MEDICINE

## 2022-10-20 PROCEDURE — 2580000003 HC RX 258: Performed by: FAMILY MEDICINE

## 2022-10-20 PROCEDURE — 36415 COLL VENOUS BLD VENIPUNCTURE: CPT

## 2022-10-20 PROCEDURE — 80202 ASSAY OF VANCOMYCIN: CPT

## 2022-10-20 PROCEDURE — 6370000000 HC RX 637 (ALT 250 FOR IP)

## 2022-10-20 PROCEDURE — 97535 SELF CARE MNGMENT TRAINING: CPT

## 2022-10-20 RX ORDER — SODIUM CHLORIDE 9 MG/ML
INJECTION, SOLUTION INTRAVENOUS CONTINUOUS
Status: DISCONTINUED | OUTPATIENT
Start: 2022-10-20 | End: 2022-10-24

## 2022-10-20 RX ADMIN — OXYCODONE 10 MG: 5 TABLET ORAL at 13:07

## 2022-10-20 RX ADMIN — ENOXAPARIN SODIUM 30 MG: 100 INJECTION SUBCUTANEOUS at 20:55

## 2022-10-20 RX ADMIN — METHOCARBAMOL TABLETS 750 MG: 750 TABLET, COATED ORAL at 18:12

## 2022-10-20 RX ADMIN — ENOXAPARIN SODIUM 30 MG: 100 INJECTION SUBCUTANEOUS at 08:16

## 2022-10-20 RX ADMIN — TRAMADOL HYDROCHLORIDE 50 MG: 50 TABLET, COATED ORAL at 08:16

## 2022-10-20 RX ADMIN — GABAPENTIN 300 MG: 300 CAPSULE ORAL at 08:16

## 2022-10-20 RX ADMIN — Medication: at 13:00

## 2022-10-20 RX ADMIN — DOCUSATE SODIUM 100 MG: 100 CAPSULE, LIQUID FILLED ORAL at 20:55

## 2022-10-20 RX ADMIN — TRAMADOL HYDROCHLORIDE 50 MG: 50 TABLET, COATED ORAL at 20:55

## 2022-10-20 RX ADMIN — FLUCONAZOLE 200 MG: 100 TABLET ORAL at 08:16

## 2022-10-20 RX ADMIN — SODIUM CHLORIDE, PRESERVATIVE FREE 10 ML: 5 INJECTION INTRAVENOUS at 20:55

## 2022-10-20 RX ADMIN — SODIUM CHLORIDE: 900 INJECTION, SOLUTION INTRAVENOUS at 08:14

## 2022-10-20 RX ADMIN — TRAMADOL HYDROCHLORIDE 50 MG: 50 TABLET, COATED ORAL at 03:16

## 2022-10-20 RX ADMIN — GABAPENTIN 300 MG: 300 CAPSULE ORAL at 20:55

## 2022-10-20 RX ADMIN — INSULIN GLARGINE 15 UNITS: 100 INJECTION, SOLUTION SUBCUTANEOUS at 08:17

## 2022-10-20 RX ADMIN — DOCUSATE SODIUM 100 MG: 100 CAPSULE, LIQUID FILLED ORAL at 08:16

## 2022-10-20 RX ADMIN — VANCOMYCIN HYDROCHLORIDE 1500 MG: 100 INJECTION, POWDER, LYOPHILIZED, FOR SOLUTION INTRAVENOUS at 20:55

## 2022-10-20 RX ADMIN — GABAPENTIN 300 MG: 300 CAPSULE ORAL at 13:07

## 2022-10-20 RX ADMIN — ANTI-FUNGAL POWDER MICONAZOLE NITRATE TALC FREE: 1.42 POWDER TOPICAL at 00:15

## 2022-10-20 RX ADMIN — ASPIRIN 81 MG: 81 TABLET ORAL at 08:16

## 2022-10-20 RX ADMIN — OXYCODONE 10 MG: 5 TABLET ORAL at 06:20

## 2022-10-20 RX ADMIN — Medication: at 00:15

## 2022-10-20 RX ADMIN — TRAMADOL HYDROCHLORIDE 50 MG: 50 TABLET, COATED ORAL at 15:46

## 2022-10-20 RX ADMIN — METHOCARBAMOL TABLETS 750 MG: 750 TABLET, COATED ORAL at 20:55

## 2022-10-20 RX ADMIN — METHOCARBAMOL TABLETS 750 MG: 750 TABLET, COATED ORAL at 13:07

## 2022-10-20 RX ADMIN — METHOCARBAMOL TABLETS 750 MG: 750 TABLET, COATED ORAL at 08:16

## 2022-10-20 RX ADMIN — ATORVASTATIN CALCIUM 40 MG: 40 TABLET, FILM COATED ORAL at 20:55

## 2022-10-20 ASSESSMENT — PAIN DESCRIPTION - LOCATION: LOCATION: GROIN

## 2022-10-20 ASSESSMENT — PAIN SCALES - GENERAL: PAINLEVEL_OUTOF10: 10

## 2022-10-20 ASSESSMENT — PAIN DESCRIPTION - ORIENTATION: ORIENTATION: INNER;LEFT

## 2022-10-20 ASSESSMENT — PAIN DESCRIPTION - DESCRIPTORS: DESCRIPTORS: BURNING;STABBING

## 2022-10-20 NOTE — PROGRESS NOTES
Infectious Disease Progress Note    Today's Date: 10/20/2022   Admit Date: 10/5/2022    Impression:   MRSA soft tissue paraspinous abscess: [id number]  abscess abutting the left paraspinous musculatures, abundant inflammatory changes in left pubic region and a left 3.8cm pubic abscess. S/P I&D 10/7/22 with General Surgery and . Cx with MRSA, no bactermia  MRSA penile abscess / perineal soft tissue infection  S/p I&D 10/7/22; Cx: MRSA  Visual disturbance / R arm weakness  Urinary retention  Poorly controlled DM;insulin started here and statin    Plan:   Continue IV vancomycin while inpatient, can transition to Bactrim or Doxy when ready for discharge. ID will continue to follow. Anti-infectives:   PO fluconazole 10/12 -  IV vancomycin 10/5-  Zosyn 10/5-    Subjective: Interval history: Afebrile. WBC 7.8. Patient up walking the halls with PT this morning. His pain is more controlled. No N/V/D. Patient is a 60 y.o. male with PMH of poorly controlled DM, who  has lived in the [address] [place] [address] since he was 1years old. He has lived in [place] and [place]. He moved to the [address] [place], [place] [id number] when he was 2 from [facility]. He is visiting [place], history of DM 2 HgbA1c of >14, reports that he is on metformin, admitted on 10/5/22 , with primary complaint of persistent draining back wound for about 2 months. S/p bedside I&D of groin abscess by urology on 10/06/22. A CT done showed a 99z62k5.9cm  abscess abutting the left paraspinous musculatures, abundant inflammatory changes in left pubic region and a left 3.8cm pubic abscess. Patient denies any trauma the area. He also has drainage from his left groin and shaft of his penis. No Known Allergies     Review of Systems:  A comprehensive review of systems is negative except as stated above.       Objective:     Visit Vitals  BP (!) 147/87   Pulse 76   Temp 98.8 °F (37.1 °C) (Oral)   Resp 16   Ht 6' 2\" (1.88 m)   Wt (!) 310 lb (140.6 kg)   SpO2 99%   BMI 39.80 kg/m²     Temp (24hrs), Av.6 °F (37 °C), Min:98.1 °F (36.7 °C), Max:99 °F (37.2 °C)     Patient was seen and examined on 10/20/2022 and physical exam remains unchanged since yesterday, unless noted below:    Lines:  Peripheral IV:       Physical Exam:    General:  In no acute distress   Eyes:  Non-icteric, PERRL   Mouth/Throat: No thrush, mucosa pink   Neck: Supple and symmetric   Lungs:   Breathing comfortably, breath sounds clear, no wheezing   CV:   S1/S2; no murmurs noted   Abdomen:   Non-distended, soft, BS normal   Extremities: Trace edema LE   Skin:  wound vac on back in place with seal intact, Left groin dressing intact   Lymph nodes:    Musculoskeletal: No swollen or inflamed joints   Lines/Devices: Robles   Psych: Alert and oriented       Data Review:     CBC:  Recent Labs     10/18/22  0600 10/20/22  0544   WBC 8.1 7.8   HGB 9.0* 9.0*   HCT 30.9* 29.0*   * 511*         BMP:  Recent Labs     10/18/22  0600 10/19/22  0554   BUN 11 12    143   K 4.2 4.1    109   CO2 27 30         LFTS:  No results for input(s): ALT, TP, ALB in the last 72 hours. Invalid input(s): TBILI, SGOT, AP    Microbiology:   Reviewed  Susceptibility    Methicillin-Resistant Staphylococcus aureus (1)    Antibiotic Interpretation Microscan  Method Status    trimethoprim-sulfamethoxazole Sensitive <=0.5/9.5 ug/mL BACTERIAL SUSCEPTIBILITY PANEL MADINA Final    tetracycline Sensitive <=0.5 ug/mL BACTERIAL SUSCEPTIBILITY PANEL MADINA Final    clindamycin Sensitive <=0.5 ug/mL BACTERIAL SUSCEPTIBILITY PANEL MADINA Final    vancomycin Sensitive 1 ug/mL BACTERIAL SUSCEPTIBILITY PANEL MADINA Final    oxacillin Resistant >2 ug/mL BACTERIAL SUSCEPTIBILITY PANEL MADINA Final    rifampin Sensitive <=0.5 ug/mL BACTERIAL SUSCEPTIBILITY PANEL MADINA Final     Rifampin is not to be used for mono-therapy. Specimen Collected: 10/05/22 20:23 EDT Last Resulted: 10/08/22 10:53 EDT        Imaging:   10/10/22 CT head:     FINDINGS: Brain volume is appropriate for age.  No acute infarct, hemorrhage or   mass is identified. There is no mass effect, midline shift or depressed   fracture. The visualized paranasal sinuses and mastoid air cells are clear,   except for mild right maxillary sinus mucosal thickening.        Signed By: LILLIAN Cruz     October 20, 2022

## 2022-10-20 NOTE — PROGRESS NOTES
VANCO DAILY FOLLOW UP NOTE  [address] Pharmacokinetic Monitoring Service - Vancomycin    Consulting Provider: Dr. Hector Olguin   Indication: SSTI due to MRSA  Target Concentration: Goal trough of 10-15 mg/L and AUC/MADINA <500 mg*hr/L  Day of Therapy: 13  Additional Antimicrobials: none    Pertinent Laboratory Values: Wt Readings from Last 1 Encounters:   10/19/22 (!) 310 lb (140.6 kg)     Temp Readings from Last 1 Encounters:   10/20/22 98.8 °F (37.1 °C) (Oral)     Recent Labs     10/18/22  0600 10/19/22  0554 10/20/22  0544   BUN 11 12  --    CREATININE 1.50 1.70*  --    WBC 8.1  --  7.8     Estimated Creatinine Clearance: 71 mL/min (A) (based on SCr of 1.7 mg/dL (H)). Lab Results   Component Value Date/Time    VANCORANDOM 26.7 10/20/2022 05:44 AM     MRSA Nasal Swab: N/A. Non-respiratory infection. Assessment:  Date/Time Dose Concentration AUC   10/10 0706 1250 mg q18h 21.6 513   10/12 0558 1250 mg q24h 23.8 452   10/17 0654 1500 mg q24h 22.6 513   10/20 0544 1500 mg q24h 26.7 534   Note: Serum concentrations collected for AUC dosing may appear elevated if collected in close proximity to the dose administered, this is not necessarily an indication of toxicity    Plan:  Dosing per [employer].   Continue current dose of 1500 mg Q24H  Repeat vancomycin concentrations ordered for 10/20 0600  Pharmacy will continue to monitor patient and adjust therapy as indicated    Thank you for the consult,  Tiffanie Li, PharmD, BCOP  Clinical Pharmacist  Contact [employer]

## 2022-10-20 NOTE — PROGRESS NOTES
ACUTE OCCUPATIONAL THERAPY GOALS:   (Developed with and agreed upon by patient and/or caregiver.)  1. Sarah Mcpherson will be modified independent with functional mobility for ADL in room within 4 - 7 visits. 2. Sarah Mcpherson will be modified independent with total body bathing and dressing within 4 - 7 visits. 3. Sarah Mcpherson will state and demonstrate at least 5 energy conservation techniques during ADL/therapeutic activities within 4 - 7 visits. 4. Sarah Mcpherson will voice a plan for appropriate home modifications for home safety and fall prevention within 7 visits. 5. Sarah Mcpherson will participate at least 30 minutes of ADL with 3 or less rest breaks within 7 visits. GOAL MET 10/18/22  6. Sarah Mcpherson will complete a toilet transfer with modified independence within 7 visits. OCCUPATIONAL THERAPY: Daily Note AM   OT Visit Days: 4   Time  OT Charge Capture  Rehab Caseload Tracker  OT Orders    Sarah Mcpherson is a 60 y.o. male   PRIMARY DIAGNOSIS: Sepsis ([facility] [address]  Cellulitis and abscess of trunk [L03.319, L02.219]  Abscess [L02.91]  Hyperglycemia [R73.9]  Abscess, perineum [L02.215]  Procedure(s) (LRB):  BACK ABSCESS I & D (N/A)  INGUINAL AND PENILE DEBRIDEMENT (N/A)  13 Days Post-Op  Inpatient: Payor: /     ASSESSMENT:     REHAB RECOMMENDATIONS: CURRENT LEVEL OF FUNCTION:  (Most Recently Demonstrated)   Recommendation to date pending progress:  Setting:  Home Health Therapy    Equipment:    None Bathing:  Not Tested  Dressing: Moderate Assist   Feeding/Grooming:  Not Tested   Toileting:  Not Tested  Functional Mobility:  Minimal Assist     ASSESSMENT:  Mr. Mimi Granados is doing fair today. Pt presents supine upon arrival. Pt required mod A for donning underwear. Pt performed bed mobility with SBA. Fair sitting balance noted at EOB. Pt was able to perform functional mobility in hallway with one seated rest break. Pt required cueing for proper RW use.  Pt not very receptive to education which makes him unsafe. Pt returned to room and to supine. Minimal progress made with goals today. Will continue to benefit from skilled OT during stay.        SUBJECTIVE:     Mr. Malou Pollard states, \"I can't use it like that\"    Social/Functional Lives With: Significant other  Type of Home: House  Home Access: Stairs to enter with rails  Entrance Stairs - Number of Steps: 4  Bathroom Equipment: Shower chair  Home Equipment: [email or web address]  ADL Assistance: [address]: Independent  Homemaking Responsibilities: Yes  Active : Yes  Mode of Transportation: Car  Occupation: Full time employment    OBJECTIVE:     Blas Mcgrath / Deepak Pérez / Deangelo Epps: Wound Vac    RESTRICTIONS/PRECAUTIONS:  Restrictions/Precautions  Restrictions/Precautions: Fall Risk        PAIN: VITALS / O2:   Pre Treatment:            Post Treatment: not quanitfied Vitals          Oxygen        MOBILITY: I Mod I S SBA CGA Min Mod Max Total  NT x2 Comments:   Bed Mobility    Rolling [] [] [] [] [] [] [] [] [] [] []    Supine to Sit [] [] [] [x] [] [] [] [] [] [] []    Scooting [] [] [] [x] [] [] [] [] [] [] []    Sit to Supine [] [] [] [x] [] [] [] [] [] [] []    Transfers    Sit to Stand [] [] [] [] [x] [x] [] [] [] [] []    Bed to Chair [] [] [] [] [] [] [] [] [] [x] []    Stand to Sit [] [] [] [] [x] [] [] [] [] [] []    Tub/Shower [] [] [] [] [] [] [] [] [] [x] []     Toilet [] [] [] [] [] [] [] [] [] [x] []      [] [] [] [] [] [] [] [] [] [] []    I=Independent, Mod I=Modified Independent, S=Supervision/Setup, SBA=Standby Assistance, CGA=Contact Guard Assistance, Min=Minimal Assistance, Mod=Moderate Assistance, Max=Maximal Assistance, Total=Total Assistance, NT=Not Tested    ACTIVITIES OF DAILY LIVING: I Mod I S SBA CGA Min Mod Max Total NT Comments   BASIC ADLs:              Upper Body   Bathing [] [] [] [] [] [] [] [] [] []    Lower Body Bathing [] [] [] [] [] [] [] [] [] [x]    Toileting [] [] [] [] [] [] [] [] [] []    Upper Body Dressing [] [] [] [] [] [] [] [] [] [x]    Lower Body Dressing [] [] [] [] [] [] [x] [] [] []    Feeding [] [] [] [] [] [] [] [] [] [x]    Grooming [] [] [] [] [] [] [] [] [] [x]    Personal Device Care [] [] [] [] [] [] [] [] [] [x]    Functional Mobility [] [] [] [] [] [x] [] [] [] [] X2 STS   I=Independent, Mod I=Modified Independent, S=Supervision/Setup, SBA=Standby Assistance, CGA=Contact Guard Assistance, Min=Minimal Assistance, Mod=Moderate Assistance, Max=Maximal Assistance, Total=Total Assistance, NT=Not Tested    BALANCE: Good Fair+ Fair Fair- Poor NT Comments   Sitting Static [] [x] [] [] [] []    Sitting Dynamic [] [x] [] [] [] []              Standing Static [] [] [x] [] [] []    Standing Dynamic [] [] [x] [] [] []        PLAN:     FREQUENCY/DURATION   OT Plan of Care: 3 times/week for duration of hospital stay or until stated goals are met, whichever comes first.    TREATMENT:     TREATMENT:   Neuromuscular Re-education (20 Minutes): Neuromuscular Re-education included Balance Training, Coordination training, Postural training, Sitting balance training, and Standing balance training to improve Balance, Coordination, and Functional Mobility. Self Care (32 minutes): Patient participated in lower body dressing ADLs in unsupported sitting and supine with moderate verbal and manual cueing to increase independence and decrease assistance required. Patient also participated in functional mobility and functional transfer training to increase independence, decrease assistance required, and increase activity tolerance.      TREATMENT GRID:  N/A    AFTER TREATMENT PRECAUTIONS: Bed, Bed/Chair Locked, Call light within reach, and Needs within reach    INTERDISCIPLINARY COLLABORATION:  RN/ PCT, PT/ PTA, and OT/ STEEN    EDUCATION:       TOTAL TREATMENT DURATION AND TIME:  Time In: 1010  Time Out: [address]  Minutes: 30    CHANDLER Ritter

## 2022-10-20 NOTE — PROGRESS NOTES
END OF SHIFT NOTE:    INTAKE/OUTPUT  10/19 0701 - 10/20 0700  In: 012 [P.O.:360; I.V.:597]  Out: 4736 [Urine:4575; Drains:40]  Voiding: No  Catheter: Yes  Drain:   Negative Pressure Wound Therapy Back Left;Mid (Active)   $ Standard NPWT <=50 sq cm PER TX $ Yes 10/17/22 1529   Wound Type Surgical 10/19/22 1018   Unit Type kci ulta 10/19/22 1018   Dressing Type Black Foam 10/19/22 1018   Number of pieces used 1 10/19/22 1010   Number of pieces removed 1 10/19/22 1010   Cycle Continuous 10/19/22 1920   Target Pressure (mmHg) 125 10/19/22 1920   Canister changed? No 10/19/22 1920   Dressing Status Clean, dry & intact 10/19/22 1920   Dressing Changed Changed/New 10/17/22 1529   Drainage Amount Moderate 10/19/22 1920   Drainage Description Serosanguinous 10/19/22 1920   Dressing Change Due 10/19/22 10/19/22 1920   Output (ml) 40 ml 10/19/22 1920   Wound Assessment Granulation tissue 10/19/22 1010   Nemo-wound Assessment Intact 10/19/22 1920   Shape oval 10/17/22 1529   Odor None 10/19/22 1010       Urinary Catheter 10/06/22 Robles (Active)   $ Urethral catheter insertion Inserted for procedure 10/10/22 0800   Catheter Indications Perioperative use for selected surgical procedures;Assist in healing of open sacral or perineal wounds (Stage III, IV, or unstageable documented by a provider or enterostomal therapy) and/or full thickness perineal and lower extremity burns in incontinent patients 10/19/22 1920   Site Assessment No urethral drainage 10/19/22 1920   Urine Color Yellow 10/19/22 1920   Urine Appearance Clear 10/19/22 1920   Urine Odor Other (Comment) 10/18/22 0735   Collection Container Standard 10/19/22 1920   Securement Method Securing device (Describe) 10/19/22 1920   Catheter Care Completed Yes 10/20/22 0015   Catheter Best Practices  Drainage tube clipped to bed;Catheter secured to thigh; Tamper seal intact; Bag below bladder;Bag not on floor; Lack of dependent loop in tubing;Drainage bag less than half full 10/19/22 1920   Status Draining;Patent 10/19/22 1920   Output (mL) 1000 mL 10/20/22 0324               Flatus: Patient does have flatus present. Stool:  occurrences. Characteristics:           Stool Assessment  Last BM (including prior to admit): 10/03/22 (per patient)    Emesis:  occurrences. Characteristics:        VITAL SIGNS  Patient Vitals for the past 12 hrs:   Temp Pulse Resp BP SpO2   10/20/22 0324 98.5 °F (36.9 °C) 86 18 (!) 146/92 95 %   10/19/22 2325 98.1 °F (36.7 °C) 82 16 (!) 157/93 95 %   10/19/22 1944 98.3 °F (36.8 °C) 77 16 (!) 165/88 95 %       Pain Assessment  Pain Level: 10 (10/20/22 0620)  Pain Location: Groin  Patient's Stated Pain Goal: 5    Ambulating  No    Shift report given to oncoming nurse at the bedside.     Sharyle Sellar, RN

## 2022-10-20 NOTE — DIABETES MGMT
Patient admitted with sepsis. Blood glucose ranged 101-159 yesterday with patient receiving Lantus 15 units. Blood glucose this morning was 136. Reviewed patient current regimen: Lantus 15 units daily and Humalog correctional insulin. Glycemic control overall stable on current regimen. Will follow along loosely.

## 2022-10-20 NOTE — PROGRESS NOTES
Hospitalist Progress Note   Admit Date:  10/5/2022  7:22 PM   Name:  Lamin Barrett   Age:  60 y.o. Sex:  male  :  1965   MRN:  [id number]   Room:      Reason(s) for Admission: Cellulitis and abscess of trunk [L03.319, L02.219]  Abscess [L02.91]  Hyperglycemia [R73.9]  Abscess, perineum [facility] Course & Interval History:   Mr. Rimma Mendoza is a nice 61 y/o male with a h/o DM2 and HTN who was admitted to our service on 10/5 with an abscess on his upper back for about 2 months. CT was done showing a paraspinal abscess w/o muscular involvement along with L inguinal inflammatory changes with possible early abscess of scrotum. Urology and General Surgery were consulted. He was started on antibiotics. Hyperglycemia, A1C >14, started on basal + bolus insulin. ID consulted, now on Zosyn. HIV neg, other ID orders pending. On 10/7 he underwent I&D of back abscess and L groin abscess. He later underwent debridement of penile abscess with Urology. Wound cultures from 10/5 I&D with staph aureus. Wound vac to back placed 10/10. Wound care recommend possible further debridement versus aggressive wound care. Surgery following patient and recommend continue wet-to-dry dressing twice daily, no surgical debridement at this time. ID recommend to continue IV vancomycin while inpatient. Also started on p.o. fluconazole for yeast coverage on . ID recommend can transition to Bactrim or Doxy when ready for discharge. Anticipate 4-6 weeks total ABX, EOT -22. Subjective/24hr Events (10/20/22): Patient is seen at the bedside. Reports feeling better today. Pain better controlled. Worked with physical therapy and walked in the hallway. Wound care following. Assessment & Plan:     # Sepsis 2/2 abscess of upper back, L groin and penis due to MRSA  - Leukocytosis + tachycardia + abscesses on CT. Resolved.   - S/p I&D of back and groin abscess, penile debridement on 10/7. 10/5 wound culture with MRSA. - Wound vac placed 10/10.  - Con't vancomycin. ID recommend can transition to Bactrim or Doxy when ready for discharge. Anticipate 4-6 weeks total ABX, EOT 11/5-11/19/22  - Wound care  - Wound VAC removed and surgery recommended wet-to-dry dressing for perianal area. 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 Recommend avoid pressure on the cubital tunnel by padding the elbow bilaterally. EMG nerve conduction studies outpatient. Supportive management for now  - PT/OT     # Acute urinary retention  - Likely 2/2 penile abscess and edema. Con't Robles. # Uncontrolled DM2  - Decrease Lantus to 15 unit daily, and hold Premeal insulin to avoid hypoglycemia, continue sliding scale  - LDL 88, con't statin  - Stop glipizide at discharge. # HTN  - Controlled off meds. Discharge Planning: Pending, CM involved. Diet:  ADULT DIET;  Regular; 3 carb choices (45 gm/meal)  DVT PPx: Lovenox  Code status: Full Code    Hospital Problems             Last Modified POA    * (Principal) Sepsis ([facility] [address] 10/8/2022 Yes    Primary hypertension 10/6/2022 Yes    DM2 (diabetes mellitus, type 2) ([facility] [address] 10/6/2022 Yes    Cellulitis and abscess of trunk 10/7/2022 Yes    Abscess of groin, left 10/8/2022 Yes    Penile abscess 10/8/2022 Yes    Acute urinary retention 10/8/2022 Yes    Bilateral arm weakness 10/18/2022 Yes    Ulnar neuropathy at elbow of right upper extremity 10/18/2022 Yes    Objective:   Patient Vitals for the past 24 hrs:   Temp Pulse Resp BP SpO2   10/20/22 1144 99.3 °F (37.4 °C) 96 18 (!) 150/83 93 %   10/20/22 0741 98.8 °F (37.1 °C) 76 16 (!) 147/87 99 %   10/20/22 0324 98.5 °F (36.9 °C) 86 18 (!) 146/92 95 %   10/19/22 2325 98.1 °F (36.7 °C) 82 16 (!) 157/93 95 %   10/19/22 1944 98.3 °F (36.8 °C) 77 16 (!) 165/88 95 %   10/19/22 1553 98.9 °F (37.2 °C) 73 16 (!) 147/83 98 %         Estimated body mass index is 39.8 kg/m² as calculated from the following:    Height as of this encounter: 6' 2\" (1.88 m). Weight as of this encounter: 310 lb (140.6 kg). Intake/Output Summary (Last 24 hours) at 10/20/2022 1201  Last data filed at 10/20/2022 1055  Gross per 24 hour   Intake 597 ml   Output 4540 ml   Net -3943 ml           Physical Exam:   Blood pressure (!) 150/83, pulse 96, temperature 99.3 °F (37.4 °C), temperature source Oral, resp. rate 18, height 6' 2\" (1.88 m), weight (!) 310 lb (140.6 kg), SpO2 93 %. General:    Well nourished. No overt distress. Morbidly obese. Head:  Normocephalic, atraumatic  Eyes:  Sclerae appear normal. Pupils equally round. ENT:  Nares appear normal, no drainage. Moist oral mucosa. Poor dentition. Neck:  No restricted ROM. Trachea midline. CV:   RRR. No m/r/g. No jugular venous distension. Lungs:   CTAB. No wheezing, rhonchi, or rales. Respirations even, unlabored. Abdomen: Bowel sounds present. Soft, nontender, nondistended. :  Robles in place. Extremities: No cyanosis or clubbing. No edema. Skin:     Perennial dressing dry intact, wound vac to mid-back. Neuro:  CN II-XII grossly intact. Decrease upper extremity strength, weak   Psych:  Normal mood and affect.       I have reviewed ordered lab tests and independently visualized imaging below:    Recent Labs:  Recent Results (from the past 48 hour(s))   POCT Glucose    Collection Time: 10/18/22  4:44 PM   Result Value Ref Range    POC Glucose 113 (H) 65 - 100 mg/dL    Performed by: MylesneyPCA    POCT Glucose    Collection Time: 10/18/22  9:31 PM   Result Value Ref Range    POC Glucose 111 (H) 65 - 100 mg/dL    Performed by: Laney    Basic Metabolic Panel w/ Reflex to MG    Collection Time: 10/19/22  5:54 AM   Result Value Ref Range    Sodium 143 133 - 143 mmol/L    Potassium 4.1 3.5 - 5.1 mmol/L    Chloride 109 101 - 110 mmol/L    CO2 30 21 - 32 mmol/L    Anion Gap 4 2 - 11 mmol/L    Glucose 101 (H) 65 - 100 mg/dL    BUN 12 6 - 23 MG/DL    Creatinine 1.70 (H) 0.8 - 1.5 MG/DL    Est, Glom Filt Rate 46 (L) >60 ml/min/1.73m2    Calcium 8.7 8.3 - 10.4 MG/DL   POCT Glucose    Collection Time: 10/19/22  7:37 AM   Result Value Ref Range    POC Glucose 102 (H) 65 - 100 mg/dL    Performed by: LenavesTaylorRDELORES    POCT Glucose    Collection Time: 10/19/22 11:31 AM   Result Value Ref Range    POC Glucose 115 (H) 65 - 100 mg/dL    Performed by: LenavesTaylorRDELORES    POCT Glucose    Collection Time: 10/19/22  4:46 PM   Result Value Ref Range    POC Glucose 122 (H) 65 - 100 mg/dL    Performed by: FideorRDELORES    POCT Glucose    Collection Time: 10/19/22  8:51 PM   Result Value Ref Range    POC Glucose 159 (H) 65 - 100 mg/dL    Performed by: Laney    Vancomycin Level, Random    Collection Time: 10/20/22  5:44 AM   Result Value Ref Range    Vancomycin Rm 26.7 UG/ML   CBC with Auto Differential    Collection Time: 10/20/22  5:44 AM   Result Value Ref Range    WBC 7.8 4.3 - 11.1 K/uL    RBC 3.11 (L) 4.23 - 5.6 M/uL    Hemoglobin 9.0 (L) 13.6 - 17.2 g/dL    Hematocrit 29.0 (L) 41.1 - 50.3 %    MCV 93.2 82 - 102 FL    MCH 28.9 26.1 - 32.9 PG    MCHC 31.0 (L) 31.4 - 35.0 g/dL    RDW 14.3 11.9 - 14.6 %    Platelets 565 (H) 388 - 450 K/uL    MPV 10.2 9.4 - 12.3 FL    nRBC 0.00 0.0 - 0.2 K/uL    Differential Type AUTOMATED      Seg Neutrophils 58 43 - 78 %    Lymphocytes 30 13 - 44 %    Monocytes 7 4.0 - 12.0 %    Eosinophils % 4 0.5 - 7.8 %    Basophils 1 0.0 - 2.0 %    Immature Granulocytes 0 0.0 - 5.0 %    Segs Absolute 4.5 1.7 - 8.2 K/UL    Absolute Lymph # 2.4 0.5 - 4.6 K/UL    Absolute Mono # 0.6 0.1 - 1.3 K/UL    Absolute Eos # 0.3 0.0 - 0.8 K/UL    Basophils Absolute 0.1 0.0 - 0.2 K/UL    Absolute Immature Granulocyte 0.0 0.0 - 0.5 K/UL   POCT Glucose    Collection Time: 10/20/22  7:42 AM   Result Value Ref Range    POC Glucose 136 (H) 65 - 100 mg/dL    Performed by: Randolph    POCT Glucose    Collection Time: 10/20/22 11:45 AM   Result Value Ref Range    POC Glucose 184 (H) 65 - 100 mg/dL    Performed by: Randolph          Other Studies:  CT HEAD WO CONTRAST    Result Date: 10/10/2022  EXAM: Noncontrast CT head. INDICATION: Visual disturbance. COMPARISON: None. TECHNIQUE: Noncontrast CT images of the head were obtained. Radiation dose reduction techniques were used for this study. Our CT scanners use one or all of the following:  Automated exposure control, adjustment of the mA or kV according to patient size, iterative reconstruction. FINDINGS: Brain volume is appropriate for age. No acute infarct, hemorrhage or mass is identified. There is no mass effect, midline shift or depressed fracture. The visualized paranasal sinuses and mastoid air cells are clear, except for mild right maxillary sinus mucosal thickening. No acute process.       Current Meds:  Current Facility-Administered Medications   Medication Dose Route Frequency    0.9 % sodium chloride infusion   IntraVENous Continuous    insulin glargine (LANTUS) injection vial 15 Units  15 Units SubCUTAneous Daily    vancomycin (VANCOCIN) 1500 mg in sodium chloride 0.9% 500 mL IVPB  1,500 mg IntraVENous Q24H    enoxaparin Sodium (LOVENOX) injection 30 mg  30 mg SubCUTAneous BID    hydrALAZINE (APRESOLINE) injection 10 mg  10 mg IntraVENous Q6H PRN    gabapentin (NEURONTIN) capsule 300 mg  300 mg Oral TID    medicated lip ointment (BLISTEX)   Topical PRN    traMADol (ULTRAM) tablet 50 mg  50 mg Oral Q6H    polyethylene glycol (GLYCOLAX) packet 17 g  17 g Oral BID    fluconazole (DIFLUCAN) tablet 200 mg  200 mg Oral Daily    aspirin EC tablet 81 mg  81 mg Oral Daily atorvastatin (LIPITOR) tablet 40 mg  40 mg Oral Nightly    docusate sodium (COLACE) capsule 100 mg  100 mg Oral BID    miconazole (MICOTIN) 2 % powder   Topical BID    oxyCODONE (ROXICODONE) immediate release tablet 10 mg  10 mg Oral Q4H PRN    sodium hypochlorite (DAKINS) 0.125 % external solution   Irrigation BID    methocarbamol (ROBAXIN) tablet 750 mg  750 mg Oral 4x Daily    HYDROmorphone HCl PF (DILAUDID) injection 0.5 mg  0.5 mg IntraVENous Q3H PRN    [Held by provider] insulin lispro (HUMALOG) injection vial 4 Units  4 Units SubCUTAneous TID WC    glucose chewable tablet 16 g  4 tablet Oral PRN    dextrose bolus 10% 125 mL  125 mL IntraVENous PRN    Or    dextrose bolus 10% 250 mL  250 mL IntraVENous PRN    glucagon (rDNA) injection 1 mg  1 mg SubCUTAneous PRN    dextrose 10 % infusion   IntraVENous Continuous PRN    insulin lispro (HUMALOG) injection vial 0-8 Units  0-8 Units SubCUTAneous TID WC    insulin lispro (HUMALOG) injection vial 0-4 Units  0-4 Units SubCUTAneous Nightly    sodium chloride flush 0.9 % injection 5-40 mL  5-40 mL IntraVENous 2 times per day    sodium chloride flush 0.9 % injection 5-40 mL  5-40 mL IntraVENous PRN    0.9 % sodium chloride infusion   IntraVENous PRN    ondansetron (ZOFRAN-ODT) disintegrating tablet 4 mg  4 mg Oral Q8H PRN    Or    ondansetron (ZOFRAN) injection 4 mg  4 mg IntraVENous Q6H PRN    polyethylene glycol (GLYCOLAX) packet 17 g  17 g Oral Daily PRN    acetaminophen (TYLENOL) tablet 650 mg  650 mg Oral Q6H PRN    Or    acetaminophen (TYLENOL) suppository 650 mg  650 mg Rectal Q6H PRN       Signed:  David Bazan MD    Part of this note may have been written by using a voice dictation software. The note has been proof read but may still contain some grammatical/other typographical errors.

## 2022-10-20 NOTE — PROGRESS NOTES
ACUTE PHYSICAL THERAPY GOALS:   (Developed with and agreed upon by patient and/or caregiver. )  LTG:  (1.)Mr. Malou Pollard will move from supine to sit and sit to supine , scoot up and down, and roll side to side in bed with STAND BY ASSIST within 7 treatment day(s). (2.)Mr. Malou Pollard will transfer from bed to chair and chair to bed with CONTACT GUARD ASSIST using the least restrictive device within 7 treatment day(s). (3.)Mr. Malou Pollard will ambulate with CONTACT GUARD ASSIST for 250+ feet with the least restrictive device within 7 treatment day(s). (4.)Mr. Malou Pollard will perform 4 stairs with HR and CGA within 7 treatment days for ascending and descending stairs for home. PHYSICAL THERAPY: Daily Note AM   (Link to Caseload Tracking: PT Visit Days : 5  Time In/Out PT Charge Capture  Rehab Caseload Tracker  Orders    Miguel Castaneda is a 60 y.o. male   PRIMARY DIAGNOSIS: Sepsis ([address]  Cellulitis and abscess of trunk [L03.319, L02.219]  Abscess [L02.91]  Hyperglycemia [R73.9]  Abscess, perineum [L02.215]  Procedure(s) (LRB):  BACK ABSCESS I & D (N/A)  INGUINAL AND PENILE DEBRIDEMENT (N/A)  13 Days Post-Op  Inpatient: Payor: /     ASSESSMENT:     REHAB RECOMMENDATIONS:   Recommendation to date pending progress:  Setting:  Home Health Therapy    Equipment:    To Be Determined     ASSESSMENT:  Mr. Malou Pollard is sitting edge of bed with OT. Agreeable to therapy. Sit to stand  several times to the walker  Gait training with rolling walker x 250 feet  x 2 with slow and slightly unsteady gait with assist to maintain safety. (Needed another person to manage the wound vac)  One sitting rest break also required . Patient required both verbal and tactile cues throughout to ensure safety and safe walker management. He also required several standing rest breaks with him leaning on the rail or against the wall. He is returned to supine in bed and positioned for comfort, PCT as bedside.  Safety issues present, Encouraged OOB activities and increased mobility. Patient demonstrated progress with gait today. Continue PT efforts. HHPT at d/c     SUBJECTIVE:   Mr. 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   OBJECTIVE:     PAIN: Luis Meredith / O2: PRECAUTION / Nelma Kelch / DRAINS:   Pre Treatment: 0/10         Post Treatment: 0/10 Vitals        Oxygen    Robles Catheter and Wound Vac    RESTRICTIONS/PRECAUTIONS:  Restrictions/Precautions  Restrictions/Precautions: Fall Risk  Restrictions/Precautions: Fall Risk     MOBILITY: I Mod I S SBA CGA Min Mod Max Total  NT x2 Comments:   Bed Mobility    Rolling [] [] [x] [] [] [] [] [] [] [] []    Supine to Sit [] [] [x] [] [] [] [] [] [] [] []    Scooting [] [] [x] [] [] [] [] [] [] [] []    Sit to Supine [] [] [x] [] [] [] [] [] [] [] []    Transfers    Sit to Stand [] [] [] [] [] [x] [] [] [] [] []    Bed to Chair [] [] [] [] [] [] [] [] [] [x] []    Stand to Sit [] [] [] [] [] [x] [] [] [] [] []     [] [] [] [] [] [] [] [] [] [] []    I=Independent, Mod I=Modified Independent, S=Supervision, SBA=Standby Assistance, CGA=Contact Guard Assistance,   Min=Minimal Assistance, Mod=Moderate Assistance, Max=Maximal Assistance, Total=Total Assistance, NT=Not Tested    BALANCE: Good Fair+ Fair Fair- Poor NT Comments   Sitting Static [x] [] [] [] [] []    Sitting Dynamic [x] [] [] [] [] []              Standing Static [] [] [x] [] [] []    Standing Dynamic [] [] [x] [] [] []      GAIT: I Mod I S SBA CGA Min Mod Max Total  NT x2 Comments:   Level of Assistance [] [] [] [] [x] [x] [] [] [] [] []    Distance  250 feet x 2     DME Gait Belt and Rolling Walker    Gait Quality Decreased miki , Decreased step clearance, Decreased step length, Trunk flexion, Trunk sway increased, and Wide base of support    Weightbearing Status      Stairs      I=Independent, Mod I=Modified Independent, S=Supervision, SBA=Standby Assistance, CGA=Contact Guard Assistance,   Min=Minimal Assistance, Mod=Moderate Assistance, Max=Maximal Assistance, Total=Total Assistance, NT=Not Tested    PLAN:   FREQUENCY AND DURATION: 3 times/week for duration of hospital stay or until stated goals are met, whichever comes first.    TREATMENT:   TREATMENT:   Co-Treatment PT/OT necessary due to patient's decreased overall endurance/tolerance levels, as well as need for high level skilled assistance to complete functional transfers/mobility and functional tasks  Therapeutic Activity (25 Minutes): Therapeutic activity included Rolling, Sit to Supine, Scooting, Transfer Training, Ambulation on level ground, Sitting balance , and Standing balance to improve functional Activity tolerance, Balance, Coordination, Mobility, and Strength.     TREATMENT GRID:  N/A    AFTER TREATMENT PRECAUTIONS: Bed, Bed/Chair Locked, Call light within reach, Needs within reach, RN notified, and PCT at bedside    INTERDISCIPLINARY COLLABORATION:  RN/ PCT, PT/ PTA, and OT/ STEEN    EDUCATION:  review POC and importance of mobility in the recovery process    TIME IN/OUT:  Time In: 1014  Time Out: [facility]  Minutes: 25    Sulma Segundo PTA

## 2022-10-21 LAB
ANION GAP SERPL CALC-SCNC: 4 MMOL/L (ref 2–11)
BASOPHILS # BLD: 0.1 K/UL (ref 0–0.2)
BASOPHILS NFR BLD: 1 % (ref 0–2)
BUN SERPL-MCNC: 13 MG/DL (ref 6–23)
CALCIUM SERPL-MCNC: 8.9 MG/DL (ref 8.3–10.4)
CHLORIDE SERPL-SCNC: 110 MMOL/L (ref 101–110)
CO2 SERPL-SCNC: 28 MMOL/L (ref 21–32)
CREAT SERPL-MCNC: 1.6 MG/DL (ref 0.8–1.5)
DIFFERENTIAL METHOD BLD: ABNORMAL
EOSINOPHIL # BLD: 0.3 K/UL (ref 0–0.8)
EOSINOPHIL NFR BLD: 4 % (ref 0.5–7.8)
ERYTHROCYTE [DISTWIDTH] IN BLOOD BY AUTOMATED COUNT: 14.6 % (ref 11.9–14.6)
GLUCOSE BLD STRIP.AUTO-MCNC: 141 MG/DL (ref 65–100)
GLUCOSE BLD STRIP.AUTO-MCNC: 157 MG/DL (ref 65–100)
GLUCOSE BLD STRIP.AUTO-MCNC: 180 MG/DL (ref 65–100)
GLUCOSE BLD STRIP.AUTO-MCNC: 261 MG/DL (ref 65–100)
GLUCOSE SERPL-MCNC: 138 MG/DL (ref 65–100)
HCT VFR BLD AUTO: 29.1 % (ref 41.1–50.3)
HGB BLD-MCNC: 8.5 G/DL (ref 13.6–17.2)
IMM GRANULOCYTES # BLD AUTO: 0 K/UL (ref 0–0.5)
IMM GRANULOCYTES NFR BLD AUTO: 0 % (ref 0–5)
LYMPHOCYTES # BLD: 2.7 K/UL (ref 0.5–4.6)
LYMPHOCYTES NFR BLD: 33 % (ref 13–44)
MCH RBC QN AUTO: 28.4 PG (ref 26.1–32.9)
MCHC RBC AUTO-ENTMCNC: 29.2 G/DL (ref 31.4–35)
MCV RBC AUTO: 97.3 FL (ref 82–102)
MONOCYTES # BLD: 0.6 K/UL (ref 0.1–1.3)
MONOCYTES NFR BLD: 7 % (ref 4–12)
NEUTS SEG # BLD: 4.4 K/UL (ref 1.7–8.2)
NEUTS SEG NFR BLD: 55 % (ref 43–78)
NRBC # BLD: 0 K/UL (ref 0–0.2)
PLATELET # BLD AUTO: 492 K/UL (ref 150–450)
PMV BLD AUTO: 10.3 FL (ref 9.4–12.3)
POTASSIUM SERPL-SCNC: 4.2 MMOL/L (ref 3.5–5.1)
RBC # BLD AUTO: 2.99 M/UL (ref 4.23–5.6)
SERVICE CMNT-IMP: ABNORMAL
SODIUM SERPL-SCNC: 142 MMOL/L (ref 133–143)
WBC # BLD AUTO: 8 K/UL (ref 4.3–11.1)

## 2022-10-21 PROCEDURE — 36415 COLL VENOUS BLD VENIPUNCTURE: CPT

## 2022-10-21 PROCEDURE — 97605 NEG PRS WND THER DME<=50SQCM: CPT

## 2022-10-21 PROCEDURE — 6370000000 HC RX 637 (ALT 250 FOR IP): Performed by: STUDENT IN AN ORGANIZED HEALTH CARE EDUCATION/TRAINING PROGRAM

## 2022-10-21 PROCEDURE — 6360000002 HC RX W HCPCS: Performed by: INTERNAL MEDICINE

## 2022-10-21 PROCEDURE — 1100000000 HC RM PRIVATE

## 2022-10-21 PROCEDURE — 2580000003 HC RX 258: Performed by: FAMILY MEDICINE

## 2022-10-21 PROCEDURE — 97607 NEG PRS WND THR NDME<=50SQCM: CPT

## 2022-10-21 PROCEDURE — 6370000000 HC RX 637 (ALT 250 FOR IP): Performed by: INTERNAL MEDICINE

## 2022-10-21 PROCEDURE — 82962 GLUCOSE BLOOD TEST: CPT

## 2022-10-21 PROCEDURE — 85025 COMPLETE CBC W/AUTO DIFF WBC: CPT

## 2022-10-21 PROCEDURE — 2500000003 HC RX 250 WO HCPCS: Performed by: INTERNAL MEDICINE

## 2022-10-21 PROCEDURE — 6370000000 HC RX 637 (ALT 250 FOR IP)

## 2022-10-21 PROCEDURE — 6360000002 HC RX W HCPCS: Performed by: FAMILY MEDICINE

## 2022-10-21 PROCEDURE — 6370000000 HC RX 637 (ALT 250 FOR IP): Performed by: FAMILY MEDICINE

## 2022-10-21 PROCEDURE — 80048 BASIC METABOLIC PNL TOTAL CA: CPT

## 2022-10-21 RX ORDER — DOXYCYCLINE HYCLATE 100 MG/1
100 CAPSULE ORAL EVERY 12 HOURS SCHEDULED
Status: DISCONTINUED | OUTPATIENT
Start: 2022-10-21 | End: 2022-10-25 | Stop reason: HOSPADM

## 2022-10-21 RX ADMIN — GABAPENTIN 300 MG: 300 CAPSULE ORAL at 08:07

## 2022-10-21 RX ADMIN — TRAMADOL HYDROCHLORIDE 50 MG: 50 TABLET, COATED ORAL at 03:46

## 2022-10-21 RX ADMIN — METHOCARBAMOL TABLETS 750 MG: 750 TABLET, COATED ORAL at 20:53

## 2022-10-21 RX ADMIN — ATORVASTATIN CALCIUM 40 MG: 40 TABLET, FILM COATED ORAL at 20:53

## 2022-10-21 RX ADMIN — ASPIRIN 81 MG: 81 TABLET ORAL at 08:07

## 2022-10-21 RX ADMIN — ENOXAPARIN SODIUM 30 MG: 100 INJECTION SUBCUTANEOUS at 20:53

## 2022-10-21 RX ADMIN — GABAPENTIN 300 MG: 300 CAPSULE ORAL at 20:53

## 2022-10-21 RX ADMIN — OXYCODONE 10 MG: 5 TABLET ORAL at 11:04

## 2022-10-21 RX ADMIN — DOCUSATE SODIUM 100 MG: 100 CAPSULE, LIQUID FILLED ORAL at 20:53

## 2022-10-21 RX ADMIN — METHOCARBAMOL TABLETS 750 MG: 750 TABLET, COATED ORAL at 13:01

## 2022-10-21 RX ADMIN — METHOCARBAMOL TABLETS 750 MG: 750 TABLET, COATED ORAL at 08:07

## 2022-10-21 RX ADMIN — DOXYCYCLINE HYCLATE 100 MG: 100 CAPSULE ORAL at 20:53

## 2022-10-21 RX ADMIN — TRAMADOL HYDROCHLORIDE 50 MG: 50 TABLET, COATED ORAL at 08:07

## 2022-10-21 RX ADMIN — INSULIN GLARGINE 15 UNITS: 100 INJECTION, SOLUTION SUBCUTANEOUS at 08:08

## 2022-10-21 RX ADMIN — ENOXAPARIN SODIUM 30 MG: 100 INJECTION SUBCUTANEOUS at 08:07

## 2022-10-21 RX ADMIN — DOCUSATE SODIUM 100 MG: 100 CAPSULE, LIQUID FILLED ORAL at 08:07

## 2022-10-21 RX ADMIN — TRAMADOL HYDROCHLORIDE 50 MG: 50 TABLET, COATED ORAL at 13:01

## 2022-10-21 RX ADMIN — TRAMADOL HYDROCHLORIDE 50 MG: 50 TABLET, COATED ORAL at 20:53

## 2022-10-21 RX ADMIN — Medication: at 21:00

## 2022-10-21 RX ADMIN — Medication: at 08:13

## 2022-10-21 RX ADMIN — ANTI-FUNGAL POWDER MICONAZOLE NITRATE TALC FREE: 1.42 POWDER TOPICAL at 08:13

## 2022-10-21 RX ADMIN — Medication: at 00:25

## 2022-10-21 RX ADMIN — OXYCODONE 10 MG: 5 TABLET ORAL at 00:25

## 2022-10-21 RX ADMIN — SODIUM CHLORIDE: 900 INJECTION, SOLUTION INTRAVENOUS at 13:04

## 2022-10-21 RX ADMIN — METHOCARBAMOL TABLETS 750 MG: 750 TABLET, COATED ORAL at 16:51

## 2022-10-21 RX ADMIN — ANTI-FUNGAL POWDER MICONAZOLE NITRATE TALC FREE: 1.42 POWDER TOPICAL at 00:25

## 2022-10-21 RX ADMIN — HYDROMORPHONE HYDROCHLORIDE 0.5 MG: 1 INJECTION, SOLUTION INTRAMUSCULAR; INTRAVENOUS; SUBCUTANEOUS at 22:32

## 2022-10-21 RX ADMIN — SODIUM CHLORIDE, PRESERVATIVE FREE 10 ML: 5 INJECTION INTRAVENOUS at 21:00

## 2022-10-21 RX ADMIN — ANTI-FUNGAL POWDER MICONAZOLE NITRATE TALC FREE: 1.42 POWDER TOPICAL at 20:58

## 2022-10-21 RX ADMIN — GABAPENTIN 300 MG: 300 CAPSULE ORAL at 13:01

## 2022-10-21 RX ADMIN — FLUCONAZOLE 200 MG: 100 TABLET ORAL at 08:07

## 2022-10-21 ASSESSMENT — PAIN SCALES - GENERAL
PAINLEVEL_OUTOF10: 5
PAINLEVEL_OUTOF10: 8
PAINLEVEL_OUTOF10: 10
PAINLEVEL_OUTOF10: 0

## 2022-10-21 ASSESSMENT — PAIN DESCRIPTION - ORIENTATION
ORIENTATION: ANTERIOR
ORIENTATION: INNER;LEFT

## 2022-10-21 ASSESSMENT — PAIN DESCRIPTION - LOCATION
LOCATION: ABDOMEN
LOCATION: GROIN

## 2022-10-21 ASSESSMENT — PAIN DESCRIPTION - DESCRIPTORS
DESCRIPTORS: CRAMPING;SHARP
DESCRIPTORS: ACHING;SORE

## 2022-10-21 NOTE — PROGRESS NOTES
Hospitalist Progress Note   Admit Date:  10/5/2022  7:22 PM   Name:  Quoc Espinoza   Age:  60 y.o. Sex:  male  :  1965   MRN:  [id number]   Room:      Reason(s) for Admission: Cellulitis and abscess of trunk [L03.319, L02.219]  Abscess [L02.91]  Hyperglycemia [R73.9]  Abscess, perineum [facility] Course & Interval History:   Mr. Severa Kapur is a nice 61 y/o male with a h/o DM2 and HTN who was admitted to our service on 10/5 with an abscess on his upper back for about 2 months. CT was done showing a paraspinal abscess w/o muscular involvement along with L inguinal inflammatory changes with possible early abscess of scrotum. Urology and General Surgery were consulted. He was started on antibiotics. Hyperglycemia, A1C >14, started on basal + bolus insulin. ID consulted. HIV neg, other ID orders pending. On 10/7 he underwent I&D of back abscess and L groin abscess. He later underwent debridement of penile abscess with Urology. Wound cultures from 10/5 I&D with staph aureus. Wound vac to back placed 10/10. Wound care recommend possible further debridement versus aggressive wound care. Surgery following patient and recommend continue wet-to-dry dressing twice daily, no surgical debridement at this time. Continue wound VAC to back while inpatient. Vancomycin discontinued and patient transitioned to oral doxycycline on 10/21 with EOT 2022 as per ID recommendations. ID signed off. Subjective/24hr Events (10/21/22): Patient is seen at the bedside. No new complaint. Reports feeling better. Pain optimally controlled. Need aggressive wound care. Plan to teach family wound care and able to discharge early next week. Assessment & Plan:     # Sepsis 2/2 abscess of upper back, L groin and penis due to MRSA  - Leukocytosis + tachycardia + abscesses on CT. Resolved. - S/p I&D of back and groin abscess, penile debridement on 10/7. 10/5 wound culture with MRSA.  - Wound vac placed 10/10.  - Vancomycin discontinued on 10/21 and patient transitioned to oral doxycycline on 10/21 with EOT 11/4/2022 as per ID recommendations. ID signed off. - Wound care  - Wound VAC removed and surgery recommended wet-to-dry dressing for perianal area. 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 Recommend avoid pressure on the cubital tunnel by padding the elbow bilaterally. EMG nerve conduction studies outpatient. Supportive management for now  - PT/OT     # Acute urinary retention  - Likely 2/2 penile abscess and edema. Con't Robles. # Uncontrolled DM2  - Decrease Lantus to 15 unit daily, and hold Premeal insulin to avoid hypoglycemia, continue sliding scale  - LDL 88, con't statin  - Stop glipizide at discharge. # HTN  - Controlled off meds. Discharge Planning: Pending, CM involved. Diet:  ADULT DIET; Regular; 3 carb choices (45 gm/meal)  ADULT ORAL NUTRITION SUPPLEMENT; Breakfast, Dinner;  Wound Healing Oral Supplement  DVT PPx: Lovenox  Code status: Full Code    Hospital Problems             Last Modified POA    * (Principal) Sepsis ([facility] [address] 10/8/2022 Yes    Primary hypertension 10/6/2022 Yes    DM2 (diabetes mellitus, type 2) ([facility] [address] 10/6/2022 Yes    Cellulitis and abscess of trunk 10/7/2022 Yes    Abscess of groin, left 10/8/2022 Yes    Penile abscess 10/8/2022 Yes    Acute urinary retention 10/8/2022 Yes    Bilateral arm weakness 10/18/2022 Yes    Ulnar neuropathy at elbow of right upper extremity 10/18/2022 Yes    Objective:   Patient Vitals for the past 24 hrs:   Temp Pulse Resp BP SpO2   10/21/22 1516 98.4 °F (36.9 °C) 82 16 (!) 147/86 93 %   10/21/22 1105 98.1 °F (36.7 °C) 82 16 (!) 150/92 94 %   10/21/22 0743 98.6 °F (37 °C) 79 16 (!) 160/89 93 %   10/21/22 0429 98.6 °F (37 °C) 86 18 (!) 150/89 96 %   10/20/22 2302 98.7 °F (37.1 °C) 83 18 (!) 171/90 96 %   10/20/22 1945 99 °F (37.2 °C) 82 18 (!) 148/87 93 %         Estimated body mass index is 39.8 kg/m² as calculated from the following:    Height as of this encounter: 6' 2\" (1.88 m). Weight as of this encounter: 310 lb (140.6 kg). Intake/Output Summary (Last 24 hours) at 10/21/2022 1540  Last data filed at 10/21/2022 1115  Gross per 24 hour   Intake 1558.76 ml   Output 4075 ml   Net -2516.24 ml           Physical Exam:   Blood pressure (!) 147/86, pulse 82, temperature 98.4 °F (36.9 °C), temperature source Oral, resp. rate 16, height 6' 2\" (1.88 m), weight (!) 310 lb (140.6 kg), SpO2 93 %. General:    Well nourished. No overt distress. Morbidly obese. Head:  Normocephalic, atraumatic  Eyes:  Sclerae appear normal. Pupils equally round. ENT:  Nares appear normal, no drainage. Moist oral mucosa. Poor dentition. Neck:  No restricted ROM. Trachea midline. CV:   RRR. No m/r/g. No jugular venous distension. Lungs:   CTAB. No wheezing, rhonchi, or rales. Respirations even, unlabored. Abdomen: Bowel sounds present. Soft, nontender, nondistended. :  Robles in place. Extremities: No cyanosis or clubbing. No edema. Skin:     Perennial dressing dry intact, wound vac to mid-back. Neuro:  CN II-XII grossly intact. Decrease upper extremity strength, weak   Psych:  Normal mood and affect.       I have reviewed ordered lab tests and independently visualized imaging below:    Recent Labs:  Recent Results (from the past 48 hour(s))   POCT Glucose    Collection Time: 10/19/22  4:46 PM   Result Value Ref Range    POC Glucose 122 (H) 65 - 100 mg/dL    Performed by: Zoë    POCT Glucose    Collection Time: 10/19/22  8:51 PM   Result Value Ref Range POC Glucose 159 (H) 65 - 100 mg/dL    Performed by: Laney    Vancomycin Level, Random    Collection Time: 10/20/22  5:44 AM   Result Value Ref Range    Vancomycin Rm 26.7 UG/ML   CBC with Auto Differential    Collection Time: 10/20/22  5:44 AM   Result Value Ref Range    WBC 7.8 4.3 - 11.1 K/uL    RBC 3.11 (L) 4.23 - 5.6 M/uL    Hemoglobin 9.0 (L) 13.6 - 17.2 g/dL    Hematocrit 29.0 (L) 41.1 - 50.3 %    MCV 93.2 82 - 102 FL    MCH 28.9 26.1 - 32.9 PG    MCHC 31.0 (L) 31.4 - 35.0 g/dL    RDW 14.3 11.9 - 14.6 %    Platelets 176 (H) 624 - 450 K/uL    MPV 10.2 9.4 - 12.3 FL    nRBC 0.00 0.0 - 0.2 K/uL    Differential Type AUTOMATED      Seg Neutrophils 58 43 - 78 %    Lymphocytes 30 13 - 44 %    Monocytes 7 4.0 - 12.0 %    Eosinophils % 4 0.5 - 7.8 %    Basophils 1 0.0 - 2.0 %    Immature Granulocytes 0 0.0 - 5.0 %    Segs Absolute 4.5 1.7 - 8.2 K/UL    Absolute Lymph # 2.4 0.5 - 4.6 K/UL    Absolute Mono # 0.6 0.1 - 1.3 K/UL    Absolute Eos # 0.3 0.0 - 0.8 K/UL    Basophils Absolute 0.1 0.0 - 0.2 K/UL    Absolute Immature Granulocyte 0.0 0.0 - 0.5 K/UL   POCT Glucose    Collection Time: 10/20/22  7:42 AM   Result Value Ref Range    POC Glucose 136 (H) 65 - 100 mg/dL    Performed by: mySkinlEkotropeciaPCT    POCT Glucose    Collection Time: 10/20/22 11:45 AM   Result Value Ref Range    POC Glucose 184 (H) 65 - 100 mg/dL    Performed by: mySkinlEkotropeciaPCT    POCT Glucose    Collection Time: 10/20/22  4:35 PM   Result Value Ref Range    POC Glucose 146 (H) 65 - 100 mg/dL    Performed by: mySkinlEkotropeciaPCT    POCT Glucose    Collection Time: 10/20/22  8:49 PM   Result Value Ref Range    POC Glucose 133 (H) 65 - 100 mg/dL    Performed by: Laney    CBC with Auto Differential    Collection Time: 10/21/22  6:52 AM   Result Value Ref Range    WBC 8.0 4.3 - 11.1 K/uL    RBC 2.99 (L) 4.23 - 5.6 M/uL    Hemoglobin 8.5 (L) 13.6 - 17.2 g/dL    Hematocrit 29.1 (L) 41.1 - 50.3 %    MCV 97.3 82 - 102 FL    MCH 28.4 26.1 - 32.9 PG    MCHC 29.2 (L) 31.4 - 35.0 g/dL    RDW 14.6 11.9 - 14.6 %    Platelets 296 (H) 843 - 450 K/uL    MPV 10.3 9.4 - 12.3 FL    nRBC 0.00 0.0 - 0.2 K/uL    Differential Type AUTOMATED      Seg Neutrophils 55 43 - 78 %    Lymphocytes 33 13 - 44 %    Monocytes 7 4.0 - 12.0 %    Eosinophils % 4 0.5 - 7.8 %    Basophils 1 0.0 - 2.0 %    Immature Granulocytes 0 0.0 - 5.0 %    Segs Absolute 4.4 1.7 - 8.2 K/UL    Absolute Lymph # 2.7 0.5 - 4.6 K/UL    Absolute Mono # 0.6 0.1 - 1.3 K/UL    Absolute Eos # 0.3 0.0 - 0.8 K/UL    Basophils Absolute 0.1 0.0 - 0.2 K/UL    Absolute Immature Granulocyte 0.0 0.0 - 0.5 K/UL   Basic Metabolic Panel w/ Reflex to MG    Collection Time: 10/21/22  6:52 AM   Result Value Ref Range    Sodium 142 133 - 143 mmol/L    Potassium 4.2 3.5 - 5.1 mmol/L    Chloride 110 101 - 110 mmol/L    CO2 28 21 - 32 mmol/L    Anion Gap 4 2 - 11 mmol/L    Glucose 138 (H) 65 - 100 mg/dL    BUN 13 6 - 23 MG/DL    Creatinine 1.60 (H) 0.8 - 1.5 MG/DL    Est, Glom Filt Rate 50 (L) >60 ml/min/1.73m2    Calcium 8.9 8.3 - 10.4 MG/DL   POCT Glucose    Collection Time: 10/21/22  8:03 AM   Result Value Ref Range    POC Glucose 157 (H) 65 - 100 mg/dL    Performed by: Marcia Dickinson    POCT Glucose    Collection Time: 10/21/22 11:07 AM   Result Value Ref Range    POC Glucose 141 (H) 65 - 100 mg/dL    Performed by: Sarah          Other Studies:  CT HEAD WO CONTRAST    Result Date: 10/10/2022  EXAM: Noncontrast CT head. INDICATION: Visual disturbance. COMPARISON: None. TECHNIQUE: Noncontrast CT images of the head were obtained. Radiation dose reduction techniques were used for this study. Our CT scanners use one or all of the following:  Automated exposure control, adjustment of the mA or kV according to patient size, iterative reconstruction. FINDINGS: Brain volume is appropriate for age. No acute infarct, hemorrhage or mass is identified. There is no mass effect, midline shift or depressed fracture. The visualized paranasal sinuses and mastoid air cells are clear, except for mild right maxillary sinus mucosal thickening. No acute process.       Current Meds:  Current Facility-Administered Medications   Medication Dose Route Frequency    0.9 % sodium chloride infusion   IntraVENous Continuous    insulin glargine (LANTUS) injection vial 15 Units  15 Units SubCUTAneous Daily    vancomycin (VANCOCIN) 1500 mg in sodium chloride 0.9% 500 mL IVPB  1,500 mg IntraVENous Q24H    enoxaparin Sodium (LOVENOX) injection 30 mg  30 mg SubCUTAneous BID    hydrALAZINE (APRESOLINE) injection 10 mg  10 mg IntraVENous Q6H PRN    gabapentin (NEURONTIN) capsule 300 mg  300 mg Oral TID    medicated lip ointment (BLISTEX)   Topical PRN    traMADol (ULTRAM) tablet 50 mg  50 mg Oral Q6H    polyethylene glycol (GLYCOLAX) packet 17 g  17 g Oral BID    aspirin EC tablet 81 mg  81 mg Oral Daily    atorvastatin (LIPITOR) tablet 40 mg  40 mg Oral Nightly    docusate sodium (COLACE) capsule 100 mg  100 mg Oral BID    miconazole (MICOTIN) 2 % powder   Topical BID    oxyCODONE (ROXICODONE) immediate release tablet 10 mg  10 mg Oral Q4H PRN    sodium hypochlorite (DAKINS) 0.125 % external solution   Irrigation BID    methocarbamol (ROBAXIN) tablet 750 mg  750 mg Oral 4x Daily    HYDROmorphone HCl PF (DILAUDID) injection 0.5 mg  0.5 mg IntraVENous Q3H PRN    [Held by provider] insulin lispro (HUMALOG) injection vial 4 Units  4 Units SubCUTAneous TID WC    glucose chewable tablet 16 g  4 tablet Oral PRN    dextrose bolus 10% 125 mL  125 mL IntraVENous PRN    Or    dextrose bolus 10% 250 mL  250 mL IntraVENous PRN    glucagon (rDNA) injection 1 mg  1 mg SubCUTAneous PRN    dextrose 10 % infusion   IntraVENous Continuous PRN    insulin lispro (HUMALOG) injection vial 0-8 Units  0-8 Units SubCUTAneous TID WC    insulin lispro (HUMALOG) injection vial 0-4 Units  0-4 Units SubCUTAneous Nightly    sodium chloride flush 0.9 % injection 5-40 mL  5-40 mL IntraVENous 2 times per day    sodium chloride flush 0.9 % injection 5-40 mL  5-40 mL IntraVENous PRN    0.9 % sodium chloride infusion   IntraVENous PRN    ondansetron (ZOFRAN-ODT) disintegrating tablet 4 mg  4 mg Oral Q8H PRN    Or    ondansetron (ZOFRAN) injection 4 mg  4 mg IntraVENous Q6H PRN    polyethylene glycol (GLYCOLAX) packet 17 g  17 g Oral Daily PRN    acetaminophen (TYLENOL) tablet 650 mg  650 mg Oral Q6H PRN    Or    acetaminophen (TYLENOL) suppository 650 mg  650 mg Rectal Q6H PRN       Signed:  Dianna Dixon MD    Part of this note may have been written by using a voice dictation software. The note has been proof read but may still contain some grammatical/other typographical errors.

## 2022-10-21 NOTE — PROGRESS NOTES
Comprehensive Nutrition Assessment    Type and Reason for Visit: Reassess  Length of Stay    Nutrition Recommendations/Plan:   Meals and Snacks:  Diet: Continue current order  Nutrition Supplement Therapy:  Medical food supplement therapy:  Initiate Guille twice per day (this provides 90 kcal and 2.5 grams protein per packet)     Malnutrition Assessment:  Malnutrition Status: No malnutrition    Nutrition Assessment:  Nutrition History: 10/14: Deferred, pt asleep ; 10/21 RD assessment. Pt reports intake and appetite at baseline, denies any acute changes to weight status. States he does not know if he has gained or lost weight, rarely weighs self. Nutrition Background:  H/O DM2, HTN who presented to ED with nausea, vomiting, and concerns about abscess. Has been having intermittent drainage from a painful wound on his back for ~2 months. Additionally, patient with areas of groin cellulitis and induration as well. I&D performed in ER for back abscess. Findings sepsis d/t groin, back and penile wounds, poorly controlled DM. 10/7: S/P I and D of penile, back and left groin abscess  Nutrition Interval:  Pt reports he continues to eat well, typically 100% of meals served. Pt agreeable to starting Guille BID once RD explained benefits of wound healing modular. Explained in order for wound modular to be effective pt needs to maintain meeting >60% of nutrition needs. Pt verbalized understanding. Current Nutrition Therapies:  ADULT DIET;  Regular; 3 carb choices (45 gm/meal)    Current Intake:   Average Meal Intake: % Average Supplements Intake: None Ordered      Anthropometric Measures:  Height: 6' 2\" (188 cm)  Current Body Wt: 310 lb (140.6 kg) (10/19), Weight source: Bed Scale  BMI: 39.8  Admission Body Weight: 365 lb (165.6 kg) (stated)  Ideal Body Weight (Kg) (Calculated): 86 kg (190 lbs), 163.2 %  Edema: No data recorded  BMI Category Obese Class 2 (BMI 35.0 -39.9)    Estimated Daily Nutrient Needs:  Energy (kcal/day): 4640-9991 (12-15kcal/kg) (Kcal/kg Weight used: 140.6 kg Current  Protein (g/day):  (0.6-0.8g/kg) Weight Used: (Current) 140.6 kg  Fluid (ml/day):   (1 ml/kcal)    Nutrition Diagnosis:   Inadequate protein intake related to increase demand for energy/nutrients as evidenced by wounds    Nutrition Monitoring and Evaluation:      Food/Nutrient Intake Outcomes: Food and Nutrient Intake, Supplement Intake  Physical Signs/Symptoms Outcomes: Meal Time Behavior, Skin, Weight    Discharge Planning:    Continue Oral Nutrition Supplement    Abigail Berdine Litten) SAROJ Melendez, LD  Contact: [phone]

## 2022-10-21 NOTE — WOUND CARE
Groin wound changed by nursing, noted all areas continue to improve, this bandage will need to be taught to someone for home. At discharge we will discontinue vac and use wet to dry on the back wound. Back wound wound vac dressing changed, wound granular, edges are pulling together, used drape and steri-strip to pull edges together with the wound vac today.

## 2022-10-21 NOTE — PROGRESS NOTES
Infectious Disease Progress Note    Today's Date: 10/21/2022   Admit Date: 10/5/2022    Impression:   MRSA soft tissue paraspinous abscess: 84v50m7.9cm  abscess abutting the left paraspinous musculatures, abundant inflammatory changes in left pubic region and a left 3.8cm pubic abscess. S/P I&D 10/7/22 with General Surgery and . Cx with MRSA, no bactermia  MRSA penile abscess / perineal soft tissue infection  S/p I&D 10/7/22; Cx: MRSA  Visual disturbance / R arm weakness  Urinary retention  Poorly controlled DM;insulin started here and statin    Plan:     Transition to doxycyline today and continue for two more weeks, for a total of 4 weeks of antibiotic therapy from 10/07. EOT planned for 11/04/2022  Discontinue vancomycin today  ID will sign off   Follow up with surgery and wound care  Last dose of fluconazole given today    Anti-infectives:   Doxycycline 10/21 -  PO fluconazole 10/12 - 10/21  IV vancomycin 10/5- 10/21  Zosyn 10/5-9    Subjective: Interval history: Afebrile. WBC 8. Patient resting in bed, states is pain is controlled. He is feeling better and has been working with PT and OT. No has no concerns today. Patient is a 60 y.o. male with PMH of poorly controlled DM, who  has lived in the [address] [place] [address] since he was 1years old. He has lived in [place] and [place]. He moved to the [address] [place] [address] when he was 2 from [facility]. He is visiting [place], history of DM 2 HgbA1c of >14, reports that he is on metformin, admitted on 10/5/22 , with primary complaint of persistent draining back wound for about 2 months. S/p bedside I&D of groin abscess by urology on 10/06/22. A CT done showed a 50y77y5.9cm  abscess abutting the left paraspinous musculatures, abundant inflammatory changes in left pubic region and a left 3.8cm pubic abscess. Patient denies any trauma the area. He also has drainage from his left groin and shaft of his penis.      No Known Allergies     Review of Systems:  A comprehensive review of systems is negative except as stated above. Objective:     Visit Vitals  BP (!) 160/89   Pulse 79   Temp 98.6 °F (37 °C) (Oral)   Resp 16   Ht 6' 2\" (1.88 m)   Wt (!) 310 lb (140.6 kg)   SpO2 93%   BMI 39.80 kg/m²     Temp (24hrs), Av.7 °F (37.1 °C), Min:98.2 °F (36.8 °C), Max:99.3 °F (37.4 °C)     Patient was seen and examined on 10/21/2022 and physical exam remains unchanged since yesterday, unless noted below:    Lines:  Peripheral IV:       Physical Exam:    General:  In no acute distress   Eyes:  Non-icteric, PERRL   Mouth/Throat: No thrush, mucosa pink   Neck: Supple and symmetric   Lungs:   Breathing comfortably, breath sounds clear, no wheezing   CV:   S1/S2; no murmurs noted   Abdomen:   Non-distended, soft, BS normal   Extremities: Trace edema LE   Skin:  wound vac on back in place with seal intact, Left groin dressing intact   Lymph nodes:    Musculoskeletal: No swollen or inflamed joints   Lines/Devices: Robles   Psych: Alert and oriented       Data Review:     CBC:  Recent Labs     10/20/22  0544 10/21/22  0652   WBC 7.8 8.0   HGB 9.0* 8.5*   HCT 29.0* 29.1*   * 492*         BMP:  Recent Labs     10/19/22  0554   BUN 12      K 4.1      CO2 30         LFTS:  No results for input(s): ALT, TP, ALB in the last 72 hours.     Invalid input(s): TBILI, SGOT, AP    Microbiology:   Reviewed  Susceptibility    Methicillin-Resistant Staphylococcus aureus (1)    Antibiotic Interpretation Microscan  Method Status    trimethoprim-sulfamethoxazole Sensitive <=0.5/9.5 ug/mL BACTERIAL SUSCEPTIBILITY PANEL MADINA Final    tetracycline Sensitive <=0.5 ug/mL BACTERIAL SUSCEPTIBILITY PANEL MADINA Final    clindamycin Sensitive <=0.5 ug/mL BACTERIAL SUSCEPTIBILITY PANEL MADINA Final    vancomycin Sensitive 1 ug/mL BACTERIAL SUSCEPTIBILITY PANEL MADINA Final    oxacillin Resistant >2 ug/mL BACTERIAL SUSCEPTIBILITY PANEL MADINA Final    rifampin Sensitive <=0.5 ug/mL BACTERIAL SUSCEPTIBILITY PANEL MADINA Final     Rifampin is not to be used for mono-therapy. Specimen Collected: 10/05/22 20:23 EDT Last Resulted: 10/08/22 10:53 EDT        Imaging:   10/10/22 CT head:     FINDINGS: Brain volume is appropriate for age. No acute infarct, hemorrhage or   mass is identified. There is no mass effect, midline shift or depressed   fracture. The visualized paranasal sinuses and mastoid air cells are clear,   except for mild right maxillary sinus mucosal thickening.        Signed By: LILLIAN Hhan     October 21, 2022

## 2022-10-21 NOTE — CARE COORDINATION
LOS 16d    Chart reviewed by [facility] and discussed in IDR. Patient POD 14, s/p back abscess I&D, inguinal, penile debridement. Patient continuing to make progress with PT/OT. ID following for cultures with MRSA, on IV abx currently, will transition to PO at discharge. Patient open to going to the Wound Care clinic and having female friend, Xochilt Matias to provide dressing changes at home. Discussed with Primary RN to assist with providing training/teaching Clint Plummer today and the weekend.     CM following

## 2022-10-21 NOTE — PROGRESS NOTES
END OF SHIFT NOTE:    INTAKE/OUTPUT  10/20 0701 - 10/21 0700  In: 1558.8 [I.V.:1558.8]  Out: 8337 [Urine:4550; Drains:25]  Voiding: Yes  Catheter: Yes  Drain:   Negative Pressure Wound Therapy Back Left;Mid (Active)   $ Standard NPWT <=50 sq cm PER TX $ Yes 10/21/22 1457   Wound Type Surgical 10/21/22 1457   Unit Type kci ulta 10/21/22 1457   Dressing Type Black Foam 10/21/22 1457   Number of pieces used 1 10/21/22 1457   Number of pieces removed 1 10/21/22 1457   Cycle Continuous 10/21/22 0712   Target Pressure (mmHg) 125 10/21/22 0712   Canister changed?  No 10/21/22 1457   Dressing Status New dressing applied 10/21/22 1457   Dressing Changed Changed/New 10/21/22 1457   Drainage Amount None 10/21/22 1457   Drainage Description Serosanguinous 10/21/22 0712   Dressing Change Due 10/24/22 10/21/22 1457   Output (ml) 25 ml 10/20/22 1920   Wound Assessment Granulation tissue 10/21/22 1457   Nemo-wound Assessment Blanchable erythema 10/21/22 1457   Shape oval 10/21/22 1457   Odor Mild 10/21/22 1457       Urinary Catheter 10/06/22 Robles (Active)   $ Urethral catheter insertion Inserted for procedure 10/10/22 0800   Catheter Indications Perioperative use for selected surgical procedures;Assist in healing of open sacral or perineal wounds (Stage III, IV, or unstageable documented by a provider or enterostomal therapy) and/or full thickness perineal and lower extremity burns in incontinent patients;Prolonged immobilization (e.g. unstable thoracic or lumbar spine, multiple traumatic injuries such as pelvic fractures) 10/21/22 0712   Site Assessment No urethral drainage 10/21/22 5981   Urine Color Yellow 10/21/22 1115   Urine Appearance Clear 10/21/22 1115   Urine Odor Other (Comment) 10/18/22 0735   Collection Container Standard 10/21/22 5052   Securement Method Securing device (Describe) 10/21/22 0712   Catheter Care Completed Yes 10/21/22 5905   Catheter Best Practices  Drainage tube clipped to bed;Catheter secured to thigh; Tamper seal intact; Bag below bladder;Bag not on floor; Lack of dependent loop in tubing;Drainage bag less than half full 10/21/22 0712   Status Draining;Patent 10/21/22 0489   Output (mL) 800 mL 10/21/22 1115               Flatus: Patient does have flatus present. Stool:  occurrences. Characteristics:           Stool Assessment  Last BM (including prior to admit): 10/03/22 (per patient)    Emesis:  occurrences. Characteristics:        VITAL SIGNS  Patient Vitals for the past 12 hrs:   Temp Pulse Resp BP SpO2   10/21/22 1516 98.4 °F (36.9 °C) 82 16 (!) 147/86 93 %   10/21/22 1105 98.1 °F (36.7 °C) 82 16 (!) 150/92 94 %   10/21/22 0743 98.6 °F (37 °C) 79 16 (!) 160/89 93 %       Pain Assessment  Pain Level: 0 (10/21/22 0712)  Pain Location: Groin  Patient's Stated Pain Goal: 5    Ambulating  No    Shift report given to oncoming nurse at the bedside.     Forrest Saenz RN

## 2022-10-21 NOTE — PROGRESS NOTES
VANCO DAILY FOLLOW UP NOTE  [facility] Pharmacokinetic Monitoring Service - Vancomycin    Consulting Provider: Dr. Valerio Vieira   Indication: SSTI due to MRSA  Target Concentration: Goal trough of 10-15 mg/L and AUC/MADINA <500 mg*hr/L  Day of Therapy: 14  Additional Antimicrobials: none    Pertinent Laboratory Values: Wt Readings from Last 1 Encounters:   10/19/22 (!) 310 lb (140.6 kg)     Temp Readings from Last 1 Encounters:   10/21/22 98.6 °F (37 °C) (Oral)     Recent Labs     10/19/22  0554 10/20/22  0544 10/21/22  0652   BUN 12  --   --    CREATININE 1.70*  --   --    WBC  --  7.8 8.0     Estimated Creatinine Clearance: 71 mL/min (A) (based on SCr of 1.7 mg/dL (H)). Lab Results   Component Value Date/Time    VANCORANDOM 26.7 10/20/2022 05:44 AM       MRSA Nasal Swab: N/A. Non-respiratory infection.     Assessment:  Date/Time Dose Concentration AUC   10/10 0706 1250 mg q18h 21.6 513   10/12 0558 1250 mg q24h 23.8 452   10/17 0654 1500 mg q24h 22.6 513   10/20 0544 1500 mg q24h 26.7 534   Note: Serum concentrations collected for AUC dosing may appear elevated if collected in close proximity to the dose administered, this is not necessarily an indication of toxicity    Plan:  Dosing recommendations based on Paulette Gandara current dose of 1500 mg Q24H  Renal labs as indicated   Pharmacy will continue to monitor patient and adjust therapy as indicated    Thank you for the consult,  Roxy Duran, [address] [facility]

## 2022-10-22 LAB
ANION GAP SERPL CALC-SCNC: 4 MMOL/L (ref 2–11)
BASOPHILS # BLD: 0.1 K/UL (ref 0–0.2)
BASOPHILS NFR BLD: 1 % (ref 0–2)
BUN SERPL-MCNC: 17 MG/DL (ref 6–23)
CALCIUM SERPL-MCNC: 8.8 MG/DL (ref 8.3–10.4)
CHLORIDE SERPL-SCNC: 109 MMOL/L (ref 101–110)
CO2 SERPL-SCNC: 26 MMOL/L (ref 21–32)
CREAT SERPL-MCNC: 1.5 MG/DL (ref 0.8–1.5)
DIFFERENTIAL METHOD BLD: ABNORMAL
EOSINOPHIL # BLD: 0.3 K/UL (ref 0–0.8)
EOSINOPHIL NFR BLD: 3 % (ref 0.5–7.8)
ERYTHROCYTE [DISTWIDTH] IN BLOOD BY AUTOMATED COUNT: 14.3 % (ref 11.9–14.6)
GLUCOSE BLD STRIP.AUTO-MCNC: 143 MG/DL (ref 65–100)
GLUCOSE BLD STRIP.AUTO-MCNC: 146 MG/DL (ref 65–100)
GLUCOSE BLD STRIP.AUTO-MCNC: 148 MG/DL (ref 65–100)
GLUCOSE BLD STRIP.AUTO-MCNC: 175 MG/DL (ref 65–100)
GLUCOSE SERPL-MCNC: 163 MG/DL (ref 65–100)
HCT VFR BLD AUTO: 30.2 % (ref 41.1–50.3)
HGB BLD-MCNC: 9.1 G/DL (ref 13.6–17.2)
IMM GRANULOCYTES # BLD AUTO: 0 K/UL (ref 0–0.5)
IMM GRANULOCYTES NFR BLD AUTO: 0 % (ref 0–5)
LYMPHOCYTES # BLD: 2.5 K/UL (ref 0.5–4.6)
LYMPHOCYTES NFR BLD: 32 % (ref 13–44)
MCH RBC QN AUTO: 28.4 PG (ref 26.1–32.9)
MCHC RBC AUTO-ENTMCNC: 30.1 G/DL (ref 31.4–35)
MCV RBC AUTO: 94.4 FL (ref 82–102)
MONOCYTES # BLD: 0.5 K/UL (ref 0.1–1.3)
MONOCYTES NFR BLD: 6 % (ref 4–12)
NEUTS SEG # BLD: 4.5 K/UL (ref 1.7–8.2)
NEUTS SEG NFR BLD: 58 % (ref 43–78)
NRBC # BLD: 0 K/UL (ref 0–0.2)
PLATELET # BLD AUTO: 498 K/UL (ref 150–450)
PMV BLD AUTO: 10.4 FL (ref 9.4–12.3)
POTASSIUM SERPL-SCNC: 3.9 MMOL/L (ref 3.5–5.1)
RBC # BLD AUTO: 3.2 M/UL (ref 4.23–5.6)
SERVICE CMNT-IMP: ABNORMAL
SODIUM SERPL-SCNC: 139 MMOL/L (ref 133–143)
WBC # BLD AUTO: 7.9 K/UL (ref 4.3–11.1)

## 2022-10-22 PROCEDURE — 80048 BASIC METABOLIC PNL TOTAL CA: CPT

## 2022-10-22 PROCEDURE — 6370000000 HC RX 637 (ALT 250 FOR IP)

## 2022-10-22 PROCEDURE — 2580000003 HC RX 258: Performed by: FAMILY MEDICINE

## 2022-10-22 PROCEDURE — 6370000000 HC RX 637 (ALT 250 FOR IP): Performed by: FAMILY MEDICINE

## 2022-10-22 PROCEDURE — 6370000000 HC RX 637 (ALT 250 FOR IP): Performed by: INTERNAL MEDICINE

## 2022-10-22 PROCEDURE — 6360000002 HC RX W HCPCS: Performed by: INTERNAL MEDICINE

## 2022-10-22 PROCEDURE — 6360000002 HC RX W HCPCS: Performed by: FAMILY MEDICINE

## 2022-10-22 PROCEDURE — 1100000000 HC RM PRIVATE

## 2022-10-22 PROCEDURE — 36415 COLL VENOUS BLD VENIPUNCTURE: CPT

## 2022-10-22 PROCEDURE — 82962 GLUCOSE BLOOD TEST: CPT

## 2022-10-22 PROCEDURE — 85025 COMPLETE CBC W/AUTO DIFF WBC: CPT

## 2022-10-22 PROCEDURE — 6370000000 HC RX 637 (ALT 250 FOR IP): Performed by: STUDENT IN AN ORGANIZED HEALTH CARE EDUCATION/TRAINING PROGRAM

## 2022-10-22 RX ADMIN — ENOXAPARIN SODIUM 30 MG: 100 INJECTION SUBCUTANEOUS at 21:17

## 2022-10-22 RX ADMIN — GABAPENTIN 300 MG: 300 CAPSULE ORAL at 21:17

## 2022-10-22 RX ADMIN — HYDROMORPHONE HYDROCHLORIDE 0.5 MG: 1 INJECTION, SOLUTION INTRAMUSCULAR; INTRAVENOUS; SUBCUTANEOUS at 18:21

## 2022-10-22 RX ADMIN — ANTI-FUNGAL POWDER MICONAZOLE NITRATE TALC FREE: 1.42 POWDER TOPICAL at 08:29

## 2022-10-22 RX ADMIN — OXYCODONE 10 MG: 5 TABLET ORAL at 23:31

## 2022-10-22 RX ADMIN — ATORVASTATIN CALCIUM 40 MG: 40 TABLET, FILM COATED ORAL at 21:16

## 2022-10-22 RX ADMIN — METHOCARBAMOL TABLETS 750 MG: 750 TABLET, COATED ORAL at 08:19

## 2022-10-22 RX ADMIN — OXYCODONE 10 MG: 5 TABLET ORAL at 11:31

## 2022-10-22 RX ADMIN — TRAMADOL HYDROCHLORIDE 50 MG: 50 TABLET, COATED ORAL at 02:46

## 2022-10-22 RX ADMIN — METHOCARBAMOL TABLETS 750 MG: 750 TABLET, COATED ORAL at 16:46

## 2022-10-22 RX ADMIN — TRAMADOL HYDROCHLORIDE 50 MG: 50 TABLET, COATED ORAL at 21:21

## 2022-10-22 RX ADMIN — HYDRALAZINE HYDROCHLORIDE 10 MG: 20 INJECTION INTRAMUSCULAR; INTRAVENOUS at 00:06

## 2022-10-22 RX ADMIN — METHOCARBAMOL TABLETS 750 MG: 750 TABLET, COATED ORAL at 21:19

## 2022-10-22 RX ADMIN — INSULIN GLARGINE 15 UNITS: 100 INJECTION, SOLUTION SUBCUTANEOUS at 08:21

## 2022-10-22 RX ADMIN — GABAPENTIN 300 MG: 300 CAPSULE ORAL at 14:06

## 2022-10-22 RX ADMIN — Medication: at 08:30

## 2022-10-22 RX ADMIN — Medication: at 21:22

## 2022-10-22 RX ADMIN — ANTI-FUNGAL POWDER MICONAZOLE NITRATE TALC FREE: 1.42 POWDER TOPICAL at 21:20

## 2022-10-22 RX ADMIN — OXYCODONE 10 MG: 5 TABLET ORAL at 02:10

## 2022-10-22 RX ADMIN — TRAMADOL HYDROCHLORIDE 50 MG: 50 TABLET, COATED ORAL at 08:19

## 2022-10-22 RX ADMIN — ENOXAPARIN SODIUM 30 MG: 100 INJECTION SUBCUTANEOUS at 08:19

## 2022-10-22 RX ADMIN — DOXYCYCLINE HYCLATE 100 MG: 100 CAPSULE ORAL at 21:17

## 2022-10-22 RX ADMIN — ASPIRIN 81 MG: 81 TABLET ORAL at 08:19

## 2022-10-22 RX ADMIN — GABAPENTIN 300 MG: 300 CAPSULE ORAL at 08:19

## 2022-10-22 RX ADMIN — DOCUSATE SODIUM 100 MG: 100 CAPSULE, LIQUID FILLED ORAL at 21:16

## 2022-10-22 RX ADMIN — TRAMADOL HYDROCHLORIDE 50 MG: 50 TABLET, COATED ORAL at 14:06

## 2022-10-22 RX ADMIN — HYDROMORPHONE HYDROCHLORIDE 0.5 MG: 1 INJECTION, SOLUTION INTRAMUSCULAR; INTRAVENOUS; SUBCUTANEOUS at 05:54

## 2022-10-22 RX ADMIN — SODIUM CHLORIDE: 900 INJECTION, SOLUTION INTRAVENOUS at 02:15

## 2022-10-22 RX ADMIN — SODIUM CHLORIDE, PRESERVATIVE FREE 10 ML: 5 INJECTION INTRAVENOUS at 21:23

## 2022-10-22 RX ADMIN — DOXYCYCLINE HYCLATE 100 MG: 100 CAPSULE ORAL at 08:19

## 2022-10-22 RX ADMIN — METHOCARBAMOL TABLETS 750 MG: 750 TABLET, COATED ORAL at 14:06

## 2022-10-22 ASSESSMENT — PAIN DESCRIPTION - PAIN TYPE: TYPE: CHRONIC PAIN

## 2022-10-22 ASSESSMENT — PAIN SCALES - GENERAL
PAINLEVEL_OUTOF10: 8
PAINLEVEL_OUTOF10: 5
PAINLEVEL_OUTOF10: 10
PAINLEVEL_OUTOF10: 7
PAINLEVEL_OUTOF10: 10
PAINLEVEL_OUTOF10: 0

## 2022-10-22 ASSESSMENT — PAIN DESCRIPTION - ORIENTATION
ORIENTATION: POSTERIOR
ORIENTATION: ANTERIOR
ORIENTATION: POSTERIOR

## 2022-10-22 ASSESSMENT — PAIN DESCRIPTION - LOCATION
LOCATION: BACK
LOCATION: BACK;PENIS
LOCATION: BACK
LOCATION: BACK
LOCATION: PENIS
LOCATION: BACK
LOCATION: BACK

## 2022-10-22 ASSESSMENT — PAIN DESCRIPTION - DESCRIPTORS
DESCRIPTORS: ACHING;SORE
DESCRIPTORS: ACHING

## 2022-10-22 ASSESSMENT — PAIN DESCRIPTION - ONSET: ONSET: SUDDEN

## 2022-10-22 ASSESSMENT — PAIN DESCRIPTION - FREQUENCY: FREQUENCY: INTERMITTENT

## 2022-10-22 NOTE — PROGRESS NOTES
Hospitalist Progress Note   Admit Date:  10/5/2022  7:22 PM   Name:  Tomas Bolanos   Age:  60 y.o. Sex:  male  :  1965   MRN:  [id number]   Room:      Reason(s) for Admission: Cellulitis and abscess of trunk [L03.319, L02.219]  Abscess [L02.91]  Hyperglycemia [R73.9]  Abscess, perineum [facility] Course & Interval History:   59 y/o male with a h/o DM2 and HTN who was admitted to our service on 10/5 with an abscess on his upper back for about 2 months. CT was done showing a paraspinal abscess w/o muscular involvement along with L inguinal inflammatory changes with possible early abscess of scrotum. Urology and General Surgery were consulted. He was started on antibiotics. Hyperglycemia, A1C >14, started on basal + bolus insulin. ID consulted. HIV neg, other ID orders pending. On 10/7 he underwent I&D of back abscess and L groin abscess. He later underwent debridement of penile abscess with Urology. Wound cultures from 10/5 I&D with staph aureus. Wound vac to back placed 10/10. Wound care recommend possible further debridement versus aggressive wound care. Surgery following patient and recommend continue wet-to-dry dressing twice daily, no surgical debridement at this time. Continue wound VAC to back while inpatient. Vancomycin discontinued and patient transitioned to oral doxycycline on 10/21 with EOT 2022 as per ID recommendations. ID signed off. Subjective/24hr Events (10/22/22): A&O x3, states some constipation. Pain controlled. Assessment & Plan:     # Sepsis 2/2 abscess of upper back, L groin and penis due to MRSA  - Leukocytosis + tachycardia + abscesses on CT. Resolved. - S/p I&D of back and groin abscess, penile debridement on 10/7. 10/5 wound culture with MRSA. - Wound vac placed 10/10.  - Vancomycin discontinued on 10/21 and patient transitioned to oral doxycycline on 10/21 with EOT 2022 as per ID recommendations. ID signed off.   - Wound care  - Wound VAC removed and surgery recommended wet-to-dry dressing for perianal area. 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 Recommend avoid pressure on the cubital tunnel by padding the elbow bilaterally. EMG nerve conduction studies outpatient. Supportive management for now  - PT/OT     # Acute urinary retention  - Likely 2/2 penile abscess and edema. Con't Jaimes. # Uncontrolled DM2  - Decrease Lantus to 15 unit daily, and hold Premeal insulin to avoid hypoglycemia, continue sliding scale  - Stop glipizide at discharge. 10/22  BGL controlled on current regimen     # HTN  - Controlled off meds. HLD:  Statin ongoing    Discharge Planning: Pending, CM involved. Diet:  ADULT DIET; Regular; 3 carb choices (45 gm/meal)  ADULT ORAL NUTRITION SUPPLEMENT; Breakfast, Dinner;  Wound Healing Oral Supplement  DVT PPx: Lovenox  Code status: Full Code    Hospital Problems             Last Modified POA    * (Principal) Sepsis ([facility] [address] 10/8/2022 Yes    Primary hypertension 10/6/2022 Yes    DM2 (diabetes mellitus, type 2) ([facility] [address] 10/6/2022 Yes    Cellulitis and abscess of trunk 10/7/2022 Yes    Abscess of groin, left 10/8/2022 Yes    Penile abscess 10/8/2022 Yes    Acute urinary retention 10/8/2022 Yes    Bilateral arm weakness 10/18/2022 Yes    Ulnar neuropathy at elbow of right upper extremity 10/18/2022 Yes    Objective:   Patient Vitals for the past 24 hrs:   Temp Pulse Resp BP SpO2   10/22/22 0728 98.5 °F (36.9 °C) 86 18 (!) 161/92 96 %   10/22/22 0333 98.4 °F (36.9 °C) 88 18 (!) 163/98 96 %   10/21/22 2347 98.6 °F (37 °C) 90 19 (!) 174/103 94 %   10/21/22 2232 -- -- 20 -- --   10/21/22 2010 98.6 °F (37 °C) 93 19 (!) 155/91 94 %   10/21/22 1516 98.4 °F (36.9 °C) 82 16 (!) 147/86 93 %   10/21/22 1105 98.1 °F (36.7 °C) 82 16 (!) 150/92 94 %       Estimated body mass index is 39.8 kg/m² as calculated from the following:    Height as of this encounter: 6' 2\" (1.88 m). Weight as of this encounter: 310 lb (140.6 kg). Intake/Output Summary (Last 24 hours) at 10/22/2022 1024  Last data filed at 10/22/2022 7372  Gross per 24 hour   Intake 1863 ml   Output 3630 ml   Net -1767 ml         Physical Exam:   Blood pressure (!) 161/92, pulse 86, temperature 98.5 °F (36.9 °C), temperature source Oral, resp. rate 18, height 6' 2\" (1.88 m), weight (!) 310 lb (140.6 kg), SpO2 96 %. General:    Well nourished. No overt distress. Morbidly obese. Head:  Normocephalic, atraumatic  Eyes:  Sclerae appear normal. Pupils equally round. ENT:  Nares appear normal, no drainage. Moist oral mucosa. Poor dentition. Neck:  No restricted ROM. Trachea midline. CV:   RRR. No m/r/g. No jugular venous distension. Lungs:   CTAB. No wheezing, rhonchi, or rales. Respirations even, unlabored. Abdomen: Bowel sounds present. Soft, nontender, nondistended. :  Robles in place. Extremities: No cyanosis or clubbing. No edema. Skin:     Perennial dressing dry intact, wound vac to mid-back. Neuro:  CN II-XII grossly intact. Decrease upper extremity strength, weak   Psych:  Normal mood and affect.       I have reviewed ordered lab tests and independently visualized imaging below:    Recent Labs:  Recent Results (from the past 48 hour(s))   POCT Glucose    Collection Time: 10/20/22 11:45 AM   Result Value Ref Range    POC Glucose 184 (H) 65 - 100 mg/dL    Performed by: Randolph    POCT Glucose    Collection Time: 10/20/22  4:35 PM   Result Value Ref Range    POC Glucose 146 (H) 65 - 100 mg/dL    Performed by: Randolph POCT Glucose    Collection Time: 10/20/22  8:49 PM   Result Value Ref Range    POC Glucose 133 (H) 65 - 100 mg/dL    Performed by: Laney    CBC with Auto Differential    Collection Time: 10/21/22  6:52 AM   Result Value Ref Range    WBC 8.0 4.3 - 11.1 K/uL    RBC 2.99 (L) 4.23 - 5.6 M/uL    Hemoglobin 8.5 (L) 13.6 - 17.2 g/dL    Hematocrit 29.1 (L) 41.1 - 50.3 %    MCV 97.3 82 - 102 FL    MCH 28.4 26.1 - 32.9 PG    MCHC 29.2 (L) 31.4 - 35.0 g/dL    RDW 14.6 11.9 - 14.6 %    Platelets 396 (H) 023 - 450 K/uL    MPV 10.3 9.4 - 12.3 FL    nRBC 0.00 0.0 - 0.2 K/uL    Differential Type AUTOMATED      Seg Neutrophils 55 43 - 78 %    Lymphocytes 33 13 - 44 %    Monocytes 7 4.0 - 12.0 %    Eosinophils % 4 0.5 - 7.8 %    Basophils 1 0.0 - 2.0 %    Immature Granulocytes 0 0.0 - 5.0 %    Segs Absolute 4.4 1.7 - 8.2 K/UL    Absolute Lymph # 2.7 0.5 - 4.6 K/UL    Absolute Mono # 0.6 0.1 - 1.3 K/UL    Absolute Eos # 0.3 0.0 - 0.8 K/UL    Basophils Absolute 0.1 0.0 - 0.2 K/UL    Absolute Immature Granulocyte 0.0 0.0 - 0.5 K/UL   Basic Metabolic Panel w/ Reflex to MG    Collection Time: 10/21/22  6:52 AM   Result Value Ref Range    Sodium 142 133 - 143 mmol/L    Potassium 4.2 3.5 - 5.1 mmol/L    Chloride 110 101 - 110 mmol/L    CO2 28 21 - 32 mmol/L    Anion Gap 4 2 - 11 mmol/L    Glucose 138 (H) 65 - 100 mg/dL    BUN 13 6 - 23 MG/DL    Creatinine 1.60 (H) 0.8 - 1.5 MG/DL    Est, Glom Filt Rate 50 (L) >60 ml/min/1.73m2    Calcium 8.9 8.3 - 10.4 MG/DL   POCT Glucose    Collection Time: 10/21/22  8:03 AM   Result Value Ref Range    POC Glucose 157 (H) 65 - 100 mg/dL    Performed by: Frantz Kelly    POCT Glucose    Collection Time: 10/21/22 11:07 AM   Result Value Ref Range    POC Glucose 141 (H) 65 - 100 mg/dL    Performed by: Sarah    POCT Glucose    Collection Time: 10/21/22  4:47 PM   Result Value Ref Range    POC Glucose 180 (H) 65 - 100 mg/dL    Performed by: Sarah    POCT Glucose Collection Time: 10/21/22  9:10 PM   Result Value Ref Range    POC Glucose 261 (H) 65 - 100 mg/dL    Performed by: Laney    CBC with Auto Differential    Collection Time: 10/22/22  5:36 AM   Result Value Ref Range    WBC 7.9 4.3 - 11.1 K/uL    RBC 3.20 (L) 4.23 - 5.6 M/uL    Hemoglobin 9.1 (L) 13.6 - 17.2 g/dL    Hematocrit 30.2 (L) 41.1 - 50.3 %    MCV 94.4 82 - 102 FL    MCH 28.4 26.1 - 32.9 PG    MCHC 30.1 (L) 31.4 - 35.0 g/dL    RDW 14.3 11.9 - 14.6 %    Platelets 163 (H) 196 - 450 K/uL    MPV 10.4 9.4 - 12.3 FL    nRBC 0.00 0.0 - 0.2 K/uL    Differential Type AUTOMATED      Seg Neutrophils 58 43 - 78 %    Lymphocytes 32 13 - 44 %    Monocytes 6 4.0 - 12.0 %    Eosinophils % 3 0.5 - 7.8 %    Basophils 1 0.0 - 2.0 %    Immature Granulocytes 0 0.0 - 5.0 %    Segs Absolute 4.5 1.7 - 8.2 K/UL    Absolute Lymph # 2.5 0.5 - 4.6 K/UL    Absolute Mono # 0.5 0.1 - 1.3 K/UL    Absolute Eos # 0.3 0.0 - 0.8 K/UL    Basophils Absolute 0.1 0.0 - 0.2 K/UL    Absolute Immature Granulocyte 0.0 0.0 - 0.5 K/UL   Basic Metabolic Panel w/ Reflex to MG    Collection Time: 10/22/22  5:36 AM   Result Value Ref Range    Sodium 139 133 - 143 mmol/L    Potassium 3.9 3.5 - 5.1 mmol/L    Chloride 109 101 - 110 mmol/L    CO2 26 21 - 32 mmol/L    Anion Gap 4 2 - 11 mmol/L    Glucose 163 (H) 65 - 100 mg/dL    BUN 17 6 - 23 MG/DL    Creatinine 1.50 0.8 - 1.5 MG/DL    Est, Glom Filt Rate 54 (L) >60 ml/min/1.73m2    Calcium 8.8 8.3 - 10.4 MG/DL   POCT Glucose    Collection Time: 10/22/22  7:12 AM   Result Value Ref Range    POC Glucose 146 (H) 65 - 100 mg/dL    Performed by: Sarah          Other Studies:  CT HEAD WO CONTRAST    Result Date: 10/10/2022  EXAM: Noncontrast CT head. INDICATION: Visual disturbance. COMPARISON: None. TECHNIQUE: Noncontrast CT images of the head were obtained. Radiation dose reduction techniques were used for this study.   Our CT scanners use one or all of the following:  Automated exposure control, adjustment of the mA or kV according to patient size, iterative reconstruction. FINDINGS: Brain volume is appropriate for age. No acute infarct, hemorrhage or mass is identified. There is no mass effect, midline shift or depressed fracture. The visualized paranasal sinuses and mastoid air cells are clear, except for mild right maxillary sinus mucosal thickening. No acute process.       Current Meds:  Current Facility-Administered Medications   Medication Dose Route Frequency    doxycycline hyclate (VIBRAMYCIN) capsule 100 mg  100 mg Oral 2 times per day    0.9 % sodium chloride infusion   IntraVENous Continuous    insulin glargine (LANTUS) injection vial 15 Units  15 Units SubCUTAneous Daily    enoxaparin Sodium (LOVENOX) injection 30 mg  30 mg SubCUTAneous BID    hydrALAZINE (APRESOLINE) injection 10 mg  10 mg IntraVENous Q6H PRN    gabapentin (NEURONTIN) capsule 300 mg  300 mg Oral TID    medicated lip ointment (BLISTEX)   Topical PRN    traMADol (ULTRAM) tablet 50 mg  50 mg Oral Q6H    polyethylene glycol (GLYCOLAX) packet 17 g  17 g Oral BID    aspirin EC tablet 81 mg  81 mg Oral Daily    atorvastatin (LIPITOR) tablet 40 mg  40 mg Oral Nightly    docusate sodium (COLACE) capsule 100 mg  100 mg Oral BID    miconazole (MICOTIN) 2 % powder   Topical BID    oxyCODONE (ROXICODONE) immediate release tablet 10 mg  10 mg Oral Q4H PRN    sodium hypochlorite (DAKINS) 0.125 % external solution   Irrigation BID    methocarbamol (ROBAXIN) tablet 750 mg  750 mg Oral 4x Daily    HYDROmorphone HCl PF (DILAUDID) injection 0.5 mg  0.5 mg IntraVENous Q3H PRN    [Held by provider] insulin lispro (HUMALOG) injection vial 4 Units  4 Units SubCUTAneous TID WC    glucose chewable tablet 16 g  4 tablet Oral PRN    dextrose bolus 10% 125 mL  125 mL IntraVENous PRN    Or    dextrose bolus 10% 250 mL  250 mL IntraVENous PRN    glucagon (rDNA) injection 1 mg  1 mg SubCUTAneous PRN    dextrose 10 % infusion   IntraVENous Continuous PRN    insulin lispro (HUMALOG) injection vial 0-8 Units  0-8 Units SubCUTAneous TID WC    insulin lispro (HUMALOG) injection vial 0-4 Units  0-4 Units SubCUTAneous Nightly    sodium chloride flush 0.9 % injection 5-40 mL  5-40 mL IntraVENous 2 times per day    sodium chloride flush 0.9 % injection 5-40 mL  5-40 mL IntraVENous PRN    0.9 % sodium chloride infusion   IntraVENous PRN    ondansetron (ZOFRAN-ODT) disintegrating tablet 4 mg  4 mg Oral Q8H PRN    Or    ondansetron (ZOFRAN) injection 4 mg  4 mg IntraVENous Q6H PRN    polyethylene glycol (GLYCOLAX) packet 17 g  17 g Oral Daily PRN    acetaminophen (TYLENOL) tablet 650 mg  650 mg Oral Q6H PRN    Or    acetaminophen (TYLENOL) suppository 650 mg  650 mg Rectal Q6H PRN       Signed:  Miranda Garcia, APRN - CNP

## 2022-10-22 NOTE — PROGRESS NOTES
Patient dressing changed with patient's female friend, King Floyd, at the bedside. Explained each stepped with King Floyd on how to clean the wound on the patient's groin and penis, how to repack with Dakins soaked gauze and to cover the wound. This RN explained how to completed the dressing changed on the patient's back once the wound vac will come off. Explained the powder we place on his groin as well. King Floyd verbalized understanding. No questions at this time. No needs stated. Patient tolerated dressing change with no complications. Patient resting in bed comfortably.

## 2022-10-22 NOTE — PROGRESS NOTES
END OF SHIFT NOTE:    INTAKE/OUTPUT  10/21 0701 - 10/22 0700  In: 1863 [I.V.:1863]  Out: 7679 [Urine:3550]  Voiding: Yes  Catheter: Yes  Drain:   Negative Pressure Wound Therapy Back Left;Mid (Active)   $ Standard NPWT <=50 sq cm PER TX $ Yes 10/21/22 1457   Wound Type Surgical 10/21/22 1457   Unit Type kci ulta 10/21/22 1457   Dressing Type Black Foam 10/21/22 1457   Number of pieces used 1 10/21/22 1457   Number of pieces removed 1 10/21/22 1457   Cycle Continuous 10/21/22 0712   Target Pressure (mmHg) 125 10/21/22 0712   Canister changed? No 10/21/22 1457   Dressing Status New dressing applied 10/21/22 1457   Dressing Changed Changed/New 10/21/22 1457   Drainage Amount None 10/21/22 1457   Drainage Description Serosanguinous 10/21/22 0712   Dressing Change Due 10/24/22 10/21/22 1457   Output (ml) 25 ml 10/20/22 1920   Wound Assessment Granulation tissue 10/21/22 1457   Nemo-wound Assessment Blanchable erythema 10/21/22 1457   Shape oval 10/21/22 1457   Odor Mild 10/21/22 1457       Urinary Catheter 10/06/22 Robles (Active)   $ Urethral catheter insertion Inserted for procedure 10/10/22 0800   Catheter Indications Assist in healing of open sacral or perineal wounds (Stage III, IV, or unstageable documented by a provider or enterostomal therapy) and/or full thickness perineal and lower extremity burns in incontinent patients 10/22/22 0728   Site Assessment No urethral drainage 10/22/22 0728   Urine Color Yellow 10/22/22 0728   Urine Appearance Clear 10/22/22 0728   Urine Odor Other (Comment) 10/18/22 0735   Collection Container Standard 10/22/22 0728   Securement Method Securing device (Describe) 10/22/22 0728   Catheter Care Completed Yes 10/22/22 0728   Catheter Best Practices  Drainage tube clipped to bed;Catheter secured to thigh; Tamper seal intact; Bag below bladder;Bag not on floor; Lack of dependent loop in tubing;Drainage bag less than half full 10/22/22 0728   Status Draining 10/22/22 0728   Output (mL) 830 mL 10/22/22 0728               Flatus: Patient does have flatus present. Stool: 3 occurrences. Characteristics:  Stool Appearance: Soft  Stool Color: Brown  Stool Amount: Medium  Stool Assessment  Incontinence: No  Stool Appearance: Soft  Stool Color: Brown  Stool Amount: Medium  Stool Source: Rectum  Last BM (including prior to admit): 10/22/22    Emesis:  occurrences. Characteristics:        VITAL SIGNS  Patient Vitals for the past 12 hrs:   Temp Pulse Resp BP SpO2   10/22/22 1649 98.2 °F (36.8 °C) 80 18 (!) 171/95 97 %   10/22/22 1126 98.6 °F (37 °C) 81 18 (!) 163/100 92 %   10/22/22 0728 98.5 °F (36.9 °C) 86 18 (!) 161/92 96 %       Pain Assessment  Pain Level: 0 (10/22/22 1231)  Pain Location: Back  Patient's Stated Pain Goal: 0 - No pain    Ambulating  Yes, sat up in chair    Shift report given to oncoming nurse at the bedside.     Analy Solorzano RN

## 2022-10-23 ENCOUNTER — APPOINTMENT (OUTPATIENT)
Dept: MRI IMAGING | Age: 57
DRG: 854 | End: 2022-10-23

## 2022-10-23 ENCOUNTER — APPOINTMENT (OUTPATIENT)
Dept: CT IMAGING | Age: 57
DRG: 854 | End: 2022-10-23

## 2022-10-23 LAB
ANION GAP SERPL CALC-SCNC: 4 MMOL/L (ref 2–11)
BASOPHILS # BLD: 0.1 K/UL (ref 0–0.2)
BASOPHILS NFR BLD: 1 % (ref 0–2)
BUN SERPL-MCNC: 15 MG/DL (ref 6–23)
CALCIUM SERPL-MCNC: 9.1 MG/DL (ref 8.3–10.4)
CHLORIDE SERPL-SCNC: 110 MMOL/L (ref 101–110)
CO2 SERPL-SCNC: 28 MMOL/L (ref 21–32)
CREAT SERPL-MCNC: 1.5 MG/DL (ref 0.8–1.5)
DIFFERENTIAL METHOD BLD: ABNORMAL
EOSINOPHIL # BLD: 0.3 K/UL (ref 0–0.8)
EOSINOPHIL NFR BLD: 4 % (ref 0.5–7.8)
ERYTHROCYTE [DISTWIDTH] IN BLOOD BY AUTOMATED COUNT: 14.4 % (ref 11.9–14.6)
GLUCOSE BLD STRIP.AUTO-MCNC: 114 MG/DL (ref 65–100)
GLUCOSE BLD STRIP.AUTO-MCNC: 158 MG/DL (ref 65–100)
GLUCOSE BLD STRIP.AUTO-MCNC: 188 MG/DL (ref 65–100)
GLUCOSE BLD STRIP.AUTO-MCNC: 193 MG/DL (ref 65–100)
GLUCOSE SERPL-MCNC: 122 MG/DL (ref 65–100)
HCT VFR BLD AUTO: 30.3 % (ref 41.1–50.3)
HGB BLD-MCNC: 9.2 G/DL (ref 13.6–17.2)
IMM GRANULOCYTES # BLD AUTO: 0 K/UL (ref 0–0.5)
IMM GRANULOCYTES NFR BLD AUTO: 0 % (ref 0–5)
LYMPHOCYTES # BLD: 2.5 K/UL (ref 0.5–4.6)
LYMPHOCYTES NFR BLD: 35 % (ref 13–44)
MCH RBC QN AUTO: 28.4 PG (ref 26.1–32.9)
MCHC RBC AUTO-ENTMCNC: 30.4 G/DL (ref 31.4–35)
MCV RBC AUTO: 93.5 FL (ref 82–102)
MONOCYTES # BLD: 0.5 K/UL (ref 0.1–1.3)
MONOCYTES NFR BLD: 7 % (ref 4–12)
NEUTS SEG # BLD: 3.8 K/UL (ref 1.7–8.2)
NEUTS SEG NFR BLD: 53 % (ref 43–78)
NRBC # BLD: 0 K/UL (ref 0–0.2)
PLATELET # BLD AUTO: 444 K/UL (ref 150–450)
PMV BLD AUTO: 10 FL (ref 9.4–12.3)
POTASSIUM SERPL-SCNC: 3.9 MMOL/L (ref 3.5–5.1)
RBC # BLD AUTO: 3.24 M/UL (ref 4.23–5.6)
SERVICE CMNT-IMP: ABNORMAL
SODIUM SERPL-SCNC: 142 MMOL/L (ref 133–143)
WBC # BLD AUTO: 7.3 K/UL (ref 4.3–11.1)

## 2022-10-23 PROCEDURE — 72142 MRI NECK SPINE W/DYE: CPT

## 2022-10-23 PROCEDURE — 6370000000 HC RX 637 (ALT 250 FOR IP)

## 2022-10-23 PROCEDURE — 2580000003 HC RX 258: Performed by: FAMILY MEDICINE

## 2022-10-23 PROCEDURE — A9579 GAD-BASE MR CONTRAST NOS,1ML: HCPCS | Performed by: NURSE PRACTITIONER

## 2022-10-23 PROCEDURE — 6360000004 HC RX CONTRAST MEDICATION: Performed by: NURSE PRACTITIONER

## 2022-10-23 PROCEDURE — 70498 CT ANGIOGRAPHY NECK: CPT

## 2022-10-23 PROCEDURE — 82962 GLUCOSE BLOOD TEST: CPT

## 2022-10-23 PROCEDURE — 80048 BASIC METABOLIC PNL TOTAL CA: CPT

## 2022-10-23 PROCEDURE — 85025 COMPLETE CBC W/AUTO DIFF WBC: CPT

## 2022-10-23 PROCEDURE — 6370000000 HC RX 637 (ALT 250 FOR IP): Performed by: INTERNAL MEDICINE

## 2022-10-23 PROCEDURE — 6370000000 HC RX 637 (ALT 250 FOR IP): Performed by: FAMILY MEDICINE

## 2022-10-23 PROCEDURE — 6370000000 HC RX 637 (ALT 250 FOR IP): Performed by: NURSE PRACTITIONER

## 2022-10-23 PROCEDURE — 1100000000 HC RM PRIVATE

## 2022-10-23 PROCEDURE — 6360000002 HC RX W HCPCS: Performed by: FAMILY MEDICINE

## 2022-10-23 PROCEDURE — 6370000000 HC RX 637 (ALT 250 FOR IP): Performed by: STUDENT IN AN ORGANIZED HEALTH CARE EDUCATION/TRAINING PROGRAM

## 2022-10-23 PROCEDURE — 70450 CT HEAD/BRAIN W/O DYE: CPT

## 2022-10-23 PROCEDURE — 36415 COLL VENOUS BLD VENIPUNCTURE: CPT

## 2022-10-23 PROCEDURE — 6360000002 HC RX W HCPCS: Performed by: INTERNAL MEDICINE

## 2022-10-23 RX ORDER — INSULIN GLARGINE 100 [IU]/ML
17 INJECTION, SOLUTION SUBCUTANEOUS DAILY
Status: DISCONTINUED | OUTPATIENT
Start: 2022-10-23 | End: 2022-10-25 | Stop reason: HOSPADM

## 2022-10-23 RX ADMIN — POLYETHYLENE GLYCOL 3350 17 G: 17 POWDER, FOR SOLUTION ORAL at 08:43

## 2022-10-23 RX ADMIN — METHOCARBAMOL TABLETS 750 MG: 750 TABLET, COATED ORAL at 12:42

## 2022-10-23 RX ADMIN — GABAPENTIN 300 MG: 300 CAPSULE ORAL at 20:13

## 2022-10-23 RX ADMIN — DOCUSATE SODIUM 100 MG: 100 CAPSULE, LIQUID FILLED ORAL at 20:13

## 2022-10-23 RX ADMIN — HYDRALAZINE HYDROCHLORIDE 10 MG: 20 INJECTION INTRAMUSCULAR; INTRAVENOUS at 12:58

## 2022-10-23 RX ADMIN — DOXYCYCLINE HYCLATE 100 MG: 100 CAPSULE ORAL at 20:13

## 2022-10-23 RX ADMIN — SODIUM CHLORIDE, PRESERVATIVE FREE 10 ML: 5 INJECTION INTRAVENOUS at 08:55

## 2022-10-23 RX ADMIN — TRAMADOL HYDROCHLORIDE 50 MG: 50 TABLET, COATED ORAL at 20:13

## 2022-10-23 RX ADMIN — ASPIRIN 81 MG: 81 TABLET ORAL at 08:43

## 2022-10-23 RX ADMIN — SODIUM CHLORIDE: 900 INJECTION, SOLUTION INTRAVENOUS at 02:20

## 2022-10-23 RX ADMIN — METHOCARBAMOL TABLETS 750 MG: 750 TABLET, COATED ORAL at 20:13

## 2022-10-23 RX ADMIN — TRAMADOL HYDROCHLORIDE 50 MG: 50 TABLET, COATED ORAL at 08:43

## 2022-10-23 RX ADMIN — METHOCARBAMOL TABLETS 750 MG: 750 TABLET, COATED ORAL at 17:34

## 2022-10-23 RX ADMIN — SODIUM CHLORIDE, PRESERVATIVE FREE 10 ML: 5 INJECTION INTRAVENOUS at 20:14

## 2022-10-23 RX ADMIN — GABAPENTIN 300 MG: 300 CAPSULE ORAL at 08:43

## 2022-10-23 RX ADMIN — HYDROMORPHONE HYDROCHLORIDE 0.5 MG: 1 INJECTION, SOLUTION INTRAMUSCULAR; INTRAVENOUS; SUBCUTANEOUS at 02:21

## 2022-10-23 RX ADMIN — GADOTERIDOL 28 ML: 279.3 INJECTION, SOLUTION INTRAVENOUS at 15:33

## 2022-10-23 RX ADMIN — IOPAMIDOL 100 ML: 755 INJECTION, SOLUTION INTRAVENOUS at 12:00

## 2022-10-23 RX ADMIN — ATORVASTATIN CALCIUM 40 MG: 40 TABLET, FILM COATED ORAL at 20:13

## 2022-10-23 RX ADMIN — DOXYCYCLINE HYCLATE 100 MG: 100 CAPSULE ORAL at 08:43

## 2022-10-23 RX ADMIN — GABAPENTIN 300 MG: 300 CAPSULE ORAL at 16:09

## 2022-10-23 RX ADMIN — ANTI-FUNGAL POWDER MICONAZOLE NITRATE TALC FREE: 1.42 POWDER TOPICAL at 08:56

## 2022-10-23 RX ADMIN — Medication: at 08:55

## 2022-10-23 RX ADMIN — TRAMADOL HYDROCHLORIDE 50 MG: 50 TABLET, COATED ORAL at 16:09

## 2022-10-23 RX ADMIN — ENOXAPARIN SODIUM 30 MG: 100 INJECTION SUBCUTANEOUS at 20:13

## 2022-10-23 RX ADMIN — INSULIN GLARGINE 17 UNITS: 100 INJECTION, SOLUTION SUBCUTANEOUS at 08:43

## 2022-10-23 RX ADMIN — TRAMADOL HYDROCHLORIDE 50 MG: 50 TABLET, COATED ORAL at 02:24

## 2022-10-23 RX ADMIN — ENOXAPARIN SODIUM 30 MG: 100 INJECTION SUBCUTANEOUS at 08:42

## 2022-10-23 RX ADMIN — DOCUSATE SODIUM 100 MG: 100 CAPSULE, LIQUID FILLED ORAL at 08:43

## 2022-10-23 RX ADMIN — METHOCARBAMOL TABLETS 750 MG: 750 TABLET, COATED ORAL at 08:43

## 2022-10-23 ASSESSMENT — PAIN DESCRIPTION - LOCATION
LOCATION: BACK
LOCATION: BACK

## 2022-10-23 ASSESSMENT — PAIN DESCRIPTION - ORIENTATION: ORIENTATION: POSTERIOR

## 2022-10-23 ASSESSMENT — PAIN SCALES - GENERAL
PAINLEVEL_OUTOF10: 10
PAINLEVEL_OUTOF10: 8
PAINLEVEL_OUTOF10: 10
PAINLEVEL_OUTOF10: 0

## 2022-10-23 ASSESSMENT — PAIN DESCRIPTION - DESCRIPTORS: DESCRIPTORS: ACHING

## 2022-10-23 NOTE — PROGRESS NOTES
END OF SHIFT NOTE:    INTAKE/OUTPUT  10/22 0701 - 10/23 0700  In: -   Out: 4710 [Urine:2830]  Voiding: No  Catheter: Yes  Drain:   Negative Pressure Wound Therapy Back Left;Mid (Active)   $ Standard NPWT <=50 sq cm PER TX $ Yes 10/21/22 1457   Wound Type Surgical 10/23/22 0737   Unit Type kci ulta 10/23/22 0737   Dressing Type Black Foam 10/23/22 0737   Number of pieces used 1 10/21/22 1457   Number of pieces removed 1 10/21/22 1457   Cycle Continuous 10/21/22 0712   Target Pressure (mmHg) 125 10/21/22 0712   Canister changed? No 10/21/22 1457   Dressing Status New dressing applied 10/21/22 1457   Dressing Changed Changed/New 10/21/22 1457   Drainage Amount None 10/21/22 1457   Drainage Description Serosanguinous 10/21/22 0712   Dressing Change Due 10/24/22 10/21/22 1457   Output (ml) 350 ml 10/23/22 0737   Wound Assessment Granulation tissue 10/21/22 1457   Nemo-wound Assessment Blanchable erythema 10/21/22 1457   Shape oval 10/21/22 1457   Odor Mild 10/21/22 1457       Urinary Catheter 10/06/22 Robles (Active)   $ Urethral catheter insertion Inserted for procedure 10/22/22 2119   Catheter Indications Assist in healing of open sacral or perineal wounds (Stage III, IV, or unstageable documented by a provider or enterostomal therapy) and/or full thickness perineal and lower extremity burns in incontinent patients 10/23/22 0737   Site Assessment No urethral drainage 10/23/22 0737   Urine Color Yellow 10/23/22 0737   Urine Appearance Clear 10/23/22 0737   Urine Odor Other (Comment) 10/18/22 0735   Collection Container Standard 10/22/22 0728   Securement Method Securing device (Describe) 10/23/22 0737   Catheter Care Completed Yes 10/23/22 0737   Catheter Best Practices  Drainage tube clipped to bed;Catheter secured to thigh; Tamper seal intact; Bag below bladder;Bag not on floor; Lack of dependent loop in tubing;Drainage bag less than half full 10/23/22 0737   Status Draining 10/23/22 0737   Output (mL) 1000 mL 10/23/22 1553               Flatus: Patient does have flatus present. Stool:  occurrences. 2  Characteristics:  Stool Appearance: Soft  Stool Color: Brown  Stool Amount: Medium  Stool Assessment  Incontinence: No  Stool Appearance: Soft  Stool Color: Brown  Stool Amount: Medium  Stool Source: Rectum  Last BM (including prior to admit): 10/23/22    Emesis:  occurrences. Characteristics:        VITAL SIGNS  Patient Vitals for the past 12 hrs:   Temp Pulse Resp BP SpO2   10/23/22 1608 97.9 °F (36.6 °C) 93 18 (!) 148/88 95 %   10/23/22 1231 97.7 °F (36.5 °C) 88 18 (!) 172/95 97 %   10/23/22 0754 98.4 °F (36.9 °C) 75 18 (!) 154/84 93 %       Pain Assessment  Pain Level: 0 (10/23/22 0324)  Pain Location: Back  Patient's Stated Pain Goal: 0 - No pain    Ambulating  Yes    Shift report given to oncoming nurse at the bedside.     Jackelin See, RN

## 2022-10-23 NOTE — PROGRESS NOTES
Hospitalist Progress Note   Admit Date:  10/5/2022  7:22 PM   Name:  Leon Cho   Age:  60 y.o. Sex:  male  :  1965   MRN:  [id number]   Room:      Reason(s) for Admission: Cellulitis and abscess of trunk [L03.319, L02.219]  Abscess [L02.91]  Hyperglycemia [R73.9]  Abscess, perineum [facility] Course & Interval History:   61 y/o male with a h/o DM2 and HTN who was admitted to our service on 10/5 with an abscess on his upper back for about 2 months. CT was done showing a paraspinal abscess w/o muscular involvement along with L inguinal inflammatory changes with possible early abscess of scrotum. Urology and General Surgery were consulted. He was started on antibiotics. Hyperglycemia, A1C >14, started on basal + bolus insulin. ID consulted. HIV neg, other ID orders pending. On 10/7 he underwent I&D of back abscess and L groin abscess. He later underwent debridement of penile abscess with Urology. Wound cultures from 10/5 I&D with staph aureus. Wound vac to back placed 10/10. Wound care recommend possible further debridement versus aggressive wound care. Surgery following patient and recommend continue wet-to-dry dressing twice daily, no surgical debridement at this time. Continue wound VAC to back while inpatient. Vancomycin discontinued and patient transitioned to oral doxycycline on 10/21 with EOT 2022 as per ID recommendations. ID signed off. Subjective/24hr Events (10/23/22): A&O x3, states BM yesterday and today x 1. Complains of vision change seeing objects upside down for past 3 days, did not alert anyone until today. CT head ordered. Assessment & Plan:     # Sepsis 2/2 abscess of upper back, L groin and penis due to MRSA  - Leukocytosis + tachycardia + abscesses on CT. Resolved. - S/p I&D of back and groin abscess, penile debridement on 10/7. 10/5 wound culture with MRSA.  - Wound vac placed 10/10.  - Vancomycin discontinued on 10/21 and patient transitioned to oral doxycycline on 10/21 with EOT 11/4/2022 as per ID recommendations. ID signed off. - Wound care  - Wound VAC removed and surgery recommended wet-to-dry dressing for perianal area. 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 Recommend avoid pressure on the cubital tunnel by padding the elbow bilaterally. EMG nerve conduction studies outpatient. Supportive management for now  - PT/OT  10/23  Will add MRI with contrast C and T spine for completeness. vision change  seeing objects upside down for past 3 days, did not alert anyone until today. CT head, CTA head/neck, neuro checks  ASA 81 mg ongoing, high dose statin   Lipid panel with triglycerides 166, HLD 16   check A1C     # Acute urinary retention  - Likely 2/2 penile abscess and edema. Con't Jaimes. # Uncontrolled DM2  - Decrease Lantus to 15 unit daily, and hold Premeal insulin to avoid hypoglycemia, continue sliding scale  - Stop glipizide at discharge. 10/23  Prandial insulin stopped, has been on hold  Increase Lantus to 17 units nightly for tighter control  Cont SSI     # HTN  - Controlled off meds. HLD:  Statin ongoing    Discharge Planning: Pending, CM involved. Diet:  ADULT DIET; Regular; 3 carb choices (45 gm/meal)  ADULT ORAL NUTRITION SUPPLEMENT; Breakfast, Dinner;  Wound Healing Oral Supplement  DVT PPx: Lovenox  Code status: Full Code    Hospital Problems             Last Modified POA    * (Principal) Sepsis ([facility] [address] 10/8/2022 Yes    Primary hypertension 10/6/2022 Yes    DM2 (diabetes mellitus, type 2) ([address] 10/6/2022 Yes    Cellulitis and abscess of trunk 10/7/2022 Yes    Abscess of groin, left 10/8/2022 Yes    Penile abscess 10/8/2022 Yes    Acute urinary retention 10/8/2022 Yes    Bilateral arm weakness 10/18/2022 Yes    Ulnar neuropathy at elbow of right upper extremity 10/18/2022 Yes    Objective:   Patient Vitals for the past 24 hrs:   Temp Pulse Resp BP SpO2   10/23/22 0754 98.4 °F (36.9 °C) 75 18 (!) 154/84 93 %   10/23/22 0250 98.2 °F (36.8 °C) 83 18 (!) 165/94 95 %   10/22/22 2309 98.6 °F (37 °C) 83 18 (!) 169/96 93 %   10/22/22 1940 98.4 °F (36.9 °C) 89 18 (!) 154/90 95 %   10/22/22 1649 98.2 °F (36.8 °C) 80 18 (!) 171/95 97 %   10/22/22 1126 98.6 °F (37 °C) 81 18 (!) 163/100 92 %       Estimated body mass index is 39.8 kg/m² as calculated from the following:    Height as of this encounter: 6' 2\" (1.88 m). Weight as of this encounter: 310 lb (140.6 kg). Intake/Output Summary (Last 24 hours) at 10/23/2022 1039  Last data filed at 10/23/2022 0754  Gross per 24 hour   Intake --   Output 2950 ml   Net -2950 ml         Physical Exam:   Blood pressure (!) 154/84, pulse 75, temperature 98.4 °F (36.9 °C), temperature source Oral, resp. rate 18, height 6' 2\" (1.88 m), weight (!) 310 lb (140.6 kg), SpO2 93 %. General:    Well nourished. No overt distress. Morbidly obese. Head:  Normocephalic, atraumatic  Eyes:  Sclerae appear normal. Pupils equally round. ENT:  Nares appear normal, no drainage. Moist oral mucosa. Poor dentition. Neck:  No restricted ROM. Trachea midline. CV:   RRR. No m/r/g. No jugular venous distension. Lungs:   CTAB. No wheezing, rhonchi, or rales. Respirations even, unlabored. Abdomen: Bowel sounds present. Soft, nontender, nondistended. :  Robles in place. Extremities: No cyanosis or clubbing. No edema. Skin:     Perennial dressing dry intact, wound vac to mid-back. Neuro:  CN II-XII grossly intact.   Decrease upper extremity strength, weak   Psych:  Normal mood and affect.       I have reviewed ordered lab tests and independently visualized imaging below:    Recent Labs:  Recent Results (from the past 48 hour(s))   POCT Glucose    Collection Time: 10/21/22 11:07 AM   Result Value Ref Range    POC Glucose 141 (H) 65 - 100 mg/dL    Performed by: Sarah    POCT Glucose    Collection Time: 10/21/22  4:47 PM   Result Value Ref Range    POC Glucose 180 (H) 65 - 100 mg/dL    Performed by: Sarah    POCT Glucose    Collection Time: 10/21/22  9:10 PM   Result Value Ref Range    POC Glucose 261 (H) 65 - 100 mg/dL    Performed by: Laney    CBC with Auto Differential    Collection Time: 10/22/22  5:36 AM   Result Value Ref Range    WBC 7.9 4.3 - 11.1 K/uL    RBC 3.20 (L) 4.23 - 5.6 M/uL    Hemoglobin 9.1 (L) 13.6 - 17.2 g/dL    Hematocrit 30.2 (L) 41.1 - 50.3 %    MCV 94.4 82 - 102 FL    MCH 28.4 26.1 - 32.9 PG    MCHC 30.1 (L) 31.4 - 35.0 g/dL    RDW 14.3 11.9 - 14.6 %    Platelets 752 (H) 670 - 450 K/uL    MPV 10.4 9.4 - 12.3 FL    nRBC 0.00 0.0 - 0.2 K/uL    Differential Type AUTOMATED      Seg Neutrophils 58 43 - 78 %    Lymphocytes 32 13 - 44 %    Monocytes 6 4.0 - 12.0 %    Eosinophils % 3 0.5 - 7.8 %    Basophils 1 0.0 - 2.0 %    Immature Granulocytes 0 0.0 - 5.0 %    Segs Absolute 4.5 1.7 - 8.2 K/UL    Absolute Lymph # 2.5 0.5 - 4.6 K/UL    Absolute Mono # 0.5 0.1 - 1.3 K/UL    Absolute Eos # 0.3 0.0 - 0.8 K/UL    Basophils Absolute 0.1 0.0 - 0.2 K/UL    Absolute Immature Granulocyte 0.0 0.0 - 0.5 K/UL   Basic Metabolic Panel w/ Reflex to MG    Collection Time: 10/22/22  5:36 AM   Result Value Ref Range    Sodium 139 133 - 143 mmol/L    Potassium 3.9 3.5 - 5.1 mmol/L    Chloride 109 101 - 110 mmol/L    CO2 26 21 - 32 mmol/L    Anion Gap 4 2 - 11 mmol/L    Glucose 163 (H) 65 - 100 mg/dL    BUN 17 6 - 23 MG/DL    Creatinine 1.50 0.8 - 1.5 MG/DL    Est, Glom Filt Rate 54 (L) >60 ml/min/1.73m2    Calcium 8.8 8.3 - 10.4 MG/DL   POCT Glucose    Collection Time: 10/22/22  7:12 AM   Result Value Ref Range    POC Glucose 146 (H) 65 - 100 mg/dL    Performed by: Sarah    POCT Glucose    Collection Time: 10/22/22 11:24 AM   Result Value Ref Range    POC Glucose 143 (H) 65 - 100 mg/dL    Performed by: Pastora    POCT Glucose    Collection Time: 10/22/22  4:46 PM   Result Value Ref Range    POC Glucose 148 (H) 65 - 100 mg/dL    Performed by: Pastora    POCT Glucose    Collection Time: 10/22/22  8:45 PM   Result Value Ref Range    POC Glucose 175 (H) 65 - 100 mg/dL    Performed by: Elisabeth Brooks    CBC with Auto Differential    Collection Time: 10/23/22  6:57 AM   Result Value Ref Range    WBC 7.3 4.3 - 11.1 K/uL    RBC 3.24 (L) 4.23 - 5.6 M/uL    Hemoglobin 9.2 (L) 13.6 - 17.2 g/dL    Hematocrit 30.3 (L) 41.1 - 50.3 %    MCV 93.5 82 - 102 FL    MCH 28.4 26.1 - 32.9 PG    MCHC 30.4 (L) 31.4 - 35.0 g/dL    RDW 14.4 11.9 - 14.6 %    Platelets 829 888 - 329 K/uL    MPV 10.0 9.4 - 12.3 FL    nRBC 0.00 0.0 - 0.2 K/uL    Differential Type AUTOMATED      Seg Neutrophils 53 43 - 78 %    Lymphocytes 35 13 - 44 %    Monocytes 7 4.0 - 12.0 %    Eosinophils % 4 0.5 - 7.8 %    Basophils 1 0.0 - 2.0 %    Immature Granulocytes 0 0.0 - 5.0 %    Segs Absolute 3.8 1.7 - 8.2 K/UL    Absolute Lymph # 2.5 0.5 - 4.6 K/UL    Absolute Mono # 0.5 0.1 - 1.3 K/UL    Absolute Eos # 0.3 0.0 - 0.8 K/UL    Basophils Absolute 0.1 0.0 - 0.2 K/UL    Absolute Immature Granulocyte 0.0 0.0 - 0.5 K/UL   Basic Metabolic Panel w/ Reflex to MG    Collection Time: 10/23/22  6:57 AM   Result Value Ref Range    Sodium 142 133 - 143 mmol/L    Potassium 3.9 3.5 - 5.1 mmol/L    Chloride 110 101 - 110 mmol/L    CO2 28 21 - 32 mmol/L    Anion Gap 4 2 - 11 mmol/L    Glucose 122 (H) 65 - 100 mg/dL    BUN 15 6 - 23 MG/DL    Creatinine 1.50 0.8 - 1.5 MG/DL    Est, Glom Filt Rate 54 (L) >60 ml/min/1.73m2    Calcium 9.1 8.3 - 10.4 MG/DL   POCT Glucose    Collection Time: 10/23/22  7:55 AM   Result Value Ref Range    POC Glucose 114 (H) 65 - 100 mg/dL    Performed by: Randolph          Other Studies:  CT HEAD WO CONTRAST    Result Date: 10/10/2022  EXAM: Noncontrast CT head. INDICATION: Visual disturbance. COMPARISON: None. TECHNIQUE: Noncontrast CT images of the head were obtained. Radiation dose reduction techniques were used for this study. Our CT scanners use one or all of the following:  Automated exposure control, adjustment of the mA or kV according to patient size, iterative reconstruction. FINDINGS: Brain volume is appropriate for age. No acute infarct, hemorrhage or mass is identified. There is no mass effect, midline shift or depressed fracture. The visualized paranasal sinuses and mastoid air cells are clear, except for mild right maxillary sinus mucosal thickening. No acute process.       Current Meds:  Current Facility-Administered Medications   Medication Dose Route Frequency    insulin glargine (LANTUS) injection vial 17 Units  17 Units SubCUTAneous Daily    doxycycline hyclate (VIBRAMYCIN) capsule 100 mg  100 mg Oral 2 times per day    0.9 % sodium chloride infusion   IntraVENous Continuous    enoxaparin Sodium (LOVENOX) injection 30 mg  30 mg SubCUTAneous BID    hydrALAZINE (APRESOLINE) injection 10 mg  10 mg IntraVENous Q6H PRN    gabapentin (NEURONTIN) capsule 300 mg  300 mg Oral TID    medicated lip ointment (BLISTEX)   Topical PRN    traMADol (ULTRAM) tablet 50 mg  50 mg Oral Q6H    polyethylene glycol (GLYCOLAX) packet 17 g  17 g Oral BID    aspirin EC tablet 81 mg  81 mg Oral Daily    atorvastatin (LIPITOR) tablet 40 mg  40 mg Oral Nightly    docusate sodium (COLACE) capsule 100 mg  100 mg Oral BID    miconazole (MICOTIN) 2 % powder   Topical BID    oxyCODONE (ROXICODONE) immediate release tablet 10 mg  10 mg Oral Q4H PRN    sodium hypochlorite (DAKINS) 0.125 % external solution   Irrigation BID    methocarbamol (ROBAXIN) tablet 750 mg  750 mg Oral 4x Daily HYDROmorphone HCl PF (DILAUDID) injection 0.5 mg  0.5 mg IntraVENous Q3H PRN    glucose chewable tablet 16 g  4 tablet Oral PRN    dextrose bolus 10% 125 mL  125 mL IntraVENous PRN    Or    dextrose bolus 10% 250 mL  250 mL IntraVENous PRN    glucagon (rDNA) injection 1 mg  1 mg SubCUTAneous PRN    dextrose 10 % infusion   IntraVENous Continuous PRN    insulin lispro (HUMALOG) injection vial 0-8 Units  0-8 Units SubCUTAneous TID WC    insulin lispro (HUMALOG) injection vial 0-4 Units  0-4 Units SubCUTAneous Nightly    sodium chloride flush 0.9 % injection 5-40 mL  5-40 mL IntraVENous 2 times per day    sodium chloride flush 0.9 % injection 5-40 mL  5-40 mL IntraVENous PRN    0.9 % sodium chloride infusion   IntraVENous PRN    ondansetron (ZOFRAN-ODT) disintegrating tablet 4 mg  4 mg Oral Q8H PRN    Or    ondansetron (ZOFRAN) injection 4 mg  4 mg IntraVENous Q6H PRN    polyethylene glycol (GLYCOLAX) packet 17 g  17 g Oral Daily PRN    acetaminophen (TYLENOL) tablet 650 mg  650 mg Oral Q6H PRN    Or    acetaminophen (TYLENOL) suppository 650 mg  650 mg Rectal Q6H PRN       Signed:  MAURILIO Marcial - CNP

## 2022-10-24 ENCOUNTER — APPOINTMENT (OUTPATIENT)
Dept: MRI IMAGING | Age: 57
DRG: 854 | End: 2022-10-24

## 2022-10-24 LAB
ANION GAP SERPL CALC-SCNC: 4 MMOL/L (ref 2–11)
BASOPHILS # BLD: 0.1 K/UL (ref 0–0.2)
BASOPHILS NFR BLD: 1 % (ref 0–2)
BUN SERPL-MCNC: 12 MG/DL (ref 6–23)
CALCIUM SERPL-MCNC: 8.7 MG/DL (ref 8.3–10.4)
CHLORIDE SERPL-SCNC: 110 MMOL/L (ref 101–110)
CO2 SERPL-SCNC: 26 MMOL/L (ref 21–32)
CREAT SERPL-MCNC: 1.4 MG/DL (ref 0.8–1.5)
DIFFERENTIAL METHOD BLD: ABNORMAL
EOSINOPHIL # BLD: 0.3 K/UL (ref 0–0.8)
EOSINOPHIL NFR BLD: 4 % (ref 0.5–7.8)
ERYTHROCYTE [DISTWIDTH] IN BLOOD BY AUTOMATED COUNT: 14.5 % (ref 11.9–14.6)
EST. AVERAGE GLUCOSE BLD GHB EST-MCNC: 318 MG/DL
GLUCOSE BLD STRIP.AUTO-MCNC: 115 MG/DL (ref 65–100)
GLUCOSE BLD STRIP.AUTO-MCNC: 172 MG/DL (ref 65–100)
GLUCOSE BLD STRIP.AUTO-MCNC: 183 MG/DL (ref 65–100)
GLUCOSE BLD STRIP.AUTO-MCNC: 204 MG/DL (ref 65–100)
GLUCOSE SERPL-MCNC: 123 MG/DL (ref 65–100)
HBA1C MFR BLD: 12.7 % (ref 4.8–5.6)
HCT VFR BLD AUTO: 31.5 % (ref 41.1–50.3)
HGB BLD-MCNC: 9.5 G/DL (ref 13.6–17.2)
IMM GRANULOCYTES # BLD AUTO: 0 K/UL (ref 0–0.5)
IMM GRANULOCYTES NFR BLD AUTO: 0 % (ref 0–5)
LYMPHOCYTES # BLD: 2.6 K/UL (ref 0.5–4.6)
LYMPHOCYTES NFR BLD: 35 % (ref 13–44)
MCH RBC QN AUTO: 28 PG (ref 26.1–32.9)
MCHC RBC AUTO-ENTMCNC: 30.2 G/DL (ref 31.4–35)
MCV RBC AUTO: 92.9 FL (ref 82–102)
MONOCYTES # BLD: 0.5 K/UL (ref 0.1–1.3)
MONOCYTES NFR BLD: 7 % (ref 4–12)
NEUTS SEG # BLD: 3.9 K/UL (ref 1.7–8.2)
NEUTS SEG NFR BLD: 53 % (ref 43–78)
NRBC # BLD: 0 K/UL (ref 0–0.2)
PLATELET # BLD AUTO: 419 K/UL (ref 150–450)
PMV BLD AUTO: 10.3 FL (ref 9.4–12.3)
POTASSIUM SERPL-SCNC: 3.8 MMOL/L (ref 3.5–5.1)
RBC # BLD AUTO: 3.39 M/UL (ref 4.23–5.6)
SERVICE CMNT-IMP: ABNORMAL
SODIUM SERPL-SCNC: 140 MMOL/L (ref 133–143)
WBC # BLD AUTO: 7.5 K/UL (ref 4.3–11.1)

## 2022-10-24 PROCEDURE — 6370000000 HC RX 637 (ALT 250 FOR IP): Performed by: NURSE PRACTITIONER

## 2022-10-24 PROCEDURE — 36415 COLL VENOUS BLD VENIPUNCTURE: CPT

## 2022-10-24 PROCEDURE — 1100000000 HC RM PRIVATE

## 2022-10-24 PROCEDURE — 6370000000 HC RX 637 (ALT 250 FOR IP): Performed by: FAMILY MEDICINE

## 2022-10-24 PROCEDURE — 6360000002 HC RX W HCPCS: Performed by: FAMILY MEDICINE

## 2022-10-24 PROCEDURE — 6370000000 HC RX 637 (ALT 250 FOR IP): Performed by: STUDENT IN AN ORGANIZED HEALTH CARE EDUCATION/TRAINING PROGRAM

## 2022-10-24 PROCEDURE — 6370000000 HC RX 637 (ALT 250 FOR IP): Performed by: HOSPITALIST

## 2022-10-24 PROCEDURE — 85025 COMPLETE CBC W/AUTO DIFF WBC: CPT

## 2022-10-24 PROCEDURE — 83036 HEMOGLOBIN GLYCOSYLATED A1C: CPT

## 2022-10-24 PROCEDURE — 97530 THERAPEUTIC ACTIVITIES: CPT

## 2022-10-24 PROCEDURE — 6370000000 HC RX 637 (ALT 250 FOR IP): Performed by: INTERNAL MEDICINE

## 2022-10-24 PROCEDURE — 72157 MRI CHEST SPINE W/O & W/DYE: CPT

## 2022-10-24 PROCEDURE — 2580000003 HC RX 258: Performed by: NURSE PRACTITIONER

## 2022-10-24 PROCEDURE — 6370000000 HC RX 637 (ALT 250 FOR IP)

## 2022-10-24 PROCEDURE — 97605 NEG PRS WND THER DME<=50SQCM: CPT

## 2022-10-24 PROCEDURE — 2580000003 HC RX 258: Performed by: FAMILY MEDICINE

## 2022-10-24 PROCEDURE — A9579 GAD-BASE MR CONTRAST NOS,1ML: HCPCS | Performed by: NURSE PRACTITIONER

## 2022-10-24 PROCEDURE — 80048 BASIC METABOLIC PNL TOTAL CA: CPT

## 2022-10-24 PROCEDURE — 97164 PT RE-EVAL EST PLAN CARE: CPT

## 2022-10-24 PROCEDURE — 6360000004 HC RX CONTRAST MEDICATION: Performed by: NURSE PRACTITIONER

## 2022-10-24 PROCEDURE — 97112 NEUROMUSCULAR REEDUCATION: CPT

## 2022-10-24 PROCEDURE — 97607 NEG PRS WND THR NDME<=50SQCM: CPT

## 2022-10-24 PROCEDURE — 82962 GLUCOSE BLOOD TEST: CPT

## 2022-10-24 RX ORDER — SODIUM CHLORIDE 0.9 % (FLUSH) 0.9 %
10 SYRINGE (ML) INJECTION AS NEEDED
Status: DISCONTINUED | OUTPATIENT
Start: 2022-10-24 | End: 2022-10-25 | Stop reason: HOSPADM

## 2022-10-24 RX ORDER — POLYETHYLENE GLYCOL 3350 17 G/17G
17 POWDER, FOR SOLUTION ORAL DAILY PRN
Status: DISCONTINUED | OUTPATIENT
Start: 2022-10-24 | End: 2022-10-24 | Stop reason: SDUPTHER

## 2022-10-24 RX ORDER — TRAMADOL HYDROCHLORIDE 50 MG/1
50 TABLET ORAL EVERY 6 HOURS PRN
Status: DISCONTINUED | OUTPATIENT
Start: 2022-10-24 | End: 2022-10-25 | Stop reason: HOSPADM

## 2022-10-24 RX ORDER — DOCUSATE SODIUM 100 MG/1
100 CAPSULE, LIQUID FILLED ORAL 2 TIMES DAILY PRN
Status: DISCONTINUED | OUTPATIENT
Start: 2022-10-24 | End: 2022-10-25 | Stop reason: HOSPADM

## 2022-10-24 RX ORDER — OXYCODONE HYDROCHLORIDE 5 MG/1
10 TABLET ORAL EVERY 4 HOURS PRN
Status: DISCONTINUED | OUTPATIENT
Start: 2022-10-24 | End: 2022-10-25 | Stop reason: HOSPADM

## 2022-10-24 RX ORDER — LISINOPRIL 5 MG/1
10 TABLET ORAL DAILY
Status: DISCONTINUED | OUTPATIENT
Start: 2022-10-24 | End: 2022-10-25 | Stop reason: HOSPADM

## 2022-10-24 RX ADMIN — METHOCARBAMOL TABLETS 750 MG: 750 TABLET, COATED ORAL at 12:05

## 2022-10-24 RX ADMIN — METHOCARBAMOL TABLETS 750 MG: 750 TABLET, COATED ORAL at 17:50

## 2022-10-24 RX ADMIN — GABAPENTIN 300 MG: 300 CAPSULE ORAL at 15:30

## 2022-10-24 RX ADMIN — GABAPENTIN 300 MG: 300 CAPSULE ORAL at 08:30

## 2022-10-24 RX ADMIN — OXYCODONE 10 MG: 5 TABLET ORAL at 12:05

## 2022-10-24 RX ADMIN — DOCUSATE SODIUM 100 MG: 100 CAPSULE, LIQUID FILLED ORAL at 08:30

## 2022-10-24 RX ADMIN — Medication: at 00:00

## 2022-10-24 RX ADMIN — DOXYCYCLINE HYCLATE 100 MG: 100 CAPSULE ORAL at 08:30

## 2022-10-24 RX ADMIN — ENOXAPARIN SODIUM 30 MG: 100 INJECTION SUBCUTANEOUS at 08:31

## 2022-10-24 RX ADMIN — ANTI-FUNGAL POWDER MICONAZOLE NITRATE TALC FREE: 1.42 POWDER TOPICAL at 00:00

## 2022-10-24 RX ADMIN — Medication: at 08:36

## 2022-10-24 RX ADMIN — METHOCARBAMOL TABLETS 750 MG: 750 TABLET, COATED ORAL at 21:06

## 2022-10-24 RX ADMIN — TRAMADOL HYDROCHLORIDE 50 MG: 50 TABLET, COATED ORAL at 02:44

## 2022-10-24 RX ADMIN — METHOCARBAMOL TABLETS 750 MG: 750 TABLET, COATED ORAL at 08:31

## 2022-10-24 RX ADMIN — OXYCODONE 10 MG: 5 TABLET ORAL at 23:32

## 2022-10-24 RX ADMIN — INSULIN LISPRO 2 UNITS: 100 INJECTION, SOLUTION INTRAVENOUS; SUBCUTANEOUS at 17:51

## 2022-10-24 RX ADMIN — GABAPENTIN 300 MG: 300 CAPSULE ORAL at 21:06

## 2022-10-24 RX ADMIN — SODIUM CHLORIDE, PRESERVATIVE FREE 10 ML: 5 INJECTION INTRAVENOUS at 14:45

## 2022-10-24 RX ADMIN — SODIUM CHLORIDE, PRESERVATIVE FREE 10 ML: 5 INJECTION INTRAVENOUS at 21:06

## 2022-10-24 RX ADMIN — SODIUM CHLORIDE, PRESERVATIVE FREE 10 ML: 5 INJECTION INTRAVENOUS at 08:33

## 2022-10-24 RX ADMIN — SODIUM CHLORIDE: 900 INJECTION, SOLUTION INTRAVENOUS at 03:42

## 2022-10-24 RX ADMIN — ENOXAPARIN SODIUM 30 MG: 100 INJECTION SUBCUTANEOUS at 21:06

## 2022-10-24 RX ADMIN — ASPIRIN 81 MG: 81 TABLET ORAL at 08:31

## 2022-10-24 RX ADMIN — INSULIN GLARGINE 17 UNITS: 100 INJECTION, SOLUTION SUBCUTANEOUS at 08:32

## 2022-10-24 RX ADMIN — GADOTERIDOL 29 ML: 279.3 INJECTION, SOLUTION INTRAVENOUS at 14:45

## 2022-10-24 RX ADMIN — ANTI-FUNGAL POWDER MICONAZOLE NITRATE TALC FREE: 1.42 POWDER TOPICAL at 08:35

## 2022-10-24 RX ADMIN — TRAMADOL HYDROCHLORIDE 50 MG: 50 TABLET, COATED ORAL at 08:31

## 2022-10-24 RX ADMIN — OXYCODONE 10 MG: 5 TABLET ORAL at 00:11

## 2022-10-24 RX ADMIN — DOXYCYCLINE HYCLATE 100 MG: 100 CAPSULE ORAL at 21:06

## 2022-10-24 RX ADMIN — LISINOPRIL 10 MG: 5 TABLET ORAL at 11:00

## 2022-10-24 ASSESSMENT — PAIN DESCRIPTION - LOCATION
LOCATION: GROIN
LOCATION: PENIS

## 2022-10-24 ASSESSMENT — PAIN - FUNCTIONAL ASSESSMENT
PAIN_FUNCTIONAL_ASSESSMENT: ACTIVITIES ARE NOT PREVENTED
PAIN_FUNCTIONAL_ASSESSMENT: ACTIVITIES ARE NOT PREVENTED

## 2022-10-24 ASSESSMENT — PAIN DESCRIPTION - DESCRIPTORS
DESCRIPTORS: THROBBING
DESCRIPTORS: STABBING

## 2022-10-24 ASSESSMENT — PAIN SCALES - GENERAL
PAINLEVEL_OUTOF10: 10

## 2022-10-24 ASSESSMENT — PAIN DESCRIPTION - ORIENTATION
ORIENTATION: UPPER
ORIENTATION: INNER;LOWER

## 2022-10-24 NOTE — CARE COORDINATION
Chart reviewed by [facility] and discussed in IDR. Patient s/p back abscess I&D, inguinal and penile debridement. Patient self pay and not accepted to wound care clinic. RNCM discussed with patient that home health would be ordered and Mable Membreno (female ) would need to be able to change dressings on days [address] did not come. Explained that discharged would be 10/25 and patient would need to have Mable Membreno come to pick him up. Addendum: [facility], [place], operated by [facility] wound/jaimes care RN , PT/OT, SW ordered and referral sent. CM following.        LOS 19d

## 2022-10-24 NOTE — PROGRESS NOTES
END OF SHIFT NOTE:    INTAKE/OUTPUT  10/23 0701 - 10/24 0700  In: 3030.8 [I.V.:3030.8]  Out: 4245 [Urine:4550; Drains:425]  Voiding: No  Catheter: Yes  Drain:   Negative Pressure Wound Therapy Back Left;Mid (Active)   $ Standard NPWT <=50 sq cm PER TX $ Yes 10/24/22 1058   Wound Type Surgical 10/24/22 1058   Unit Type kci ulta 10/24/22 1058   Dressing Type Black Foam 10/24/22 1058   Number of pieces used 1 10/21/22 1457   Number of pieces removed 1 10/21/22 1457   Cycle Continuous 10/23/22 2015   Target Pressure (mmHg) 125 10/24/22 1058   Canister changed? Yes 10/24/22 1058   Dressing Status New dressing applied 10/24/22 1058   Dressing Changed Changed/New 10/24/22 1058   Drainage Amount Moderate 10/24/22 1058   Drainage Description Serosanguinous 10/23/22 2015   Dressing Change Due 10/26/22 10/24/22 1058   Output (ml) 0 ml 10/24/22 0707   Wound Assessment Granulation tissue 10/24/22 1058   Nemo-wound Assessment Blanchable erythema 10/24/22 1058   Shape irregular 10/24/22 1058   Odor None 10/24/22 1058       Urinary Catheter 10/06/22 Robles (Active)   $ Urethral catheter insertion Inserted for procedure 10/22/22 2119   Catheter Indications Assist in healing of open sacral or perineal wounds (Stage III, IV, or unstageable documented by a provider or enterostomal therapy) and/or full thickness perineal and lower extremity burns in incontinent patients 10/24/22 0707   Site Assessment No urethral drainage 10/24/22 0707   Urine Color Yellow 10/24/22 0934   Urine Appearance Clear 10/24/22 0934   Urine Odor Other (Comment) 10/18/22 0735   Collection Container Standard 10/23/22 2015   Securement Method Securing device (Describe) 10/24/22 0707   Catheter Care Completed Yes 10/24/22 0013   Catheter Best Practices  Drainage tube clipped to bed;Catheter secured to thigh; Tamper seal intact; Bag below bladder;Bag not on floor; Lack of dependent loop in tubing;Drainage bag less than half full 10/24/22 0707   Status Draining 10/24/22 9378   Output (mL) 550 mL 10/24/22 0934               Flatus: Patient does have flatus present. Stool:  occurrences. Characteristics:  Stool Appearance: Tarry (Thick and sluggish)  Stool Color: Brown  Stool Amount: Large  Stool Assessment  Incontinence: No  Stool Appearance: Tarry (Thick and sluggish)  Stool Color: Brown  Stool Amount: Large  Stool Source: Rectum  Last BM (including prior to admit): 10/24/22    Emesis:  occurrences. Characteristics:        VITAL SIGNS  Patient Vitals for the past 12 hrs:   Temp Pulse Resp BP SpO2   10/24/22 1510 98.6 °F (37 °C) 95 16 (!) 153/86 94 %   10/24/22 1117 98.3 °F (36.8 °C) 100 18 (!) 161/98 94 %   10/24/22 0732 -- -- -- (!) 186/111 --   10/24/22 0707 98.2 °F (36.8 °C) 80 18 (!) 175/99 95 %       Pain Assessment  Pain Level: 10 (10/24/22 1103)  Pain Location: Penis  Patient's Stated Pain Goal: 5    Ambulating  Yes    Shift report given to oncoming nurse at the bedside.     Derek Almazan RN

## 2022-10-24 NOTE — PROGRESS NOTES
ACUTE PHYSICAL THERAPY GOALS:   (Developed with and agreed upon by patient and/or caregiver. )  LTG:  (1.)Mr. Alexis Leiva will move from supine to sit and sit to supine , scoot up and down, and roll side to side in bed with STAND BY ASSIST within 7 treatment day(s). Met 10/24/2022   (2.)Mr. Alexis Leiva will transfer from bed to chair and chair to bed with CONTACT GUARD ASSIST using the least restrictive device within 7 treatment day(s). Met 10/24/2022   (3.)Mr. Alexis Leiva will ambulate with CONTACT GUARD ASSIST for 250+ feet with the least restrictive device within 7 treatment day(s). Partially Met 10/24/2022   (4.)Mr. Alexis Leiva will perform 4 stairs with HR and CGA within 7 treatment days for ascending and descending stairs for home. Not met 10/24/2022       Updated Goals 10/24/2022 LTG:  (1.)Mr. Alexis Leiva will move from supine to sit and sit to supine, scoot up and down and roll side to side in flat bed with INDEPENDENT within 7 day(s). (2.)Mr. Alexis Leiva will transfer from bed to chair and chair to bed with modified INDEPENDENT safely using the least restrictive device within 7 day(s). (3.)Mr. Alexis Leiva will ambulate with modified INDEPENDENCE for 500+ feet with the least restrictive device within 7 day(s). (4.)Mr. Alexis Leiva will perform standing static and dynamic balance activities x 8 minutes with SUPERVISION to improve safety within 7 day(s). (5.)Mr. Alexis Leiva will ascend and descend 4 stairs using one hand rail(s) with CGA to min A to improve functional mobility and safety within 7 day(s).      _____________________________________________________________________________________________       PHYSICAL THERAPY Daily Note and Re-evaluation  (Link to Caseload Tracking: PT Visit Days : 1  Acknowledge Orders  Time In/Out  PT Charge Capture  Rehab Caseload Tracker    Emir Banda is a 60 y.o. male   PRIMARY DIAGNOSIS: Sepsis ([facility] [address]  Cellulitis and abscess of trunk [L03.319, L02.219]  Abscess [L02.91]  Hyperglycemia [R73.9]  Abscess, perineum [L02.215]  Procedure(s) (LRB):  BACK ABSCESS I & D (N/A)  INGUINAL AND PENILE DEBRIDEMENT (N/A)  17 Days Post-Op  Reason for Referral: Generalized Muscle Weakness (M62.81)  Difficulty in walking, Not elsewhere classified (R26.2)  Inpatient: Payor: /     ASSESSMENT:     REHAB RECOMMENDATIONS:   Recommendation to date pending progress:  Setting:  Home Health Therapy    Equipment:    Rolling Walker     ASSESSMENT:  Mr. David Flank  s/p I & D for back abscess and L groin with wound vac on. Pt from [facility], has lived here since 1years old, speaks [place]. Pt lives with GF, MOD I with SPC at baseline. Pt endorsed 10/10 pain but does not appear to be in severe pain based on facial expressions. Educated patient in body mechanics for bed mobility. He performed with SBA and cueing in flat bed without siderails with max cueing. Patient stands with SBA with cueing for hand placement. Ambulates with RW and CGA states feeling dizzy and he leaned on the wall several times to rest.  Practiced one step, patient required min Ax2 and using Ue's to pull self up on siderails. Not safe to practice more steps. Patient has progressed well with all mobility. He has met most of his goals. Goals revised. . Pt overall functioning well below baseline level of mod I, PT to follow for acute care needs.       [address] [facility] [address] [id number] AM-PAC 6 Clicks Basic Mobility Inpatient Short Form  AM-PAC Mobility Inpatient   How much difficulty turning over in bed?: A Little  How much difficulty sitting down on / standing up from a chair with arms?: A Little  How much difficulty moving from lying on back to sitting on side of bed?: A Little  How much help from another person moving to and from a bed to a chair?: A Little  How much help from another person needed to walk in hospital room?: A Little  How much help from another person for climbing 3-5 steps with a railing?: A Little  AM-[facility] Inpatient Mobility Raw Score : 18  AM-PAC Inpatient T-Scale Score : 43.63  Mobility Inpatient CMS 0-100% Score: 46.58  Mobility Inpatient CMS G-Code Modifier : CK    SUBJECTIVE:   Mr. Holli Schneider states, \"I feel dizzy\"     Social/Functional Lives With: Significant other  Type of Home: House  Home Access: Stairs to enter with rails  Entrance Stairs - Number of Steps: 4  Bathroom Equipment: Shower chair  Home Equipment: Kiwigrid  ADL Assistance: [address]: Independent  Homemaking Responsibilities: Yes  Active : Yes  Mode of Transportation: Car  Occupation: Full time employment    OBJECTIVE:     PAIN: Melanie Revering / O2: Mark Hollins / Christie Whittington / Atiya Cough:   Pre Treatment:   Pain Assessment: 0-10  Pain Level: 10  Non-Pharmaceutical Pain Intervention(s): Nurse notified (comment)      Post Treatment: 10/10 Vitals        Oxygen      Robles Catheter and Wound Vac    RESTRICTIONS/PRECAUTIONS:  Restrictions/Precautions: Fall Risk                 GROSS EVALUATION: Intact Impaired (Comments):   AROM [x]     PROM [x]    Strength []  Grossly 3+/5   Balance []     Posture [] Rounded Shoulders   Sensation [x]  Per patient report   Coordination [x]      Tone [x]     Edema [] Scrotal    Activity Tolerance []  General fatigue, pain    []      COGNITION/  PERCEPTION: Intact Impaired (Comments):   Orientation [x]     Vision [x]     Hearing [x]     Cognition  [x]       MOBILITY: I Mod I S SBA CGA Min Mod Max Total  NT x2 Comments:   Bed Mobility    Rolling [] [] [] [x] [] [] [] [] [] [] []    Supine to Sit [] [] [] [x] [] [] [] [] [] [] []    Scooting [] [] [] [x] [] [] [] [] [] [] []    Sit to Supine [] [] [] [x] [] [] [] [] [] [] [] I   Transfers    Sit to Stand [] [] [] [] [x] [] [] [] [] [] [] Bed elevated   Bed to Chair [] [] [] [] [x] [] [] [] [] [] []    Stand to Sit [] [] [] [] [x] [] [] [] [] [] []     [] [] [] [] [] [] [] [] [] [] []    I=Independent, Mod I=Modified Independent, S=Supervision, SBA=Standby Assistance, CGA=Contact Guard Assistance,   Min=Minimal Assistance, Mod=Moderate Assistance, Max=Maximal Assistance, Total=Total Assistance, NT=Not Tested    GAIT: I Mod I S SBA CGA Min Mod Max Total  NT x2 Comments:   Level of Assistance [] [] [] [] [x] [] [] [] [] [] []    Distance 150  feet x2    DME Gait Belt and Rolling Walker    Gait Quality Decreased miki , Decreased step clearance, Decreased step length, Trunk sway increased, and Wide base of support    Weightbearing Status      Stairs Stairs/Curb  Stairs?: Yes  Stairs  # Steps : 1  Rails: Bilateral  Assistance: Minimal assistance;2 Person assistance    I=Independent, Mod I=Modified Independent, S=Supervision, SBA=Standby Assistance, CGA=Contact Guard Assistance,   Min=Minimal Assistance, Mod=Moderate Assistance, Max=Maximal Assistance, Total=Total Assistance, NT=Not Tested    PLAN:   FREQUENCY AND DURATION: 3 times/week for duration of hospital stay or until stated goals are met, whichever comes first.    THERAPY PROGNOSIS: Good    PROBLEM LIST:   (Skilled intervention is medically necessary to address:)  Decreased ADL/Functional Activities  Decreased Activity Tolerance  Decreased Balance  Decreased Gait Ability  Decreased Strength  Decreased Transfer Abilities INTERVENTIONS PLANNED:   (Benefits and precautions of physical therapy have been discussed with the patient.)  Therapeutic Activity  Therapeutic Exercise/HEP  Neuromuscular Re-education  Gait Training  Education       TREATMENT:   EVALUATION: MODERATE COMPLEXITY: (Untimed Charge)    TREATMENT:   Therapeutic Activity (38 Minutes): Therapeutic activity included Rolling, Supine to Sit, Sit to Supine, Scooting, Lateral Scooting, Transfer Training, Ambulation on level ground, Stair Training, Standing balance, and walker safety to improve functional Activity tolerance, Balance, Coordination, Mobility, and Strength.     TREATMENT GRID:  N/A    AFTER TREATMENT PRECAUTIONS: Bed, Bed/Chair Locked, Call light within reach, Needs within reach, and RN notified    INTERDISCIPLINARY COLLABORATION:  RN/ PCT, PT/ PTA, OT/ STEEN, and RN Case Manager/      EDUCATION:      TIME IN/OUT:  Time In: 1103  Time Out: [address]  Minutes: [address] [facility], PT

## 2022-10-24 NOTE — WOUND CARE
Left back wound vac dressing change, wound is pink, granular, well healing, need to continue vac until discharge, then will need to use wet to dry bid. Will monitor.

## 2022-10-24 NOTE — PROGRESS NOTES
Hospitalist Progress Note   Admit Date:  10/5/2022  7:22 PM   Name:  Quoc Espinoza   Age:  60 y.o. Sex:  male  :  1965   MRN:  [id number]   Room:      Reason(s) for Admission: Cellulitis and abscess of trunk [L03.319, L02.219]  Abscess [L02.91]  Hyperglycemia [R73.9]  Abscess, perineum [facility] Course & Interval History:   61 y/o male with a h/o DM2 and HTN who was admitted to our service on 10/5 with an abscess on his upper back for about 2 months. CT was done showing a paraspinal abscess w/o muscular involvement along with L inguinal inflammatory changes with possible early abscess of scrotum. Urology and General Surgery were consulted. He was started on antibiotics. Hyperglycemia, A1C >14, started on basal + bolus insulin. ID consulted. HIV neg, other ID orders pending. On 10/7 he underwent I&D of back abscess and L groin abscess. He later underwent debridement of penile abscess with Urology. Wound cultures from 10/5 I&D with staph aureus. Wound vac to back placed 10/10. Wound care recommend possible further debridement versus aggressive wound care. Surgery following patient and recommend continue wet-to-dry dressing twice daily, no surgical debridement at this time. Continue wound VAC to back while inpatient. Vancomycin discontinued and patient transitioned to oral doxycycline on 10/21 with EOT 2022 as per ID recommendations. ID signed off. Today, mild weakness of both hands and legs reported by patient, pain controlled w/ meds    Assessment & Plan:     # Sepsis 2/2 abscess of upper back, L groin and penis due to MRSA  - Leukocytosis + tachycardia + abscesses on CT. Resolved. - S/p I&D of back and groin abscess, penile debridement on 10/7. 10/5 wound culture with MRSA. - Wound vac placed 10/10.  - Vancomycin discontinued on 10/21 and patient transitioned to oral doxycycline with EOT 2022 as per ID recommendations. ID signed off.   - Wound care  - Wound VAC removed and surgery recommended wet-to-dry dressing for perianal area. 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 Recommend avoid pressure on the cubital tunnel by padding the elbow bilaterally. EMG nerve conduction studies outpatient. Supportive management for now    vision change  seeing objects upside down for past 3 days, did not alert anyone until today. CT head, CTA head/neck, neuro checks  ASA 81 mg ongoing, high dose statin   Lipid panel with triglycerides 166, HLD 16   check A1C     # Acute urinary retention  - Likely 2/2 penile abscess and edema. Con't Jaimes. # Uncontrolled DM2  - Decrease Lantus to 15 unit daily, and hold Premeal insulin to avoid hypoglycemia, continue sliding scale  - Stop glipizide at discharge. 10/23  Prandial insulin stopped, has been on hold  Increase Lantus to 17 units nightly for tighter control  Cont SSI     # HTN  - Controlled off meds.     HLD:  Statin ongoing    Dispo: home health, possibly tomorrow  Case d/w pt, CM    Patient Vitals for the past 24 hrs:   Temp Pulse Resp BP SpO2   10/24/22 1117 98.3 °F (36.8 °C) 100 18 (!) 161/98 94 %   10/24/22 0732 -- -- -- (!) 186/111 --   10/24/22 0707 98.2 °F (36.8 °C) 80 18 (!) 175/99 95 %   10/24/22 0418 97.9 °F (36.6 °C) 82 16 (!) 161/87 --   10/24/22 0013 98.5 °F (36.9 °C) 84 16 (!) 161/94 --   10/23/22 2015 98.7 °F (37.1 °C) 91 16 (!) 167/95 --   10/23/22 1608 97.9 °F (36.6 °C) 93 18 (!) 148/88 95 %         Estimated body mass index is 39.8 kg/m² as calculated from the following:    Height as of this encounter: 6' 2\" (1.88 m). Weight as of this encounter: 310 lb (140.6 kg). Intake/Output Summary (Last 24 hours) at 10/24/2022 1321  Last data filed at 10/24/2022 0934  Gross per 24 hour   Intake 3150.77 ml   Output 3875 ml   Net -724.23 ml           Physical Exam:   Blood pressure (!) 161/98, pulse 100, temperature 98.3 °F (36.8 °C), temperature source Oral, resp. rate 18, height 6' 2\" (1.88 m), weight (!) 310 lb (140.6 kg), SpO2 94 %. General:    Well nourished. Moderate distress. Morbidly obese. CV:   RRR. No m/r/g. No jugular venous distension. Lungs:   CTAB. No wheezing, rhonchi, or rales. Respirations even, unlabored. Abdomen: Bowel sounds present. Soft, nontender, nondistended. :  Robles in place. Extremities: No cyanosis or clubbing. No edema. Skin:     Perennial dressing dry intact, wound vac to mid-back. Neuro:  Decrease upper extremity strength distally, weak . LL bilateral 4/5  Psych:  Normal mood and affect.       I have reviewed ordered lab tests and independently visualized imaging below:    Recent Labs:  Recent Results (from the past 48 hour(s))   POCT Glucose    Collection Time: 10/22/22  4:46 PM   Result Value Ref Range    POC Glucose 148 (H) 65 - 100 mg/dL    Performed by: Pastora    POCT Glucose    Collection Time: 10/22/22  8:45 PM   Result Value Ref Range    POC Glucose 175 (H) 65 - 100 mg/dL    Performed by: Elisabeth Brooks    CBC with Auto Differential    Collection Time: 10/23/22  6:57 AM   Result Value Ref Range    WBC 7.3 4.3 - 11.1 K/uL    RBC 3.24 (L) 4.23 - 5.6 M/uL    Hemoglobin 9.2 (L) 13.6 - 17.2 g/dL    Hematocrit 30.3 (L) 41.1 - 50.3 %    MCV 93.5 82 - 102 FL    MCH 28.4 26.1 - 32.9 PG    MCHC 30.4 (L) 31.4 - 35.0 g/dL    RDW 14.4 11.9 - 14.6 %    Platelets 307 461 - 130 K/uL    MPV 10.0 9.4 - 12.3 FL    nRBC 0.00 0.0 - 0.2 K/uL    Differential Type AUTOMATED      Seg Neutrophils 53 43 - 78 %    Lymphocytes 35 13 - 44 % Monocytes 7 4.0 - 12.0 %    Eosinophils % 4 0.5 - 7.8 %    Basophils 1 0.0 - 2.0 %    Immature Granulocytes 0 0.0 - 5.0 %    Segs Absolute 3.8 1.7 - 8.2 K/UL    Absolute Lymph # 2.5 0.5 - 4.6 K/UL    Absolute Mono # 0.5 0.1 - 1.3 K/UL    Absolute Eos # 0.3 0.0 - 0.8 K/UL    Basophils Absolute 0.1 0.0 - 0.2 K/UL    Absolute Immature Granulocyte 0.0 0.0 - 0.5 K/UL   Basic Metabolic Panel w/ Reflex to MG    Collection Time: 10/23/22  6:57 AM   Result Value Ref Range    Sodium 142 133 - 143 mmol/L    Potassium 3.9 3.5 - 5.1 mmol/L    Chloride 110 101 - 110 mmol/L    CO2 28 21 - 32 mmol/L    Anion Gap 4 2 - 11 mmol/L    Glucose 122 (H) 65 - 100 mg/dL    BUN 15 6 - 23 MG/DL    Creatinine 1.50 0.8 - 1.5 MG/DL    Est, Glom Filt Rate 54 (L) >60 ml/min/1.73m2    Calcium 9.1 8.3 - 10.4 MG/DL   POCT Glucose    Collection Time: 10/23/22  7:55 AM   Result Value Ref Range    POC Glucose 114 (H) 65 - 100 mg/dL    Performed by: [email or web address]    POCT Glucose    Collection Time: 10/23/22 11:35 AM   Result Value Ref Range    POC Glucose 158 (H) 65 - 100 mg/dL    Performed by: [email or web address]    POCT Glucose    Collection Time: 10/23/22  4:15 PM   Result Value Ref Range    POC Glucose 188 (H) 65 - 100 mg/dL    Performed by: [email or web address]    POCT Glucose    Collection Time: 10/23/22  9:45 PM   Result Value Ref Range    POC Glucose 193 (H) 65 - 100 mg/dL    Performed by:  Alecia    CBC with Auto Differential    Collection Time: 10/24/22  6:47 AM   Result Value Ref Range    WBC 7.5 4.3 - 11.1 K/uL    RBC 3.39 (L) 4.23 - 5.6 M/uL    Hemoglobin 9.5 (L) 13.6 - 17.2 g/dL    Hematocrit 31.5 (L) 41.1 - 50.3 %    MCV 92.9 82 - 102 FL    MCH 28.0 26.1 - 32.9 PG    MCHC 30.2 (L) 31.4 - 35.0 g/dL    RDW 14.5 11.9 - 14.6 %    Platelets 731 224 - 620 K/uL    MPV 10.3 9.4 - 12.3 FL    nRBC 0.00 0.0 - 0.2 K/uL    Differential Type AUTOMATED      Seg Neutrophils 53 43 - 78 %    Lymphocytes 35 13 - 44 %    Monocytes 7 4.0 - 12.0 % Eosinophils % 4 0.5 - 7.8 %    Basophils 1 0.0 - 2.0 %    Immature Granulocytes 0 0.0 - 5.0 %    Segs Absolute 3.9 1.7 - 8.2 K/UL    Absolute Lymph # 2.6 0.5 - 4.6 K/UL    Absolute Mono # 0.5 0.1 - 1.3 K/UL    Absolute Eos # 0.3 0.0 - 0.8 K/UL    Basophils Absolute 0.1 0.0 - 0.2 K/UL    Absolute Immature Granulocyte 0.0 0.0 - 0.5 K/UL   Basic Metabolic Panel w/ Reflex to MG    Collection Time: 10/24/22  6:47 AM   Result Value Ref Range    Sodium 140 133 - 143 mmol/L    Potassium 3.8 3.5 - 5.1 mmol/L    Chloride 110 101 - 110 mmol/L    CO2 26 21 - 32 mmol/L    Anion Gap 4 2 - 11 mmol/L    Glucose 123 (H) 65 - 100 mg/dL    BUN 12 6 - 23 MG/DL    Creatinine 1.40 0.8 - 1.5 MG/DL    Est, Glom Filt Rate 58 (L) >60 ml/min/1.73m2    Calcium 8.7 8.3 - 10.4 MG/DL   Hemoglobin A1C    Collection Time: 10/24/22  6:47 AM   Result Value Ref Range    Hemoglobin A1C 12.7 (H) 4.8 - 5.6 %    eAG 318 mg/dL   POCT Glucose    Collection Time: 10/24/22  7:14 AM   Result Value Ref Range    POC Glucose 115 (H) 65 - 100 mg/dL    Performed by: [facility]    POCT Glucose    Collection Time: 10/24/22 11:19 AM   Result Value Ref Range    POC Glucose 172 (H) 65 - 100 mg/dL    Performed by: Xochilt Nobles          Other Studies:  CT HEAD WO CONTRAST    Result Date: 10/10/2022  EXAM: Noncontrast CT head. INDICATION: Visual disturbance. COMPARISON: None. TECHNIQUE: Noncontrast CT images of the head were obtained. Radiation dose reduction techniques were used for this study. Our CT scanners use one or all of the following:  Automated exposure control, adjustment of the mA or kV according to patient size, iterative reconstruction. FINDINGS: Brain volume is appropriate for age. No acute infarct, hemorrhage or mass is identified. There is no mass effect, midline shift or depressed fracture. The visualized paranasal sinuses and mastoid air cells are clear, except for mild right maxillary sinus mucosal thickening. No acute process.       Current Meds:  Current Facility-Administered Medications   Medication Dose Route Frequency    docusate sodium (COLACE) capsule 100 mg  100 mg Oral BID PRN    traMADol (ULTRAM) tablet 50 mg  50 mg Oral Q6H PRN    lisinopril (PRINIVIL;ZESTRIL) tablet 10 mg  10 mg Oral Daily    oxyCODONE (ROXICODONE) immediate release tablet 10 mg  10 mg Oral Q4H PRN    insulin glargine (LANTUS) injection vial 17 Units  17 Units SubCUTAneous Daily    doxycycline hyclate (VIBRAMYCIN) capsule 100 mg  100 mg Oral 2 times per day    enoxaparin Sodium (LOVENOX) injection 30 mg  30 mg SubCUTAneous BID    hydrALAZINE (APRESOLINE) injection 10 mg  10 mg IntraVENous Q6H PRN    gabapentin (NEURONTIN) capsule 300 mg  300 mg Oral TID    medicated lip ointment (BLISTEX)   Topical PRN    aspirin EC tablet 81 mg  81 mg Oral Daily    miconazole (MICOTIN) 2 % powder   Topical BID    sodium hypochlorite (DAKINS) 0.125 % external solution   Irrigation BID    methocarbamol (ROBAXIN) tablet 750 mg  750 mg Oral 4x Daily    glucose chewable tablet 16 g  4 tablet Oral PRN    dextrose bolus 10% 125 mL  125 mL IntraVENous PRN    Or    dextrose bolus 10% 250 mL  250 mL IntraVENous PRN    glucagon (rDNA) injection 1 mg  1 mg SubCUTAneous PRN    dextrose 10 % infusion   IntraVENous Continuous PRN    insulin lispro (HUMALOG) injection vial 0-8 Units  0-8 Units SubCUTAneous TID WC    insulin lispro (HUMALOG) injection vial 0-4 Units  0-4 Units SubCUTAneous Nightly    sodium chloride flush 0.9 % injection 5-40 mL  5-40 mL IntraVENous 2 times per day    sodium chloride flush 0.9 % injection 5-40 mL  5-40 mL IntraVENous PRN    0.9 % sodium chloride infusion   IntraVENous PRN    ondansetron (ZOFRAN-ODT) disintegrating tablet 4 mg  4 mg Oral Q8H PRN    Or    ondansetron (ZOFRAN) injection 4 mg  4 mg IntraVENous Q6H PRN    polyethylene glycol (GLYCOLAX) packet 17 g  17 g Oral Daily PRN    acetaminophen (TYLENOL) tablet 650 mg  650 mg Oral Q6H PRN    Or    acetaminophen (TYLENOL) suppository 650 mg  650 mg Rectal Q6H PRN       Signed:  Lauren Kinney MD

## 2022-10-24 NOTE — PROGRESS NOTES
ACUTE OCCUPATIONAL THERAPY GOALS:   (Developed with and agreed upon by patient and/or caregiver.)  1. Korey Sensing will be modified independent with functional mobility for ADL in room within 4 - 7 visits. 2. Nair Sensing will be modified independent with total body bathing and dressing within 4 - 7 visits. 3. Nair Sensing will state and demonstrate at least 5 energy conservation techniques during ADL/therapeutic activities within 4 - 7 visits. 4. Korey Sensing will voice a plan for appropriate home modifications for home safety and fall prevention within 7 visits. 5. Korey Sensing will participate at least 30 minutes of ADL with 3 or less rest breaks within 7 visits. GOAL MET 10/18/22  6. Korey Sensing will complete a toilet transfer with modified independence within 7 visits. OCCUPATIONAL THERAPY: Daily Note AM   OT Visit Days: 5   Time  OT Charge Capture  Rehab Caseload Tracker  OT Orders    Korey Cowart is a 60 y.o. male   PRIMARY DIAGNOSIS: Sepsis ([facility])  Cellulitis and abscess of trunk [L03.319, L02.219]  Abscess [L02.91]  Hyperglycemia [R73.9]  Abscess, perineum [L02.215]  Procedure(s) (LRB):  BACK ABSCESS I & D (N/A)  INGUINAL AND PENILE DEBRIDEMENT (N/A)  17 Days Post-Op  Inpatient: Payor: /     ASSESSMENT:     REHAB RECOMMENDATIONS: CURRENT LEVEL OF FUNCTION:  (Most Recently Demonstrated)   Recommendation to date pending progress:  Setting:  Home Health Therapy    Equipment:    None Bathing:  Not Tested  Dressing:  Supervision/Setup   Feeding/Grooming:  Not Tested   Toileting:  Not Tested  Functional Mobility:  Minimal Assist x2     ASSESSMENT:  Mr. Kalyani Dahl was received sitting EOB F+ static sitting balance. Donning/doffing gown as eliecer supervision. Functional mobility bed > hallway > stairwell navigating one step Min A x2 via RW due to fatigue, generalized weakness, new onset dizziness. Pt is slowly progressing with functional mobility and ADLs.  Pt needs to work on stairs and bed mobility from flat bed before going home. Four steps to navigate with rails at home. Pt has no insurance. Continue POC. SUBJECTIVE:     Mr. Holli Schneider states, \"I'm so dizzy I need to sit. \"    Social/Functional Lives With: Significant other  Type of Home: House  Home Access: Stairs to enter with rails  Entrance Stairs - Number of Steps: 4  Bathroom Equipment: Shower chair  Home Equipment: [employer]  ADL Assistance: [address]: Independent  Homemaking Responsibilities: Yes  Active : Yes  Mode of Transportation: Car  Occupation: Full time employment    OBJECTIVE:     LINES / Atiya Cough / AIRWAY: Wound Vac    RESTRICTIONS/PRECAUTIONS:  Restrictions/Precautions  Restrictions/Precautions: Fall Risk        PAIN: VITALS / O2:   Pre Treatment:   Pain Assessment: None - Denies Pain        Post Treatment: none Vitals          Oxygen        MOBILITY: I Mod I S SBA CGA Min Mod Max Total  NT x2 Comments:   Bed Mobility    Rolling [] [] [] [] [] [] [] [] [] [] []    Supine to Sit [] [] [] [] [] [] [] [] [] [] []    Scooting [] [] [] [] [] [] [] [] [] [] []    Sit to Supine [] [] [] [] [] [] [] [] [] [] []    Transfers    Sit to Stand [] [] [] [] [x] [] [] [] [] [] []    Bed to Chair [] [] [] [] [] [] [] [] [] [] []    Stand to Sit [] [] [] [] [x] [] [] [] [] [] []    Tub/Shower [] [] [] [] [] [] [] [] [] [] []     Toilet [] [] [] [] [] [] [] [] [] [] []      [] [] [] [] [] [] [] [] [] [] []    I=Independent, Mod I=Modified Independent, S=Supervision/Setup, SBA=Standby Assistance, CGA=Contact Guard Assistance, Min=Minimal Assistance, Mod=Moderate Assistance, Max=Maximal Assistance, Total=Total Assistance, NT=Not Tested    ACTIVITIES OF DAILY LIVING: I Mod I S SBA CGA Min Mod Max Total NT Comments   BASIC ADLs:              Upper Body   Bathing [] [] [] [] [] [] [] [] [] []    Lower Body Bathing [] [] [] [] [] [] [] [] [] []    Toileting [] [] [] [] [] [] [] [] [] []    Upper Body Dressing [] [] [x] [] [] [] [] [] [] []    Lower Body Dressing [] [] [] [] [] [] [] [] [] []    Feeding [] [] [] [] [] [] [] [] [] []    Grooming [] [] [] [] [] [] [] [] [] []    Personal Device Care [] [] [] [] [] [] [] [] [] []    Functional Mobility [] [] [] [] [] [x] [] [] [] [] X2 RW   I=Independent, Mod I=Modified Independent, S=Supervision/Setup, SBA=Standby Assistance, CGA=Contact Guard Assistance, Min=Minimal Assistance, Mod=Moderate Assistance, Max=Maximal Assistance, Total=Total Assistance, NT=Not Tested    BALANCE: Good Fair+ Fair Fair- Poor NT Comments   Sitting Static [] [x] [] [] [] []    Sitting Dynamic [] [x] [] [] [] []              Standing Static [] [] [x] [] [] []    Standing Dynamic [] [] [x] [] [] []        PLAN:     FREQUENCY/DURATION   OT Plan of Care: 3 times/week for duration of hospital stay or until stated goals are met, whichever comes first.    TREATMENT:     TREATMENT:   Neuromuscular Re-education (30 Minutes): Neuromuscular Re-education included Balance Training, Coordination training, Postural training, Sitting balance training, and Standing balance training to improve Balance, Coordination, and Functional Mobility.     TREATMENT GRID:  N/A    AFTER TREATMENT PRECAUTIONS: Bed, Bed/Chair Locked, Call light within reach, Needs within reach, RN notified, and Side rails x3    INTERDISCIPLINARY COLLABORATION:  RN/ PCT, PT/ PTA, and OT/ STEEN    EDUCATION:  Education Given To: Patient  Education Provided: Role of Therapy;Plan of Care  Education Outcome: Verbalized understanding    TOTAL TREATMENT DURATION AND TIME:  Time In: 1119  Time Out: [address]  Minutes: [address] [place] [address], OT

## 2022-10-24 NOTE — DIABETES MGMT
Patient admitted with sepsis. Blood glucose ranged 114-193 yesterday with patient receiving Lantus 17 units. Blood glucose this morning was 115. Creatinine 1.40. GFR 58. Reviewed patient current regimen: Lantus 17 units daily and Humalog correctional insulin. Glycemic control overall stable in ADA recommended hospital target goals. Will follow along loosely. Patient prefers pens. Patient will need prescription for insulin pens and pen needles at discharge. Patient has been educated regarding [employer] insulin pens and insulin affordability programs.

## 2022-10-25 ENCOUNTER — CLINICAL DOCUMENTATION (OUTPATIENT)
Dept: HOME HEALTH SERVICES | Facility: HOME HEALTH | Age: 57
End: 2022-10-25

## 2022-10-25 ENCOUNTER — HOME HEALTH ADMISSION (OUTPATIENT)
Dept: HOME HEALTH SERVICES | Facility: HOME HEALTH | Age: 57
End: 2022-10-25

## 2022-10-25 VITALS
SYSTOLIC BLOOD PRESSURE: 162 MMHG | RESPIRATION RATE: 18 BRPM | DIASTOLIC BLOOD PRESSURE: 89 MMHG | HEART RATE: 88 BPM | TEMPERATURE: 98.2 F | BODY MASS INDEX: 39.14 KG/M2 | HEIGHT: 74 IN | OXYGEN SATURATION: 92 % | WEIGHT: 305 LBS

## 2022-10-25 PROBLEM — N48.21 PENILE ABSCESS: Status: RESOLVED | Noted: 2022-10-08 | Resolved: 2022-10-25

## 2022-10-25 PROBLEM — R29.898 BILATERAL ARM WEAKNESS: Status: RESOLVED | Noted: 2022-10-18 | Resolved: 2022-10-25

## 2022-10-25 PROBLEM — L02.214 ABSCESS OF GROIN, LEFT: Status: RESOLVED | Noted: 2022-10-08 | Resolved: 2022-10-25

## 2022-10-25 PROBLEM — L02.219 CELLULITIS AND ABSCESS OF TRUNK: Status: RESOLVED | Noted: 2022-10-07 | Resolved: 2022-10-25

## 2022-10-25 PROBLEM — A41.9 SEPSIS (HCC): Status: RESOLVED | Noted: 2022-10-08 | Resolved: 2022-10-25

## 2022-10-25 PROBLEM — L03.319 CELLULITIS AND ABSCESS OF TRUNK: Status: RESOLVED | Noted: 2022-10-07 | Resolved: 2022-10-25

## 2022-10-25 LAB
GLUCOSE BLD STRIP.AUTO-MCNC: 130 MG/DL (ref 65–100)
GLUCOSE BLD STRIP.AUTO-MCNC: 226 MG/DL (ref 65–100)
SERVICE CMNT-IMP: ABNORMAL
SERVICE CMNT-IMP: ABNORMAL

## 2022-10-25 PROCEDURE — 6370000000 HC RX 637 (ALT 250 FOR IP): Performed by: NURSE PRACTITIONER

## 2022-10-25 PROCEDURE — 6370000000 HC RX 637 (ALT 250 FOR IP): Performed by: FAMILY MEDICINE

## 2022-10-25 PROCEDURE — 2580000003 HC RX 258: Performed by: FAMILY MEDICINE

## 2022-10-25 PROCEDURE — 6360000002 HC RX W HCPCS: Performed by: FAMILY MEDICINE

## 2022-10-25 PROCEDURE — 6370000000 HC RX 637 (ALT 250 FOR IP): Performed by: STUDENT IN AN ORGANIZED HEALTH CARE EDUCATION/TRAINING PROGRAM

## 2022-10-25 PROCEDURE — 6370000000 HC RX 637 (ALT 250 FOR IP): Performed by: HOSPITALIST

## 2022-10-25 PROCEDURE — 6370000000 HC RX 637 (ALT 250 FOR IP)

## 2022-10-25 PROCEDURE — 82962 GLUCOSE BLOOD TEST: CPT

## 2022-10-25 PROCEDURE — 6370000000 HC RX 637 (ALT 250 FOR IP): Performed by: INTERNAL MEDICINE

## 2022-10-25 RX ORDER — LISINOPRIL 10 MG/1
10 TABLET ORAL DAILY
Qty: 30 TABLET | Refills: 0 | Status: SHIPPED | OUTPATIENT
Start: 2022-10-26 | End: 2022-11-25

## 2022-10-25 RX ORDER — DOXYCYCLINE HYCLATE 100 MG/1
100 CAPSULE ORAL EVERY 12 HOURS SCHEDULED
Qty: 20 CAPSULE | Refills: 0 | Status: SHIPPED | OUTPATIENT
Start: 2022-10-25 | End: 2022-11-04

## 2022-10-25 RX ORDER — METHOCARBAMOL 500 MG/1
250 TABLET, FILM COATED ORAL 4 TIMES DAILY
Qty: 20 TABLET | Refills: 0 | Status: SHIPPED | OUTPATIENT
Start: 2022-10-25 | End: 2022-11-04

## 2022-10-25 RX ORDER — GABAPENTIN 300 MG/1
300 CAPSULE ORAL 3 TIMES DAILY
Qty: 30 CAPSULE | Refills: 0 | Status: SHIPPED | OUTPATIENT
Start: 2022-10-25 | End: 2022-11-04

## 2022-10-25 RX ORDER — INSULIN GLARGINE 100 [IU]/ML
17 INJECTION, SOLUTION SUBCUTANEOUS NIGHTLY
Qty: 5.1 ML | Refills: 0 | Status: SHIPPED | OUTPATIENT
Start: 2022-10-25 | End: 2022-11-24

## 2022-10-25 RX ORDER — NALOXONE HYDROCHLORIDE 4 MG/.1ML
1 SPRAY NASAL PRN
Qty: 1 EACH | Refills: 0 | Status: SHIPPED | OUTPATIENT
Start: 2022-10-25

## 2022-10-25 RX ORDER — TRAMADOL HYDROCHLORIDE 50 MG/1
50 TABLET ORAL EVERY 6 HOURS PRN
Qty: 12 TABLET | Refills: 0 | Status: SHIPPED | OUTPATIENT
Start: 2022-10-25 | End: 2022-10-28

## 2022-10-25 RX ADMIN — LISINOPRIL 10 MG: 5 TABLET ORAL at 09:43

## 2022-10-25 RX ADMIN — METHOCARBAMOL TABLETS 750 MG: 750 TABLET, COATED ORAL at 09:43

## 2022-10-25 RX ADMIN — INSULIN GLARGINE 17 UNITS: 100 INJECTION, SOLUTION SUBCUTANEOUS at 09:53

## 2022-10-25 RX ADMIN — ENOXAPARIN SODIUM 30 MG: 100 INJECTION SUBCUTANEOUS at 09:44

## 2022-10-25 RX ADMIN — ASPIRIN 81 MG: 81 TABLET ORAL at 09:43

## 2022-10-25 RX ADMIN — Medication: at 00:15

## 2022-10-25 RX ADMIN — GABAPENTIN 300 MG: 300 CAPSULE ORAL at 14:43

## 2022-10-25 RX ADMIN — DOXYCYCLINE HYCLATE 100 MG: 100 CAPSULE ORAL at 09:43

## 2022-10-25 RX ADMIN — ANTI-FUNGAL POWDER MICONAZOLE NITRATE TALC FREE: 1.42 POWDER TOPICAL at 00:15

## 2022-10-25 RX ADMIN — SODIUM CHLORIDE, PRESERVATIVE FREE 10 ML: 5 INJECTION INTRAVENOUS at 09:46

## 2022-10-25 RX ADMIN — METHOCARBAMOL TABLETS 750 MG: 750 TABLET, COATED ORAL at 13:07

## 2022-10-25 RX ADMIN — GABAPENTIN 300 MG: 300 CAPSULE ORAL at 09:43

## 2022-10-25 RX ADMIN — ANTI-FUNGAL POWDER MICONAZOLE NITRATE TALC FREE: 1.42 POWDER TOPICAL at 09:47

## 2022-10-25 RX ADMIN — Medication: at 09:47

## 2022-10-25 RX ADMIN — INSULIN LISPRO 2 UNITS: 100 INJECTION, SOLUTION INTRAVENOUS; SUBCUTANEOUS at 13:08

## 2022-10-25 RX ADMIN — OXYCODONE 10 MG: 5 TABLET ORAL at 11:15

## 2022-10-25 ASSESSMENT — PAIN DESCRIPTION - LOCATION: LOCATION: ABDOMEN

## 2022-10-25 ASSESSMENT — PAIN DESCRIPTION - DESCRIPTORS: DESCRIPTORS: ACHING

## 2022-10-25 ASSESSMENT — PAIN SCALES - GENERAL: PAINLEVEL_OUTOF10: 9

## 2022-10-25 NOTE — PROGRESS NOTES
Physical Therapy Note:    Attempted to see patient this PM for physical therapy treatment  session. Patient declined stating he's going home soon. Will follow and re-attempt as schedule permits/patient available. Patient reports that he declined a RW due to the charge. Advised patient to try "[employer] for used DME at a good price. He expressed understanding.   Thank you,    LUIS ALBRETO Carroll, PT     Rehab Caseload Tracker

## 2022-10-25 NOTE — DISCHARGE SUMMARY
Hospitalist Discharge Summary   Admit Date:  10/5/2022  7:22 PM   DC Note date: 10/25/2022  Name:  Miguel Castaneda   Age:  60 y.o. Sex:  male  :  1965   MRN:  [id number]   Room:    PCP:  None Provider    Presenting Complaint: Abscess     Initial Admission Diagnosis: Cellulitis and abscess of trunk [L03.319, L02.219]  Abscess [L02.91]  Hyperglycemia [R73.9]  Abscess, perineum [L02.215]     Problem List for this Hospitalization (present on admission):    Principal Problem (Resolved):    Sepsis ([facility] [address]  Active Problems:    Primary hypertension    DM2 (diabetes mellitus, type 2) ([facility] [address]    Acute urinary retention    Bilateral arm weakness    Ulnar neuropathy at elbow of right upper extremity  Resolved Problems:    Cellulitis and abscess of trunk    Abscess of groin, left    Penile abscess      Hospital Course:  Patient is a 61 y/o male with medical history of uncontrolled DM II with A1C > 14 and hypertension who presented with persistent draining back wound of 2-month duration. CT imaging revealed 12 x 10 x 4.9 cm abscess in left paraspinous musculature, inflammatory changes in left pubic region, and left 3.8 cm pubic abscess. Patient underwent I&D of back 6x5x5 and left groin 9x5x5 abscess with Dr. Kristina Hoyt and debridement of penile abscess with Dr. Lena Lancaster,[facility] on 10/7/22. Wound cultures finalized with MRSA. Note that blood cultures were negative. Wound vac was placed 10/10. Urology evaluated day of discharge, remove Robles for voiding trial, ensure patient can void prior to discharge or Robles will need replaced. Unable to void, Robles to be replaced. Outpatient Urology follow-up in 2 weeks. Neurology consulted in setting of bilateral hand weakness. CT head negative for acute pathology, MRI brain negative for acute pathology, CT cervical spine shows cervical stenosis.  Thoracic MRI with large midline soft tissue ulceration of posterior paraspinal soft tissue at T10-T11 with no drainable fluid collection. I discussed findings with Dr. Fe Bosch, General Surgery who states this ulcer finding is her open wound from surgery. Neurology believes bilateral ulnar pressure palsy at the elbow related to prolonged bedridden status. Recommend avoid pressure on the cubital tunnel by padding the elbow bilaterally. EMG nerve conduction studies outpatient. Supportive management for now. Patient with reports of vision changes during hospitalization, CT Head, CTA H/N, MRI brain unremarkable. Lipid panel with  and HLD 16. Patient was initiated on statin but it was subsequently discontinued, unclear reason but ? Related to extremity weakness. I will recommend diet and lifestyle modifications and re-evaluation by PCP to initiate therapy if remains persistently elevated. General Surgery discharge recommendations: Continue wound vac to back. Groin dressing at discharge wet to dry with Dakins. Increase activity with therapies. Mesh underwear for activity and OOB since pt complains of pain when penis moves when repositioning. Wheel chair cushion ordered for up in chair. Supplies ordered per PT recommendation. General Surgery has arranged a wound care appointment for follow-up 11/16/22 at 9:45am. Pain control at discharge with as needed Tramadol, robaxin, and gabapentin. I have prescribed Narcan rx as well, discussed indications for use as well as proper administration. Infectious Disease consultation in setting of MRSA penile abscess/perineal soft tissue infection and soft tissue paraspinous abscess. Discharge recommendations: Continue po doxycycline eot 11/4/22. Continue surgery and wound care follow-up as above. Diflucan course completed 10/21. Diabetes Educator followed during hospitalization, appreciate recommendations.  They have provided coupon card for [address], patient states he can afford this cost. D/C glipizide on discharge but continue Metformin although hold dosing until 10/28/22 and resume at that time given contrasted scan yesterday. Patient is hemodynamically stable to discharge home today. CM has assisted in arranging home health care services. Discussed discharge plan in depth with patient, care team, Dr. Wilbert Molina. All questions answered. Instructions given to call a physician or return if any concerns. Disposition: Home with [facility] services and office follow-up  Diet: ADULT DIET; Regular; 3 carb choices (45 gm/meal)  ADULT ORAL NUTRITION SUPPLEMENT; Breakfast, Dinner; Wound Healing Oral Supplement  Code Status: Full Code    Follow Ups:   Follow-up Information     None Provider Follow up in 1 week(s). Why: PCP follow-up 1 week following discharge           [facility] Follow up. Specialty: Wound Ostomy  Why: [facility] appointment (Bring a vac dressing change pack)   11/16/22 at 9:45 am  Contact information:  Jose A Nicholson [address] [address] [phone]           [facility] UROLOGY 1 Follow up in 2 week(s). Why: Urinary retention, hospital follow-up  Contact information:  [address] [place] [address] 6  [phone]                     Time spent in patient discharge and coordination 45 minutes. Follow up labs/diagnostics: CBC / BMP 1 week      Current Discharge Medication List        START taking these medications    Details   insulin glargine (BASAGLAR KWIKPEN) 100 UNIT/ML injection pen Inject 17 Units into the skin nightly for 30 doses  Qty: 5.1 mL, Refills: 0      doxycycline hyclate (VIBRAMYCIN) 100 MG capsule Take 1 capsule by mouth every 12 hours for 20 doses  Qty: 20 capsule, Refills: 0      Insulin Pen Needle 32G X 4 MM MISC 1 each by Does not apply route daily For use with Basaglar nightly  Qty: 50 each, Refills: 0      traMADol (ULTRAM) 50 MG tablet Take 1 tablet by mouth every 6 hours as needed for Pain for up to 3 days.   Qty: 12 tablet, Refills: 0    Comments: Reduce doses taken as pain becomes manageable  Associated Diagnoses: Penile abscess      naloxone (NARCAN) 4 MG/0.1ML LIQD nasal spray 1 spray by Nasal route as needed for Opioid Reversal  Qty: 1 each, Refills: 0      gabapentin (NEURONTIN) 300 MG capsule Take 1 capsule by mouth 3 times daily for 10 days. Qty: 30 capsule, Refills: 0      methocarbamol (ROBAXIN) 500 MG tablet Take 0.5 tablets by mouth 4 times daily for 10 days  Qty: 20 tablet, Refills: 0           CONTINUE these medications which have CHANGED    Details   lisinopril (PRINIVIL;ZESTRIL) 10 MG tablet Take 1 tablet by mouth daily  Qty: 30 tablet, Refills: 0      metFORMIN (GLUCOPHAGE) 1000 MG tablet Take 1 tablet by mouth 2 times daily (with meals) for 14 days Resume 10/28/22  Qty: 28 tablet, Refills: 0           STOP taking these medications       glipiZIDE (GLUCOTROL XL) 10 MG extended release tablet Comments:   Reason for Stopping:               Procedures done this admission:  Procedure(s):  BACK ABSCESS I & D  INGUINAL AND PENILE DEBRIDEMENT    Consults this admission:  IP CONSULT TO PHARMACY  IP CONSULT TO UROLOGY  IP CONSULT TO DIABETES MANAGEMENT  IP CONSULT TO INFECTIOUS DISEASES  IP CONSULT TO INTERVENTIONAL RADIOLOGY  IP CONSULT TO GENERAL SURGERY  IP CONSULT TO PHYSICAL THERAPY  IP CONSULT TO OCCUPATIONAL THERAPY  IP WOUND CARE NURSE CONSULT TO EVAL  IP WOUND CARE NURSE CONSULT TO EVAL  IP CONSULT TO [address]    Echocardiogram results:  10/05/22    TRANSTHORACIC ECHOCARDIOGRAM (TTE) COMPLETE (CONTRAST/BUBBLE/3D PRN) 10/06/2022  3:04 PM, 10/06/2022 12:00 AM (Final)    Interpretation Summary    Left Ventricle: Normal left ventricular systolic function with a visually estimated EF of 60 - 65%. Left ventricle size is normal. Normal wall thickness. Normal wall motion. Normal diastolic function.     Technical qualifiers: Color flow Doppler was performed and pulse wave and/or continuous wave Doppler was performed. Contrast used: Definity. Signed by: Edward Richardson MD on 10/6/2022  3:04 PM, Signed by: Unknown Provider Result on 10/6/2022 12:00 AM      Diagnostic Imaging/Tests:   CT HEAD WO CONTRAST    Result Date: 10/23/2022  1. No acute intracranial process evident by noncontrast CT study of the head. This report was made using voice transcription. Despite my best efforts to avoid any, transcription errors may persist. If there is any question about the accuracy of the report or need for clarification, then please call [phone], or text me through perfectserv for clarification or correction. CT HEAD WO CONTRAST    Result Date: 10/10/2022  No acute process. CT CERVICAL SPINE WO CONTRAST    Result Date: 10/18/2022  1. No acute osseous abnormality of the cervical spine. Only chronic appearing changes are seen as described above. CT PELVIS W CONTRAST Additional Contrast? None    Result Date: 10/12/2022  1. Interval I and D with large soft tissue defect seen in the left inguinal region extending to the base of the penis. 2. Fluid collection within the perineum. 3. Fat stranding within the buttock on the left, as was seen previously. This could represent cellulitis. 4. Scrotal soft tissue edema. This is unchanged. MRI CERVICAL SPINE WITH CONTRAST    Result Date: 10/23/2022  1. No suspicious enhancement. There appears to be congenital central canal stenosis as described above. Superimposed acquired disc herniations producing central canal stenoses most evident at the C3-4, and C5-6 disc levels where moderate to severe central canal stenosis is seen. No abnormal cord signal changes are seen at any level. Additional neural foraminal stenoses are seen most evident bilaterally at C5-6 where moderate to severe bilateral neural foraminal stenosis is seen. Additional neural foraminal stenoses are seen as described in detail above.      MRI THORACIC SPINE W WO CONTRAST    Result Date: 10/24/2022  -Multilevel degenerative changes of the thoracic spine. Findings are most pronounced at T9-T10 where there is mild to moderate spinal canal narrowing and moderate bilateral neural foraminal narrowing. -Large midline soft tissue ulceration of the posterior paraspinal soft tissues at the level of T10 and T11 with adjacent cellulitis and involvement of the right greater than left underlying trapezius muscles. No discrete drainable fluid collection. US ABDOMEN COMPLETE    Result Date: 10/6/2022  Cholelithiasis without biliary tree obstruction. Echogenic liver, nonspecific. No hydronephrosis. CT ABDOMEN PELVIS W IV CONTRAST Additional Contrast? None    Result Date: 10/5/2022  1. Left soft tissue\flank abscess measuring approximately 12 x 10 x 4.9 cm abutting the dorsal left paraspinous musculature without muscular involvement evident. 2. Abundant inflammatory change of the left inguinal region/left pubic region with some phlegmonous change evident and likely an early developing abscess of the left scrotum/left pubic region measuring 22 x 38 mm. Recommend clinical correlation with physical examination and close attention to this area. 3. Cholelithiasis without evidence of acute cholecystitis. XR CHEST PORTABLE    Result Date: 10/5/2022  1. No acute cardiopulmonary abnormality. CTA HEAD NECK W CONTRAST    Result Date: 10/23/2022  No intracranial arterial occlusion. MRI BRAIN WO CONTRAST    Result Date: 10/11/2022  1. No acute intracranial abnormality.  CPT code(s) B5644295        Labs: Results:       BMP, Mg, Phos Recent Labs     10/23/22  0657 10/24/22  0647    140   K 3.9 3.8    110   CO2 28 26   ANIONGAP 4 4   BUN 15 12   CREATININE 1.50 1.40   LABGLOM 54* 58*   CALCIUM 9.1 8.7   GLUCOSE 122* 123*      CBC Recent Labs     10/23/22  0657 10/24/22  0647   WBC 7.3 7.5   RBC 3.24* 3.39*   HGB 9.2* 9.5*   HCT 30.3* 31.5*   MCV 93.5 92.9   MCH 28.4 28.0   MCHC 30.4* 30.2*   RDW 14.4 14.5    419   MPV 10.0 10.3   NRBC 0.00 0.00   SEGS 53 53   LYMPHOPCT 35 35   EOSRELPCT 4 4   MONOPCT 7 7   BASOPCT 1 1   IMMGRAN 0 0   SEGSABS 3.8 3.9   LYMPHSABS 2.5 2.6   EOSABS 0.3 0.3   MONOSABS 0.5 0.5   BASOSABS 0.1 0.1   ABSIMMGRAN 0.0 0.0      LFT No results for input(s): BILITOT, BILIDIR, ALKPHOS, AST, ALT, PROT, LABALBU, GLOB in the last 72 hours. Cardiac  No results found for: NTPROBNP, TROPHS   Coags No results found for: PROTIME, INR, APTT   A1c Lab Results   Component Value Date/Time    LABA1C 12.7 10/24/2022 06:47 AM    LABA1C >14.0 10/06/2022 08:40 AM     10/24/2022 06:47 AM    EAG Cannot be calculated 10/06/2022 08:40 AM      Lipids Lab Results   Component Value Date/Time    CHOL 138 10/11/2022 05:28 AM    LDLCALC 88.8 10/11/2022 05:28 AM    LABVLDL 33.2 10/11/2022 05:28 AM    HDL 16 10/11/2022 05:28 AM    CHOLHDLRATIO 8.6 10/11/2022 05:28 AM    TRIG 166 10/11/2022 05:28 AM      Thyroid  No results found for: TSHELE, DRI8TBV     Most Recent UA No results found for: COLORU, APPEARANCE, SPECGRAV, LABPH, PROTEINU, GLUCOSEU, KETUA, BILIRUBINUR, BLOODU, UROBILINOGEN, NITRU, LEUKOCYTESUR, WBCUA, RBCUA, EPITHUA, BACTERIA, LABCAST, MUCUS     Recent Labs     10/07/22  1610 10/05/22  2023 10/05/22  1658   CULTURE NO ANAEROBES ISOLATED 7 DAYS  NO ANAEROBES ISOLATED 7 DAYS  MODERATE * Methicillin Resistant Staphylococcus aureus **  PATIENT IS A KNOWN MRSA  MODERATE * Methicillin Resistant Staphylococcus aureus **  PATIENT IS A KNOWN MRSA NO GROWTH 5 DAYS  MODERATE * Methicillin Resistant Staphylococcus aureus **  RESULTS VERIFIED, PHONED TO AND READ BACK BY Tereso Colon RN ON 10/08/22 @1053, ADS NO GROWTH 5 DAYS       All Labs from Last 24 Hrs:  Recent Results (from the past 24 hour(s))   POCT Glucose    Collection Time: 10/24/22  5:21 PM   Result Value Ref Range    POC Glucose 204 (H) 65 - 100 mg/dL    Performed by:  [employer]    POCT Glucose    Collection Time: 10/24/22  9:02 PM even, unlabored on RA  Back:  Obese, rounded, soft, nontender, nondistended. Extremities: Warm and dry. No cyanosis or clubbing. Trace BLE edema    :  Dressing intact to left groin  Skin:     No rashes. Normal coloration  Neuro:  CN II-XII grossly intact. A&O x 3. Moves all extremities. Motor exam intact. Psych:  Normal mood and affect.     Signed:  Melodie POLLOCK-BC

## 2022-10-25 NOTE — PROGRESS NOTES
Patient girlfriend states she knows how to do dressing changes was not present when primary RN changed dressing

## 2022-10-25 NOTE — DISCHARGE INSTRUCTIONS
Follow-up Information     None Provider Follow up in 1 week(s). Why: PCP follow-up 1 week following discharge            [facility] Follow up. Specialty: Wound Ostomy  Why: [address] appointment (Bring a vac dressing change pack)   11/16/22 at 9:45 am  Contact information:  [place] [address] [place] [address] [facility] [address] [phone]            [facility] UROLOGY 1 Follow up in 2 week(s). Why: Urinary retention, hospital follow-up  Contact information:  [address]  [address] [facility]  If you have a deep wound, your doctor may show you how to pack it. This helps keep the wound clean. It also helps it heal more evenly, from the inside out. You may be able to pack your wound yourself. Or you may need someone to help you reach it. It's important to wash your hands and keep the area clean when you pack the wound. Ask your doctor how often to change the packing and what supplies to use. How to get ready  How to get ready to pack your wound  Clean the table or sink where you will work. Wash your hands with soap and water. Cover your work area with a clean towel. Put a clean bowl on the towel. Lay out the rest of your supplies. Don't touch the inside of the bowl. How to prepare the packing material  Pour some wetting solution into the bowl. Cut off some of the packing material.  Cut a few pieces of tape, and have them ready. Remove the old bandage and packing. Wash your hands again with soap and water. Use enough to cover your packing material.  Use the amount your doctor suggests. Place it in the wetting solution. Throw them away in a small plastic bag. How to pack your wound  Put on gloves. Take packing material from the bowl. Fill the wound with packing material.  Be gentle. Gently squeeze it out. It should be wet, but not dripping wet.   Don't pack it too tightly. Use your fingers or a cotton swab to press the material into smaller areas of the wound. Let your doctor know if it hurts too much. How to place the outer dressing  Open the package for the outer dressing. Keep the outer dressing dry and clean. Place the outer dressing over the packing and the wound area. Throw away your gloves, and wash your hands one more time. This dressing is used to cover the damp packing material.  Tape it down securely. When should you call for help? Call your doctor now or seek immediate medical care if:    You have symptoms of a new infection or of an infection that's getting worse, such as: Increased pain, swelling, warmth, or redness. Red streaks leading from the area, or red streaks getting worse. Pus draining from the area or more pus than the bandage can absorb. A fever. You have lots of bleeding. Your wound is changing color or has a worse odor. Watch closely for changes in your health, and be sure to contact your doctor if:    You do not get better as expected. Follow-up care is a key part of your treatment and safety. Be sure to make and go to all appointments, and call your doctor if you are having problems. It's also a good idea to know your test results and keep a list of the medicines you take. Current as of: March 9, 2022               Content Version: 13.4  © 2006-2022 [employer]. Care instructions adapted under license by [facility] [place] ([facility]). If you have questions about a medical condition or this instruction, always ask your healthcare professional. [place] [address] any warranty or liability for your use of this information.       DISCHARGE SUMMARY from Nurse    PATIENT INSTRUCTIONS:    After general anesthesia or intravenous sedation, for 24 hours or while taking prescription Narcotics:  Limit your activities  Do not drive and operate hazardous machinery  Do not make important personal or business decisions  Do  not drink alcoholic beverages  If you have not urinated within 8 hours after discharge, please contact your surgeon on call. Report the following to your surgeon:  Excessive pain, swelling, redness or odor of or around the surgical area  Temperature over 100.5  Nausea and vomiting lasting longer than 4 hours or if unable to take medications  Any signs of decreased circulation or nerve impairment to extremity: change in color, persistent  numbness, tingling, coldness or increase pain  Any questions    What to do at Home:  Recommended activity: no lifting greater than 5-10lbs, Driving while taking pain meds, or Strenuous exercise for 2-3 week or follow up,. If you experience any of the following symptoms fever of 101 or greater, uncontrolled nausea/pain, foul purulent drainage from wound, please follow up with PCP. *  Please give a list of your current medications to your Primary Care Provider. *  Please update this list whenever your medications are discontinued, doses are      changed, or new medications (including over-the-counter products) are added. *  Please carry medication information at all times in case of emergency situations. These are general instructions for a healthy lifestyle:    No smoking/ No tobacco products/ Avoid exposure to second hand smoke  Surgeon General's Warning:  Quitting smoking now greatly reduces serious risk to your health. Obesity, smoking, and sedentary lifestyle greatly increases your risk for illness    A healthy diet, regular physical exercise & weight monitoring are important for maintaining a healthy lifestyle    You may be retaining fluid if you have a history of heart failure or if you experience any of the following symptoms:  Weight gain of 3 pounds or more overnight or 5 pounds in a week, increased swelling in our hands or feet or shortness of breath while lying flat in bed.   Please call your doctor as soon as you notice any of these symptoms; do not wait until your next office visit. The discharge information has been reviewed with the patient and caregiver. The patient and caregiver verbalized understanding. Discharge medications reviewed with the patient and caregiver and appropriate educational materials and side effects teaching were provided. ___________________________________________________________________________________________________________________________________     Skin Abscess: Care Instructions  Overview     A skin abscess is a bacterial infection that forms a pocket of pus. A boil is a kind of skin abscess. The doctor may have cut an opening in the abscess so that the pus can drain out. You may have gauze in the cut so that the abscess will stay open and keep draining. You may need antibiotics. You will need to follow up with your doctor to make sure the infection has gone away. The doctor has checked you carefully, but problems can develop later. If you notice any problems or new symptoms, get medical treatment right away. Follow-up care is a key part of your treatment and safety. Be sure to make and go to all appointments, and call your doctor if you are having problems. It's also a good idea to know your test results and keep a list of the medicines you take. How can you care for yourself at home? Apply warm and dry compresses, a heating pad set on low, or a hot water bottle 3 or 4 times a day for pain. Keep a cloth between the heat source and your skin. If your doctor prescribed antibiotics, take them as directed. Do not stop taking them just because you feel better. You need to take the full course of antibiotics. Take pain medicines exactly as directed. If the doctor gave you a prescription medicine for pain, take it as prescribed. If you are not taking a prescription pain medicine, ask your doctor if you can take an over-the-counter medicine. Keep your bandage clean and dry.  Change the bandage whenever it gets wet or dirty, or at least one time a day. If the abscess was packed with gauze:  Keep follow-up appointments to have the gauze changed or removed. If the doctor instructed you to remove the gauze, follow the instructions you were given for how to remove it. After the gauze is removed, soak the area in warm water for 15 to 20 minutes 2 times a day, until the wound closes. When should you call for help? Call your doctor now or seek immediate medical care if:    You have signs of worsening infection, such as: Increased pain, swelling, warmth, or redness. Red streaks leading from the infected skin. Pus draining from the wound. A fever. Watch closely for changes in your health, and be sure to contact your doctor if:    You do not get better as expected. Where can you learn more? Go to https://[employer]. org and sign in to your [employer] account. Enter U577 in the [employer] box to learn more about \"Skin Abscess: Care Instructions. \"     If you do not have an account, please click on the \"Sign Up Now\" link. Current as of: November 15, 2021               Content Version: 13.4  © 2006-2022 Healthwise, Incorporated. Care instructions adapted under license by [facility] ([facility]). If you have questions about a medical condition or this instruction, always ask your healthcare professional. [place] [address] any warranty or liability for your use of this information. apply Dakin's moist to dry dressing. Dressing on back and groin will need changed 2 times daily at home.

## 2022-10-25 NOTE — DIABETES MGMT
Patient admitted with sepsis. Blood glucose ranged 123-204 yesterday with patient receiving Lantus 17 units and Humalog 2 units. Blood glucose this morning was 130. Reviewed patient current regimen: Lantus 17 units daily and Humalog correctional insulin. Per case management patient to discharge today. Patient will need prescription for either Basaglar pens or NPH insulin at discharge as patient has affordability issues. Provider updated via [employer] regarding affordability issues. Update 1025:  Explained basic physiology of diabetes, as well as causes, signs and symptoms, and treatments for hypoglycemia and hyperglycemia. Described the effects of poor glycemic control and the development of long-term complications such as renal, eye, nerve, and cardiovascular disease. Also explained the relationship between hyperglycemia and infection and delayed healing. Discussed target goals for blood glucose and A1C. Educated patient regarding diabetic medications including mechanism of action, timing, and possible side effects. Patient verbalizes understanding of teaching. Explained the importance of blood glucose monitoring to patient and how this will provide their primary care provider with more information to help them safely titrate their regimen. Recommended frequency of twice daily and to record in log book to take to primary care provider appointment. Patient educated regarding insulin administration sites. Patient also educated regarding the importance of site rotation, proper storage of insulin, and proper sharps disposal. Patient prefers pens. Patient will need prescription for Basaglar pens and pen needles at discharge. Educated patient regarding current basal/bolus regimen of Lantus 17 units daily and Humalog correctional insulin including type of insulin, timing with meals, onset, duration of effect, and peak of insulin dose. Patient verbalizes understanding.  Patient educated on the different types of insulins including Regular, NPH, and mixed insulins. Discussed increased risk of hypoglycemia when using NPH or mixed insulins versus Lantus, the importance of eating regularly when taking those types of insulin, and reviewed s/s treatments of hypoglycemia again. Patient verbalized understanding. Coupon card provided to patient and patient was provided with Website and phone number to contact insulin affordability programs to obtain more coupon cards if needed. Patient verbalized understanding of importance of follow up with [address] [place] [address] or PCP for long-term affordable diabetes regimen. Patient states they can afford $35 if needed at discharge. Encouraged compliance with discharge regimen. Encouraged patient to continue to work on lifestyle modifications and to follow up with PCP for further titration of regimen. Patient verbalized understanding and voices no further questions regarding diabetes management at this time.

## 2022-10-25 NOTE — PROGRESS NOTES
Pt's D/C instructions completed. Verbalized understanding of all instructions including diet, activity, s/sx to alert MD, medications, wound care, and f/u appointment. Family at [facility]. Reiterated dressing change to gf and patient both parties verbally stated they understood. Supplies given to patient prior to dc.

## 2022-10-25 NOTE — CASE COMMUNICATION
Robles removed for voiding trial earlier today per Dr. Tru Tobias, Urology. Patient was able to void 25 cc but with > 300 retention. Nursing staff attempted to replace Robles but patient refused to allow placement. Educated to return to ED if unable to void or if remains with low urine output. 2 week Urology follow-up ordered as well. May call office to be seen sooner if any further issues.      Jenny Rodríguez AGACNP-BC

## 2022-10-25 NOTE — PROGRESS NOTES
Jaimes catheter dc patient bladder scan 492 in bladder, writer attempted to replace jaimes per primary physician request patient refused sat on bsc and void 25 cc, primary physician made aware, instructed to educate patient to return to ED/Call Urology if continues to void minimal/ no ouput.

## 2022-10-26 ENCOUNTER — TELEPHONE (OUTPATIENT)
Dept: HOME HEALTH SERVICES | Facility: HOME HEALTH | Age: 57
End: 2022-10-26

## 2022-10-26 ENCOUNTER — HOME CARE VISIT (OUTPATIENT)
Dept: SCHEDULING | Facility: HOME HEALTH | Age: 57
End: 2022-10-26

## 2022-10-26 VITALS
OXYGEN SATURATION: 96 % | BODY MASS INDEX: 45.47 KG/M2 | HEART RATE: 80 BPM | SYSTOLIC BLOOD PRESSURE: 138 MMHG | RESPIRATION RATE: 18 BRPM | HEIGHT: 68 IN | WEIGHT: 300 LBS | TEMPERATURE: 98 F | DIASTOLIC BLOOD PRESSURE: 88 MMHG

## 2022-10-26 PROCEDURE — G0299 HHS/HOSPICE OF RN EA 15 MIN: HCPCS

## 2022-10-26 PROCEDURE — 400013 HH SOC

## 2022-10-26 ASSESSMENT — ENCOUNTER SYMPTOMS
DYSPNEA ACTIVITY LEVEL: AFTER AMBULATING 10 - 20 FT
PAIN LOCATION - PAIN QUALITY: SORE

## 2022-10-27 ENCOUNTER — HOME CARE VISIT (OUTPATIENT)
Dept: SCHEDULING | Facility: HOME HEALTH | Age: 57
End: 2022-10-27

## 2022-10-27 VITALS
SYSTOLIC BLOOD PRESSURE: 138 MMHG | HEART RATE: 78 BPM | DIASTOLIC BLOOD PRESSURE: 84 MMHG | OXYGEN SATURATION: 96 % | TEMPERATURE: 97.8 F | RESPIRATION RATE: 18 BRPM

## 2022-10-27 PROCEDURE — G0152 HHCP-SERV OF OT,EA 15 MIN: HCPCS

## 2022-10-27 PROCEDURE — G0151 HHCP-SERV OF PT,EA 15 MIN: HCPCS

## 2022-10-27 ASSESSMENT — ENCOUNTER SYMPTOMS
SKIN LESIONS: 1
PAIN LOCATION - PAIN QUALITY: SORE
DYSPNEA ACTIVITY LEVEL: AFTER AMBULATING MORE THAN 20 FT

## 2022-10-29 ENCOUNTER — HOME CARE VISIT (OUTPATIENT)
Dept: SCHEDULING | Facility: HOME HEALTH | Age: 57
End: 2022-10-29

## 2022-10-29 PROCEDURE — G0299 HHS/HOSPICE OF RN EA 15 MIN: HCPCS

## 2022-10-31 ENCOUNTER — HOME CARE VISIT (OUTPATIENT)
Dept: SCHEDULING | Facility: HOME HEALTH | Age: 57
End: 2022-10-31

## 2022-10-31 VITALS
HEART RATE: 78 BPM | RESPIRATION RATE: 17 BRPM | DIASTOLIC BLOOD PRESSURE: 78 MMHG | SYSTOLIC BLOOD PRESSURE: 132 MMHG | OXYGEN SATURATION: 98 % | TEMPERATURE: 98 F

## 2022-10-31 PROCEDURE — G0157 HHC PT ASSISTANT EA 15: HCPCS

## 2022-10-31 PROCEDURE — G0155 HHCP-SVS OF CSW,EA 15 MIN: HCPCS

## 2022-11-01 ENCOUNTER — HOME CARE VISIT (OUTPATIENT)
Dept: SCHEDULING | Facility: HOME HEALTH | Age: 57
End: 2022-11-01

## 2022-11-01 VITALS
SYSTOLIC BLOOD PRESSURE: 124 MMHG | RESPIRATION RATE: 16 BRPM | TEMPERATURE: 98.1 F | HEART RATE: 86 BPM | DIASTOLIC BLOOD PRESSURE: 74 MMHG | OXYGEN SATURATION: 97 %

## 2022-11-01 PROCEDURE — G0299 HHS/HOSPICE OF RN EA 15 MIN: HCPCS

## 2022-11-01 ASSESSMENT — ENCOUNTER SYMPTOMS
STOOL DESCRIPTION: SOFT FORMED
PAIN LOCATION - PAIN QUALITY: SHARP

## 2022-11-02 ENCOUNTER — TELEPHONE (OUTPATIENT)
Dept: HOME HEALTH SERVICES | Facility: HOME HEALTH | Age: 57
End: 2022-11-02

## 2022-11-02 VITALS
SYSTOLIC BLOOD PRESSURE: 152 MMHG | RESPIRATION RATE: 17 BRPM | OXYGEN SATURATION: 96 % | TEMPERATURE: 98.4 F | DIASTOLIC BLOOD PRESSURE: 80 MMHG | HEART RATE: 81 BPM

## 2022-11-02 ASSESSMENT — ENCOUNTER SYMPTOMS: PAIN LOCATION - PAIN QUALITY: ACHE/SORE

## 2022-11-03 ENCOUNTER — HOME CARE VISIT (OUTPATIENT)
Dept: SCHEDULING | Facility: HOME HEALTH | Age: 57
End: 2022-11-03

## 2022-11-03 VITALS
SYSTOLIC BLOOD PRESSURE: 122 MMHG | RESPIRATION RATE: 16 BRPM | OXYGEN SATURATION: 98 % | DIASTOLIC BLOOD PRESSURE: 78 MMHG | TEMPERATURE: 98.1 F | HEART RATE: 78 BPM

## 2022-11-03 VITALS
SYSTOLIC BLOOD PRESSURE: 114 MMHG | DIASTOLIC BLOOD PRESSURE: 72 MMHG | HEART RATE: 86 BPM | RESPIRATION RATE: 16 BRPM | OXYGEN SATURATION: 99 % | TEMPERATURE: 98.7 F

## 2022-11-03 VITALS
RESPIRATION RATE: 16 BRPM | HEART RATE: 90 BPM | DIASTOLIC BLOOD PRESSURE: 65 MMHG | OXYGEN SATURATION: 98 % | SYSTOLIC BLOOD PRESSURE: 115 MMHG | TEMPERATURE: 97.7 F

## 2022-11-03 PROCEDURE — G0299 HHS/HOSPICE OF RN EA 15 MIN: HCPCS

## 2022-11-03 PROCEDURE — G0158 HHC OT ASSISTANT EA 15: HCPCS

## 2022-11-03 PROCEDURE — G0157 HHC PT ASSISTANT EA 15: HCPCS

## 2022-11-03 ASSESSMENT — ENCOUNTER SYMPTOMS
PAIN LOCATION - PAIN QUALITY: ACHING
PAIN LOCATION - PAIN QUALITY: ACHE

## 2022-11-04 ENCOUNTER — HOME CARE VISIT (OUTPATIENT)
Dept: HOME HEALTH SERVICES | Facility: HOME HEALTH | Age: 57
End: 2022-11-04

## 2022-11-07 ENCOUNTER — HOME CARE VISIT (OUTPATIENT)
Dept: SCHEDULING | Facility: HOME HEALTH | Age: 57
End: 2022-11-07

## 2022-11-07 VITALS
HEART RATE: 74 BPM | TEMPERATURE: 98 F | OXYGEN SATURATION: 98 % | SYSTOLIC BLOOD PRESSURE: 120 MMHG | DIASTOLIC BLOOD PRESSURE: 80 MMHG | RESPIRATION RATE: 17 BRPM

## 2022-11-07 VITALS
TEMPERATURE: 97.8 F | HEART RATE: 80 BPM | RESPIRATION RATE: 16 BRPM | DIASTOLIC BLOOD PRESSURE: 86 MMHG | SYSTOLIC BLOOD PRESSURE: 126 MMHG | OXYGEN SATURATION: 99 %

## 2022-11-07 VITALS
RESPIRATION RATE: 19 BRPM | TEMPERATURE: 97.6 F | HEART RATE: 81 BPM | DIASTOLIC BLOOD PRESSURE: 82 MMHG | SYSTOLIC BLOOD PRESSURE: 142 MMHG | OXYGEN SATURATION: 97 %

## 2022-11-07 PROCEDURE — G0157 HHC PT ASSISTANT EA 15: HCPCS

## 2022-11-07 PROCEDURE — G0299 HHS/HOSPICE OF RN EA 15 MIN: HCPCS

## 2022-11-07 PROCEDURE — G0158 HHC OT ASSISTANT EA 15: HCPCS

## 2022-11-07 ASSESSMENT — ENCOUNTER SYMPTOMS
PAIN LOCATION - PAIN QUALITY: ACHING
PAIN LOCATION - PAIN QUALITY: SHARP

## 2022-11-08 VITALS
TEMPERATURE: 98.3 F | HEART RATE: 99 BPM | OXYGEN SATURATION: 94 % | RESPIRATION RATE: 16 BRPM | DIASTOLIC BLOOD PRESSURE: 80 MMHG | SYSTOLIC BLOOD PRESSURE: 132 MMHG

## 2022-11-08 LAB
FUNGAL CULT/SMEAR: NORMAL
FUNGUS (MYCOLOGY) CULTURE: NEGATIVE
FUNGUS SMEAR: NORMAL
REFLEX TO ID: NORMAL
SPECIMEN PROCESSING: NORMAL
SPECIMEN SOURCE: NORMAL
SPECIMEN SOURCE: NORMAL

## 2022-11-08 ASSESSMENT — ENCOUNTER SYMPTOMS
PAIN LOCATION - PAIN QUALITY: ACHING, STABBING
DYSPNEA ACTIVITY LEVEL: AFTER AMBULATING MORE THAN 20 FT

## 2022-11-09 ENCOUNTER — HOME CARE VISIT (OUTPATIENT)
Dept: SCHEDULING | Facility: HOME HEALTH | Age: 57
End: 2022-11-09

## 2022-11-09 VITALS
RESPIRATION RATE: 17 BRPM | TEMPERATURE: 97.8 F | HEART RATE: 73 BPM | OXYGEN SATURATION: 96 % | DIASTOLIC BLOOD PRESSURE: 81 MMHG | SYSTOLIC BLOOD PRESSURE: 120 MMHG

## 2022-11-09 PROCEDURE — G0158 HHC OT ASSISTANT EA 15: HCPCS

## 2022-11-09 ASSESSMENT — ENCOUNTER SYMPTOMS: PAIN LOCATION - PAIN QUALITY: SHARP

## 2022-11-10 ENCOUNTER — HOME CARE VISIT (OUTPATIENT)
Dept: SCHEDULING | Facility: HOME HEALTH | Age: 57
End: 2022-11-10

## 2022-11-14 ENCOUNTER — HOME CARE VISIT (OUTPATIENT)
Dept: SCHEDULING | Facility: HOME HEALTH | Age: 57
End: 2022-11-14

## 2022-11-14 VITALS
HEART RATE: 70 BPM | OXYGEN SATURATION: 97 % | TEMPERATURE: 97.8 F | DIASTOLIC BLOOD PRESSURE: 78 MMHG | SYSTOLIC BLOOD PRESSURE: 120 MMHG | RESPIRATION RATE: 18 BRPM

## 2022-11-14 VITALS
DIASTOLIC BLOOD PRESSURE: 82 MMHG | SYSTOLIC BLOOD PRESSURE: 142 MMHG | RESPIRATION RATE: 14 BRPM | OXYGEN SATURATION: 97 % | TEMPERATURE: 98.4 F | HEART RATE: 76 BPM

## 2022-11-14 PROCEDURE — G0157 HHC PT ASSISTANT EA 15: HCPCS

## 2022-11-14 PROCEDURE — G0299 HHS/HOSPICE OF RN EA 15 MIN: HCPCS

## 2022-11-14 ASSESSMENT — ENCOUNTER SYMPTOMS
STOOL DESCRIPTION: SOFT FORMED
PAIN LOCATION - PAIN QUALITY: SHARP

## 2022-11-15 ENCOUNTER — HOME CARE VISIT (OUTPATIENT)
Dept: SCHEDULING | Facility: HOME HEALTH | Age: 57
End: 2022-11-15

## 2022-11-15 VITALS
OXYGEN SATURATION: 98 % | RESPIRATION RATE: 16 BRPM | SYSTOLIC BLOOD PRESSURE: 122 MMHG | TEMPERATURE: 97.8 F | HEART RATE: 69 BPM | DIASTOLIC BLOOD PRESSURE: 69 MMHG

## 2022-11-15 PROCEDURE — G0158 HHC OT ASSISTANT EA 15: HCPCS

## 2022-11-16 ENCOUNTER — HOSPITAL ENCOUNTER (OUTPATIENT)
Dept: WOUND CARE | Age: 57
Discharge: HOME | End: 2022-11-16

## 2022-11-16 ENCOUNTER — TELEPHONE (OUTPATIENT)
Dept: HOME HEALTH SERVICES | Facility: HOME HEALTH | Age: 57
End: 2022-11-16

## 2022-11-16 VITALS
DIASTOLIC BLOOD PRESSURE: 87 MMHG | TEMPERATURE: 98.2 F | BODY MASS INDEX: 41.52 KG/M2 | WEIGHT: 274 LBS | OXYGEN SATURATION: 99 % | SYSTOLIC BLOOD PRESSURE: 137 MMHG | HEART RATE: 94 BPM | HEIGHT: 68 IN | RESPIRATION RATE: 20 BRPM

## 2022-11-16 DIAGNOSIS — S31.104A NON-HEALING LEFT GROIN OPEN WOUND, INITIAL ENCOUNTER: ICD-10-CM

## 2022-11-16 DIAGNOSIS — L98.425 NON-PRESSURE CHRONIC ULCER OF BACK WITH MUSCLE INVOLVEMENT WITHOUT EVIDENCE OF NECROSIS (HCC): ICD-10-CM

## 2022-11-16 PROBLEM — E11.9 DM2 (DIABETES MELLITUS, TYPE 2) (HCC): Chronic | Status: ACTIVE | Noted: 2022-10-06

## 2022-11-16 PROCEDURE — 11045 DBRDMT SUBQ TISS EACH ADDL: CPT | Performed by: FAMILY MEDICINE

## 2022-11-16 PROCEDURE — 11042 DBRDMT SUBQ TIS 1ST 20SQCM/<: CPT

## 2022-11-16 PROCEDURE — 11042 DBRDMT SUBQ TIS 1ST 20SQCM/<: CPT | Performed by: FAMILY MEDICINE

## 2022-11-16 PROCEDURE — 99202 OFFICE O/P NEW SF 15 MIN: CPT | Performed by: FAMILY MEDICINE

## 2022-11-16 PROCEDURE — 99203 OFFICE O/P NEW LOW 30 MIN: CPT

## 2022-11-16 RX ORDER — BACITRACIN, NEOMYCIN, POLYMYXIN B 400; 3.5; 5 [USP'U]/G; MG/G; [USP'U]/G
OINTMENT TOPICAL ONCE
OUTPATIENT
Start: 2022-11-16 | End: 2022-11-16

## 2022-11-16 RX ORDER — BACITRACIN ZINC AND POLYMYXIN B SULFATE 500; 1000 [USP'U]/G; [USP'U]/G
OINTMENT TOPICAL ONCE
OUTPATIENT
Start: 2022-11-16 | End: 2022-11-16

## 2022-11-16 RX ORDER — LIDOCAINE HYDROCHLORIDE 20 MG/ML
JELLY TOPICAL ONCE
OUTPATIENT
Start: 2022-11-16 | End: 2022-11-16

## 2022-11-16 RX ORDER — LIDOCAINE 40 MG/G
CREAM TOPICAL ONCE
OUTPATIENT
Start: 2022-11-16 | End: 2022-11-16

## 2022-11-16 RX ORDER — CLOBETASOL PROPIONATE 0.5 MG/G
OINTMENT TOPICAL ONCE
OUTPATIENT
Start: 2022-11-16 | End: 2022-11-16

## 2022-11-16 RX ORDER — LIDOCAINE HYDROCHLORIDE 40 MG/ML
SOLUTION TOPICAL ONCE
OUTPATIENT
Start: 2022-11-16 | End: 2022-11-16

## 2022-11-16 RX ORDER — LIDOCAINE 50 MG/G
OINTMENT TOPICAL ONCE
OUTPATIENT
Start: 2022-11-16 | End: 2022-11-16

## 2022-11-16 RX ORDER — GENTAMICIN SULFATE 1 MG/G
OINTMENT TOPICAL ONCE
OUTPATIENT
Start: 2022-11-16 | End: 2022-11-16

## 2022-11-16 RX ORDER — GINSENG 100 MG
CAPSULE ORAL ONCE
OUTPATIENT
Start: 2022-11-16 | End: 2022-11-16

## 2022-11-16 RX ORDER — BETAMETHASONE DIPROPIONATE 0.05 %
OINTMENT (GRAM) TOPICAL ONCE
OUTPATIENT
Start: 2022-11-16 | End: 2022-11-16

## 2022-11-16 ASSESSMENT — PAIN DESCRIPTION - LOCATION: LOCATION: GROIN

## 2022-11-16 ASSESSMENT — PAIN DESCRIPTION - ORIENTATION: ORIENTATION: LEFT;RIGHT

## 2022-11-16 ASSESSMENT — PAIN DESCRIPTION - DESCRIPTORS: DESCRIPTORS: TENDER;SHARP

## 2022-11-16 ASSESSMENT — PAIN SCALES - GENERAL: PAINLEVEL_OUTOF10: 9

## 2022-11-16 NOTE — TELEPHONE ENCOUNTER
Voice mail answered. I left my name and cell phone number. I asked Luis Gerardo Boucher to call me with any medication questions or issues.

## 2022-11-16 NOTE — DISCHARGE INSTRUCTIONS
Discharge Instructions for  Jane Álvarez  [address] [place] [address] Sole [place], [address]  Phone [phone]   Fax [phone]      NAME:  Connie Gutierrez  YOB: 1965  MEDICAL RECORD NUMBER:  [id number]  DATE:  @ED@    Return Appointment:   1 week with Howard Fleischer, DO      Instructions:  left groin to include penis  Cleanse with your dakins or saline. Moisten gauze with dakins solution and place in wound bed. Secure with gauze or abd. Hold in place with your underwear. Change daily. Back  Cleanse with saline. Pack with iodoform gauze. Pack the tunnel at 2 oclock. Secure with gauze and covrsite or tape. Change daily. Increase your protein with lean chicken, meat or fish or eggs. We recommend at least 60 grams per day. Samples of glucerna and Guille given at todays visit. Home health to continue with dressing changes 1 to 2x weekly and provide assistance to family/friends with training on dressing changes on alternate days. Should you experience increased redness, swelling, pain, foul odor, size of wound(s), or have a temperature over 101 degrees please contact the [address] at [phone] or if after hours contact your primary care physician or go to the hospital emergency department. PLEASE NOTE: IF YOU ARE UNABLE TO OBTAIN WOUND SUPPLIES, CONTINUE TO USE THE SUPPLIES YOU HAVE AVAILABLE UNTIL YOU ARE ABLE TO REACH US. IT IS MOST IMPORTANT TO KEEP THE WOUND COVERED AT ALL TIMES.     Electronically signed Amber Novak RN on 11/16/2022 at 11:52 AM

## 2022-11-16 NOTE — PROGRESS NOTES
[facility]   History and Physical Note   Referring Michelle Kim MD  Reason for Referral: Left groin and penis wound from abscess and back wound from abscess    [facility][1]:  [id number]  AGE: 60 y.o. GENDER: male  : 1965  EPISODE DATE:  2022    Chief complaint and reason for visit:     Chief Complaint   Patient presents with    Wound Infection     Abscess to back , left and right groin         HISTORY of PRESENT ILLNESS HPI     Lamin Barrett is a 60 y.o. male who presents today for an initial evaluation of a wound/ulcer. Patient is new to the wound center. Wound duration:  3-4 week(s). History of Wound Context: Patient developed multiple abscesses requiring surgical I&D including left upper back and left groin involving base of penis. Recently been using Dakin's wet-to-dry dressings. Areas have been improving. Patient states he does not sleep much at night due to discomfort. In general areas are improving. Comes in today for evaluation of this. Pertinent associated symptoms: drainage     PAST MEDICAL HISTORY        Diagnosis Date    Diabetes mellitus ([address]     Hypertension        PAST SURGICAL HISTORY  Past Surgical History:   Procedure Laterality Date    SKIN LESION EXCISION N/A 10/7/2022    BACK ABSCESS I & D performed by Alondra Osborn DO at [facility] N/A 10/7/2022    INGUINAL AND PENILE DEBRIDEMENT performed by Alondra Osborn DO at [facility] MAIN OR       FAMILY HISTORY  History reviewed. No pertinent family history.     SOCIAL HISTORY  Social History     Tobacco Use    Smoking status: Former     Types: Cigarettes    Smokeless tobacco: Never    Tobacco comments:     States occasionally smokes cigars   Substance Use Topics    Alcohol use: Not Currently    Drug use: Never       ALLERGIES  Allergies   Allergen Reactions    Peanut-Containing Drug Products Itching MEDICATIONS  Current Outpatient Medications on File Prior to Encounter   Medication Sig Dispense Refill    insulin glargine (BASAGLAR KWIKPEN) 100 UNIT/ML injection pen Inject 17 Units into the skin nightly for 30 doses 5.1 mL 0    lisinopril (PRINIVIL;ZESTRIL) 10 MG tablet Take 1 tablet by mouth daily (Patient not taking: Reported on 11/16/2022) 30 tablet 0    Insulin Pen Needle 32G X 4 MM MISC 1 each by Does not apply route daily For use with Basaglar nightly 50 each 0    naloxone (NARCAN) 4 MG/0.1ML LIQD nasal spray 1 spray by Nasal route as needed for Opioid Reversal 1 each 0    gabapentin (NEURONTIN) 300 MG capsule Take 1 capsule by mouth 3 times daily for 10 days. 30 capsule 0    metFORMIN (GLUCOPHAGE) 1000 MG tablet Take 1 tablet by mouth 2 times daily (with meals) for 14 days Resume 10/28/22 28 tablet 0     No current facility-administered medications on file prior to encounter. REVIEW OF SYSTEMS  A comprehensive review of systems was negative except for: Pertinent items are noted in HPI. Objective:      /87   Pulse 94   Temp 98.2 °F (36.8 °C) (Oral)   Resp 20   Ht 5' 8\" (1.727 m)   Wt 274 lb (124.3 kg)   SpO2 99%   BMI 41.66 kg/m²     Wt Readings from Last 3 Encounters:   11/16/22 274 lb (124.3 kg)   10/26/22 300 lb (136.1 kg)   10/25/22 (!) 305 lb (138.3 kg)       PHYSICAL EXAM  General Appearance/Constitutional: alert and oriented to person, place and time,  and in no acute distress. Nontoxic. Skin: warm and dry, no rash, positive wound per LDA documentation if applicable. Head: normocephalic and atraumatic. Eyes: extraocular eye movements intact, conjunctivae normal, and sclera anicteric. ENT: hearing grossly normal bilaterally. Normal appearance. Pulmonary/Chest: no chest wall tenderness and clear anteriorly. No respiratory distress. Cardiovascular: normal rate and regular rhythm. GI: abdomen soft, non-tender and non-distended.   Musculoskeletal: normal range of motion of joints. Nontender calves. No cyanosis. Nontender calves. Edema 1+  Neurologic: no gross cranial nerve deficit and speech normal. No focal deficits. Mental status normal.    Medical Decision Making:     Patient wounds are improving. Since it looks so healthy we will continue with current Dakin's. Ice to left groin and penis area. Difficulty getting areas dressing to stay in the areas. As far as his back wound there is some tunneling several centimeters so we will utilize the iodoform. Cecily excellent protein intake. Patient will institute this. Assessment required other independent historian(s): Yes. Additional Historian: patient . Comorbid conditions affecting wound healing: As noted in [address] and [facility] which was reviewed. 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 Continue with Dakin's solution on the left groin keeping dressing in place with his underwear. Talked about excellent protein intake which she will institute. Other diagnoses or problems addressed:  Diabetes which is improving with an A1c recently of 12. Was [id number] 15. This morning fingerstick was was 120. Pertinent labs reviewed. Review of medical records and external note (s) from other providers was done for this visit. New lab or imaging orders placed:  Labs. Prescription drug management: N/A     Risk of complications and/or mortality of patient management: This patient has a minimal risk of morbidity and mortality from additional diagnostic testing or treatment. This is due to the above conditions affecting wound healing as well as patient and procedure risk factors.  Education and discussion held with patient regarding these disease processes pertinent to wound(s). Other pertinent decisions include: minor surgery or procedures as below. The patient's diagnosis or treatment is  significantly limited by social determinants of health as noted by: nutritional resource limitations . Discussion of management or test interpretation with N/A. Time spent with patient and patient care issues above the usual time needed for wound assessment and treatment was: [] 15-20 min  [x] 21-30 min  [] 31-44 min  [] 45 min or more  This included time retrieving and reviewing records with patient and education provided to patient regarding disease process(es), offloading or pressure relief, nutrition needed for wound healing, smoking cessation when applicable, and infection risk.     This time also included physician non-face-to-face service time visit on the date of service such as  Preparing to see the patient (eg, review of tests)  Obtaining and/or reviewing separately obtained history  Performing a medically necessary appropriate examination and/or evaluation  Counseling and educating the patient/family/caregiver  Ordering medications, tests, or procedures  Referring and communicating with other health care professionals as needed  Documenting clinical information in the electronic or other health record  Independently interpreting results (not reported separately) and communicating results to the patient/family/caregiver  Care coordination (not reported separately)    Wound 11/16/22 Groin #1 penis to include left groin (Active)   Wound Image    11/16/22 1100   Wound Etiology Other 11/16/22 1100   Wound Cleansed Vashe 11/16/22 1100   Wound Length (cm) 5 cm 11/16/22 1100   Wound Width (cm) 5 cm 11/16/22 1100   Wound Depth (cm) 1.5 cm 11/16/22 1100   Wound Surface Area (cm^2) 25 cm^2 11/16/22 1100   Wound Volume (cm^3) 37.5 cm^3 11/16/22 1100   Post-Procedure Length (cm) 5 cm 11/16/22 1100   Post-Procedure Width (cm) 5 cm 11/16/22 1100   Post-Procedure Depth (cm) 1.5 cm 11/16/22 1100   Post-Procedure Surface Area (cm^2) 25 cm^2 11/16/22 1100   Post-Procedure Volume (cm^3) 37.5 cm^3 11/16/22 1100   Wound Assessment Granulation tissue 11/16/22 1100   Drainage Amount Moderate 11/16/22 1100   Drainage Description Serosanguinous 11/16/22 1100   Nemo-wound Assessment Intact 11/16/22 1100   Wound Thickness Description not for Pressure Injury Full thickness 11/16/22 1100   Number of days: 0       Wound 11/16/22 Back Left;Medial #2 back (Active)   Wound Image    11/16/22 1102   Wound Etiology Other 11/16/22 1102   Wound Cleansed Vashe 11/16/22 1102   Wound Length (cm) 0.9 cm 11/16/22 1102   Wound Width (cm) 1.2 cm 11/16/22 1102   Wound Depth (cm) 0.6 cm 11/16/22 1102   Wound Surface Area (cm^2) 1.08 cm^2 11/16/22 1102   Wound Volume (cm^3) 0.648 cm^3 11/16/22 1102   Post-Procedure Length (cm) 0.9 cm 11/16/22 1102   Post-Procedure Width (cm) 1.2 cm 11/16/22 1102   Post-Procedure Depth (cm) 0.6 cm 11/16/22 1102   Post-Procedure Surface Area (cm^2) 1.08 cm^2 11/16/22 1102   Post-Procedure Volume (cm^3) 0.648 cm^3 11/16/22 1102   Distance Tunneling (cm) 2.6 cm 11/16/22 1102   Tunneling Position ___ O'Clock 2 11/16/22 1102   Wound Assessment Granulation tissue 11/16/22 1102   Drainage Amount Small 11/16/22 1102   Drainage Description Serosanguinous 11/16/22 1102   Odor None 11/16/22 1102   Wound Thickness Description not for Pressure Injury Full thickness 11/16/22 1102   Number of days: 0     Incision 10/07/22 Penis (Active)   Dressing Status New dressing applied 11/14/22 1137   Dressing Change Due 11/03/22 11/03/22 1320   Incision Cleansed Wound cleanser 11/14/22 1137   Dressing/Treatment Gauze dressing/dressing sponge;Tape/Soft cloth adhesive tape 11/14/22 1137   Incision Length (cm) 3 11/14/22 1137   Incision Width (cm) 9.5 cm 11/14/22 1137   Incision Depth (cm) 6.6 cm 11/14/22 1137   Closure Other (Comment) 11/14/22 1137   Margins Other (Comment) 10/10/22 0800   Incision Assessment Other (Comment) 11/14/22 1137   Drainage Amount Moderate 11/14/22 1137   Drainage Description Serosanguinous 11/14/22 1137   Odor None 11/14/22 1137   Nemo-incision Assessment Intact 11/14/22 1137   Number of days: 39       Incision 10/07/22 Back Left (Active)   Dressing Status New dressing applied 11/14/22 1137   Dressing Change Due 11/03/22 11/03/22 1320   Incision Cleansed Wound cleanser 11/14/22 1137   Dressing/Treatment Gauze dressing/dressing sponge;Tape/Soft cloth adhesive tape 11/14/22 1137   Incision Length (cm) 3.8 11/14/22 1137   Incision Width (cm) 2.7 cm 11/14/22 1137   Incision Depth (cm) 0.2 cm 11/14/22 1137   Closure Steri-Strips 10/22/22 0728   Margins Other (Comment) 11/14/22 1137   Incision Assessment Other (Comment) 11/14/22 1137   Drainage Amount Small 11/14/22 1137   Drainage Description Serosanguinous 11/14/22 1137   Odor None 11/14/22 1137   Nemo-incision Assessment Intact 11/14/22 1137   Number of days: 39       Procedures done this visit:   Procedure Note  Indications:  Based on my examination of this patient's wound(s)/ulcer(s) today, debridement is required to promote healing and evaluate the wound base. Performed by: Tawanna Power DO  Consent obtained:  Yes  Time out taken:  Yes  Pain Control:     Debridement: Excisional Debridement  Using curette the wound(s)/ulcer(s) was/were debrided down through and including the removal of epidermis, dermis, and subcutaneous tissue. Devitalized Tissue Debrided:  fibrin, biofilm, and slough  Pre Debridement Measurements:  Are located in the [place]  Documentation Flow Sheet  Diabetic/Pressure/Non Pressure Ulcers only:  Ulcer: Diabetic ulcer, fat layer exposed   Wound/Ulcer #: 1 and 2  Post Debridement Measurements:  Wound/Ulcer Descriptions are Pre Debridement except measurements:   Total Surface Area Debrided:  26 sq cm   Estimated Blood Loss:  Minimal  Hemostasis Achieved:  by pressure  Procedural Pain:  1  / 10   Post Procedural Pain:  2 / 10 Response to treatment:  Well tolerated by patient., With complaints of pain. Written patient dismissal instructions given to patient and signed by patient or POA. Patient voiced understanding that the importance of adherence to instructions is paramount to wound healing improvement or success.      Electronically signed by Natalie Boswell DO on 11/16/2022 at 1:20 PM

## 2022-11-16 NOTE — WOUND CARE
Discharge Instructions for  Jane Álvarez  [address]  [address], [address]  Phone [phone]   Fax [phone]      NAME:  Medina Che  YOB: 1965  MEDICAL RECORD NUMBER:  [id number]  DATE:  11/16/2022    Return Appointment:   1 week with Haven Candelario DO      Instructions: left groin to include penis  Cleanse with your dakins or saline. Moisten gauze with dakins solution and place in wound bed. Secure with gauze or abd. Hold in place with your underwear. Change daily. Back  Cleanse with saline. Pack with iodoform gauze. Pack the tunnel at 2 oclock. Secure with gauze and covrsite or tape. Change daily. Increase your protein with lean chicken, meat or fish or eggs. We recommend at least 60 grams per day. Samples of glucerna and Guille given at todays visit. Home health to continue with dressing changes 1 to 2x weekly and provide assistance to family/friends with training on dressing changes on alternate days. Should you experience increased redness, swelling, pain, foul odor, size of wound(s), or have a temperature over 101 degrees please contact the [address] at [phone] or if after hours contact your primary care physician or go to the hospital emergency department. PLEASE NOTE: IF YOU ARE UNABLE TO OBTAIN WOUND SUPPLIES, CONTINUE TO USE THE SUPPLIES YOU HAVE AVAILABLE UNTIL YOU ARE ABLE TO REACH US. IT IS MOST IMPORTANT TO KEEP THE WOUND COVERED AT ALL TIMES.     Electronically signed Josselyn Escoto RN on 11/16/2022 at 11:19 AM

## 2022-11-16 NOTE — FLOWSHEET NOTE
11/16/22 1100 11/16/22 1102   Wound 11/16/22 Groin #1 penis to include left groin   Date First Assessed/Time First Assessed: 11/16/22 1059   Present on Hospital Admission: Yes  Wound Approximate Age at First Assessment (Weeks): 6 weeks  Primary Wound Type: Soft Tissue Necrosis  Location: Groin  Wound Description (Comments): #1 penis . .. Wound Image    --    Wound Etiology Other  (abscess)  --    Wound Cleansed Vashe  --    Wound Length (cm) 5 cm  --    Wound Width (cm) 5 cm  --    Wound Depth (cm) 1.5 cm  --    Wound Surface Area (cm^2) 25 cm^2  --    Wound Volume (cm^3) 37.5 cm^3  --    Post-Procedure Length (cm) 5 cm  --    Post-Procedure Width (cm) 5 cm  --    Post-Procedure Depth (cm) 1.5 cm  --    Post-Procedure Surface Area (cm^2) 25 cm^2  --    Post-Procedure Volume (cm^3) 37.5 cm^3  --    Wound Assessment Granulation tissue  --    Drainage Amount Moderate  --    Drainage Description Serosanguinous  --    Nemo-wound Assessment Intact  --    Wound Thickness Description not for Pressure Injury Full thickness  --    Wound 11/16/22 Back Left;Medial #2 back   Date First Assessed/Time First Assessed: 11/16/22 1101   Present on Hospital Admission: Yes  Wound Approximate Age at First Assessment (Weeks): 6 weeks  Primary Wound Type: Soft Tissue Necrosis  Location: Back  Wound Location Orientation: Left;Medial ...    Post-Procedure Length (cm)  --  0.9 cm   Post-Procedure Width (cm)  --  1.2 cm   Post-Procedure Depth (cm)  --  0.6 cm   Post-Procedure Surface Area (cm^2)  --  1.08 cm^2   Post-Procedure Volume (cm^3)  --  0.648 cm^3

## 2022-11-16 NOTE — FLOWSHEET NOTE
11/16/22 1100 11/16/22 1102   Wound 11/16/22 Groin #1 penis to include left groin   Date First Assessed/Time First Assessed: 11/16/22 1059   Present on Hospital Admission: Yes  Wound Approximate Age at First Assessment (Weeks): 6 weeks  Primary Wound Type: Soft Tissue Necrosis  Location: Groin  Wound Description (Comments): #1 penis . .. Wound Length (cm) 5 cm  --    Wound Width (cm) 5 cm  --    Wound Depth (cm) 1.5 cm  --    Wound Surface Area (cm^2) 25 cm^2  --    Wound Volume (cm^3) 37.5 cm^3  --    Post-Procedure Length (cm) 5 cm  --    Post-Procedure Width (cm) 5 cm  --    Post-Procedure Depth (cm) 1.5 cm  --    Post-Procedure Surface Area (cm^2) 25 cm^2  --    Post-Procedure Volume (cm^3) 37.5 cm^3  --    Wound 11/16/22 Back Left;Medial #2 back   Date First Assessed/Time First Assessed: 11/16/22 1101   Present on Hospital Admission: Yes  Wound Approximate Age at First Assessment (Weeks): 6 weeks  Primary Wound Type: Soft Tissue Necrosis  Location: Back  Wound Location Orientation: Left;Medial ...    Wound Image  --      Wound Etiology  --  Other   Wound Cleansed  --  Vashe   Wound Length (cm)  --  0.9 cm   Wound Width (cm)  --  1.2 cm   Wound Depth (cm)  --  0.6 cm   Wound Surface Area (cm^2)  --  1.08 cm^2   Wound Volume (cm^3)  --  0.648 cm^3   Post-Procedure Length (cm)  --  0.9 cm   Post-Procedure Width (cm)  --  1.2 cm   Post-Procedure Depth (cm)  --  0.6 cm   Post-Procedure Surface Area (cm^2)  --  1.08 cm^2   Post-Procedure Volume (cm^3)  --  0.648 cm^3   Distance Tunneling (cm)  --  2.6 cm   Tunneling Position ___ O'Clock  --  2   Wound Assessment  --  Granulation tissue   Drainage Amount  --  Small   Drainage Description  --  Serosanguinous   Odor  --  None   Wound Thickness Description not for Pressure Injury  --  Full thickness

## 2022-11-17 ENCOUNTER — HOME CARE VISIT (OUTPATIENT)
Dept: SCHEDULING | Facility: HOME HEALTH | Age: 57
End: 2022-11-17

## 2022-11-22 ENCOUNTER — HOME CARE VISIT (OUTPATIENT)
Dept: SCHEDULING | Facility: HOME HEALTH | Age: 57
End: 2022-11-22

## 2022-11-22 VITALS
RESPIRATION RATE: 18 BRPM | TEMPERATURE: 98.4 F | HEART RATE: 82 BPM | OXYGEN SATURATION: 97 % | SYSTOLIC BLOOD PRESSURE: 138 MMHG | DIASTOLIC BLOOD PRESSURE: 80 MMHG

## 2022-11-22 PROCEDURE — G0151 HHCP-SERV OF PT,EA 15 MIN: HCPCS

## 2022-11-22 PROCEDURE — G0152 HHCP-SERV OF OT,EA 15 MIN: HCPCS

## 2022-11-23 VITALS
TEMPERATURE: 98.2 F | HEART RATE: 85 BPM | OXYGEN SATURATION: 98 % | RESPIRATION RATE: 16 BRPM | DIASTOLIC BLOOD PRESSURE: 90 MMHG | SYSTOLIC BLOOD PRESSURE: 162 MMHG

## 2022-11-23 ASSESSMENT — ENCOUNTER SYMPTOMS
PAIN LOCATION - PAIN QUALITY: ACHE/SORE
CONSTIPATION: 1

## 2022-11-25 ENCOUNTER — HOME CARE VISIT (OUTPATIENT)
Dept: SCHEDULING | Facility: HOME HEALTH | Age: 57
End: 2022-11-25

## 2022-11-25 VITALS
HEART RATE: 95 BPM | RESPIRATION RATE: 16 BRPM | DIASTOLIC BLOOD PRESSURE: 76 MMHG | SYSTOLIC BLOOD PRESSURE: 145 MMHG | TEMPERATURE: 97.4 F | OXYGEN SATURATION: 97 %

## 2022-11-25 PROCEDURE — G0299 HHS/HOSPICE OF RN EA 15 MIN: HCPCS

## 2022-11-25 PROCEDURE — 400013 HH SOC

## 2022-11-25 ASSESSMENT — ENCOUNTER SYMPTOMS
PAIN LOCATION - PAIN QUALITY: SORE, BURNING
DYSPNEA ACTIVITY LEVEL: AFTER AMBULATING MORE THAN 20 FT

## 2022-11-30 ENCOUNTER — HOME CARE VISIT (OUTPATIENT)
Dept: SCHEDULING | Facility: HOME HEALTH | Age: 57
End: 2022-11-30

## 2022-11-30 VITALS
DIASTOLIC BLOOD PRESSURE: 90 MMHG | OXYGEN SATURATION: 97 % | HEART RATE: 99 BPM | TEMPERATURE: 97.9 F | SYSTOLIC BLOOD PRESSURE: 158 MMHG | RESPIRATION RATE: 18 BRPM

## 2022-11-30 PROCEDURE — G0299 HHS/HOSPICE OF RN EA 15 MIN: HCPCS

## 2022-11-30 ASSESSMENT — ENCOUNTER SYMPTOMS
PAIN LOCATION - PAIN QUALITY: BURNING
DYSPNEA ACTIVITY LEVEL: AFTER AMBULATING MORE THAN 20 FT

## 2022-12-07 ENCOUNTER — HOME CARE VISIT (OUTPATIENT)
Dept: SCHEDULING | Facility: HOME HEALTH | Age: 57
End: 2022-12-07

## 2022-12-07 VITALS
DIASTOLIC BLOOD PRESSURE: 80 MMHG | TEMPERATURE: 97.1 F | SYSTOLIC BLOOD PRESSURE: 138 MMHG | OXYGEN SATURATION: 98 % | RESPIRATION RATE: 16 BRPM | HEART RATE: 92 BPM

## 2022-12-07 PROCEDURE — G0299 HHS/HOSPICE OF RN EA 15 MIN: HCPCS

## 2022-12-07 ASSESSMENT — ENCOUNTER SYMPTOMS
DYSPNEA ACTIVITY LEVEL: AFTER AMBULATING MORE THAN 20 FT
PAIN LOCATION - PAIN QUALITY: SORE

## 2022-12-12 ENCOUNTER — HOME CARE VISIT (OUTPATIENT)
Dept: SCHEDULING | Facility: HOME HEALTH | Age: 57
End: 2022-12-12

## 2022-12-12 PROCEDURE — G0299 HHS/HOSPICE OF RN EA 15 MIN: HCPCS

## 2022-12-14 VITALS
SYSTOLIC BLOOD PRESSURE: 136 MMHG | RESPIRATION RATE: 18 BRPM | DIASTOLIC BLOOD PRESSURE: 70 MMHG | OXYGEN SATURATION: 98 % | TEMPERATURE: 98 F | HEART RATE: 94 BPM

## 2022-12-14 ASSESSMENT — ENCOUNTER SYMPTOMS
PAIN LOCATION - PAIN QUALITY: SORE
DYSPNEA ACTIVITY LEVEL: AFTER AMBULATING MORE THAN 20 FT
STOOL DESCRIPTION: SOFT

## 2023-09-15 ENCOUNTER — OFFICE VISIT (OUTPATIENT)
Dept: URBAN - METROPOLITAN AREA CLINIC 30 | Facility: CLINIC | Age: 58
End: 2023-09-15

## 2023-09-15 DIAGNOSIS — Z96.1 PRESENCE OF INTRAOCULAR LENS: Primary | ICD-10-CM

## 2023-09-15 DIAGNOSIS — H25.812 COMBINED FORMS OF AGE-RELATED CATARACT, LEFT EYE: ICD-10-CM

## 2023-09-15 DIAGNOSIS — E11.3413 TYPE 2 DIAB W SEVERE NONPRLF DIABETIC RTNOP W MACULAR EDEMA, BILATERAL: ICD-10-CM

## 2023-09-15 PROCEDURE — 92004 COMPRE OPH EXAM NEW PT 1/>: CPT

## 2023-09-15 ASSESSMENT — VISUAL ACUITY
OD: 20/400
OS: LP

## 2023-09-15 ASSESSMENT — KERATOMETRY
OS: 45.09
OD: 2254.5

## 2023-09-15 ASSESSMENT — INTRAOCULAR PRESSURE
OS: 17
OD: 17

## 2023-10-13 ENCOUNTER — TECH ONLY (OUTPATIENT)
Dept: URBAN - METROPOLITAN AREA CLINIC 24 | Facility: CLINIC | Age: 58
End: 2023-10-13

## 2023-10-13 ENCOUNTER — ADULT PHYSICAL (OUTPATIENT)
Dept: URBAN - METROPOLITAN AREA CLINIC 24 | Facility: CLINIC | Age: 58
End: 2023-10-13

## 2023-10-13 DIAGNOSIS — H25.812 COMBINED FORMS OF AGE-RELATED CATARACT, LEFT EYE: Primary | ICD-10-CM

## 2023-10-13 DIAGNOSIS — Z01.818 ENCOUNTER FOR OTHER PREPROCEDURAL EXAMINATION: Primary | ICD-10-CM

## 2023-10-13 PROCEDURE — 99203 OFFICE O/P NEW LOW 30 MIN: CPT | Performed by: REGISTERED NURSE

## 2023-10-26 ENCOUNTER — OFFICE VISIT (OUTPATIENT)
Dept: URBAN - METROPOLITAN AREA CLINIC 24 | Facility: CLINIC | Age: 58
End: 2023-10-26

## 2023-10-26 DIAGNOSIS — H25.812 COMBINED FORMS OF AGE-RELATED CATARACT, LEFT EYE: ICD-10-CM

## 2023-10-26 DIAGNOSIS — H43.13 VITREOUS HEMORRHAGE, BILATERAL: ICD-10-CM

## 2023-10-26 DIAGNOSIS — E11.3513 TYPE 2 DIABETES MELLITUS W/ PROLIFERATIVE DIABETIC RETINOPATHY W/ MACULAR EDEMA, BILATERAL: ICD-10-CM

## 2023-10-26 DIAGNOSIS — E11.3413 TYPE 2 DIABETES MELLITUS WITH SEVERE NONPROLIFERATIVE DIABETIC RETINOPATHY WITH MACULAR EDEMA, BILATERAL: Primary | ICD-10-CM

## 2023-10-26 PROCEDURE — 99204 OFFICE O/P NEW MOD 45 MIN: CPT | Performed by: OPHTHALMOLOGY

## 2023-10-26 PROCEDURE — 76512 OPH US DX B-SCAN: CPT | Performed by: OPHTHALMOLOGY

## 2023-10-26 PROCEDURE — 92134 CPTRZ OPH DX IMG PST SGM RTA: CPT | Performed by: OPHTHALMOLOGY

## 2023-10-26 ASSESSMENT — INTRAOCULAR PRESSURE
OD: 15
OS: 19

## 2023-11-07 ENCOUNTER — OFFICE VISIT (OUTPATIENT)
Dept: URBAN - METROPOLITAN AREA CLINIC 30 | Facility: CLINIC | Age: 58
End: 2023-11-07

## 2023-11-07 DIAGNOSIS — L03.213 PRESEPTAL CELLULITIS: Primary | ICD-10-CM

## 2023-11-07 PROCEDURE — 99213 OFFICE O/P EST LOW 20 MIN: CPT

## 2023-11-07 RX ORDER — AMOXICILLIN AND CLAVULANATE POTASSIUM 500; 125 MG/1; 1/1
TABLET, FILM COATED ORAL
Qty: 20 | Refills: 1 | Status: ACTIVE
Start: 2023-11-07

## 2023-11-07 ASSESSMENT — INTRAOCULAR PRESSURE
OS: 19
OD: 23

## 2023-11-22 ENCOUNTER — OFFICE VISIT (OUTPATIENT)
Dept: URBAN - METROPOLITAN AREA CLINIC 24 | Facility: CLINIC | Age: 58
End: 2023-11-22

## 2023-11-22 DIAGNOSIS — L03.213 PRESEPTAL CELLULITIS: ICD-10-CM

## 2023-11-22 DIAGNOSIS — H25.812 COMBINED FORMS OF AGE-RELATED CATARACT, LEFT EYE: Primary | ICD-10-CM

## 2023-11-22 DIAGNOSIS — H43.13 VITREOUS HEMORRHAGE, BILATERAL: ICD-10-CM

## 2023-11-22 PROCEDURE — 92136 OPHTHALMIC BIOMETRY: CPT | Performed by: OPHTHALMOLOGY

## 2023-11-22 PROCEDURE — 99214 OFFICE O/P EST MOD 30 MIN: CPT | Performed by: OPHTHALMOLOGY

## 2023-11-22 RX ORDER — PREDNISOLONE ACETATE 10 MG/ML
1 % SUSPENSION/ DROPS OPHTHALMIC
Qty: 2.5 | Refills: 2 | Status: ACTIVE
Start: 2023-11-22

## 2023-11-22 RX ORDER — DICLOFENAC SODIUM 1 MG/ML
0.1 % SOLUTION/ DROPS OPHTHALMIC
Qty: 2.5 | Refills: 2 | Status: ACTIVE
Start: 2023-11-22

## 2023-11-22 ASSESSMENT — INTRAOCULAR PRESSURE
OD: 16
OS: 16

## 2023-12-04 ENCOUNTER — SURGERY (OUTPATIENT)
Dept: URBAN - METROPOLITAN AREA SURGERY 12 | Facility: SURGERY | Age: 58
End: 2023-12-04

## 2023-12-04 PROCEDURE — 66982 XCAPSL CTRC RMVL CPLX WO ECP: CPT | Performed by: OPHTHALMOLOGY

## 2023-12-05 ENCOUNTER — POST-OPERATIVE VISIT (OUTPATIENT)
Dept: URBAN - METROPOLITAN AREA CLINIC 30 | Facility: CLINIC | Age: 58
End: 2023-12-05

## 2023-12-05 DIAGNOSIS — Z96.1 PRESENCE OF INTRAOCULAR LENS: Primary | ICD-10-CM

## 2023-12-05 PROCEDURE — 99024 POSTOP FOLLOW-UP VISIT: CPT

## 2023-12-05 ASSESSMENT — INTRAOCULAR PRESSURE: OS: 10

## 2023-12-18 ENCOUNTER — OFFICE VISIT (OUTPATIENT)
Dept: URBAN - METROPOLITAN AREA CLINIC 24 | Facility: CLINIC | Age: 58
End: 2023-12-18

## 2023-12-18 DIAGNOSIS — E11.3513 TYPE 2 DIABETES MELLITUS WITH PROLIFERATIVE DIABETIC RETINOPATHY WITH MACULAR EDEMA, BILATERAL: Primary | ICD-10-CM

## 2023-12-18 PROCEDURE — 99214 OFFICE O/P EST MOD 30 MIN: CPT | Performed by: OPHTHALMOLOGY

## 2023-12-18 PROCEDURE — 92134 CPTRZ OPH DX IMG PST SGM RTA: CPT | Performed by: OPHTHALMOLOGY

## 2023-12-18 ASSESSMENT — INTRAOCULAR PRESSURE
OD: 31
OS: 16
OS: 30

## 2024-07-24 ENCOUNTER — HOSPITAL ENCOUNTER (EMERGENCY)
Age: 59
Discharge: HOME | End: 2024-07-25
Attending: EMERGENCY MEDICINE
Payer: MEDICAID

## 2024-07-24 ENCOUNTER — APPOINTMENT (OUTPATIENT)
Dept: GENERAL RADIOLOGY | Age: 59
End: 2024-07-24
Payer: MEDICAID

## 2024-07-24 VITALS
DIASTOLIC BLOOD PRESSURE: 76 MMHG | HEART RATE: 86 BPM | SYSTOLIC BLOOD PRESSURE: 154 MMHG | WEIGHT: 310 LBS | TEMPERATURE: 98.2 F | BODY MASS INDEX: 43.4 KG/M2 | OXYGEN SATURATION: 96 % | HEIGHT: 71 IN | RESPIRATION RATE: 16 BRPM

## 2024-07-24 DIAGNOSIS — E11.621 DIABETIC ULCER OF FOOT ASSOCIATED WITH TYPE 2 DIABETES MELLITUS, LIMITED TO BREAKDOWN OF SKIN, UNSPECIFIED LATERALITY, UNSPECIFIED PART OF FOOT (HCC): Primary | ICD-10-CM

## 2024-07-24 DIAGNOSIS — L97.501 DIABETIC ULCER OF FOOT ASSOCIATED WITH TYPE 2 DIABETES MELLITUS, LIMITED TO BREAKDOWN OF SKIN, UNSPECIFIED LATERALITY, UNSPECIFIED PART OF FOOT (HCC): Primary | ICD-10-CM

## 2024-07-24 DIAGNOSIS — Z99.2 ESRD (END STAGE RENAL DISEASE) ON DIALYSIS (HCC): ICD-10-CM

## 2024-07-24 DIAGNOSIS — N18.6 ESRD (END STAGE RENAL DISEASE) ON DIALYSIS (HCC): ICD-10-CM

## 2024-07-24 DIAGNOSIS — R60.0 BILATERAL LOWER EXTREMITY EDEMA: ICD-10-CM

## 2024-07-24 PROCEDURE — 96365 THER/PROPH/DIAG IV INF INIT: CPT

## 2024-07-24 PROCEDURE — 80053 COMPREHEN METABOLIC PANEL: CPT

## 2024-07-24 PROCEDURE — 83605 ASSAY OF LACTIC ACID: CPT

## 2024-07-24 PROCEDURE — 84145 PROCALCITONIN (PCT): CPT

## 2024-07-24 PROCEDURE — 73630 X-RAY EXAM OF FOOT: CPT

## 2024-07-24 PROCEDURE — 99284 EMERGENCY DEPT VISIT MOD MDM: CPT

## 2024-07-24 PROCEDURE — 85025 COMPLETE CBC W/AUTO DIFF WBC: CPT

## 2024-07-24 ASSESSMENT — ENCOUNTER SYMPTOMS
ABDOMINAL PAIN: 0
COLOR CHANGE: 1
VOMITING: 0
NAUSEA: 0

## 2024-07-25 LAB
ALBUMIN SERPL-MCNC: 3 G/DL (ref 3.2–4.6)
ALBUMIN/GLOB SERPL: 0.5 (ref 1–1.9)
ALP SERPL-CCNC: 154 U/L (ref 40–129)
ALT SERPL-CCNC: 32 U/L (ref 12–65)
ANION GAP SERPL CALC-SCNC: 15 MMOL/L (ref 9–18)
AST SERPL-CCNC: 23 U/L (ref 15–37)
BASOPHILS # BLD: 0.1 K/UL (ref 0–0.2)
BASOPHILS NFR BLD: 1 % (ref 0–2)
BILIRUB SERPL-MCNC: 0.4 MG/DL (ref 0–1.2)
BUN SERPL-MCNC: 69 MG/DL (ref 8–23)
CALCIUM SERPL-MCNC: 9.1 MG/DL (ref 8.8–10.2)
CHLORIDE SERPL-SCNC: 102 MMOL/L (ref 98–107)
CO2 SERPL-SCNC: 22 MMOL/L (ref 20–28)
CREAT SERPL-MCNC: 8.87 MG/DL (ref 0.8–1.3)
DIFFERENTIAL METHOD BLD: ABNORMAL
EOSINOPHIL # BLD: 0.4 K/UL (ref 0–0.8)
EOSINOPHIL NFR BLD: 3 % (ref 0.5–7.8)
ERYTHROCYTE [DISTWIDTH] IN BLOOD BY AUTOMATED COUNT: 14.4 % (ref 11.9–14.6)
GLOBULIN SER CALC-MCNC: 5.7 G/DL (ref 2.3–3.5)
GLUCOSE SERPL-MCNC: 145 MG/DL (ref 70–99)
HCT VFR BLD AUTO: 32.6 % (ref 41.1–50.3)
HGB BLD-MCNC: 10.3 G/DL (ref 13.6–17.2)
IMM GRANULOCYTES # BLD AUTO: 0 K/UL (ref 0–0.5)
IMM GRANULOCYTES NFR BLD AUTO: 0 % (ref 0–5)
LACTATE SERPL-SCNC: 0.6 MMOL/L (ref 0.5–2)
LYMPHOCYTES # BLD: 2 K/UL (ref 0.5–4.6)
LYMPHOCYTES NFR BLD: 17 % (ref 13–44)
MCH RBC QN AUTO: 29.3 PG (ref 26.1–32.9)
MCHC RBC AUTO-ENTMCNC: 31.6 G/DL (ref 31.4–35)
MCV RBC AUTO: 92.6 FL (ref 82–102)
MONOCYTES # BLD: 1 K/UL (ref 0.1–1.3)
MONOCYTES NFR BLD: 8 % (ref 4–12)
NEUTS SEG # BLD: 8.5 K/UL (ref 1.7–8.2)
NEUTS SEG NFR BLD: 71 % (ref 43–78)
NRBC # BLD: 0 K/UL (ref 0–0.2)
PLATELET # BLD AUTO: 270 K/UL (ref 150–450)
PMV BLD AUTO: 11.1 FL (ref 9.4–12.3)
POTASSIUM SERPL-SCNC: 5.5 MMOL/L (ref 3.5–5.1)
PROCALCITONIN SERPL-MCNC: 0.41 NG/ML (ref 0–0.1)
PROT SERPL-MCNC: 8.7 G/DL (ref 6.3–8.2)
RBC # BLD AUTO: 3.52 M/UL (ref 4.23–5.6)
SODIUM SERPL-SCNC: 138 MMOL/L (ref 136–145)
WBC # BLD AUTO: 12 K/UL (ref 4.3–11.1)

## 2024-07-25 PROCEDURE — 6360000002 HC RX W HCPCS: Performed by: EMERGENCY MEDICINE

## 2024-07-25 RX ORDER — CLINDAMYCIN PHOSPHATE 600 MG/50ML
600 INJECTION, SOLUTION INTRAVENOUS
Status: COMPLETED | OUTPATIENT
Start: 2024-07-25 | End: 2024-07-25

## 2024-07-25 RX ORDER — CLINDAMYCIN HYDROCHLORIDE 300 MG/1
300 CAPSULE ORAL 4 TIMES DAILY
Qty: 40 CAPSULE | Refills: 0 | Status: SHIPPED | OUTPATIENT
Start: 2024-07-25 | End: 2024-08-04

## 2024-07-25 RX ADMIN — CLINDAMYCIN PHOSPHATE 600 MG: 600 INJECTION, SOLUTION INTRAVENOUS at 02:29

## 2024-07-25 NOTE — ED NOTES
Patient mobility status  wheelchair bound. Provider aware     I have reviewed discharge instructions with the patient and spouse.  The patient and spouse verbalized understanding.    Patient left ED via Discharge Method: wheelchair to Home with Spouse.    Opportunity for questions and clarification provided.     Patient given 1 scripts.            Tayler Chavez RN  07/25/24 0539

## 2024-07-25 NOTE — ED PROVIDER NOTES
17.2 g/dL    Hematocrit 32.6 (L) 41.1 - 50.3 %    MCV 92.6 82 - 102 FL    MCH 29.3 26.1 - 32.9 PG    MCHC 31.6 31.4 - 35.0 g/dL    RDW 14.4 11.9 - 14.6 %    Platelets 270 150 - 450 K/uL    MPV 11.1 9.4 - 12.3 FL    nRBC 0.00 0.0 - 0.2 K/uL    Differential Type AUTOMATED      Neutrophils % 71 43 - 78 %    Lymphocytes % 17 13 - 44 %    Monocytes % 8 4.0 - 12.0 %    Eosinophils % 3 0.5 - 7.8 %    Basophils % 1 0.0 - 2.0 %    Immature Granulocytes % 0 0.0 - 5.0 %    Neutrophils Absolute 8.5 (H) 1.7 - 8.2 K/UL    Lymphocytes Absolute 2.0 0.5 - 4.6 K/UL    Monocytes Absolute 1.0 0.1 - 1.3 K/UL    Eosinophils Absolute 0.4 0.0 - 0.8 K/UL    Basophils Absolute 0.1 0.0 - 0.2 K/UL    Immature Granulocytes Absolute 0.0 0.0 - 0.5 K/UL   Comprehensive Metabolic Panel   Result Value Ref Range    Sodium 138 136 - 145 mmol/L    Potassium 5.5 (H) 3.5 - 5.1 mmol/L    Chloride 102 98 - 107 mmol/L    CO2 22 20 - 28 mmol/L    Anion Gap 15 9 - 18 mmol/L    Glucose 145 (H) 70 - 99 mg/dL    BUN 69 (H) 8 - 23 MG/DL    Creatinine 8.87 (H) 0.80 - 1.30 MG/DL    Est, Glom Filt Rate 6 (L) >60 ml/min/1.73m2    Calcium 9.1 8.8 - 10.2 MG/DL    Total Bilirubin 0.4 0.0 - 1.2 MG/DL    ALT 32 12 - 65 U/L    AST 23 15 - 37 U/L    Alk Phosphatase 154 (H) 40 - 129 U/L    Total Protein 8.7 (H) 6.3 - 8.2 g/dL    Albumin 3.0 (L) 3.2 - 4.6 g/dL    Globulin 5.7 (H) 2.3 - 3.5 g/dL    Albumin/Globulin Ratio 0.5 (L) 1.0 - 1.9     Lactic Acid   Result Value Ref Range    Lactic Acid 0.6 0.5 - 2.0 mmol/L   Procalcitonin   Result Value Ref Range    Procalcitonin 0.41 (H) 0.00 - 0.10 ng/mL         XR FOOT RIGHT (MIN 3 VIEWS)   Final Result   1. Severe degenerative change.   2. Diffuse soft tissue swelling.   3. Severe atherosclerosis.   4. Osteopenia       Electronically signed by Rekha Howard      XR FOOT LEFT (MIN 3 VIEWS)   Final Result   1. Severe degenerative change.   2. Diffuse soft tissue swelling.   3. Severe atherosclerosis.   4. Osteopenia

## 2024-07-25 NOTE — ED TRIAGE NOTES
C/o leg swelling and wounds on his feet. Is a dialysis pt and is to be dialyzed tomorrow. Is from out of town. Denies chest pain or shob. Wears 4LNC at baseline

## (undated) DEVICE — BLADE ES ELASTOMERIC COAT INSUL DURABLE BEND UPTO 90DEG

## (undated) DEVICE — AGENT HEMSTAT W3XL4IN OXIDIZED REGENERATED CELOS ABSRB FOR

## (undated) DEVICE — PENCIL ES L3M BTTN SWCH HOLSTER W/ BLDE ELECTRD EDGE

## (undated) DEVICE — MINOR SPLIT GENERAL: Brand: MEDLINE INDUSTRIES, INC.

## (undated) DEVICE — SUTURE MCRYL SZ 3-0 L27IN ABSRB UD L19MM PS-2 3/8 CIR PRIM Y427H

## (undated) DEVICE — SOLUTION IRRIG 1000ML 09% SOD CHL USP PIC PLAS CONTAINER

## (undated) DEVICE — MASTISOL ADHESIVE LIQ 2/3ML

## (undated) DEVICE — GLOVE SURG SZ 7 L12IN FNGR THK79MIL GRN LTX FREE

## (undated) DEVICE — STRIP,CLOSURE,WOUND,MEDI-STRIP,1/2X4: Brand: MEDLINE

## (undated) DEVICE — BANDAGE,GAUZE,BULKEE II,4.5"X4.1YD,STRL: Brand: MEDLINE

## (undated) DEVICE — GLOVE ORANGE PI 7   MSG9070

## (undated) DEVICE — PAD,ABDOMINAL,5"X9",ST,LF,25/BX: Brand: MEDLINE INDUSTRIES, INC.